# Patient Record
Sex: FEMALE | Race: WHITE | NOT HISPANIC OR LATINO | Employment: OTHER | URBAN - METROPOLITAN AREA
[De-identification: names, ages, dates, MRNs, and addresses within clinical notes are randomized per-mention and may not be internally consistent; named-entity substitution may affect disease eponyms.]

---

## 2017-04-28 ENCOUNTER — TRANSCRIBE ORDERS (OUTPATIENT)
Dept: ADMINISTRATIVE | Facility: HOSPITAL | Age: 82
End: 2017-04-28

## 2017-04-28 DIAGNOSIS — Z78.0 POSTMENOPAUSAL: ICD-10-CM

## 2017-04-28 DIAGNOSIS — Z12.31 VISIT FOR SCREENING MAMMOGRAM: Primary | ICD-10-CM

## 2017-05-05 ENCOUNTER — APPOINTMENT (OUTPATIENT)
Dept: BONE DENSITY | Facility: HOSPITAL | Age: 82
End: 2017-05-05
Attending: INTERNAL MEDICINE

## 2017-05-05 ENCOUNTER — APPOINTMENT (OUTPATIENT)
Dept: MAMMOGRAPHY | Facility: HOSPITAL | Age: 82
End: 2017-05-05
Attending: INTERNAL MEDICINE

## 2017-05-19 ENCOUNTER — APPOINTMENT (OUTPATIENT)
Dept: BONE DENSITY | Facility: HOSPITAL | Age: 82
End: 2017-05-19
Attending: INTERNAL MEDICINE

## 2017-05-19 ENCOUNTER — HOSPITAL ENCOUNTER (OUTPATIENT)
Dept: MAMMOGRAPHY | Facility: HOSPITAL | Age: 82
Discharge: HOME | End: 2017-05-19
Attending: INTERNAL MEDICINE | Admitting: INTERNAL MEDICINE

## 2017-05-19 DIAGNOSIS — Z78.0 POSTMENOPAUSAL: ICD-10-CM

## 2017-05-19 DIAGNOSIS — Z12.31 VISIT FOR SCREENING MAMMOGRAM: ICD-10-CM

## 2017-05-19 PROCEDURE — G0202 SCR MAMMO BI INCL CAD: HCPCS

## 2017-05-19 PROCEDURE — 77063 BREAST TOMOSYNTHESIS BI: CPT

## 2017-05-19 PROCEDURE — 77080 DXA BONE DENSITY AXIAL: CPT

## 2017-09-15 ENCOUNTER — HOSPITAL ENCOUNTER (OUTPATIENT)
Dept: INFUSION THERAPY | Facility: HOSPITAL | Age: 82
Discharge: HOME | End: 2017-09-15

## 2017-09-15 DIAGNOSIS — M81.0 SENILE OSTEOPOROSIS: Primary | ICD-10-CM

## 2017-10-26 ENCOUNTER — HOSPITAL ENCOUNTER (OUTPATIENT)
Dept: INFUSION THERAPY | Facility: HOSPITAL | Age: 82
Setting detail: INFUSION SERIES
Discharge: HOME | End: 2017-10-26
Admitting: INTERNAL MEDICINE

## 2017-10-26 VITALS
WEIGHT: 123 LBS | BODY MASS INDEX: 21.79 KG/M2 | RESPIRATION RATE: 16 BRPM | SYSTOLIC BLOOD PRESSURE: 162 MMHG | HEART RATE: 85 BPM | HEIGHT: 63 IN | DIASTOLIC BLOOD PRESSURE: 81 MMHG | OXYGEN SATURATION: 99 % | TEMPERATURE: 97.6 F

## 2017-10-26 DIAGNOSIS — M81.0 SENILE OSTEOPOROSIS: Primary | ICD-10-CM

## 2017-10-26 LAB
CALCIUM SPEC-SCNC: 8.9 MG/DL (ref 8.8–10.5)
CREAT BLD-MCNC: 1.63 MG/DL (ref 0.57–1)
GFR SERPL CREATININE-BSD FRML MDRD: 30 ML/MIN/1.73

## 2017-10-26 RX ORDER — LEVOTHYROXINE SODIUM 88 UG/1
88 TABLET ORAL DAILY
COMMUNITY

## 2017-10-26 RX ORDER — MIRTAZAPINE 15 MG/1
15 TABLET, FILM COATED ORAL NIGHTLY
COMMUNITY
End: 2018-04-25

## 2017-10-26 RX ORDER — LISINOPRIL AND HYDROCHLOROTHIAZIDE 12.5; 1 MG/1; MG/1
1 TABLET ORAL DAILY
COMMUNITY
End: 2018-04-20 | Stop reason: HOSPADM

## 2017-10-26 RX ORDER — CLONAZEPAM 0.5 MG/1
0.5 TABLET ORAL NIGHTLY
Status: ON HOLD | COMMUNITY
End: 2018-04-26

## 2017-10-26 RX ORDER — ZOLEDRONIC ACID 5 MG/100ML
5 INJECTION, SOLUTION INTRAVENOUS ONCE
Status: DISCONTINUED | OUTPATIENT
Start: 2017-10-26 | End: 2017-10-28 | Stop reason: HOSPADM

## 2017-10-26 NOTE — PROGRESS NOTES
1100 PT. TO [facility] FOR SCHEDULED RECLAST INFUSION, LABS DRAWN PER LAB. CREATININE LEVEL ELEVATED, DR. ELDRIDGE NOTIFIED, WILL HOLD INJECTION FOR TODAY, PT. DISCHARGED WITH DAUGHTER. FILOMENA BARRETO RN

## 2017-10-26 NOTE — PATIENT INSTRUCTIONS
Call Dr. Dion [facility] at [phone] Zoledronic Acid injection (Paget's Disease, Osteoporosis)  What is this medicine?  ZOLEDRONIC ACID (NICHOLAS wendi preston ik AS id) lowers the amount of calcium loss from bone. It is used to treat Paget's disease and osteoporosis in women.  This medicine may be used for other purposes; ask your health care provider or pharmacist if you have questions.  COMMON BRAND NAME(S): Reclast, Zometa  What should I tell my health care provider before I take this medicine?  They need to know if you have any of these conditions:  -aspirin-sensitive asthma  -cancer, especially if you are receiving medicines used to treat cancer  -dental disease or wear dentures  -infection  -kidney disease  -low levels of calcium in the blood  -past surgery on the parathyroid gland or intestines  -receiving corticosteroids like dexamethasone or prednisone  -an unusual or allergic reaction to zoledronic acid, other medicines, foods, dyes, or preservatives  -pregnant or trying to get pregnant  -breast-feeding  How should I use this medicine?  This medicine is for infusion into a vein. It is given by a health care professional in a hospital or clinic setting.  Talk to your pediatrician regarding the use of this medicine in children. This medicine is not approved for use in children.  Overdosage: If you think you have taken too much of this medicine contact a poison control center or emergency room at once.  NOTE: This medicine is only for you. Do not share this medicine with others.  What if I miss a dose?  It is important not to miss your dose. Call your doctor or health care professional if you are unable to keep an appointment.  What may interact with this medicine?  -certain antibiotics given by injection  -NSAIDs, medicines for pain and inflammation, like ibuprofen or naproxen  -some diuretics like bumetanide, furosemide  -teriparatide  This list may not describe all possible interactions. Give your health  care provider a list of all the medicines, herbs, non-prescription drugs, or dietary supplements you use. Also tell them if you smoke, drink alcohol, or use illegal drugs. Some items may interact with your medicine.  What should I watch for while using this medicine?  Visit your doctor or health care professional for regular checkups. It may be some time before you see the benefit from this medicine. Do not stop taking your medicine unless your doctor tells you to. Your doctor may order blood tests or other tests to see how you are doing.  Women should inform their doctor if they wish to become pregnant or think they might be pregnant. There is a potential for serious side effects to an unborn child. Talk to your health care professional or pharmacist for more information.  You should make sure that you get enough calcium and vitamin D while you are taking this medicine. Discuss the foods you eat and the vitamins you take with your health care professional.  Some people who take this medicine have severe bone, joint, and/or muscle pain. This medicine may also increase your risk for jaw problems or a broken thigh bone. Tell your doctor right away if you have severe pain in your jaw, bones, joints, or muscles. Tell your doctor if you have any pain that does not go away or that gets worse.  Tell your dentist and dental surgeon that you are taking this medicine. You should not have major dental surgery while on this medicine. See your dentist to have a dental exam and fix any dental problems before starting this medicine. Take good care of your teeth while on this medicine. Make sure you see your dentist for regular follow-up appointments.  What side effects may I notice from receiving this medicine?  Side effects that you should report to your doctor or health care professional as soon as possible:  -allergic reactions like skin rash, itching or hives, swelling of the face, lips, or tongue  -anxiety, confusion, or  "depression  -breathing problems  -changes in vision  -eye pain  -feeling faint or lightheaded, falls  -jaw pain, especially after dental work  -mouth sores  -muscle cramps, stiffness, or weakness  -redness, blistering, peeling or loosening of the skin, including inside the mouth  -trouble passing urine or change in the amount of urine  Side effects that usually do not require medical attention (report to your doctor or health care professional if they continue or are bothersome):  -bone, joint, or muscle pain  -constipation  -diarrhea  -fever  -hair loss  -irritation at site where injected  -loss of appetite  -nausea, vomiting  -stomach upset  -trouble sleeping  -trouble swallowing  -weak or tired  This list may not describe all possible side effects. Call your doctor for medical advice about side effects. You may report side effects to FDA at 5-438-FDA-8782.  Where should I keep my medicine?  This drug is given in a hospital or clinic and will not be stored at home.  NOTE: This sheet is a summary. It may not cover all possible information. If you have questions about this medicine, talk to your doctor, pharmacist, or health care provider.     © 2017, [employer]/Gold [employer]. (2015-05-16 14:19:57)   if you have any problems or concerns.    We know you have a Choice in healthcare and appreciate you using [facility].  Our purpose is to provide you \"Excellent Care\".  We hope that you will always choose us in the future and continue to recommend us to your family and friends.                "

## 2018-04-18 ENCOUNTER — APPOINTMENT (OUTPATIENT)
Dept: CT IMAGING | Facility: HOSPITAL | Age: 83
End: 2018-04-18
Attending: INTERNAL MEDICINE

## 2018-04-18 ENCOUNTER — HOSPITAL ENCOUNTER (EMERGENCY)
Facility: HOSPITAL | Age: 83
Discharge: ANOTHER HOSPITAL | End: 2018-04-18
Attending: EMERGENCY MEDICINE | Admitting: EMERGENCY MEDICINE

## 2018-04-18 ENCOUNTER — APPOINTMENT (OUTPATIENT)
Dept: CT IMAGING | Facility: HOSPITAL | Age: 83
End: 2018-04-18

## 2018-04-18 ENCOUNTER — APPOINTMENT (OUTPATIENT)
Dept: CARDIOLOGY | Facility: HOSPITAL | Age: 83
End: 2018-04-18
Attending: INTERNAL MEDICINE

## 2018-04-18 ENCOUNTER — APPOINTMENT (OUTPATIENT)
Dept: GENERAL RADIOLOGY | Facility: HOSPITAL | Age: 83
End: 2018-04-18

## 2018-04-18 ENCOUNTER — HOSPITAL ENCOUNTER (INPATIENT)
Facility: HOSPITAL | Age: 83
LOS: 2 days | Discharge: HOME HEALTH | End: 2018-04-20
Attending: INTERNAL MEDICINE | Admitting: INTERNAL MEDICINE

## 2018-04-18 VITALS
OXYGEN SATURATION: 98 % | SYSTOLIC BLOOD PRESSURE: 115 MMHG | RESPIRATION RATE: 14 BRPM | HEIGHT: 62 IN | BODY MASS INDEX: 20.89 KG/M2 | TEMPERATURE: 97.9 F | HEART RATE: 91 BPM | WEIGHT: 113.5 LBS | DIASTOLIC BLOOD PRESSURE: 69 MMHG

## 2018-04-18 DIAGNOSIS — R55 SYNCOPE AND COLLAPSE: Primary | ICD-10-CM

## 2018-04-18 DIAGNOSIS — R77.8 TROPONIN I ABOVE REFERENCE RANGE: ICD-10-CM

## 2018-04-18 DIAGNOSIS — S09.90XA INJURY OF HEAD, INITIAL ENCOUNTER: ICD-10-CM

## 2018-04-18 DIAGNOSIS — M25.551 BILATERAL HIP PAIN: ICD-10-CM

## 2018-04-18 DIAGNOSIS — E87.5 HYPERKALEMIA: ICD-10-CM

## 2018-04-18 DIAGNOSIS — S00.03XA HEMATOMA OF SCALP, INITIAL ENCOUNTER: ICD-10-CM

## 2018-04-18 DIAGNOSIS — E87.1 HYPONATREMIA: ICD-10-CM

## 2018-04-18 DIAGNOSIS — N18.9 CHRONIC RENAL IMPAIRMENT, UNSPECIFIED CKD STAGE: ICD-10-CM
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
## 2018-04-18 DIAGNOSIS — I61.5 INTRAVENTRICULAR HEMORRHAGE (HCC): ICD-10-CM

## 2018-04-18 DIAGNOSIS — M25.552 BILATERAL HIP PAIN: ICD-10-CM

## 2018-04-18 DIAGNOSIS — R53.81 DEBILITY: Primary | ICD-10-CM

## 2018-04-18 DIAGNOSIS — I49.1 PAC (PREMATURE ATRIAL CONTRACTION): ICD-10-CM

## 2018-04-18 PROBLEM — T79.6XXA TRAUMATIC RHABDOMYOLYSIS (HCC): Status: ACTIVE | Noted: 2018-04-18

## 2018-04-18 PROBLEM — I10 ESSENTIAL HYPERTENSION: Status: ACTIVE | Noted: 2018-04-18

## 2018-04-18 PROBLEM — N18.30 CHRONIC KIDNEY DISEASE, STAGE 3 (HCC): Status: ACTIVE | Noted: 2018-04-18

## 2018-04-18 PROBLEM — I62.9 INTRACRANIAL HEMORRHAGE (HCC): Status: ACTIVE | Noted: 2018-04-18

## 2018-04-18 PROBLEM — S06.30AA TRAUMATIC INTRAVENTRICULAR HEMORRHAGE: Status: ACTIVE | Noted: 2018-04-18

## 2018-04-18 PROBLEM — R82.90 ABNORMAL URINALYSIS: Status: ACTIVE | Noted: 2018-04-18

## 2018-04-18 LAB
ALBUMIN SERPL-MCNC: 4 G/DL (ref 3.5–5.2)
ALBUMIN/GLOB SERPL: 1.3 G/DL
ALP SERPL-CCNC: 74 U/L (ref 40–129)
ALT SERPL W P-5'-P-CCNC: 13 U/L (ref 5–33)
ANION GAP SERPL CALCULATED.3IONS-SCNC: 11.2 MMOL/L
AORTIC DIMENSIONLESS INDEX: 1.1 (DI)
APTT PPP: 26.9 SECONDS (ref 24.3–38.1)
AST SERPL-CCNC: 23 U/L (ref 5–32)
BACTERIA UR QL AUTO: ABNORMAL /HPF
BASOPHILS # BLD AUTO: 0.04 10*3/MM3 (ref 0–0.2)
BASOPHILS NFR BLD AUTO: 0.3 % (ref 0–2)
BH CV ECHO MEAS - AO MAX PG (FULL): -3.8 MMHG
BH CV ECHO MEAS - AO MAX PG: 8.4 MMHG
BH CV ECHO MEAS - AO MEAN PG (FULL): -1 MMHG
BH CV ECHO MEAS - AO MEAN PG: 5 MMHG
BH CV ECHO MEAS - AO V2 MAX: 145 CM/SEC
BH CV ECHO MEAS - AO V2 MEAN: 106 CM/SEC
BH CV ECHO MEAS - AO V2 VTI: 32 CM
BH CV ECHO MEAS - AVA(I,A): 2.7 CM^2
BH CV ECHO MEAS - AVA(I,D): 2.7 CM^2
BH CV ECHO MEAS - AVA(V,A): 3.1 CM^2
BH CV ECHO MEAS - AVA(V,D): 3.1 CM^2
BH CV ECHO MEAS - BSA(HAYCOCK): 1.5 M^2
BH CV ECHO MEAS - BSA: 1.5 M^2
BH CV ECHO MEAS - BZI_BMI: 20.9 KILOGRAMS/M^2
BH CV ECHO MEAS - BZI_METRIC_HEIGHT: 157.5 CM
BH CV ECHO MEAS - BZI_METRIC_WEIGHT: 51.7 KG
BH CV ECHO MEAS - CONTRAST EF (2CH): 81 ML/M^2
BH CV ECHO MEAS - CONTRAST EF 4CH: 80.8 ML/M^2
BH CV ECHO MEAS - EDV(CUBED): 42.9 ML
BH CV ECHO MEAS - EDV(MOD-SP2): 42 ML
BH CV ECHO MEAS - EDV(MOD-SP4): 52 ML
BH CV ECHO MEAS - EDV(TEICH): 50.9 ML
BH CV ECHO MEAS - EF(CUBED): 86.4 %
BH CV ECHO MEAS - EF(MOD-BP): 80 %
BH CV ECHO MEAS - EF(MOD-SP2): 81 %
BH CV ECHO MEAS - EF(MOD-SP4): 80.8 %
BH CV ECHO MEAS - EF(TEICH): 80.9 %
BH CV ECHO MEAS - ESV(CUBED): 5.8 ML
BH CV ECHO MEAS - ESV(MOD-SP2): 8 ML
BH CV ECHO MEAS - ESV(MOD-SP4): 10 ML
BH CV ECHO MEAS - ESV(TEICH): 9.7 ML
BH CV ECHO MEAS - FS: 48.6 %
BH CV ECHO MEAS - IVS/LVPW: 1
BH CV ECHO MEAS - IVSD: 0.9 CM
BH CV ECHO MEAS - LAT PEAK E' VEL: 11 CM/SEC
BH CV ECHO MEAS - LV DIASTOLIC VOL/BSA (35-75): 34.5 ML/M^2
BH CV ECHO MEAS - LV MASS(C)D: 88.8 GRAMS
BH CV ECHO MEAS - LV MASS(C)DI: 59 GRAMS/M^2
BH CV ECHO MEAS - LV MAX PG: 12.3 MMHG
BH CV ECHO MEAS - LV MEAN PG: 6 MMHG
BH CV ECHO MEAS - LV SYSTOLIC VOL/BSA (12-30): 6.6 ML/M^2
BH CV ECHO MEAS - LV V1 MAX: 175 CM/SEC
BH CV ECHO MEAS - LV V1 MEAN: 114 CM/SEC
BH CV ECHO MEAS - LV V1 VTI: 34.5 CM
BH CV ECHO MEAS - LVIDD: 3.5 CM
BH CV ECHO MEAS - LVIDS: 1.8 CM
BH CV ECHO MEAS - LVLD AP2: 7.3 CM
BH CV ECHO MEAS - LVLD AP4: 7.3 CM
BH CV ECHO MEAS - LVLS AP2: 6.1 CM
BH CV ECHO MEAS - LVLS AP4: 6.1 CM
BH CV ECHO MEAS - LVOT AREA (M): 2.5 CM^2
BH CV ECHO MEAS - LVOT AREA: 2.5 CM^2
BH CV ECHO MEAS - LVOT DIAM: 1.8 CM
BH CV ECHO MEAS - LVPWD: 0.9 CM
BH CV ECHO MEAS - MED PEAK E' VEL: 14 CM/SEC
BH CV ECHO MEAS - MV DEC SLOPE: 626 CM/SEC^2
BH CV ECHO MEAS - MV DEC TIME: 0.21 SEC
BH CV ECHO MEAS - MV E MAX VEL: 119 CM/SEC
BH CV ECHO MEAS - MV MAX PG: 7 MMHG
BH CV ECHO MEAS - MV MEAN PG: 3 MMHG
BH CV ECHO MEAS - MV P1/2T MAX VEL: 133 CM/SEC
BH CV ECHO MEAS - MV P1/2T: 62.2 MSEC
BH CV ECHO MEAS - MV V2 MAX: 132 CM/SEC
BH CV ECHO MEAS - MV V2 MEAN: 79.6 CM/SEC
BH CV ECHO MEAS - MV V2 VTI: 31.6 CM
BH CV ECHO MEAS - MVA P1/2T LCG: 1.7 CM^2
BH CV ECHO MEAS - MVA(P1/2T): 3.5 CM^2
BH CV ECHO MEAS - MVA(VTI): 2.8 CM^2
BH CV ECHO MEAS - PA ACC TIME: 0.1 SEC
BH CV ECHO MEAS - PA MAX PG (FULL): 2 MMHG
BH CV ECHO MEAS - PA MAX PG: 3.2 MMHG
BH CV ECHO MEAS - PA PR(ACCEL): 34.5 MMHG
BH CV ECHO MEAS - PA V2 MAX: 88.9 CM/SEC
BH CV ECHO MEAS - PVA(V,A): 1.9 CM^2
BH CV ECHO MEAS - PVA(V,D): 1.9 CM^2
BH CV ECHO MEAS - QP/QS: 0.24
BH CV ECHO MEAS - RAP SYSTOLE: 3 MMHG
BH CV ECHO MEAS - RV MAX PG: 1.2 MMHG
BH CV ECHO MEAS - RV MEAN PG: 1 MMHG
BH CV ECHO MEAS - RV V1 MAX: 53.9 CM/SEC
BH CV ECHO MEAS - RV V1 MEAN: 32.1 CM/SEC
BH CV ECHO MEAS - RV V1 VTI: 6.8 CM
BH CV ECHO MEAS - RVOT AREA: 3.1 CM^2
BH CV ECHO MEAS - RVOT DIAM: 2 CM
BH CV ECHO MEAS - RVSP: 34.4 MMHG
BH CV ECHO MEAS - SI(CUBED): 24.6 ML/M^2
BH CV ECHO MEAS - SI(LVOT): 58.3 ML/M^2
BH CV ECHO MEAS - SI(MOD-SP2): 22.6 ML/M^2
BH CV ECHO MEAS - SI(MOD-SP4): 27.9 ML/M^2
BH CV ECHO MEAS - SI(TEICH): 27.3 ML/M^2
BH CV ECHO MEAS - SV(CUBED): 37 ML
BH CV ECHO MEAS - SV(LVOT): 87.8 ML
BH CV ECHO MEAS - SV(MOD-SP2): 34 ML
BH CV ECHO MEAS - SV(MOD-SP4): 42 ML
BH CV ECHO MEAS - SV(RVOT): 21.5 ML
BH CV ECHO MEAS - SV(TEICH): 41.1 ML
BH CV ECHO MEAS - TAPSE (>1.6): 1.8 CM2
BH CV ECHO MEAS - TR MAX VEL: 280 CM/SEC
BH CV ECHO MEASUREMENTS AVERAGE E/E' RATIO: 10
BH CV VAS BP RIGHT ARM: NORMAL MMHG
BH CV XLRA - RV BASE: 3 CM
BH CV XLRA - TDI S': 16 CM/SEC
BILIRUB SERPL-MCNC: 0.4 MG/DL (ref 0.2–1.2)
BILIRUB UR QL STRIP: NEGATIVE
BUN BLD-MCNC: 25 MG/DL (ref 8–23)
BUN/CREAT SERPL: 16.3 (ref 7–25)
CALCIUM SPEC-SCNC: 9.4 MG/DL (ref 8.8–10.5)
CHLORIDE SERPL-SCNC: 88 MMOL/L (ref 98–107)
CK SERPL-CCNC: 200 U/L (ref 26–142)
CLARITY UR: CLEAR
CO2 SERPL-SCNC: 29.8 MMOL/L (ref 22–29)
COLOR UR: YELLOW
CREAT BLD-MCNC: 1.53 MG/DL (ref 0.57–1)
DEPRECATED RDW RBC AUTO: 43.6 FL (ref 37–54)
EOSINOPHIL # BLD AUTO: 0.31 10*3/MM3 (ref 0.1–0.3)
EOSINOPHIL NFR BLD AUTO: 2.5 % (ref 0–4)
ERYTHROCYTE [DISTWIDTH] IN BLOOD BY AUTOMATED COUNT: 13.2 % (ref 11.5–14.5)
GFR SERPL CREATININE-BSD FRML MDRD: 32 ML/MIN/1.73
GLOBULIN UR ELPH-MCNC: 3.1 GM/DL
GLUCOSE BLD-MCNC: 105 MG/DL (ref 65–99)
GLUCOSE UR STRIP-MCNC: NEGATIVE MG/DL
HCT VFR BLD AUTO: 34.9 % (ref 37–47)
HGB BLD-MCNC: 12.1 G/DL (ref 12–16)
HGB UR QL STRIP.AUTO: ABNORMAL
HYALINE CASTS UR QL AUTO: ABNORMAL /LPF
IMM GRANULOCYTES # BLD: 0.05 10*3/MM3 (ref 0–0.03)
IMM GRANULOCYTES NFR BLD: 0.4 % (ref 0–0.5)
INR PPP: 1.02 (ref 0.9–1.1)
INR PPP: 1.09 (ref 0.9–1.1)
KETONES UR QL STRIP: NEGATIVE
LEFT ATRIUM VOLUME INDEX: 22 ML/M2
LEFT ATRIUM VOLUME: 31 CM3
LEUKOCYTE ESTERASE UR QL STRIP.AUTO: NEGATIVE
LV EF 2D ECHO EST: 80 %
LYMPHOCYTES # BLD AUTO: 1.22 10*3/MM3 (ref 0.6–4.8)
LYMPHOCYTES NFR BLD AUTO: 9.9 % (ref 20–45)
MAGNESIUM SERPL-MCNC: 1.9 MG/DL (ref 1.7–2.5)
MAXIMAL PREDICTED HEART RATE: 133 BPM
MCH RBC QN AUTO: 31.2 PG (ref 27–31)
MCHC RBC AUTO-ENTMCNC: 34.7 G/DL (ref 31–37)
MCV RBC AUTO: 89.9 FL (ref 81–99)
MONOCYTES # BLD AUTO: 0.75 10*3/MM3 (ref 0–1)
MONOCYTES NFR BLD AUTO: 6.1 % (ref 3–8)
MUCOUS THREADS URNS QL MICRO: ABNORMAL /HPF
NEUTROPHILS # BLD AUTO: 10 10*3/MM3 (ref 1.5–8.3)
NEUTROPHILS NFR BLD AUTO: 80.8 % (ref 45–70)
NITRITE UR QL STRIP: NEGATIVE
NRBC BLD MANUAL-RTO: 0 /100 WBC (ref 0–0)
PH UR STRIP.AUTO: 7 [PH] (ref 4.5–8)
PLATELET # BLD AUTO: 292 10*3/MM3 (ref 140–500)
PMV BLD AUTO: 8.8 FL (ref 7.4–10.4)
POTASSIUM BLD-SCNC: 4.5 MMOL/L (ref 3.5–5.2)
POTASSIUM BLD-SCNC: 5.4 MMOL/L (ref 3.5–5.2)
PROT SERPL-MCNC: 7.1 G/DL (ref 6–8.5)
PROT UR QL STRIP: NEGATIVE
PROTHROMBIN TIME: 13.4 SECONDS (ref 12.1–15)
PROTHROMBIN TIME: 13.9 SECONDS (ref 11.7–14.2)
RBC # BLD AUTO: 3.88 10*6/MM3 (ref 4.2–5.4)
RBC # UR: ABNORMAL /HPF
REF LAB TEST METHOD: ABNORMAL
SODIUM BLD-SCNC: 129 MMOL/L (ref 136–145)
SP GR UR STRIP: 1.01 (ref 1–1.03)
SQUAMOUS #/AREA URNS HPF: ABNORMAL /HPF
STRESS TARGET HR: 113 BPM
TROPONIN T SERPL-MCNC: 0.03 NG/ML (ref 0–0.03)
TROPONIN T SERPL-MCNC: 0.03 NG/ML (ref 0–0.03)
TROPONIN T SERPL-MCNC: 0.05 NG/ML (ref 0–0.03)
UROBILINOGEN UR QL STRIP: ABNORMAL
WBC NRBC COR # BLD: 12.37 10*3/MM3 (ref 4.8–10.8)
WBC UR QL AUTO: ABNORMAL /HPF

## 2018-04-18 PROCEDURE — 93005 ELECTROCARDIOGRAM TRACING: CPT | Performed by: INTERNAL MEDICINE

## 2018-04-18 PROCEDURE — 93306 TTE W/DOPPLER COMPLETE: CPT

## 2018-04-18 PROCEDURE — P9612 CATHETERIZE FOR URINE SPEC: HCPCS

## 2018-04-18 PROCEDURE — 82550 ASSAY OF CK (CPK): CPT | Performed by: EMERGENCY MEDICINE

## 2018-04-18 PROCEDURE — 93005 ELECTROCARDIOGRAM TRACING: CPT | Performed by: EMERGENCY MEDICINE

## 2018-04-18 PROCEDURE — 70450 CT HEAD/BRAIN W/O DYE: CPT

## 2018-04-18 PROCEDURE — 83735 ASSAY OF MAGNESIUM: CPT | Performed by: EMERGENCY MEDICINE

## 2018-04-18 PROCEDURE — 85730 THROMBOPLASTIN TIME PARTIAL: CPT | Performed by: EMERGENCY MEDICINE

## 2018-04-18 PROCEDURE — 81001 URINALYSIS AUTO W/SCOPE: CPT | Performed by: EMERGENCY MEDICINE

## 2018-04-18 PROCEDURE — 99221 1ST HOSP IP/OBS SF/LOW 40: CPT | Performed by: NEUROLOGICAL SURGERY

## 2018-04-18 PROCEDURE — 84484 ASSAY OF TROPONIN QUANT: CPT | Performed by: EMERGENCY MEDICINE

## 2018-04-18 PROCEDURE — 99285 EMERGENCY DEPT VISIT HI MDM: CPT

## 2018-04-18 PROCEDURE — 85025 COMPLETE CBC W/AUTO DIFF WBC: CPT | Performed by: EMERGENCY MEDICINE

## 2018-04-18 PROCEDURE — 93010 ELECTROCARDIOGRAM REPORT: CPT | Performed by: INTERNAL MEDICINE

## 2018-04-18 PROCEDURE — 84484 ASSAY OF TROPONIN QUANT: CPT | Performed by: INTERNAL MEDICINE

## 2018-04-18 PROCEDURE — 99285 EMERGENCY DEPT VISIT HI MDM: CPT | Performed by: EMERGENCY MEDICINE

## 2018-04-18 PROCEDURE — 85610 PROTHROMBIN TIME: CPT | Performed by: INTERNAL MEDICINE

## 2018-04-18 PROCEDURE — 80053 COMPREHEN METABOLIC PANEL: CPT | Performed by: EMERGENCY MEDICINE

## 2018-04-18 PROCEDURE — 72125 CT NECK SPINE W/O DYE: CPT

## 2018-04-18 PROCEDURE — 73523 X-RAY EXAM HIPS BI 5/> VIEWS: CPT

## 2018-04-18 PROCEDURE — 85610 PROTHROMBIN TIME: CPT | Performed by: EMERGENCY MEDICINE

## 2018-04-18 PROCEDURE — 84132 ASSAY OF SERUM POTASSIUM: CPT | Performed by: INTERNAL MEDICINE

## 2018-04-18 PROCEDURE — 87086 URINE CULTURE/COLONY COUNT: CPT | Performed by: EMERGENCY MEDICINE

## 2018-04-18 PROCEDURE — 93306 TTE W/DOPPLER COMPLETE: CPT | Performed by: INTERNAL MEDICINE

## 2018-04-18 RX ORDER — DULOXETIN HYDROCHLORIDE 30 MG/1
30 CAPSULE, DELAYED RELEASE ORAL DAILY
COMMUNITY
End: 2018-04-20 | Stop reason: HOSPADM

## 2018-04-18 RX ORDER — ONDANSETRON 4 MG/1
4 TABLET, ORALLY DISINTEGRATING ORAL EVERY 6 HOURS PRN
Status: DISCONTINUED | OUTPATIENT
Start: 2018-04-18 | End: 2018-04-20 | Stop reason: HOSPADM

## 2018-04-18 RX ORDER — SODIUM CHLORIDE 0.9 % (FLUSH) 0.9 %
10 SYRINGE (ML) INJECTION AS NEEDED
Status: DISCONTINUED | OUTPATIENT
Start: 2018-04-18 | End: 2018-04-18 | Stop reason: HOSPADM

## 2018-04-18 RX ORDER — SODIUM CHLORIDE 9 MG/ML
125 INJECTION, SOLUTION INTRAVENOUS CONTINUOUS
Status: DISCONTINUED | OUTPATIENT
Start: 2018-04-18 | End: 2018-04-18 | Stop reason: HOSPADM

## 2018-04-18 RX ORDER — CALCIUM CARBONATE 200(500)MG
2 TABLET,CHEWABLE ORAL 2 TIMES DAILY PRN
Status: DISCONTINUED | OUTPATIENT
Start: 2018-04-18 | End: 2018-04-20 | Stop reason: HOSPADM

## 2018-04-18 RX ORDER — CLONAZEPAM 0.5 MG/1
0.5 TABLET ORAL NIGHTLY
Status: DISCONTINUED | OUTPATIENT
Start: 2018-04-18 | End: 2018-04-20 | Stop reason: HOSPADM

## 2018-04-18 RX ORDER — ONDANSETRON 4 MG/1
4 TABLET, FILM COATED ORAL EVERY 6 HOURS PRN
Status: DISCONTINUED | OUTPATIENT
Start: 2018-04-18 | End: 2018-04-20 | Stop reason: HOSPADM

## 2018-04-18 RX ORDER — NITROGLYCERIN 0.4 MG/1
0.4 TABLET SUBLINGUAL
Status: DISCONTINUED | OUTPATIENT
Start: 2018-04-18 | End: 2018-04-20 | Stop reason: HOSPADM

## 2018-04-18 RX ORDER — BISACODYL 5 MG/1
5 TABLET, DELAYED RELEASE ORAL DAILY PRN
Status: DISCONTINUED | OUTPATIENT
Start: 2018-04-18 | End: 2018-04-20 | Stop reason: HOSPADM

## 2018-04-18 RX ORDER — ACETAMINOPHEN 325 MG/1
650 TABLET ORAL EVERY 4 HOURS PRN
Status: DISCONTINUED | OUTPATIENT
Start: 2018-04-18 | End: 2018-04-20 | Stop reason: HOSPADM

## 2018-04-18 RX ORDER — HYDROCODONE BITARTRATE AND ACETAMINOPHEN 5; 325 MG/1; MG/1
1 TABLET ORAL EVERY 4 HOURS PRN
Status: DISCONTINUED | OUTPATIENT
Start: 2018-04-18 | End: 2018-04-20 | Stop reason: HOSPADM

## 2018-04-18 RX ORDER — SODIUM CHLORIDE 0.9 % (FLUSH) 0.9 %
1-10 SYRINGE (ML) INJECTION AS NEEDED
Status: DISCONTINUED | OUTPATIENT
Start: 2018-04-18 | End: 2018-04-20 | Stop reason: HOSPADM

## 2018-04-18 RX ORDER — SODIUM CHLORIDE 9 MG/ML
75 INJECTION, SOLUTION INTRAVENOUS CONTINUOUS
Status: DISCONTINUED | OUTPATIENT
Start: 2018-04-18 | End: 2018-04-18

## 2018-04-18 RX ORDER — LEVOTHYROXINE SODIUM 88 UG/1
88 TABLET ORAL
Status: DISCONTINUED | OUTPATIENT
Start: 2018-04-18 | End: 2018-04-20 | Stop reason: HOSPADM

## 2018-04-18 RX ORDER — MIRTAZAPINE 15 MG/1
15 TABLET, FILM COATED ORAL NIGHTLY
Status: DISCONTINUED | OUTPATIENT
Start: 2018-04-18 | End: 2018-04-20 | Stop reason: HOSPADM

## 2018-04-18 RX ORDER — ONDANSETRON 2 MG/ML
4 INJECTION INTRAMUSCULAR; INTRAVENOUS EVERY 6 HOURS PRN
Status: DISCONTINUED | OUTPATIENT
Start: 2018-04-18 | End: 2018-04-20 | Stop reason: HOSPADM

## 2018-04-18 RX ADMIN — SODIUM CHLORIDE 500 ML: 9 INJECTION, SOLUTION INTRAVENOUS at 05:42

## 2018-04-18 RX ADMIN — CLONAZEPAM 0.5 MG: 0.5 TABLET ORAL at 21:36

## 2018-04-18 RX ADMIN — LEVOTHYROXINE SODIUM 88 MCG: 88 TABLET ORAL at 17:32

## 2018-04-18 RX ADMIN — SODIUM CHLORIDE 75 ML/HR: 9 INJECTION, SOLUTION INTRAVENOUS at 12:30

## 2018-04-18 RX ADMIN — MIRTAZAPINE 15 MG: 15 TABLET, FILM COATED ORAL at 21:36

## 2018-04-18 NOTE — ED NOTES
Pt has a red spot about the size of a quarter on the left posterior side of her head.  No bleeding noted.  Ice bag replenished.     Ning Villafana RN  04/18/18 2249

## 2018-04-18 NOTE — ED NOTES
[facility], message left for Dr. Rider.      Sadie Maddox  04/18/18 0656

## 2018-04-18 NOTE — ED NOTES
[facility] EMS crew here.  Report given to crew.  Nursing unit notified that patient was on the way.     Ning Villafana RN  04/18/18 0904

## 2018-04-18 NOTE — ED PROVIDER NOTES
"Subjective   History of Present Illness   History obtained from patient, and family at bedside.    History of Present Illness    Chief complaint: Head injury and bilateral hip pain status post fall    Location: As above    Quality/Severity:  Mild discomfort without movement    Timing/Duration: Abrupt onset this evening, estimated around 20/3/30    Modifying Factors: Hip pain worse with range of motion    Associated Symptoms: Amnestic to event, otherwise appropriate according to family.  Patient has recently been experiencing episodic \"vertigo\" ×2 weeks.  No report of focal weakness.  No other recent illness    Narrative: 87-year-old female who lives alone and called her daughter-in-law this morning after falling in her home and being unable to get up.  Patient is amnestic to event.  Patient has been taking some Antivert as of late for some dizzy spells that she terms as vertigo.    PCP: Dion Ann                                                                                                                                                                      Review of Systems  All other systems reviewed and are otherwise negative as related chief complaint.    Past Medical History:   Diagnosis Date   • Depression    • Disease of thyroid gland    • Hypertension    • Pulmonary embolism     SPONTANEOUS HEMMORHAGE       No Known Allergies    Past Surgical History:   Procedure Laterality Date   • BREAST BIOPSY      BENIGN   • HYSTERECTOMY     • THYROID SURGERY      PARATHYROID REMOVAL   • VENA CAVA FILTER INSERTION         Family History   Problem Relation Age of Onset   • Breast cancer Daughter        Social History     Social History   • Marital status:      Social History Main Topics   • Smoking status: Former Smoker     Packs/day: 0.50     Years: 25.00     Quit date: 1974   • Smokeless tobacco: Never Used   • Alcohol use 0.6 oz/week     1 Glasses of wine per week      Comment: RARELY   • Drug use: No   • " Sexual activity: Defer     Other Topics Concern   • Not on file     ED Triage Vitals [04/18/18 0506]   Temp Heart Rate Resp BP SpO2   97.7 °F (36.5 °C) 89 18 155/83 95 %      Temp src Heart Rate Source Patient Position BP Location FiO2 (%)   Oral -- -- -- --       Objective   Physical Exam   Constitutional: She is oriented to person, place, and time. She appears well-developed. No distress.   HENT:   Head: Normocephalic. Head is without raccoon's eyes and without Rao's sign.       Mouth/Throat: Oropharynx is clear and moist.   Eyes: Conjunctivae and EOM are normal. Pupils are equal, round, and reactive to light. No scleral icterus.   Neck: Neck supple.   C-collar in place.  No focal bony tenderness.  No step-off.  C5-8 motor and sensory grossly intact   Cardiovascular: Normal rate, regular rhythm and intact distal pulses.    Pulmonary/Chest: Effort normal and breath sounds normal. No respiratory distress.   Abdominal: Soft. There is no tenderness.   Musculoskeletal:   Bilateral hip discomfort with extension however there is no discomfort with passive internal and external rotation.  No shortening or deformity appreciated.   Neurological: She is alert and oriented to person, place, and time.   Skin: Skin is warm and dry.   Psychiatric: She has a normal mood and affect. Thought content normal.   Nursing note and vitals reviewed.      Procedures         ED Course  ED Course   Value Comment By Time   Sodium: (!) 129 Chronic    Results for WILMER DA SILVA (MRN [id number]) as of 4/18/2018 06:12    12/2/2014 06:51  Sodium: 131 (L)    12/3/2014 05:55  Sodium: 130 (L)    12/4/2014 05:50  Sodium: 129 (L)    10/26/2017 11:28    4/18/2018 05:32  Sodium: 129 (L)   Andrea Bradshaw MD 04/18 0613   Creatinine: (!) 1.53 Stable  Results for WILMER DA SILVA (MRN [id number]) as of 4/18/2018 06:12    10/26/2017 11:28  Creatinine: 1.63 (H)   Andrea Bradshaw MD 04/18 0613    CONSULT        Provider: Dr. Hurt -  Neurosurgery    Discussion: Reviewed patient history, ED presentation and evaluation.  He is reassured that the patient presented 4-6 hours after her fall but is agreeable to consult.  He does recommend that the patient remain nothing by mouth until repeat imaging 4-6 hours after the initial head CT.  Every hour neuro checks are recommended until that point.    Agreeable c treatment and planned disposition.   Andrea Bradshaw MD 04/18 0658    Patient agreeable with plan for transfer.  We will contact the hospitalist at [facility] for transfer. Andrea Bradshaw MD 04/18 0659    Case discussed with Dr. Smith who is agreeable to consult with the hospitalist at [facility] from the whom we are awaiting a return call. Andrea Bradshaw MD 04/18 0733    CONSULT        Provider: Dr. Adry GASPAR [facility]    Discussion: accepts pt in transfer    Agreeable c treatment and planned disposition.   Andrea Bradshaw MD 04/18 0800      EKG           EKG time: 0 533  Rhythm/Rate: Sinus, 75-90; frequent PACs  P waves and SD: ALONDRA within normal limits  QRS, axis: Narrow QRS complex   ST and T waves: No acute appearing ischemic changes interpretation is slightly affected by underlying artifact; QTC within normal limits     Interpreted contemporaneously with treatment by me, independently viewed      RADIOLOGY        Study: XR bilateral hips with pelvis    Findings: No acute finding the right hip or pelvis; chronic-appearing change to left hip with no acute fracture or sublux identified.    Interpreted contemporaneously with treatment by me, independently viewed by me    Results for orders placed or performed during the hospital encounter of 04/18/18   Comprehensive Metabolic Panel   Result Value Ref Range    Glucose 105 (H) 65 - 99 mg/dL    BUN 25 (H) 8 - 23 mg/dL    Creatinine 1.53 (H) 0.57 - 1.00 mg/dL    Sodium 129 (L) 136 - 145 mmol/L    Potassium 5.4 (H) 3.5 - 5.2 mmol/L    Chloride 88 (L) 98 - 107 mmol/L    CO2 29.8  (H) 22.0 - 29.0 mmol/L    Calcium 9.4 8.8 - 10.5 mg/dL    Total Protein 7.1 6.0 - 8.5 g/dL    Albumin 4.00 3.50 - 5.20 g/dL    ALT (SGPT) 13 5 - 33 U/L    AST (SGOT) 23 5 - 32 U/L    Alkaline Phosphatase 74 40 - 129 U/L    Total Bilirubin 0.4 0.2 - 1.2 mg/dL    eGFR Non African Amer 32 (L) >60 mL/min/1.73    Globulin 3.1 gm/dL    A/G Ratio 1.3 g/dL    BUN/Creatinine Ratio 16.3 7.0 - 25.0    Anion Gap 11.2 mmol/L   aPTT   Result Value Ref Range    PTT 26.9 24.3 - 38.1 seconds   Protime-INR   Result Value Ref Range    Protime 13.4 12.1 - 15.0 Seconds    INR 1.02 0.90 - 1.10   Urinalysis With / Culture If Indicated - Urine, Catheter   Result Value Ref Range    Color, UA Yellow Yellow, Straw    Appearance, UA Clear Clear    pH, UA 7.0 4.5 - 8.0    Specific Gravity, UA 1.010 1.003 - 1.030    Glucose, UA Negative Negative    Ketones, UA Negative Negative, 80 mg/dL (3+), >=160 mg/dL (4+)    Bilirubin, UA Negative Negative    Blood, UA Trace (A) Negative    Protein, UA Negative Negative    Leuk Esterase, UA Negative Negative    Nitrite, UA Negative Negative    Urobilinogen, UA 0.2 E.U./dL 0.2 - 1.0 E.U./dL   Troponin   Result Value Ref Range    Troponin T 0.048 (H) 0.000 - 0.030 ng/mL   CK   Result Value Ref Range    Creatine Kinase 200 (H) 26 - 142 U/L   Magnesium   Result Value Ref Range    Magnesium 1.9 1.7 - 2.5 mg/dL   CBC Auto Differential   Result Value Ref Range    WBC 12.37 (H) 4.80 - 10.80 10*3/mm3    RBC 3.88 (L) 4.20 - 5.40 10*6/mm3    Hemoglobin 12.1 12.0 - 16.0 g/dL    Hematocrit 34.9 (L) 37.0 - 47.0 %    MCV 89.9 81.0 - 99.0 fL    MCH 31.2 (H) 27.0 - 31.0 pg    MCHC 34.7 31.0 - 37.0 g/dL    RDW 13.2 11.5 - 14.5 %    RDW-SD 43.6 37.0 - 54.0 fl    MPV 8.8 7.4 - 10.4 fL    Platelets 292 140 - 500 10*3/mm3    Neutrophil % 80.8 (H) 45.0 - 70.0 %    Lymphocyte % 9.9 (L) 20.0 - 45.0 %    Monocyte % 6.1 3.0 - 8.0 %    Eosinophil % 2.5 0.0 - 4.0 %    Basophil % 0.3 0.0 - 2.0 %    Immature Grans % 0.4 0.0 - 0.5 %     Neutrophils, Absolute 10.00 (H) 1.50 - 8.30 10*3/mm3    Lymphocytes, Absolute 1.22 0.60 - 4.80 10*3/mm3    Monocytes, Absolute 0.75 0.00 - 1.00 10*3/mm3    Eosinophils, Absolute 0.31 (H) 0.10 - 0.30 10*3/mm3    Basophils, Absolute 0.04 0.00 - 0.20 10*3/mm3    Immature Grans, Absolute 0.05 (H) 0.00 - 0.03 10*3/mm3    nRBC 0.0 0.0 - 0.0 /100 WBC   Urinalysis, Microscopic Only - Urine, Clean Catch   Result Value Ref Range    RBC, UA None Seen None Seen /HPF    WBC, UA 0-2 (A) None Seen /HPF    Bacteria, UA 1+ (A) None Seen /HPF    Squamous Epithelial Cells, UA 0-2 None Seen, 0-2 /HPF    Hyaline Casts, UA 0-2 None Seen /LPF    Mucus, UA Small/1+ (A) None Seen, Trace /HPF    Methodology Manual Light Microscopy        Ct Head Without Contrast    Result Date: 4/18/2018  Narrative: CT HEAD, NONCONTRAST, 4/18/2018:  HISTORY: 87-year-old female in the ED after head injury. She was found down in her home by a family mineral this morning after an apparent fall but does not recall event. Left posterior scalp hematoma.  TECHNIQUE: CT examination of the head without IV contrast. Radiation dose reduction techniques included automated exposure control or exposure modulation based on body size. Radiation audit for CT and nuclear cardiology exams in the last 12 months: 0.  FINDINGS: A left posterolateral parietal scalp hematoma is noted, but there is no visible skull fracture.  There is a tiny amount of isolated, acute intraventricular hemorrhage layering within the posterior horn left lateral ventricle. No parenchymal hematoma or subarachnoid hemorrhage is identified.  Mild generalized cerebral atrophy. Mild diffuse low-attenuation white matter changes are nonspecific but likely related to chronic small vessel disease.  No evidence of mass, mass effect, cerebral edema or hydrocephalus at this time.      Impression: 1. Isolated small volume acute intraventricular hemorrhage layering within the posterior horn left lateral ventricle. 2.  No other acute intracranial abnormality is identified. 3. Left parietal scalp hematoma. No skull fracture. 4. Mild diffuse chronic changes as noted above.  This report was finalized on 4/18/2018 6:38 AM by Dr. Malcom Machuca MD.      Ct Cervical Spine Without Contrast    Result Date: 4/18/2018  Narrative: CT CERVICAL SPINE, 4/18/2018:  HISTORY: 87-year-old female in the ED after head injury. She was found down in her home by a family member this morning after an apparent fall but does not recall event. Left posterior scalp hematoma  TECHNIQUE: Axial CT images of the cervical spine from the skull base to the upper margin of T5 with multiplanar image reconstruction. Radiation dose reduction techniques included automated exposure control or exposure modulation based on body size. Radiation audit for CT and nuclear cardiology exams in the last 12 months: 0.  FINDINGS: No fracture or other acute osseous abnormality is demonstrated. Mild to moderate degenerative disc space changes at C5-6 and C6-7. Mild to moderate degenerative facet arthropathy throughout the cervical spine bilaterally. Cervical vertebral alignment is normal. Demineralization suggests osteoporosis.      Impression: 1. No acute osseous abnormality. 2. Multilevel degenerative changes as noted above.  This report was finalized on 4/18/2018 6:42 AM by Dr. Malcom Machuca MD.      Xr Hips Bilateral With Or Without Pelvis 5 View    Result Date: 4/18/2018  Narrative: PELVIS AND HIPS, 4/18/2018:  HISTORY: 87-year-old female in the ED after a fall in the bathroom this morning with headache injury. Bilateral hip pain.  TECHNIQUE: AP pelvis and hips. Lateral radiograph of each hip.  FINDINGS: No acute fracture or dislocation is demonstrated involving the pelvis or hips. Three cannulated fixation screws traverse an old left femoral neck fracture.  Mild to moderate degenerative arthropathy involving both hips, greater on the right. Mild demineralization.       Impression: 1. No acute osseous abnormality. 2. ORIF old left femoral neck fracture. 3. Degenerative arthropathy both hips, greater on the right.  This report was finalized on 4/18/2018 7:41 AM by Dr. Malcom Machuca MD.                MDM  Number of Diagnoses or Management Options     Amount and/or Complexity of Data Reviewed  Clinical lab tests: reviewed and ordered  Tests in the radiology section of CPT®: reviewed and ordered  Tests in the medicine section of CPT®: reviewed and ordered  Decide to obtain previous medical records or to obtain history from someone other than the patient: yes  Review and summarize past medical records: yes  Discuss the patient with other providers: yes  Independent visualization of images, tracings, or specimens: yes        Final diagnoses:   Syncope and collapse   Injury of head, initial encounter   Intraventricular hemorrhage   Hematoma of scalp, initial encounter   Hyperkalemia   Troponin I above reference range   Hyponatremia   Chronic renal impairment, unspecified CKD stage   Bilateral hip pain   Fall, initial encounter   PAC (premature atrial contraction)            Andrea Bradshaw MD  04/18/18 0748       Andrea Bradshaw MD  04/18/18 0843

## 2018-04-18 NOTE — ED NOTES
Dr Bradshaw speaking with Dr Hurt, neurosurgery at [facility], about patient transfer.     Inna Salas, RN  04/18/18 2140

## 2018-04-19 ENCOUNTER — APPOINTMENT (OUTPATIENT)
Dept: CARDIOLOGY | Facility: HOSPITAL | Age: 83
End: 2018-04-19
Attending: INTERNAL MEDICINE

## 2018-04-19 PROBLEM — N17.9 AKI (ACUTE KIDNEY INJURY) (HCC): Status: ACTIVE | Noted: 2018-04-19

## 2018-04-19 LAB
ANION GAP SERPL CALCULATED.3IONS-SCNC: 11.6 MMOL/L
BASOPHILS # BLD AUTO: 0.01 10*3/MM3 (ref 0–0.2)
BASOPHILS NFR BLD AUTO: 0.1 % (ref 0–1.5)
BH CV XLRA MEAS LEFT CCA RATIO VEL: 69.9 CM/SEC
BH CV XLRA MEAS LEFT DIST CCA EDV: 14.5 CM/SEC
BH CV XLRA MEAS LEFT DIST CCA PSV: 69.9 CM/SEC
BH CV XLRA MEAS LEFT DIST ICA EDV: -19 CM/SEC
BH CV XLRA MEAS LEFT DIST ICA PSV: -76.5 CM/SEC
BH CV XLRA MEAS LEFT ICA RATIO VEL: -76.5 CM/SEC
BH CV XLRA MEAS LEFT ICA/CCA RATIO: -1.1
BH CV XLRA MEAS LEFT MID ICA EDV: -13 CM/SEC
BH CV XLRA MEAS LEFT MID ICA PSV: -64.4 CM/SEC
BH CV XLRA MEAS LEFT PROX CCA EDV: 14.1 CM/SEC
BH CV XLRA MEAS LEFT PROX CCA PSV: 90.9 CM/SEC
BH CV XLRA MEAS LEFT PROX ECA EDV: -6.5 CM/SEC
BH CV XLRA MEAS LEFT PROX ECA PSV: -85.6 CM/SEC
BH CV XLRA MEAS LEFT PROX ICA EDV: -13.7 CM/SEC
BH CV XLRA MEAS LEFT PROX ICA PSV: -65.2 CM/SEC
BH CV XLRA MEAS LEFT PROX SCLA PSV: 65.7 CM/SEC
BH CV XLRA MEAS LEFT VERTEBRAL A EDV: 14.9 CM/SEC
BH CV XLRA MEAS LEFT VERTEBRAL A PSV: 58.1 CM/SEC
BH CV XLRA MEAS RIGHT CCA RATIO VEL: -68 CM/SEC
BH CV XLRA MEAS RIGHT DIST CCA EDV: -15.7 CM/SEC
BH CV XLRA MEAS RIGHT DIST CCA PSV: -68 CM/SEC
BH CV XLRA MEAS RIGHT DIST ICA EDV: -15.6 CM/SEC
BH CV XLRA MEAS RIGHT DIST ICA PSV: -67.9 CM/SEC
BH CV XLRA MEAS RIGHT ICA RATIO VEL: -67.9 CM/SEC
BH CV XLRA MEAS RIGHT ICA/CCA RATIO: 1
BH CV XLRA MEAS RIGHT MID ICA EDV: -16.9 CM/SEC
BH CV XLRA MEAS RIGHT MID ICA PSV: -59.7 CM/SEC
BH CV XLRA MEAS RIGHT PROX CCA EDV: 12.3 CM/SEC
BH CV XLRA MEAS RIGHT PROX CCA PSV: 75 CM/SEC
BH CV XLRA MEAS RIGHT PROX ECA EDV: -5.1 CM/SEC
BH CV XLRA MEAS RIGHT PROX ECA PSV: -51.9 CM/SEC
BH CV XLRA MEAS RIGHT PROX ICA EDV: 14.7 CM/SEC
BH CV XLRA MEAS RIGHT PROX ICA PSV: 65.7 CM/SEC
BH CV XLRA MEAS RIGHT PROX SCLA PSV: 92.7 CM/SEC
BH CV XLRA MEAS RIGHT VERTEBRAL A EDV: -6.3 CM/SEC
BH CV XLRA MEAS RIGHT VERTEBRAL A PSV: -41.6 CM/SEC
BUN BLD-MCNC: 17 MG/DL (ref 8–23)
BUN/CREAT SERPL: 15 (ref 7–25)
CALCIUM SPEC-SCNC: 8.5 MG/DL (ref 8.6–10.5)
CHLORIDE SERPL-SCNC: 94 MMOL/L (ref 98–107)
CK SERPL-CCNC: 148 U/L (ref 20–180)
CO2 SERPL-SCNC: 25.4 MMOL/L (ref 22–29)
CREAT BLD-MCNC: 1.13 MG/DL (ref 0.57–1)
DEPRECATED RDW RBC AUTO: 44.9 FL (ref 37–54)
EOSINOPHIL # BLD AUTO: 0.35 10*3/MM3 (ref 0–0.7)
EOSINOPHIL NFR BLD AUTO: 5 % (ref 0.3–6.2)
ERYTHROCYTE [DISTWIDTH] IN BLOOD BY AUTOMATED COUNT: 13.6 % (ref 11.7–13)
GFR SERPL CREATININE-BSD FRML MDRD: 46 ML/MIN/1.73
GLUCOSE BLD-MCNC: 91 MG/DL (ref 65–99)
HCT VFR BLD AUTO: 30.4 % (ref 35.6–45.5)
HGB BLD-MCNC: 10.2 G/DL (ref 11.9–15.5)
IMM GRANULOCYTES # BLD: 0.02 10*3/MM3 (ref 0–0.03)
IMM GRANULOCYTES NFR BLD: 0.3 % (ref 0–0.5)
LEFT ARM BP: NORMAL MMHG
LYMPHOCYTES # BLD AUTO: 1.57 10*3/MM3 (ref 0.9–4.8)
LYMPHOCYTES NFR BLD AUTO: 22.6 % (ref 19.6–45.3)
MCH RBC QN AUTO: 30.4 PG (ref 26.9–32)
MCHC RBC AUTO-ENTMCNC: 33.6 G/DL (ref 32.4–36.3)
MCV RBC AUTO: 90.7 FL (ref 80.5–98.2)
MONOCYTES # BLD AUTO: 0.81 10*3/MM3 (ref 0.2–1.2)
MONOCYTES NFR BLD AUTO: 11.7 % (ref 5–12)
NEUTROPHILS # BLD AUTO: 4.19 10*3/MM3 (ref 1.9–8.1)
NEUTROPHILS NFR BLD AUTO: 60.3 % (ref 42.7–76)
PLATELET # BLD AUTO: 259 10*3/MM3 (ref 140–500)
PMV BLD AUTO: 9.1 FL (ref 6–12)
POTASSIUM BLD-SCNC: 4.2 MMOL/L (ref 3.5–5.2)
RBC # BLD AUTO: 3.35 10*6/MM3 (ref 3.9–5.2)
RIGHT ARM BP: NORMAL MMHG
SODIUM BLD-SCNC: 131 MMOL/L (ref 136–145)
TROPONIN T SERPL-MCNC: 0.05 NG/ML (ref 0–0.03)
WBC NRBC COR # BLD: 6.95 10*3/MM3 (ref 4.5–10.7)

## 2018-04-19 PROCEDURE — 85025 COMPLETE CBC W/AUTO DIFF WBC: CPT | Performed by: INTERNAL MEDICINE

## 2018-04-19 PROCEDURE — 93880 EXTRACRANIAL BILAT STUDY: CPT

## 2018-04-19 PROCEDURE — 80048 BASIC METABOLIC PNL TOTAL CA: CPT | Performed by: INTERNAL MEDICINE

## 2018-04-19 PROCEDURE — 82550 ASSAY OF CK (CPK): CPT | Performed by: INTERNAL MEDICINE

## 2018-04-19 PROCEDURE — 99222 1ST HOSP IP/OBS MODERATE 55: CPT | Performed by: INTERNAL MEDICINE

## 2018-04-19 PROCEDURE — 84484 ASSAY OF TROPONIN QUANT: CPT | Performed by: HOSPITALIST

## 2018-04-19 RX ORDER — SODIUM CHLORIDE 9 MG/ML
100 INJECTION, SOLUTION INTRAVENOUS CONTINUOUS
Status: ACTIVE | OUTPATIENT
Start: 2018-04-19 | End: 2018-04-20

## 2018-04-19 RX ADMIN — LEVOTHYROXINE SODIUM 88 MCG: 88 TABLET ORAL at 08:05

## 2018-04-19 RX ADMIN — MIRTAZAPINE 15 MG: 15 TABLET, FILM COATED ORAL at 20:53

## 2018-04-19 RX ADMIN — CLONAZEPAM 0.5 MG: 0.5 TABLET ORAL at 20:53

## 2018-04-19 RX ADMIN — SODIUM CHLORIDE 100 ML/HR: 9 INJECTION, SOLUTION INTRAVENOUS at 14:26

## 2018-04-20 ENCOUNTER — APPOINTMENT (OUTPATIENT)
Dept: CARDIOLOGY | Facility: HOSPITAL | Age: 83
End: 2018-04-20
Attending: INTERNAL MEDICINE

## 2018-04-20 VITALS
WEIGHT: 121.7 LBS | HEIGHT: 62 IN | OXYGEN SATURATION: 97 % | HEART RATE: 79 BPM | DIASTOLIC BLOOD PRESSURE: 74 MMHG | BODY MASS INDEX: 22.39 KG/M2 | RESPIRATION RATE: 18 BRPM | TEMPERATURE: 98 F | SYSTOLIC BLOOD PRESSURE: 148 MMHG

## 2018-04-20 LAB
ALBUMIN SERPL-MCNC: 3.4 G/DL (ref 3.5–5.2)
ANION GAP SERPL CALCULATED.3IONS-SCNC: 13.2 MMOL/L
BACTERIA SPEC AEROBE CULT: NO GROWTH
BUN BLD-MCNC: 15 MG/DL (ref 8–23)
BUN/CREAT SERPL: 12.6 (ref 7–25)
CALCIUM SPEC-SCNC: 7.9 MG/DL (ref 8.6–10.5)
CHLORIDE SERPL-SCNC: 92 MMOL/L (ref 98–107)
CO2 SERPL-SCNC: 24.8 MMOL/L (ref 22–29)
CREAT BLD-MCNC: 1.19 MG/DL (ref 0.57–1)
GFR SERPL CREATININE-BSD FRML MDRD: 43 ML/MIN/1.73
GLUCOSE BLD-MCNC: 88 MG/DL (ref 65–99)
PHOSPHATE SERPL-MCNC: 2.7 MG/DL (ref 2.5–4.5)
POTASSIUM BLD-SCNC: 4.1 MMOL/L (ref 3.5–5.2)
SODIUM BLD-SCNC: 130 MMOL/L (ref 136–145)

## 2018-04-20 PROCEDURE — 99231 SBSQ HOSP IP/OBS SF/LOW 25: CPT | Performed by: INTERNAL MEDICINE

## 2018-04-20 PROCEDURE — 0296T HC EXT ECG > 48HR TO 21 DAY RCRD W/CONECT INTL RCRD: CPT

## 2018-04-20 PROCEDURE — 80069 RENAL FUNCTION PANEL: CPT | Performed by: HOSPITALIST

## 2018-04-20 RX ADMIN — LEVOTHYROXINE SODIUM 88 MCG: 88 TABLET ORAL at 06:53

## 2018-04-25 ENCOUNTER — HOSPITAL ENCOUNTER (OUTPATIENT)
Facility: HOSPITAL | Age: 83
Setting detail: OBSERVATION
Discharge: HOME | End: 2018-04-26
Attending: EMERGENCY MEDICINE | Admitting: INTERNAL MEDICINE

## 2018-04-25 DIAGNOSIS — N17.9 AKI (ACUTE KIDNEY INJURY) (HCC): ICD-10-CM

## 2018-04-25 DIAGNOSIS — E86.0 DEHYDRATION: Primary | ICD-10-CM

## 2018-04-25 PROBLEM — M48.50XA: Status: ACTIVE | Noted: 2018-04-25

## 2018-04-25 LAB
ALBUMIN SERPL-MCNC: 4 G/DL (ref 3.5–5.2)
ALBUMIN/GLOB SERPL: 1.4 G/DL
ALP SERPL-CCNC: 75 U/L (ref 40–129)
ALT SERPL W P-5'-P-CCNC: 12 U/L (ref 5–33)
ANION GAP SERPL CALCULATED.3IONS-SCNC: 13.2 MMOL/L
AST SERPL-CCNC: 19 U/L (ref 5–32)
BASOPHILS # BLD AUTO: 0.04 10*3/MM3 (ref 0–0.2)
BASOPHILS NFR BLD AUTO: 0.6 % (ref 0–2)
BILIRUB SERPL-MCNC: 0.4 MG/DL (ref 0.2–1.2)
BUN BLD-MCNC: 18 MG/DL (ref 8–23)
BUN/CREAT SERPL: 12.9 (ref 7–25)
CALCIUM SPEC-SCNC: 9.3 MG/DL (ref 8.8–10.5)
CHLORIDE SERPL-SCNC: 86 MMOL/L (ref 98–107)
CO2 SERPL-SCNC: 26.8 MMOL/L (ref 22–29)
CREAT BLD-MCNC: 1.39 MG/DL (ref 0.57–1)
DEPRECATED RDW RBC AUTO: 45.6 FL (ref 37–54)
EOSINOPHIL # BLD AUTO: 0.15 10*3/MM3 (ref 0.1–0.3)
EOSINOPHIL NFR BLD AUTO: 2.1 % (ref 0–4)
ERYTHROCYTE [DISTWIDTH] IN BLOOD BY AUTOMATED COUNT: 13.7 % (ref 11.5–14.5)
GFR SERPL CREATININE-BSD FRML MDRD: 36 ML/MIN/1.73
GLOBULIN UR ELPH-MCNC: 2.9 GM/DL
GLUCOSE BLD-MCNC: 208 MG/DL (ref 65–99)
HCT VFR BLD AUTO: 33.4 % (ref 37–47)
HGB BLD-MCNC: 11.6 G/DL (ref 12–16)
IMM GRANULOCYTES # BLD: 0.02 10*3/MM3 (ref 0–0.03)
IMM GRANULOCYTES NFR BLD: 0.3 % (ref 0–0.5)
LYMPHOCYTES # BLD AUTO: 1.42 10*3/MM3 (ref 0.6–4.8)
LYMPHOCYTES NFR BLD AUTO: 20 % (ref 20–45)
MAGNESIUM SERPL-MCNC: 1.8 MG/DL (ref 1.7–2.5)
MCH RBC QN AUTO: 31.5 PG (ref 27–31)
MCHC RBC AUTO-ENTMCNC: 34.7 G/DL (ref 31–37)
MCV RBC AUTO: 90.8 FL (ref 81–99)
MONOCYTES # BLD AUTO: 0.73 10*3/MM3 (ref 0–1)
MONOCYTES NFR BLD AUTO: 10.3 % (ref 3–8)
NEUTROPHILS # BLD AUTO: 4.74 10*3/MM3 (ref 1.5–8.3)
NEUTROPHILS NFR BLD AUTO: 66.7 % (ref 45–70)
NRBC BLD MANUAL-RTO: 0 /100 WBC (ref 0–0)
PLATELET # BLD AUTO: 344 10*3/MM3 (ref 140–500)
PMV BLD AUTO: 9.5 FL (ref 7.4–10.4)
POTASSIUM BLD-SCNC: 4.4 MMOL/L (ref 3.5–5.2)
PROT SERPL-MCNC: 6.9 G/DL (ref 6–8.5)
RBC # BLD AUTO: 3.68 10*6/MM3 (ref 4.2–5.4)
SODIUM BLD-SCNC: 126 MMOL/L (ref 136–145)
TROPONIN T SERPL-MCNC: 0.04 NG/ML (ref 0–0.03)
WBC NRBC COR # BLD: 7.1 10*3/MM3 (ref 4.8–10.8)

## 2018-04-25 PROCEDURE — 99284 EMERGENCY DEPT VISIT MOD MDM: CPT

## 2018-04-25 PROCEDURE — 99219 PR INITIAL OBSERVATION CARE/DAY 50 MINUTES: CPT | Performed by: FAMILY MEDICINE

## 2018-04-25 PROCEDURE — 99285 EMERGENCY DEPT VISIT HI MDM: CPT | Performed by: PHYSICIAN ASSISTANT

## 2018-04-25 PROCEDURE — 85025 COMPLETE CBC W/AUTO DIFF WBC: CPT | Performed by: PHYSICIAN ASSISTANT

## 2018-04-25 PROCEDURE — G0378 HOSPITAL OBSERVATION PER HR: HCPCS

## 2018-04-25 PROCEDURE — 83735 ASSAY OF MAGNESIUM: CPT | Performed by: PHYSICIAN ASSISTANT

## 2018-04-25 PROCEDURE — 80053 COMPREHEN METABOLIC PANEL: CPT | Performed by: PHYSICIAN ASSISTANT

## 2018-04-25 PROCEDURE — 96361 HYDRATE IV INFUSION ADD-ON: CPT

## 2018-04-25 PROCEDURE — 96360 HYDRATION IV INFUSION INIT: CPT

## 2018-04-25 PROCEDURE — 84484 ASSAY OF TROPONIN QUANT: CPT | Performed by: PHYSICIAN ASSISTANT

## 2018-04-25 RX ORDER — SODIUM CHLORIDE 9 MG/ML
40 INJECTION, SOLUTION INTRAVENOUS AS NEEDED
Status: DISCONTINUED | OUTPATIENT
Start: 2018-04-25 | End: 2018-04-26 | Stop reason: HOSPADM

## 2018-04-25 RX ORDER — SODIUM CHLORIDE 0.9 % (FLUSH) 0.9 %
10 SYRINGE (ML) INJECTION AS NEEDED
Status: DISCONTINUED | OUTPATIENT
Start: 2018-04-25 | End: 2018-04-26 | Stop reason: HOSPADM

## 2018-04-25 RX ORDER — LEVOTHYROXINE SODIUM 88 UG/1
88 TABLET ORAL
Status: DISCONTINUED | OUTPATIENT
Start: 2018-04-26 | End: 2018-04-26 | Stop reason: HOSPADM

## 2018-04-25 RX ORDER — SODIUM CHLORIDE 9 MG/ML
125 INJECTION, SOLUTION INTRAVENOUS CONTINUOUS
Status: DISCONTINUED | OUTPATIENT
Start: 2018-04-25 | End: 2018-04-26 | Stop reason: HOSPADM

## 2018-04-25 RX ORDER — SODIUM CHLORIDE 0.9 % (FLUSH) 0.9 %
1-10 SYRINGE (ML) INJECTION AS NEEDED
Status: DISCONTINUED | OUTPATIENT
Start: 2018-04-25 | End: 2018-04-26 | Stop reason: HOSPADM

## 2018-04-25 RX ORDER — CLONAZEPAM 0.5 MG/1
0.5 TABLET ORAL NIGHTLY
Status: DISCONTINUED | OUTPATIENT
Start: 2018-04-25 | End: 2018-04-26 | Stop reason: HOSPADM

## 2018-04-25 RX ADMIN — SODIUM CHLORIDE 125 ML/HR: 9 INJECTION, SOLUTION INTRAVENOUS at 16:45

## 2018-04-25 RX ADMIN — CLONAZEPAM 0.5 MG: 0.5 TABLET ORAL at 20:15

## 2018-04-26 VITALS
SYSTOLIC BLOOD PRESSURE: 138 MMHG | WEIGHT: 110 LBS | OXYGEN SATURATION: 97 % | HEIGHT: 62 IN | DIASTOLIC BLOOD PRESSURE: 68 MMHG | HEART RATE: 84 BPM | BODY MASS INDEX: 20.24 KG/M2 | RESPIRATION RATE: 16 BRPM | TEMPERATURE: 97.8 F

## 2018-04-26 PROBLEM — I49.1 PAC (PREMATURE ATRIAL CONTRACTION): Status: ACTIVE | Noted: 2018-04-26

## 2018-04-26 LAB
ANION GAP SERPL CALCULATED.3IONS-SCNC: 8.7 MMOL/L
BUN BLD-MCNC: 15 MG/DL (ref 8–23)
BUN/CREAT SERPL: 13.3 (ref 7–25)
CALCIUM SPEC-SCNC: 7.9 MG/DL (ref 8.8–10.5)
CHLORIDE SERPL-SCNC: 96 MMOL/L (ref 98–107)
CO2 SERPL-SCNC: 26.3 MMOL/L (ref 22–29)
CREAT BLD-MCNC: 1.13 MG/DL (ref 0.57–1)
GFR SERPL CREATININE-BSD FRML MDRD: 46 ML/MIN/1.73
GLUCOSE BLD-MCNC: 101 MG/DL (ref 65–99)
POTASSIUM BLD-SCNC: 4.2 MMOL/L (ref 3.5–5.2)
SODIUM BLD-SCNC: 131 MMOL/L (ref 136–145)
TSH SERPL DL<=0.05 MIU/L-ACNC: 0.42 MIU/ML (ref 0.27–4.2)

## 2018-04-26 PROCEDURE — 96361 HYDRATE IV INFUSION ADD-ON: CPT

## 2018-04-26 PROCEDURE — 99217 PR OBSERVATION CARE DISCHARGE MANAGEMENT: CPT | Performed by: NURSE PRACTITIONER

## 2018-04-26 PROCEDURE — 99213 OFFICE O/P EST LOW 20 MIN: CPT | Performed by: INTERNAL MEDICINE

## 2018-04-26 PROCEDURE — 93005 ELECTROCARDIOGRAM TRACING: CPT | Performed by: FAMILY MEDICINE

## 2018-04-26 PROCEDURE — 80048 BASIC METABOLIC PNL TOTAL CA: CPT | Performed by: FAMILY MEDICINE

## 2018-04-26 PROCEDURE — 93010 ELECTROCARDIOGRAM REPORT: CPT | Performed by: INTERNAL MEDICINE

## 2018-04-26 PROCEDURE — G0378 HOSPITAL OBSERVATION PER HR: HCPCS

## 2018-04-26 PROCEDURE — 84443 ASSAY THYROID STIM HORMONE: CPT | Performed by: NURSE PRACTITIONER

## 2018-04-26 RX ORDER — CLONAZEPAM 0.5 MG/1
TABLET ORAL
Status: ON HOLD
Start: 2018-04-26 | End: 2018-05-29

## 2018-04-26 RX ADMIN — SODIUM CHLORIDE 125 ML/HR: 9 INJECTION, SOLUTION INTRAVENOUS at 02:05

## 2018-04-26 RX ADMIN — LEVOTHYROXINE SODIUM 88 MCG: 0.09 TABLET ORAL at 10:08

## 2018-04-26 RX ADMIN — SODIUM CHLORIDE 125 ML/HR: 9 INJECTION, SOLUTION INTRAVENOUS at 12:15

## 2018-05-11 PROCEDURE — 0298T HOLTER MONITOR - 72 HOUR UP TO 21 DAY: CPT | Performed by: INTERNAL MEDICINE

## 2018-05-29 ENCOUNTER — APPOINTMENT (OUTPATIENT)
Dept: CT IMAGING | Facility: HOSPITAL | Age: 83
End: 2018-05-29
Attending: EMERGENCY MEDICINE

## 2018-05-29 ENCOUNTER — HOSPITAL ENCOUNTER (INPATIENT)
Facility: HOSPITAL | Age: 83
LOS: 6 days | Discharge: NURSING, REHAB OR LONG-TERM CARE | End: 2018-06-05
Attending: EMERGENCY MEDICINE | Admitting: INTERNAL MEDICINE

## 2018-05-29 DIAGNOSIS — E87.1 HYPONATREMIA: Primary | ICD-10-CM

## 2018-05-29 DIAGNOSIS — R41.0 CONFUSION: ICD-10-CM

## 2018-05-29 DIAGNOSIS — R13.11 ORAL PHASE DYSPHAGIA: ICD-10-CM

## 2018-05-29 LAB
ALBUMIN SERPL-MCNC: 3.9 G/DL (ref 3.5–5.2)
ALBUMIN/GLOB SERPL: 1.5 G/DL
ALP SERPL-CCNC: 73 U/L (ref 40–129)
ALT SERPL W P-5'-P-CCNC: 12 U/L (ref 5–33)
ANION GAP SERPL CALCULATED.3IONS-SCNC: 12.9 MMOL/L
ANION GAP SERPL CALCULATED.3IONS-SCNC: 8.7 MMOL/L
APTT PPP: 28.1 SECONDS (ref 24.3–38.1)
AST SERPL-CCNC: 20 U/L (ref 5–32)
BACTERIA UR QL AUTO: ABNORMAL /HPF
BASOPHILS # BLD AUTO: 0.02 10*3/MM3 (ref 0–0.2)
BASOPHILS NFR BLD AUTO: 0.3 % (ref 0–2)
BILIRUB SERPL-MCNC: 0.6 MG/DL (ref 0.2–1.2)
BILIRUB UR QL STRIP: NEGATIVE
BUN BLD-MCNC: 10 MG/DL (ref 8–23)
BUN BLD-MCNC: 12 MG/DL (ref 8–23)
BUN/CREAT SERPL: 10.2 (ref 7–25)
BUN/CREAT SERPL: 11.3 (ref 7–25)
CALCIUM SPEC-SCNC: 8.3 MG/DL (ref 8.8–10.5)
CALCIUM SPEC-SCNC: 9.3 MG/DL (ref 8.8–10.5)
CHLORIDE SERPL-SCNC: 86 MMOL/L (ref 98–107)
CHLORIDE SERPL-SCNC: 91 MMOL/L (ref 98–107)
CLARITY UR: CLEAR
CO2 SERPL-SCNC: 25.1 MMOL/L (ref 22–29)
CO2 SERPL-SCNC: 27.3 MMOL/L (ref 22–29)
COLOR UR: YELLOW
CREAT BLD-MCNC: 0.98 MG/DL (ref 0.57–1)
CREAT BLD-MCNC: 1.06 MG/DL (ref 0.57–1)
DEPRECATED RDW RBC AUTO: 44.4 FL (ref 37–54)
EOSINOPHIL # BLD AUTO: 0.17 10*3/MM3 (ref 0.1–0.3)
EOSINOPHIL NFR BLD AUTO: 2.4 % (ref 0–4)
ERYTHROCYTE [DISTWIDTH] IN BLOOD BY AUTOMATED COUNT: 13.5 % (ref 11.5–14.5)
GFR SERPL CREATININE-BSD FRML MDRD: 49 ML/MIN/1.73
GFR SERPL CREATININE-BSD FRML MDRD: 54 ML/MIN/1.73
GLOBULIN UR ELPH-MCNC: 2.6 GM/DL
GLUCOSE BLD-MCNC: 101 MG/DL (ref 65–99)
GLUCOSE BLD-MCNC: 117 MG/DL (ref 65–99)
GLUCOSE UR STRIP-MCNC: NEGATIVE MG/DL
HCT VFR BLD AUTO: 35.7 % (ref 37–47)
HGB BLD-MCNC: 12.4 G/DL (ref 12–16)
HGB UR QL STRIP.AUTO: ABNORMAL
HYALINE CASTS UR QL AUTO: ABNORMAL /LPF
IMM GRANULOCYTES # BLD: 0.03 10*3/MM3 (ref 0–0.03)
IMM GRANULOCYTES NFR BLD: 0.4 % (ref 0–0.5)
INR PPP: 1.06 (ref 0.9–1.1)
KETONES UR QL STRIP: NEGATIVE
LEUKOCYTE ESTERASE UR QL STRIP.AUTO: ABNORMAL
LYMPHOCYTES # BLD AUTO: 1.38 10*3/MM3 (ref 0.6–4.8)
LYMPHOCYTES NFR BLD AUTO: 19.2 % (ref 20–45)
MCH RBC QN AUTO: 31.1 PG (ref 27–31)
MCHC RBC AUTO-ENTMCNC: 34.7 G/DL (ref 31–37)
MCV RBC AUTO: 89.5 FL (ref 81–99)
MONOCYTES # BLD AUTO: 0.77 10*3/MM3 (ref 0–1)
MONOCYTES NFR BLD AUTO: 10.7 % (ref 3–8)
NEUTROPHILS # BLD AUTO: 4.82 10*3/MM3 (ref 1.5–8.3)
NEUTROPHILS NFR BLD AUTO: 67 % (ref 45–70)
NITRITE UR QL STRIP: NEGATIVE
NRBC BLD MANUAL-RTO: 0 /100 WBC (ref 0–0)
PH UR STRIP.AUTO: 8.5 [PH] (ref 4.5–8)
PLATELET # BLD AUTO: 312 10*3/MM3 (ref 140–500)
PMV BLD AUTO: 9.1 FL (ref 7.4–10.4)
POTASSIUM BLD-SCNC: 4.3 MMOL/L (ref 3.5–5.2)
POTASSIUM BLD-SCNC: 4.4 MMOL/L (ref 3.5–5.2)
PROT SERPL-MCNC: 6.5 G/DL (ref 6–8.5)
PROT UR QL STRIP: ABNORMAL
PROTHROMBIN TIME: 13.8 SECONDS (ref 12.1–15)
RBC # BLD AUTO: 3.99 10*6/MM3 (ref 4.2–5.4)
RBC # UR: ABNORMAL /HPF
REF LAB TEST METHOD: ABNORMAL
SODIUM BLD-SCNC: 124 MMOL/L (ref 136–145)
SODIUM BLD-SCNC: 127 MMOL/L (ref 136–145)
SP GR UR STRIP: 1.01 (ref 1–1.03)
SQUAMOUS #/AREA URNS HPF: ABNORMAL /HPF
TSH SERPL DL<=0.05 MIU/L-ACNC: 0.76 MIU/ML (ref 0.27–4.2)
UROBILINOGEN UR QL STRIP: ABNORMAL
WBC NRBC COR # BLD: 7.19 10*3/MM3 (ref 4.8–10.8)
WBC UR QL AUTO: ABNORMAL /HPF

## 2018-05-29 PROCEDURE — G0378 HOSPITAL OBSERVATION PER HR: HCPCS

## 2018-05-29 PROCEDURE — 99291 CRITICAL CARE FIRST HOUR: CPT | Performed by: EMERGENCY MEDICINE

## 2018-05-29 PROCEDURE — 84443 ASSAY THYROID STIM HORMONE: CPT | Performed by: EMERGENCY MEDICINE

## 2018-05-29 PROCEDURE — 99220 PR INITIAL OBSERVATION CARE/DAY 70 MINUTES: CPT | Performed by: INTERNAL MEDICINE

## 2018-05-29 PROCEDURE — 85025 COMPLETE CBC W/AUTO DIFF WBC: CPT | Performed by: EMERGENCY MEDICINE

## 2018-05-29 PROCEDURE — 85610 PROTHROMBIN TIME: CPT | Performed by: EMERGENCY MEDICINE

## 2018-05-29 PROCEDURE — 85730 THROMBOPLASTIN TIME PARTIAL: CPT | Performed by: EMERGENCY MEDICINE

## 2018-05-29 PROCEDURE — 99284 EMERGENCY DEPT VISIT MOD MDM: CPT

## 2018-05-29 PROCEDURE — 81001 URINALYSIS AUTO W/SCOPE: CPT | Performed by: EMERGENCY MEDICINE

## 2018-05-29 PROCEDURE — 70450 CT HEAD/BRAIN W/O DYE: CPT

## 2018-05-29 PROCEDURE — 25010000002 ENOXAPARIN PER 10 MG: Performed by: INTERNAL MEDICINE

## 2018-05-29 PROCEDURE — 80053 COMPREHEN METABOLIC PANEL: CPT | Performed by: EMERGENCY MEDICINE

## 2018-05-29 RX ORDER — LEVOTHYROXINE SODIUM 88 UG/1
88 TABLET ORAL DAILY
Status: DISCONTINUED | OUTPATIENT
Start: 2018-05-29 | End: 2018-06-05 | Stop reason: HOSPADM

## 2018-05-29 RX ORDER — BUSPIRONE HYDROCHLORIDE 15 MG/1
7.5 TABLET ORAL 2 TIMES DAILY
Status: DISCONTINUED | OUTPATIENT
Start: 2018-05-29 | End: 2018-05-31

## 2018-05-29 RX ORDER — SODIUM CHLORIDE 0.9 % (FLUSH) 0.9 %
1-10 SYRINGE (ML) INJECTION AS NEEDED
Status: DISCONTINUED | OUTPATIENT
Start: 2018-05-29 | End: 2018-06-05 | Stop reason: HOSPADM

## 2018-05-29 RX ORDER — SODIUM CHLORIDE 9 MG/ML
50 INJECTION, SOLUTION INTRAVENOUS CONTINUOUS
Status: DISCONTINUED | OUTPATIENT
Start: 2018-05-29 | End: 2018-05-31

## 2018-05-29 RX ORDER — BISACODYL 5 MG/1
5 TABLET, DELAYED RELEASE ORAL DAILY PRN
Status: DISCONTINUED | OUTPATIENT
Start: 2018-05-29 | End: 2018-06-05 | Stop reason: HOSPADM

## 2018-05-29 RX ORDER — SODIUM CHLORIDE 9 MG/ML
40 INJECTION, SOLUTION INTRAVENOUS AS NEEDED
Status: DISCONTINUED | OUTPATIENT
Start: 2018-05-29 | End: 2018-06-05 | Stop reason: HOSPADM

## 2018-05-29 RX ORDER — ONDANSETRON 4 MG/1
4 TABLET, FILM COATED ORAL EVERY 6 HOURS PRN
Status: DISCONTINUED | OUTPATIENT
Start: 2018-05-29 | End: 2018-06-05 | Stop reason: HOSPADM

## 2018-05-29 RX ORDER — SODIUM CHLORIDE 0.9 % (FLUSH) 0.9 %
10 SYRINGE (ML) INJECTION AS NEEDED
Status: DISCONTINUED | OUTPATIENT
Start: 2018-05-29 | End: 2018-06-05 | Stop reason: HOSPADM

## 2018-05-29 RX ORDER — BUSPIRONE HYDROCHLORIDE 10 MG/1
7.5 TABLET ORAL 2 TIMES DAILY
COMMUNITY
End: 2019-11-27 | Stop reason: HOSPADM

## 2018-05-29 RX ADMIN — BUSPIRONE HYDROCHLORIDE 7.5 MG: 15 TABLET ORAL at 13:57

## 2018-05-29 RX ADMIN — BUSPIRONE HYDROCHLORIDE 7.5 MG: 15 TABLET ORAL at 20:26

## 2018-05-29 RX ADMIN — SODIUM CHLORIDE 100 ML/HR: 9 INJECTION, SOLUTION INTRAVENOUS at 08:46

## 2018-05-29 RX ADMIN — ENOXAPARIN SODIUM 40 MG: 40 INJECTION SUBCUTANEOUS at 13:57

## 2018-05-29 RX ADMIN — SODIUM CHLORIDE 100 ML/HR: 9 INJECTION, SOLUTION INTRAVENOUS at 19:26

## 2018-05-30 LAB
ANION GAP SERPL CALCULATED.3IONS-SCNC: 11.5 MMOL/L
ANION GAP SERPL CALCULATED.3IONS-SCNC: 9.6 MMOL/L
BASOPHILS # BLD AUTO: 0.03 10*3/MM3 (ref 0–0.2)
BASOPHILS NFR BLD AUTO: 0.4 % (ref 0–2)
BILIRUB UR QL STRIP: NEGATIVE
BUN BLD-MCNC: 10 MG/DL (ref 8–23)
BUN BLD-MCNC: 9 MG/DL (ref 8–23)
BUN/CREAT SERPL: 9.3 (ref 7–25)
BUN/CREAT SERPL: 9.7 (ref 7–25)
CALCIUM SPEC-SCNC: 8 MG/DL (ref 8.8–10.5)
CALCIUM SPEC-SCNC: 8 MG/DL (ref 8.8–10.5)
CHLORIDE SERPL-SCNC: 95 MMOL/L (ref 98–107)
CHLORIDE SERPL-SCNC: 96 MMOL/L (ref 98–107)
CLARITY UR: CLEAR
CO2 SERPL-SCNC: 22.5 MMOL/L (ref 22–29)
CO2 SERPL-SCNC: 23.4 MMOL/L (ref 22–29)
COLOR UR: YELLOW
CREAT BLD-MCNC: 0.93 MG/DL (ref 0.57–1)
CREAT BLD-MCNC: 1.07 MG/DL (ref 0.57–1)
DEPRECATED RDW RBC AUTO: 47 FL (ref 37–54)
EOSINOPHIL # BLD AUTO: 0.25 10*3/MM3 (ref 0.1–0.3)
EOSINOPHIL NFR BLD AUTO: 3.7 % (ref 0–4)
ERYTHROCYTE [DISTWIDTH] IN BLOOD BY AUTOMATED COUNT: 13.8 % (ref 11.5–14.5)
GFR SERPL CREATININE-BSD FRML MDRD: 49 ML/MIN/1.73
GFR SERPL CREATININE-BSD FRML MDRD: 57 ML/MIN/1.73
GLUCOSE BLD-MCNC: 153 MG/DL (ref 65–99)
GLUCOSE BLD-MCNC: 89 MG/DL (ref 65–99)
GLUCOSE UR STRIP-MCNC: NEGATIVE MG/DL
HCT VFR BLD AUTO: 31.8 % (ref 37–47)
HGB BLD-MCNC: 10.9 G/DL (ref 12–16)
HGB UR QL STRIP.AUTO: NEGATIVE
IMM GRANULOCYTES # BLD: 0.02 10*3/MM3 (ref 0–0.03)
IMM GRANULOCYTES NFR BLD: 0.3 % (ref 0–0.5)
KETONES UR QL STRIP: NEGATIVE
LEUKOCYTE ESTERASE UR QL STRIP.AUTO: NEGATIVE
LYMPHOCYTES # BLD AUTO: 1.72 10*3/MM3 (ref 0.6–4.8)
LYMPHOCYTES NFR BLD AUTO: 25.5 % (ref 20–45)
MCH RBC QN AUTO: 31.5 PG (ref 27–31)
MCHC RBC AUTO-ENTMCNC: 34.3 G/DL (ref 31–37)
MCV RBC AUTO: 91.9 FL (ref 81–99)
MONOCYTES # BLD AUTO: 0.91 10*3/MM3 (ref 0–1)
MONOCYTES NFR BLD AUTO: 13.5 % (ref 3–8)
NEUTROPHILS # BLD AUTO: 3.81 10*3/MM3 (ref 1.5–8.3)
NEUTROPHILS NFR BLD AUTO: 56.6 % (ref 45–70)
NITRITE UR QL STRIP: NEGATIVE
NRBC BLD MANUAL-RTO: 0 /100 WBC (ref 0–0)
OSMOLALITY SERPL: 275 MOSM/KG (ref 280–301)
PH UR STRIP.AUTO: 7 [PH] (ref 4.5–8)
PLATELET # BLD AUTO: 249 10*3/MM3 (ref 140–500)
PMV BLD AUTO: 9.2 FL (ref 7.4–10.4)
POTASSIUM BLD-SCNC: 4 MMOL/L (ref 3.5–5.2)
POTASSIUM BLD-SCNC: 4.6 MMOL/L (ref 3.5–5.2)
PROT UR QL STRIP: NEGATIVE
RBC # BLD AUTO: 3.46 10*6/MM3 (ref 4.2–5.4)
SODIUM BLD-SCNC: 129 MMOL/L (ref 136–145)
SODIUM BLD-SCNC: 129 MMOL/L (ref 136–145)
SP GR UR STRIP: 1.01 (ref 1–1.03)
UROBILINOGEN UR QL STRIP: NORMAL
WBC NRBC COR # BLD: 6.74 10*3/MM3 (ref 4.8–10.8)

## 2018-05-30 PROCEDURE — 97161 PT EVAL LOW COMPLEX 20 MIN: CPT

## 2018-05-30 PROCEDURE — 85025 COMPLETE CBC W/AUTO DIFF WBC: CPT | Performed by: INTERNAL MEDICINE

## 2018-05-30 PROCEDURE — G8980 MOBILITY D/C STATUS: HCPCS

## 2018-05-30 PROCEDURE — G8989 SELF CARE D/C STATUS: HCPCS

## 2018-05-30 PROCEDURE — G8978 MOBILITY CURRENT STATUS: HCPCS

## 2018-05-30 PROCEDURE — 92610 EVALUATE SWALLOWING FUNCTION: CPT

## 2018-05-30 PROCEDURE — 83930 ASSAY OF BLOOD OSMOLALITY: CPT | Performed by: NURSE PRACTITIONER

## 2018-05-30 PROCEDURE — G8987 SELF CARE CURRENT STATUS: HCPCS

## 2018-05-30 PROCEDURE — G8996 SWALLOW CURRENT STATUS: HCPCS

## 2018-05-30 PROCEDURE — 99232 SBSQ HOSP IP/OBS MODERATE 35: CPT | Performed by: NURSE PRACTITIONER

## 2018-05-30 PROCEDURE — 80048 BASIC METABOLIC PNL TOTAL CA: CPT | Performed by: NURSE PRACTITIONER

## 2018-05-30 PROCEDURE — 81003 URINALYSIS AUTO W/O SCOPE: CPT | Performed by: NURSE PRACTITIONER

## 2018-05-30 PROCEDURE — 97165 OT EVAL LOW COMPLEX 30 MIN: CPT

## 2018-05-30 PROCEDURE — G8988 SELF CARE GOAL STATUS: HCPCS

## 2018-05-30 PROCEDURE — 83935 ASSAY OF URINE OSMOLALITY: CPT | Performed by: NURSE PRACTITIONER

## 2018-05-30 PROCEDURE — G8997 SWALLOW GOAL STATUS: HCPCS

## 2018-05-30 PROCEDURE — G8998 SWALLOW D/C STATUS: HCPCS

## 2018-05-30 PROCEDURE — 84300 ASSAY OF URINE SODIUM: CPT | Performed by: NURSE PRACTITIONER

## 2018-05-30 PROCEDURE — G8979 MOBILITY GOAL STATUS: HCPCS

## 2018-05-30 PROCEDURE — 25010000002 ENOXAPARIN PER 10 MG: Performed by: INTERNAL MEDICINE

## 2018-05-30 PROCEDURE — 80048 BASIC METABOLIC PNL TOTAL CA: CPT | Performed by: INTERNAL MEDICINE

## 2018-05-30 RX ADMIN — LEVOTHYROXINE SODIUM 88 MCG: 0.09 TABLET ORAL at 20:47

## 2018-05-30 RX ADMIN — BUSPIRONE HYDROCHLORIDE 7.5 MG: 15 TABLET ORAL at 20:47

## 2018-05-30 RX ADMIN — ENOXAPARIN SODIUM 40 MG: 40 INJECTION SUBCUTANEOUS at 15:42

## 2018-05-30 RX ADMIN — SODIUM CHLORIDE 100 ML/HR: 9 INJECTION, SOLUTION INTRAVENOUS at 07:29

## 2018-05-30 RX ADMIN — BUSPIRONE HYDROCHLORIDE 7.5 MG: 15 TABLET ORAL at 08:32

## 2018-05-31 LAB
ANION GAP SERPL CALCULATED.3IONS-SCNC: 10.7 MMOL/L
BUN BLD-MCNC: 11 MG/DL (ref 8–23)
BUN/CREAT SERPL: 12.5 (ref 7–25)
CALCIUM SPEC-SCNC: 8.1 MG/DL (ref 8.8–10.5)
CHLORIDE SERPL-SCNC: 96 MMOL/L (ref 98–107)
CO2 SERPL-SCNC: 22.3 MMOL/L (ref 22–29)
CORTIS SERPL-MCNC: 15.66 MCG/DL
CREAT BLD-MCNC: 0.88 MG/DL (ref 0.57–1)
CREAT UR-MCNC: 51.6 MG/DL
GFR SERPL CREATININE-BSD FRML MDRD: 61 ML/MIN/1.73
GLUCOSE BLD-MCNC: 94 MG/DL (ref 65–99)
OSMOLALITY UR: 361 MOSM/KG
POTASSIUM BLD-SCNC: 4.2 MMOL/L (ref 3.5–5.2)
SODIUM BLD-SCNC: 129 MMOL/L (ref 136–145)
SODIUM UR-SCNC: 124 MMOL/L

## 2018-05-31 PROCEDURE — 80048 BASIC METABOLIC PNL TOTAL CA: CPT | Performed by: NURSE PRACTITIONER

## 2018-05-31 PROCEDURE — 99232 SBSQ HOSP IP/OBS MODERATE 35: CPT | Performed by: NURSE PRACTITIONER

## 2018-05-31 PROCEDURE — 25010000002 ENOXAPARIN PER 10 MG: Performed by: INTERNAL MEDICINE

## 2018-05-31 PROCEDURE — 82533 TOTAL CORTISOL: CPT | Performed by: NURSE PRACTITIONER

## 2018-05-31 PROCEDURE — 82570 ASSAY OF URINE CREATININE: CPT | Performed by: NURSE PRACTITIONER

## 2018-05-31 RX ORDER — BUSPIRONE HYDROCHLORIDE 15 MG/1
7.5 TABLET ORAL EVERY 8 HOURS SCHEDULED
Status: DISCONTINUED | OUTPATIENT
Start: 2018-05-31 | End: 2018-06-05 | Stop reason: HOSPADM

## 2018-05-31 RX ORDER — AMLODIPINE BESYLATE 5 MG/1
5 TABLET ORAL
Status: DISCONTINUED | OUTPATIENT
Start: 2018-05-31 | End: 2018-06-05 | Stop reason: HOSPADM

## 2018-05-31 RX ADMIN — ENOXAPARIN SODIUM 40 MG: 40 INJECTION SUBCUTANEOUS at 12:15

## 2018-05-31 RX ADMIN — LEVOTHYROXINE SODIUM 88 MCG: 0.09 TABLET ORAL at 20:14

## 2018-05-31 RX ADMIN — BUSPIRONE HYDROCHLORIDE 7.5 MG: 15 TABLET ORAL at 21:09

## 2018-05-31 RX ADMIN — AMLODIPINE BESYLATE 5 MG: 5 TABLET ORAL at 13:50

## 2018-05-31 RX ADMIN — BUSPIRONE HYDROCHLORIDE 7.5 MG: 15 TABLET ORAL at 08:39

## 2018-05-31 RX ADMIN — BUSPIRONE HYDROCHLORIDE 7.5 MG: 15 TABLET ORAL at 13:51

## 2018-05-31 RX ADMIN — SODIUM CHLORIDE 50 ML/HR: 9 INJECTION, SOLUTION INTRAVENOUS at 01:05

## 2018-06-01 ENCOUNTER — APPOINTMENT (OUTPATIENT)
Dept: ULTRASOUND IMAGING | Facility: HOSPITAL | Age: 83
End: 2018-06-01

## 2018-06-01 ENCOUNTER — APPOINTMENT (OUTPATIENT)
Dept: MRI IMAGING | Facility: HOSPITAL | Age: 83
End: 2018-06-01

## 2018-06-01 LAB
ANION GAP SERPL CALCULATED.3IONS-SCNC: 9.4 MMOL/L
BASOPHILS # BLD AUTO: 0.03 10*3/MM3 (ref 0–0.2)
BASOPHILS NFR BLD AUTO: 0.3 % (ref 0–2)
BUN BLD-MCNC: 13 MG/DL (ref 8–23)
BUN/CREAT SERPL: 14.1 (ref 7–25)
CALCIUM SPEC-SCNC: 8.8 MG/DL (ref 8.8–10.5)
CHLORIDE SERPL-SCNC: 91 MMOL/L (ref 98–107)
CO2 SERPL-SCNC: 25.6 MMOL/L (ref 22–29)
CREAT BLD-MCNC: 0.92 MG/DL (ref 0.57–1)
DEPRECATED RDW RBC AUTO: 46.6 FL (ref 37–54)
EOSINOPHIL # BLD AUTO: 0.37 10*3/MM3 (ref 0.1–0.3)
EOSINOPHIL NFR BLD AUTO: 4.2 % (ref 0–4)
ERYTHROCYTE [DISTWIDTH] IN BLOOD BY AUTOMATED COUNT: 13.9 % (ref 11.5–14.5)
GFR SERPL CREATININE-BSD FRML MDRD: 58 ML/MIN/1.73
GLUCOSE BLD-MCNC: 99 MG/DL (ref 65–99)
HCT VFR BLD AUTO: 33.3 % (ref 37–47)
HGB BLD-MCNC: 11.6 G/DL (ref 12–16)
IMM GRANULOCYTES # BLD: 0.03 10*3/MM3 (ref 0–0.03)
IMM GRANULOCYTES NFR BLD: 0.3 % (ref 0–0.5)
LYMPHOCYTES # BLD AUTO: 1.36 10*3/MM3 (ref 0.6–4.8)
LYMPHOCYTES NFR BLD AUTO: 15.5 % (ref 20–45)
MCH RBC QN AUTO: 31.9 PG (ref 27–31)
MCHC RBC AUTO-ENTMCNC: 34.8 G/DL (ref 31–37)
MCV RBC AUTO: 91.5 FL (ref 81–99)
MONOCYTES # BLD AUTO: 0.96 10*3/MM3 (ref 0–1)
MONOCYTES NFR BLD AUTO: 11 % (ref 3–8)
NEUTROPHILS # BLD AUTO: 6 10*3/MM3 (ref 1.5–8.3)
NEUTROPHILS NFR BLD AUTO: 68.7 % (ref 45–70)
NRBC BLD MANUAL-RTO: 0 /100 WBC (ref 0–0)
NT-PROBNP SERPL-MCNC: 1030 PG/ML (ref 5–450)
PLATELET # BLD AUTO: 285 10*3/MM3 (ref 140–500)
PMV BLD AUTO: 9.6 FL (ref 7.4–10.4)
POTASSIUM BLD-SCNC: 4.6 MMOL/L (ref 3.5–5.2)
RBC # BLD AUTO: 3.64 10*6/MM3 (ref 4.2–5.4)
SODIUM BLD-SCNC: 126 MMOL/L (ref 136–145)
URATE SERPL-MCNC: 2.8 MG/DL (ref 3.4–7)
WBC NRBC COR # BLD: 8.75 10*3/MM3 (ref 4.8–10.8)

## 2018-06-01 PROCEDURE — 25010000002 ENOXAPARIN PER 10 MG: Performed by: INTERNAL MEDICINE

## 2018-06-01 PROCEDURE — 0 GADOBENATE DIMEGLUMINE 529 MG/ML SOLUTION: Performed by: INTERNAL MEDICINE

## 2018-06-01 PROCEDURE — 99232 SBSQ HOSP IP/OBS MODERATE 35: CPT | Performed by: NURSE PRACTITIONER

## 2018-06-01 PROCEDURE — 94799 UNLISTED PULMONARY SVC/PX: CPT

## 2018-06-01 PROCEDURE — 70553 MRI BRAIN STEM W/O & W/DYE: CPT

## 2018-06-01 PROCEDURE — 99222 1ST HOSP IP/OBS MODERATE 55: CPT | Performed by: INTERNAL MEDICINE

## 2018-06-01 PROCEDURE — 84550 ASSAY OF BLOOD/URIC ACID: CPT | Performed by: NURSE PRACTITIONER

## 2018-06-01 PROCEDURE — 85025 COMPLETE CBC W/AUTO DIFF WBC: CPT | Performed by: NURSE PRACTITIONER

## 2018-06-01 PROCEDURE — 76775 US EXAM ABDO BACK WALL LIM: CPT

## 2018-06-01 PROCEDURE — 80048 BASIC METABOLIC PNL TOTAL CA: CPT | Performed by: NURSE PRACTITIONER

## 2018-06-01 PROCEDURE — A9577 INJ MULTIHANCE: HCPCS | Performed by: INTERNAL MEDICINE

## 2018-06-01 PROCEDURE — 83880 ASSAY OF NATRIURETIC PEPTIDE: CPT | Performed by: NURSE PRACTITIONER

## 2018-06-01 RX ORDER — SODIUM CHLORIDE 9 MG/ML
100 INJECTION, SOLUTION INTRAVENOUS CONTINUOUS
Status: DISCONTINUED | OUTPATIENT
Start: 2018-06-01 | End: 2018-06-02

## 2018-06-01 RX ADMIN — BUSPIRONE HYDROCHLORIDE 7.5 MG: 15 TABLET ORAL at 06:19

## 2018-06-01 RX ADMIN — SODIUM CHLORIDE 100 ML/HR: 9 INJECTION, SOLUTION INTRAVENOUS at 19:59

## 2018-06-01 RX ADMIN — AMLODIPINE BESYLATE 5 MG: 5 TABLET ORAL at 08:13

## 2018-06-01 RX ADMIN — LEVOTHYROXINE SODIUM 88 MCG: 0.09 TABLET ORAL at 22:11

## 2018-06-01 RX ADMIN — ENOXAPARIN SODIUM 40 MG: 40 INJECTION SUBCUTANEOUS at 11:46

## 2018-06-01 RX ADMIN — BUSPIRONE HYDROCHLORIDE 7.5 MG: 15 TABLET ORAL at 13:27

## 2018-06-01 RX ADMIN — SODIUM CHLORIDE 100 ML/HR: 9 INJECTION, SOLUTION INTRAVENOUS at 08:07

## 2018-06-01 RX ADMIN — GADOBENATE DIMEGLUMINE 10 ML: 529 INJECTION, SOLUTION INTRAVENOUS at 17:35

## 2018-06-01 RX ADMIN — BUSPIRONE HYDROCHLORIDE 7.5 MG: 15 TABLET ORAL at 22:11

## 2018-06-01 NOTE — CONSULTS
Date of Hospital Visit: 18  Encounter Provider: Sydney Sierra MD  Place of Service: [facility] CARDIOLOGY  Patient Name: Nicolasa Woodall  :1930  Referral Provider: dom new    Chief complaint        History of Present Illness    This is an 87-year-old female with a history of hypertension, pulmonary embolism, subdural hematoma who is brought in by her caregiver for increasing confusion.  On arrival, she was hyponatremic with sodium 124.  Chest CT of the head which showed no acute intracranial findings.        At home she has poor po intake and has lost weight.  Her TSH is stable.      She had an echocardiogram done 18 showing ejection fraction greater than 70% with mild aortic valve calcification.  She had a holter 2018 short runs of PAT but no atrial fibrillation.      She has no chest pain or dyspnea.  Her energy level is low.  She has no palpitations or tachycardia. She is having difficulty eating due to poor fitting dentures and so she has lost weight.  She lives along, nonsmoker and no alcohol.  Her daughter takes care of her.      At this time, she has no neuro deficits.  She would really like to go home.    her sodium dropped when IV fluids stopped her mental status did not change and so there is a current concern that maybe she is having TIAs.  Family really wants an aggressive workup.      Past Medical History:   Diagnosis Date   • Depression    • Disease of thyroid gland    • Hypertension    • Pulmonary embolism     SPONTANEOUS HEMMORHAGE   • Subdural hematoma        Past Surgical History:   Procedure Laterality Date   • BREAST BIOPSY      BENIGN   • HYSTERECTOMY     • JOINT REPLACEMENT     • THYROID SURGERY      PARATHYROID REMOVAL   • VENA CAVA FILTER INSERTION         Prescriptions Prior to Admission   Medication Sig Dispense Refill Last Dose   • busPIRone (BUSPAR) 10 MG tablet Take 7.5 mg by mouth 2 (Two) Times a Day.   2018 at  "2000   • levothyroxine (SYNTHROID, LEVOTHROID) 88 MCG tablet Take 88 mcg by mouth Daily.   5/28/2018 at 2000       Current Meds  Scheduled Meds:  amLODIPine 5 mg Oral Q24H   busPIRone 7.5 mg Oral Q8H   enoxaparin 40 mg Subcutaneous Q24H   levothyroxine 88 mcg Oral Daily     Continuous Infusions:  sodium chloride 100 mL/hr Last Rate: 100 mL/hr (06/01/18 0807)     PRN Meds:.bisacodyl  •  ondansetron  •  sodium chloride  •  Insert peripheral IV **AND** sodium chloride  •  sodium chloride    Allergies as of 05/29/2018   • (No Known Allergies)       Social History     Social History   • Marital status:      Spouse name: N/A   • Number of children: N/A   • Years of education: N/A     Occupational History   • Not on file.     Social History Main Topics   • Smoking status: Former Smoker     Packs/day: 0.50     Years: 25.00     Quit date: 1974   • Smokeless tobacco: Never Used   • Alcohol use 0.6 oz/week     1 Glasses of wine per week      Comment: RARELY   • Drug use: No   • Sexual activity: Defer     Other Topics Concern   • Not on file     Social History Narrative   • No narrative on file       Family History   Problem Relation Age of Onset   • Breast cancer Daughter        REVIEW OF SYSTEMS:   12 point ROS was performed and is negative except as outlined in HPI            Objective:   Temp:  [97.7 °F (36.5 °C)-98.5 °F (36.9 °C)] 97.7 °F (36.5 °C)  Heart Rate:  [68-88] 75  Resp:  [16-20] 18  BP: (132-183)/(74-78) 132/76  Body mass index is 22.12 kg/m².  Flowsheet Rows      First Filed Value   Admission Height  152 cm (59.84\") Documented at 05/29/2018 0738   Admission Weight  50.8 kg (112 lb) Documented at 05/29/2018 0738        Vitals:    06/01/18 1235   BP:    Pulse:    Resp:    Temp:    SpO2: 100%       General Appearance:    Alert, cooperative, in no acute distress   Head:    Normocephalic, without obvious abnormality, atraumatic   Eyes:            Lids and lashes normal, conjunctivae and sclerae normal, no   " icterus, no pallor, corneas clear, PERRLA   Ears:    Ears appear intact with no abnormalities noted   Throat:   No oral lesions, no thrush, oral mucosa moist   Neck:   No adenopathy, supple, trachea midline, no thyromegaly, no   carotid bruit, no JVD   Back:     No kyphosis present, no scoliosis present, no skin lesions, erythema or scars, no tenderness to percussion or palpation, range of motion normal   Lungs:     Clear to auscultation,respirations regular, even and unlabored    Heart:    Regular rhythm and normal rate, normal S1 and S2, no murmur, no gallop, no rub, no click   Chest Wall:    No abnormalities observed   Abdomen:     Normal bowel sounds, no masses, no organomegaly, soft        non-tender, non-distended, no guarding, no rebound  tenderness   Extremities:   Moves all extremities well, no edema, no cyanosis, no redness   Pulses:   Pulses palpable and equal bilaterally. Normal radial, carotid, femoral, dorsalis pedis and posterior tibial pulses bilaterally. Normal abdominal aorta   Skin:  Psychiatric:   No bleeding, bruising or rash    Alert and oriented x 3, normal mood and affect                 Lab Review:      Results from last 7 days  Lab Units 06/01/18  0453  05/29/18  0805   SODIUM mmol/L 126*  < > 124*   POTASSIUM mmol/L 4.6  < > 4.3   CHLORIDE mmol/L 91*  < > 86*   CO2 mmol/L 25.6  < > 25.1   BUN mg/dL 13  < > 12   CREATININE mg/dL 0.92  < > 1.06*   CALCIUM mg/dL 8.8  < > 9.3   BILIRUBIN mg/dL  --   --  0.6   ALK PHOS U/L  --   --  73   ALT (SGPT) U/L  --   --  12   AST (SGOT) U/L  --   --  20   GLUCOSE mg/dL 99  < > 101*   < > = values in this interval not displayed.          Results from last 7 days  Lab Units 06/01/18  0453 05/30/18  0331 05/29/18  0805   WBC 10*3/mm3 8.75 6.74 7.19   HEMOGLOBIN g/dL 11.6* 10.9* 12.4   HEMATOCRIT % 33.3* 31.8* 35.7*   PLATELETS 10*3/mm3 285 249 312       Results from last 7 days  Lab Units 05/29/18  0805   INR  1.06   APTT seconds 28.1             I  personally viewed and interpreted the patient's EKG/Telemetry data - SR with PACs  )  Patient Active Problem List   Diagnosis   • Traumatic intraventricular hemorrhage   • Hyponatremia   • Traumatic rhabdomyolysis   • Syncope   • Abnormal urinalysis   • Essential hypertension   • Chronic kidney disease, stage 3   • AMI (acute kidney injury)   • Dehydration   • Collapse of vertebra   • PAC (premature atrial contraction)     Assessment and Plan:    1. Hyponatremia - resolving, likely SIADH  2. Metabolic encephalopathy.   3. H/o subdural hematoma after a fall.   4. pSVT on holter.  There is no clear atrial fibrillation.  We do not know whether not she's had TIAs.  Given all this, I would not anticoagulate her until there is some better evidence that she is having ischemic neurologic events.  Her MRI of the head is pending      Will see as needed.  Call back if the MRI shows ischemic embolic events.     Sydney Sierra MD  06/01/18  3:04 PM.  Time spent in reviewing chart, discussion and examination:

## 2018-06-01 NOTE — PLAN OF CARE
Problem: Patient Care Overview  Goal: Plan of Care Review  Outcome: Ongoing (interventions implemented as appropriate)   06/01/18 0600   Coping/Psychosocial   Plan of Care Reviewed With patient   Plan of Care Review   Progress improving     Goal: Individualization and Mutuality  Outcome: Ongoing (interventions implemented as appropriate)    Goal: Discharge Needs Assessment  Outcome: Ongoing (interventions implemented as appropriate)      Problem: Fall Risk (Adult)  Goal: Identify Related Risk Factors and Signs and Symptoms  Outcome: Ongoing (interventions implemented as appropriate)    Goal: Absence of Fall  Outcome: Ongoing (interventions implemented as appropriate)      Problem: Fluid Volume Excess (Adult)  Goal: Identify Related Risk Factors and Signs and Symptoms  Outcome: Ongoing (interventions implemented as appropriate)    Goal: Optimal Fluid Balance  Outcome: Ongoing (interventions implemented as appropriate)      Problem: Confusion, Acute (Adult)  Goal: Identify Related Risk Factors and Signs and Symptoms  Outcome: Ongoing (interventions implemented as appropriate)    Goal: Cognitive/Functional Impairments Minimized  Outcome: Ongoing (interventions implemented as appropriate)    Goal: Safety  Outcome: Ongoing (interventions implemented as appropriate)      Problem: Skin Injury Risk (Adult)  Goal: Skin Health and Integrity  Outcome: Ongoing (interventions implemented as appropriate)

## 2018-06-01 NOTE — CONSULTS
Consults    Patient Care Team:  Dion Ann MD as PCP - General (Family Medicine)    Chief complaint:hyponatremia    Subjective     History of Present Illness    Patient is a 87 year old female with history of ckd III and chronic hyponatremia followed by Dr. Gaspar in the past. S/p subdural 2 months ago from fall. With na up to 129 yesterday then ivf stopped and now 126.  Has had poor po intake recently    Review of Systems     Past Medical History:   Diagnosis Date   • Depression    • Disease of thyroid gland    • Hypertension    • Pulmonary embolism     SPONTANEOUS HEMMORHAGE   • Subdural hematoma        Objective      Vital Signs  Temp:  [97.7 °F (36.5 °C)-98.5 °F (36.9 °C)] 97.7 °F (36.5 °C)  Heart Rate:  [68-88] 75  Resp:  [16-20] 18  BP: (132-183)/(74-78) 132/76    Physical Exam  General Appearance:  In no acute distress.    HEENT: Normal HEENT exam.    Lungs:  Normal respiratory rate and normal effort.  He is not in respiratory distress.  Breath sounds clear to auscultation.  No wheezes, rales or rhonchi.    Heart: Normal rate.  Regular rhythm.  S1 normal and S2 normal.  No murmur or gallop.   Chest: Asymmetric chest wall expansion.   Extremities: Normal range of motion.  There is no deformity, effusion or dependent edema.    Neurological: Patient is alert   Skin:  Warm and dry.  No rash.   Abdomen: Abdomen is soft and non-distended.  There are no signs of ascites.  There is no abdominal tenderness.    Results Review:    I reviewed the patient's new clinical results.    Scheduled Meds:  amLODIPine 5 mg Oral Q24H   busPIRone 7.5 mg Oral Q8H   enoxaparin 40 mg Subcutaneous Q24H   levothyroxine 88 mcg Oral Daily     Continuous Infusions:  sodium chloride 100 mL/hr Last Rate: 100 mL/hr (06/01/18 0807)     PRN Meds:.bisacodyl  •  ondansetron  •  sodium chloride  •  Insert peripheral IV **AND** sodium chloride  •  sodium chloride    Assessment/Plan     Active Problems:    Hyponatremia      Assessment & Plan  1.  Hyponatremia - chronic  2. TME  3. htn  4. calvin - resolved    Patient seen and examined. With chronic hyponatremia. Admitted with symptomatic sodium of 124.  Increased to 129 with ivf then dropped to 126 when fluids stopped. With h/o siadh in the past and recent intracranial bleed.  Labs reviewed. Picture is likely mixed.  Chronic siadh with poor po intake and hypovolemic hyponatremia.  paitent with difficulty eating due to ill fitting dentures.  Agree with ivf for now. Encourage po intake. Discussed with daughter increasing solute intake yet limiting free water. Will follow labs.  May need to try nacl tablets 1-2 grams per day if not improvement soon. Not candidate for tolvaptan      Indu Lemon MD  06/01/18  2:30 PM

## 2018-06-01 NOTE — PROGRESS NOTES
"SERVICE: [facility] HOSPITALIST    CONSULTANTS: Nephrology    CHIEF COMPLAINT: Follow-up hyponatremia, metabolic encephalopathy    SUBJECTIVE: The patient notes she is feeling well today and is unaware that her sodium dropped back down again.  She reports she is tolerating her diet and ambulating without issues.  She otherwise denies f/c/cough/soa/chest pain/n/v/d/abdominal pain or other new concerns.    OBJECTIVE:    /76 (BP Location: Left arm, Patient Position: Sitting)   Pulse 75   Temp 97.7 °F (36.5 °C) (Oral)   Resp 18   Ht 152 cm (59.84\")   Wt 51.1 kg (112 lb 10.5 oz)   SpO2 100%   BMI 22.12 kg/m²     MEDS/LABS REVIEWED AND ORDERED    amLODIPine 5 mg Oral Q24H   busPIRone 7.5 mg Oral Q8H   enoxaparin 40 mg Subcutaneous Q24H   levothyroxine 88 mcg Oral Daily     Physical Exam   Constitutional: She is oriented to person, place, and time. She appears well-developed and well-nourished.   HENT:   Head: Normocephalic and atraumatic.   Eyes: Pupils are equal, round, and reactive to light.   Cardiovascular: Normal rate.    Irregular rhythm   Pulmonary/Chest: Effort normal and breath sounds normal. No respiratory distress. She has no wheezes. She has no rales.   Abdominal: Soft. Bowel sounds are normal. She exhibits no distension. There is no tenderness. There is no guarding.   Musculoskeletal: She exhibits no edema.   Neurological: She is alert and oriented to person, place, and time.   Skin: Skin is warm and dry. No erythema.   Psychiatric: She has a normal mood and affect. Her behavior is normal.   Vitals reviewed.    LAB/DIAGNOSTICS:    Lab Results (last 24 hours)     Procedure Component Value Units Date/Time    BNP [[id number]]  (Abnormal) Collected:  06/01/18 0453    Specimen:  Blood Updated:  06/01/18 1219     proBNP 1,030.0 (H) pg/mL     Narrative:       Among patients with dyspnea, NT-proBNP is highly sensitive for the detection of acute congestive heart failure. In addition " NT-proBNP of <300 pg/ml effectively rules out acute congestive heart failure with 99% negative predictive value.    Uric Acid [[id number]]  (Abnormal) Collected:  06/01/18 0453    Specimen:  Blood Updated:  06/01/18 1209     Uric Acid 2.8 (L) mg/dL     CBC & Differential [[id number]] Collected:  06/01/18 0453    Specimen:  Blood Updated:  06/01/18 0933    Narrative:       The following orders were created for panel order CBC & Differential.  Procedure                               Abnormality         Status                     ---------                               -----------         ------                     CBC Auto Differential[[id number]]        Abnormal            Final result                 Please view results for these tests on the individual orders.    CBC Auto Differential [[id number]]  (Abnormal) Collected:  06/01/18 0453    Specimen:  Blood Updated:  06/01/18 0933     WBC 8.75 10*3/mm3      RBC 3.64 (L) 10*6/mm3      Hemoglobin 11.6 (L) g/dL      Hematocrit 33.3 (L) %      MCV 91.5 fL      MCH 31.9 (H) pg      MCHC 34.8 g/dL      RDW 13.9 %      RDW-SD 46.6 fl      MPV 9.6 fL      Platelets 285 10*3/mm3      Neutrophil % 68.7 %      Lymphocyte % 15.5 (L) %      Monocyte % 11.0 (H) %      Eosinophil % 4.2 (H) %      Basophil % 0.3 %      Immature Grans % 0.3 %      Neutrophils, Absolute 6.00 10*3/mm3      Lymphocytes, Absolute 1.36 10*3/mm3      Monocytes, Absolute 0.96 10*3/mm3      Eosinophils, Absolute 0.37 (H) 10*3/mm3      Basophils, Absolute 0.03 10*3/mm3      Immature Grans, Absolute 0.03 10*3/mm3      nRBC 0.0 /100 WBC     Basic Metabolic Panel [[id number]]  (Abnormal) Collected:  06/01/18 0453    Specimen:  Blood Updated:  06/01/18 0522     Glucose 99 mg/dL      BUN 13 mg/dL      Creatinine 0.92 mg/dL      Sodium 126 (L) mmol/L      Potassium 4.6 mmol/L      Chloride 91 (L) mmol/L      CO2 25.6 mmol/L      Calcium 8.8 mg/dL      eGFR Non African Amer 58 (L) mL/min/1.73      BUN/Creatinine Ratio  14.1     Anion Gap 9.4 mmol/L     Narrative:       The MDRD GFR formula is only valid for adults with stable renal function between ages 18 and 70.    Osmolality, Urine - Urine, Clean Catch [[id number]] Collected:  05/30/18 1601    Specimen:  Urine from Urine, Catheter Updated:  05/31/18 1654     Osmolality, Urine 361 mOsm/kg     Narrative:       Osmo Normal Reference Ranges:    Random:  mOsm/kg H2O, depending on fluid intake.  Random: >850 mOsm/kg H20, after 12 hour fluid restriction.    24 Hour: 300-900 mOsm/kg H2O.        No orders to display     Results for orders placed during the hospital encounter of 04/18/18   Adult Transthoracic Echo Complete W/ Cont if Necessary Per Protocol    Narrative · Left ventricular systolic function is hyperdynamic (EF > 70).  · There is mild calcification of the aortic valve.        Us Renal Bilateral    Result Date: 6/1/2018  Mild bilateral renal atrophy.  This report was finalized on 6/1/2018 9:40 AM by Dr. Andrea Paul MD.      ASSESSMENT/PLAN:  1, Acute on chronic hypovolemic hyponatremia, r/o SIADH:  Improvement on IV fluids and decreased sodium when IV fluids stopped  Sodium currently 126, patient asymptomatic (dropped overnight from 120 to 126 off IVFs)  AM cortisol normal  Serum osmolality low/urine creatinine 51.6  Urine osmolality 361, urine sodium 124  No sign of CHF, last echo 4/2018 overall normal  Suspect this is combination of multifactorial with nutritional and too much water intake  FENA 1.8%, Intrinsic, check renal ultrasound, consult nephrology  (NOTE: sodium 124 on screening labs on 5/11/18 per Dr. Ann)  Await further nephrology input     2. Metabolic encephalopathy, possible TIA (a-fib) vs secondary to acute on chronic hyponatremia: consult neurology  Check MRI brain  Resolved, mentation back to baseline despite today's drop back to 126  Orthostatics and UA negative  PT/OT evaluation recommended rolling walker, and  for PT/OT, ordered   Initially  "suspected secondary to dehydration with low dietary intake/high free water  Continue regular diet  Monitor     3. Hypertension: Improved today with start of amlodipine, consider BB  Previously on lisinopril/HCTZ in past with multiple electrolyte abnormalities  Anxiety contributory  Continue amlodipine 5 mg daily  Monitor      4. Dysphagia: SLP evaluated  Failed initial screen, witnessed coughing while eating, edentulous  SLP evaluation demonstrated no trouble swallowing  Recommends continued mechanical soft/ground/no raw vegetables or hard raw fruits, diced fruit canned ok     5. Chronic cardiac arrhythmia, A-fib; frequent PACs/PVCs: consult cardiology  ?Possible TIA? as above number 2  Zio patch done in 4/2018 notes \"rare isolated supraventricular and ventricular ectopic beats there are several short nonsustained runs of paroxysmal atrial tachycardia I do not see any clear A. fib and I don't see any sustained bradycardia or tachyarrhythmias\" per reading Dr. Orta  F/U Cardiology appointment 6/8/18 with Dr. Daley      6. Moderate chronic illness malnutrition: SLP and dietician following  Added ensure plus bid this admit  Monitor, Body mass index is 22.12 kg/m².  Per daughter, Dion Ann MD is monitoring as well     7. Depression/anxiety with h/o benzodiazepine dependence:   Anxiety improved today, continue buspar 7.5 mg three times daily  (changed to buspar during last admit secondary to hyponatremia with SSRI)  Last benzo rx noted 3/2018     8. Hypothyroidism: TSH normal on home levothyroxine 88 mcg daily     9. H/O subdural hematoma secondary to fall: CT head without acute findings     10. Chronic Anemia: hemoglobin stable at 11.6, suspect nutrition contributory  No active blood loss noted, f/u Dion Ann MD     "

## 2018-06-01 NOTE — PLAN OF CARE
Problem: Fall Risk (Adult)  Goal: Absence of Fall  Outcome: Ongoing (interventions implemented as appropriate)   06/01/18 5283   Fall Risk (Adult)   Absence of Fall making progress toward outcome  (Encourage to request assistance when transferring.)   Patient understands how to use the call bell when in need of assistance.

## 2018-06-02 LAB
ANION GAP SERPL CALCULATED.3IONS-SCNC: 10.9 MMOL/L
BUN BLD-MCNC: 13 MG/DL (ref 8–23)
BUN/CREAT SERPL: 15.1 (ref 7–25)
CALCIUM SPEC-SCNC: 8.6 MG/DL (ref 8.8–10.5)
CHLORIDE SERPL-SCNC: 89 MMOL/L (ref 98–107)
CO2 SERPL-SCNC: 26.1 MMOL/L (ref 22–29)
CREAT BLD-MCNC: 0.86 MG/DL (ref 0.57–1)
DEPRECATED RDW RBC AUTO: 46.6 FL (ref 37–54)
ERYTHROCYTE [DISTWIDTH] IN BLOOD BY AUTOMATED COUNT: 13.7 % (ref 11.5–14.5)
GFR SERPL CREATININE-BSD FRML MDRD: 62 ML/MIN/1.73
GLUCOSE BLD-MCNC: 99 MG/DL (ref 65–99)
HCT VFR BLD AUTO: 32.9 % (ref 37–47)
HGB BLD-MCNC: 11.4 G/DL (ref 12–16)
MCH RBC QN AUTO: 31.8 PG (ref 27–31)
MCHC RBC AUTO-ENTMCNC: 34.7 G/DL (ref 31–37)
MCV RBC AUTO: 91.9 FL (ref 81–99)
PLATELET # BLD AUTO: 282 10*3/MM3 (ref 140–500)
PMV BLD AUTO: 9.6 FL (ref 7.4–10.4)
POTASSIUM BLD-SCNC: 4.5 MMOL/L (ref 3.5–5.2)
RBC # BLD AUTO: 3.58 10*6/MM3 (ref 4.2–5.4)
SODIUM BLD-SCNC: 126 MMOL/L (ref 136–145)
WBC NRBC COR # BLD: 10.89 10*3/MM3 (ref 4.8–10.8)

## 2018-06-02 PROCEDURE — 85027 COMPLETE CBC AUTOMATED: CPT | Performed by: INTERNAL MEDICINE

## 2018-06-02 PROCEDURE — 25010000002 ENOXAPARIN PER 10 MG: Performed by: INTERNAL MEDICINE

## 2018-06-02 PROCEDURE — 94799 UNLISTED PULMONARY SVC/PX: CPT

## 2018-06-02 PROCEDURE — 99356 PR PROLONGED SVC I/P OR OBS SETTING 1ST HOUR: CPT | Performed by: INTERNAL MEDICINE

## 2018-06-02 PROCEDURE — 99233 SBSQ HOSP IP/OBS HIGH 50: CPT | Performed by: INTERNAL MEDICINE

## 2018-06-02 PROCEDURE — 80048 BASIC METABOLIC PNL TOTAL CA: CPT | Performed by: NURSE PRACTITIONER

## 2018-06-02 RX ORDER — SODIUM CHLORIDE 1000 MG
1 TABLET, SOLUBLE MISCELLANEOUS 2 TIMES DAILY WITH MEALS
Status: DISCONTINUED | OUTPATIENT
Start: 2018-06-02 | End: 2018-06-05 | Stop reason: HOSPADM

## 2018-06-02 RX ADMIN — BUSPIRONE HYDROCHLORIDE 7.5 MG: 15 TABLET ORAL at 13:05

## 2018-06-02 RX ADMIN — SODIUM CHLORIDE TAB 1 GM 1 G: 1 TAB at 12:07

## 2018-06-02 RX ADMIN — LEVOTHYROXINE SODIUM 88 MCG: 0.09 TABLET ORAL at 20:59

## 2018-06-02 RX ADMIN — SODIUM CHLORIDE 100 ML/HR: 9 INJECTION, SOLUTION INTRAVENOUS at 05:48

## 2018-06-02 RX ADMIN — BUSPIRONE HYDROCHLORIDE 7.5 MG: 15 TABLET ORAL at 05:50

## 2018-06-02 RX ADMIN — SODIUM CHLORIDE 100 ML/HR: 9 INJECTION, SOLUTION INTRAVENOUS at 16:36

## 2018-06-02 RX ADMIN — BUSPIRONE HYDROCHLORIDE 7.5 MG: 15 TABLET ORAL at 21:00

## 2018-06-02 RX ADMIN — AMLODIPINE BESYLATE 5 MG: 5 TABLET ORAL at 07:49

## 2018-06-02 RX ADMIN — ENOXAPARIN SODIUM 40 MG: 40 INJECTION SUBCUTANEOUS at 12:08

## 2018-06-02 RX ADMIN — SODIUM CHLORIDE TAB 1 GM 1 G: 1 TAB at 17:24

## 2018-06-02 NOTE — PROGRESS NOTES
"SERVICE: [facility] HOSPITALIST    CONSULTANTS:  Debbie Smith    CHIEF COMPLAINT: all over weakness    SUBJECTIVE:  Severe non-specific Generalized Weakness that has been worsening over the past 6 months to a year, that have rapidly worsened since admission for SDH in April. Associated with loss of desire to go to [employer], routine hair appointments, attend her grandkids graduation, and go shopping, all activities she previously enjoyed. With additional symptoms of increased worry over small details, going out into public spaces, and anorexia.     Med history and [facility] sig for anxiety and depression, with recent changes in her anxiety and depression medication    OBJECTIVE:    /70 (BP Location: Right arm, Patient Position: Lying)   Pulse 80   Temp 95 °F (35 °C) (Oral)   Resp 16   Ht 152 cm (59.84\")   Wt 51.1 kg (112 lb 10.5 oz)   SpO2 97%   BMI 22.12 kg/m²     MEDS/LABS REVIEWED AND ORDERED      amLODIPine 5 mg Oral Q24H   busPIRone 7.5 mg Oral Q8H   enoxaparin 40 mg Subcutaneous Q24H   levothyroxine 88 mcg Oral Daily   sodium chloride 1 g Oral BID With Meals       Physical Exam   Constitutional: She appears well-developed and well-nourished. No distress.   Neurological: She is alert.   Skin: Skin is warm and dry. She is not diaphoretic.   Psychiatric: Her behavior is normal. Her affect is blunt.   Deferred mood, very flat affect   Nursing note and vitals reviewed.      LAB/DIAGNOSTICS:    Lab Results (last 24 hours)     Procedure Component Value Units Date/Time    Basic Metabolic Panel [[id number]]  (Abnormal) Collected:  06/02/18 0452    Specimen:  Blood Updated:  06/02/18 0539     Glucose 99 mg/dL      BUN 13 mg/dL      Creatinine 0.86 mg/dL      Sodium 126 (L) mmol/L      Potassium 4.5 mmol/L      Chloride 89 (L) mmol/L      CO2 26.1 mmol/L      Calcium 8.6 (L) mg/dL      eGFR Non African Amer 62 mL/min/1.73      BUN/Creatinine Ratio 15.1     Anion Gap 10.9 mmol/L     Narrative:       " The MDRD GFR formula is only valid for adults with stable renal function between ages 18 and 70.        No orders to display     Results for orders placed during the hospital encounter of 04/18/18   Adult Transthoracic Echo Complete W/ Cont if Necessary Per Protocol    Narrative · Left ventricular systolic function is hyperdynamic (EF > 70).  · There is mild calcification of the aortic valve.        Mri Brain With & Without Contrast    Result Date: 6/2/2018  There is a thin right-sided chronic subdural hematoma with negligible mass effect, only 3 to 4 mm in maximal thickness. This is undoubtedly a sequela of the April 18, 2018 fall and intracranial hemorrhage.  No parenchymal hemorrhage, no acute ischemia, mild to moderate senescent change without acute abnormality otherwise.  This report was finalized on 6/2/2018 8:02 AM by Dr. Vinicius Lim MD.      Us Renal Bilateral    Result Date: 6/1/2018  Mild bilateral renal atrophy.  This report was finalized on 6/1/2018 9:40 AM by Dr. Andrea Paul MD.          ASSESSMENT/PLAN:    General Malaise with non-specific all over weakness  - with reduced interest in food, activities she previously enjoyed, avoidance of social situations  - Worsening over the course of months, worse since fall  - Consult PT/OT for eval, for potentional referral to SNF, pt had declined recommended SNF admission after initial admission for fall.    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 Chronic Intermittent delirium  - Per Dr. Norwood, more consistent with hyponatermia w/ Admission sodium of 124 than TIA, do appreciate his assistance in this manner  - Sodium during admission from 4/18 to 4/26 ranged between 126 and 131, with it being 131 on discharge  - Per family pt's sodium has been regularly in  the 120's for the last several years  - Personally doubt that chronic hyponatremia main contributor to AMS, feel depression, SSRI/Benzo medication changes, and potentinal post concussive syndrome on top of age/dementia related memory changes to be more significant contributors    Resolving Subdural Hematoma on top of chronic small vessel changes from recent fall  - MRI showed Chronic small vessel ischemic changes no acute stroke  - Stable 4mm subdural hematoma with minimal mass effect    Significant history of Anxiety and Depression  - Per discharge sum by Dr. Kamara at [facility] from 4/22 pt was sent home on Mirtazapine 15 mg QHS. And Klonopin 0.5mg daily  - Agree with family's report of previous physician's opinion of benzo's in 86 yo ladies, that there is an increase of falls, and this needs to be taken in context of a recent subdural hematoma  - Significant clinical symptoms of depression with withdrawing from activities she used to enjoy, which started prior to hospitalization for fall, but has been worsening since fall  - Family is to discuss risk vs benefits of SSRI's and Benzos, and participate in shared discission making over the use of these medications    Intermittent Confusion and Memory loss  - Had been happening prior to fall, however worse since fall  - Needs through otpt neuropsychiatric cognitive evaluation    TIME Spent from 16:57 to 18:12 in floor and face to face time with the pt and pt's family counseling on depression and anxiety treatment, risk vs benefits for benzos in setting of falls and participating in shared decision making

## 2018-06-02 NOTE — NURSING NOTE
Continued Stay Note  HENNY Nguyen     Patient Name: Nicolasa Woodall  MRN: [id number]  Today's Date: 6/2/2018    Admit Date: 5/29/2018          Discharge Plan     Row Name 06/02/18 0915       Plan    Plan plan home    Patient/Family in Agreement with Plan yes    Plan Comments Spoke with patient at bedside, IMM updated. She is sitting up in bed, eating breakfast - she states she is having trouble using her left hand to eat & is expecting daughter to arrive to assist. Declines help from CM. RN notified. Will continue to follow.              Discharge Codes    No documentation.           Con Godinez RN

## 2018-06-02 NOTE — PROGRESS NOTES
Renal/[facility]:    Chart reviewed.  Na stable at 126.  Patient with SIADH and also low solute intake.  Continue free water restriction.  Add oral NaCl.  Can D/C IVF in AM.  Will follow peripherally over the weekend.  Call with questions.    Rigoberto Moyer M.D.  [facility]  [phone]

## 2018-06-02 NOTE — CONSULTS
Patient Identification:  NAME:  Nicolasa Woodall  Age:  87 y.o.   Sex:  female   :  1930   MRN:  [id number]       Chief complaint: She does not have one reason for consult confusion change in mental status    History of present illness:  This patient is an 87-year-old right-handed white female to history of hypertension pulmonary embolism subdural hematoma on the right side from fall back in April who comes to the hospital with significant confusion duration at least several days context the patient with hyponatremia and sodium of 129 on 531 and 120 6161.  Presumably this has been because of her excess fluid intakes that she readily admits but also some degree of SIADH as well.  Duration not really known beyond this location she has a subdural hematoma on the right side of her brain context patient who otherwise denies any paresthesias paralysis or pain or headache or change in vision she denies hallucinations or confusion modifying factors fluid restriction I did perform an independent eyeball review of the neuro imaging MRI and CTA and they show continued evolution of the right hemisphere subdural hematoma in an appropriate way..  No witnessed seizure activity or report of seizure activity  It is noted that the patient has gone to a stepdown process over about 3 weeks to discontinue benzodiazepines and at first it was questionable whether it could be related to that according to reports of the daughter.    Past medical history:  Past Medical History:   Diagnosis Date   • Depression    • Disease of thyroid gland    • Hypertension    • Pulmonary embolism     SPONTANEOUS HEMMORHAGE   • Subdural hematoma        Past surgical history:  Past Surgical History:   Procedure Laterality Date   • BREAST BIOPSY      BENIGN   • HYSTERECTOMY     • JOINT REPLACEMENT     • THYROID SURGERY      PARATHYROID REMOVAL   • VENA CAVA FILTER INSERTION         Allergies:  Patient has no known allergies.    Home  medications:  Prescriptions Prior to Admission   Medication Sig Dispense Refill Last Dose   • busPIRone (BUSPAR) 10 MG tablet Take 7.5 mg by mouth 2 (Two) Times a Day.   5/28/2018 at 2000   • levothyroxine (SYNTHROID, LEVOTHROID) 88 MCG tablet Take 88 mcg by mouth Daily.   5/28/2018 at 2000        Hospital medications:    amLODIPine 5 mg Oral Q24H   busPIRone 7.5 mg Oral Q8H   enoxaparin 40 mg Subcutaneous Q24H   levothyroxine 88 mcg Oral Daily   sodium chloride 1 g Oral BID With Meals       sodium chloride 100 mL/hr Last Rate: 100 mL/hr (06/02/18 0548)     bisacodyl  •  ondansetron  •  sodium chloride  •  Insert peripheral IV **AND** sodium chloride  •  sodium chloride    Family history:  Family History   Problem Relation Age of Onset   • Breast cancer Daughter        Social history:  Social History   Substance Use Topics   • Smoking status: Former Smoker     Packs/day: 0.50     Years: 25.00     Quit date: 1974   • Smokeless tobacco: Never Used   • Alcohol use 0.6 oz/week     1 Glasses of wine per week      Comment: RARELY       Review of systems:    She states that she drinks too much water.  She does not have headache hair eyes ears nose throat skin bone joint  lymphatic hematologic or oncologic complaints no neck pain chest pain abdominal pain bowel bladder incontinence fever chills or rash she did phone her head a month or so ago and knows about some blood on the brain but denies any headache    Objective:  Vitals Ranges:   Temp:  [97.2 °F (36.2 °C)-98.2 °F (36.8 °C)] 97.3 °F (36.3 °C)  Heart Rate:  [71-87] 75  Resp:  [18] 18  BP: (140-165)/(75-88) 141/83      Physical Exam:  Awake alert oriented ×3 in no distress fund of knowledge attention span and concentration recent remote memory and language is normal well-developed well-nourished in no distress pupils 1-1/2 constricting to 1 normal disc retinas visual fields extraocular movements are full without nystagmus nasolabial folds palate tongue symmetrical  normal hearing facial sensation head turning shoulder shrug motor 5 out of 5 times for extremities rate or drift reflexes trace throughout symmetrical toes downgoing bilaterally sensation normal light touch face both arms and both legs coordination normal in the upper extremities station and gait was not tested at this time for fear of would let her pass out or fall heart regular without murmur neck supple without bruits extremities no clubbing cyanosis or edema visual acuity normal at 3 feet of.  Minimal rigidity no bradykinesia but no resting tremor    Results review:   I reviewed the patient's new clinical results.    Data review:  Lab Results (last 24 hours)     Procedure Component Value Units Date/Time    Basic Metabolic Panel [[id number]]  (Abnormal) Collected:  06/02/18 0452    Specimen:  Blood Updated:  06/02/18 0539     Glucose 99 mg/dL      BUN 13 mg/dL      Creatinine 0.86 mg/dL      Sodium 126 (L) mmol/L      Potassium 4.5 mmol/L      Chloride 89 (L) mmol/L      CO2 26.1 mmol/L      Calcium 8.6 (L) mg/dL      eGFR Non African Amer 62 mL/min/1.73      BUN/Creatinine Ratio 15.1     Anion Gap 10.9 mmol/L     Narrative:       The MDRD GFR formula is only valid for adults with stable renal function between ages 18 and 70.    BNP [[id number]]  (Abnormal) Collected:  06/01/18 0453    Specimen:  Blood Updated:  06/01/18 1219     proBNP 1,030.0 (H) pg/mL     Narrative:       Among patients with dyspnea, NT-proBNP is highly sensitive for the detection of acute congestive heart failure. In addition NT-proBNP of <300 pg/ml effectively rules out acute congestive heart failure with 99% negative predictive value.    Uric Acid [[id number]]  (Abnormal) Collected:  06/01/18 0453    Specimen:  Blood Updated:  06/01/18 1209     Uric Acid 2.8 (L) mg/dL            Imaging:  Imaging Results (last 24 hours)     Procedure Component Value Units Date/Time    MRI Brain With & Without Contrast [[id number]] Collected:  06/02/18 0757      Updated:  06/02/18 0804    Narrative:       EXAM: BRAIN MRI WITH AND WITHOUT CONTRAST JUNE 1, 2018     HISTORY: Confusion began 3 days ago, recent hyponatremia     TECHNIQUE: Routine brain MRI with and without contrast      COMPARISON: Head CT dated 5/29/2018     FINDINGS: There is no MR evidence of acute ischemia or other acute  restricted diffusion. There is no hydrocephalus but there is a right  temporo-occipital been subdural hematoma, but only 3 to 4 mm in maximal  thickness and extends into the right parietal region but mass effect is  negligible.     There is volume loss and moderate nonspecific white matter change but  again no evidence of acute ischemia is seen is no evidence of acute  parenchymal hemorrhage. Normal flow-voids are seen in the cerebral  vessels. Bone marrow signal is normal. Postcontrast images show no mass  or abnormal enhancement.       Impression:       There is a thin right-sided chronic subdural hematoma with  negligible mass effect, only 3 to 4 mm in maximal thickness. This is  undoubtedly a sequela of the April 18, 2018 fall and intracranial  hemorrhage.     No parenchymal hemorrhage, no acute ischemia, mild to moderate senescent  change without acute abnormality otherwise.     This report was finalized on 6/2/2018 8:02 AM by Dr. Vinicius Lim MD.                Assessment and Plan:     Active Problems:    Hyponatremia    With resultant metabolic encephalopathy now much improved.  She readily admits that she was drinking too much water and she appears to have SIADH , leading to the hyponatremia.  I have reviewed the MRI brain there is no acute stroke she is not suffered a TIA and I do not see evidence of primary seizure disorder her right sided chronic subdural hematoma is changing as I would expect it to be and I would not recommend further neurologic workup at this time.      Nahum Norwood MD  06/02/18  11:13 AM

## 2018-06-02 NOTE — PLAN OF CARE
Problem: Fall Risk (Adult)  Goal: Absence of Fall  Outcome: Ongoing (interventions implemented as appropriate)   06/02/18 2642   Fall Risk (Adult)   Absence of Fall making progress toward outcome  (Patient encouraged to ask for assistance during transfer.)   Patient uses call bell to ask for assistance, x1 assist with walker.  Patient does well.

## 2018-06-02 NOTE — PLAN OF CARE
Problem: Patient Care Overview  Goal: Plan of Care Review  Outcome: Ongoing (interventions implemented as appropriate)   06/02/18 0618   Coping/Psychosocial   Plan of Care Reviewed With patient   Plan of Care Review   Progress no change       Problem: Fall Risk (Adult)  Goal: Absence of Fall  Outcome: Ongoing (interventions implemented as appropriate)   06/02/18 0618   Fall Risk (Adult)   Absence of Fall making progress toward outcome       Problem: Confusion, Acute (Adult)  Goal: Safety  Outcome: Ongoing (interventions implemented as appropriate)   06/02/18 0618   Confusion, Acute (Adult)   Safety making progress toward outcome       Problem: Skin Injury Risk (Adult)  Goal: Skin Health and Integrity  Outcome: Ongoing (interventions implemented as appropriate)   06/02/18 0618   Skin Injury Risk (Adult)   Skin Health and Integrity making progress toward outcome

## 2018-06-03 LAB
ANION GAP SERPL CALCULATED.3IONS-SCNC: 11.1 MMOL/L
BUN BLD-MCNC: 15 MG/DL (ref 8–23)
BUN/CREAT SERPL: 18.1 (ref 7–25)
CALCIUM SPEC-SCNC: 8.6 MG/DL (ref 8.8–10.5)
CHLORIDE SERPL-SCNC: 93 MMOL/L (ref 98–107)
CO2 SERPL-SCNC: 24.9 MMOL/L (ref 22–29)
CREAT BLD-MCNC: 0.83 MG/DL (ref 0.57–1)
DEPRECATED RDW RBC AUTO: 47.2 FL (ref 37–54)
ERYTHROCYTE [DISTWIDTH] IN BLOOD BY AUTOMATED COUNT: 13.9 % (ref 11.5–14.5)
GFR SERPL CREATININE-BSD FRML MDRD: 65 ML/MIN/1.73
GLUCOSE BLD-MCNC: 97 MG/DL (ref 65–99)
HCT VFR BLD AUTO: 32.3 % (ref 37–47)
HGB BLD-MCNC: 11 G/DL (ref 12–16)
MCH RBC QN AUTO: 31.2 PG (ref 27–31)
MCHC RBC AUTO-ENTMCNC: 34.1 G/DL (ref 31–37)
MCV RBC AUTO: 91.5 FL (ref 81–99)
PLATELET # BLD AUTO: 265 10*3/MM3 (ref 140–500)
PMV BLD AUTO: 9.3 FL (ref 7.4–10.4)
POTASSIUM BLD-SCNC: 4.3 MMOL/L (ref 3.5–5.2)
RBC # BLD AUTO: 3.53 10*6/MM3 (ref 4.2–5.4)
SODIUM BLD-SCNC: 129 MMOL/L (ref 136–145)
WBC NRBC COR # BLD: 10.13 10*3/MM3 (ref 4.8–10.8)

## 2018-06-03 PROCEDURE — 85027 COMPLETE CBC AUTOMATED: CPT | Performed by: INTERNAL MEDICINE

## 2018-06-03 PROCEDURE — 25010000002 ENOXAPARIN PER 10 MG: Performed by: INTERNAL MEDICINE

## 2018-06-03 PROCEDURE — 97165 OT EVAL LOW COMPLEX 30 MIN: CPT

## 2018-06-03 PROCEDURE — 94799 UNLISTED PULMONARY SVC/PX: CPT

## 2018-06-03 PROCEDURE — 97116 GAIT TRAINING THERAPY: CPT

## 2018-06-03 PROCEDURE — 97161 PT EVAL LOW COMPLEX 20 MIN: CPT

## 2018-06-03 PROCEDURE — 99231 SBSQ HOSP IP/OBS SF/LOW 25: CPT | Performed by: HOSPITALIST

## 2018-06-03 PROCEDURE — 80048 BASIC METABOLIC PNL TOTAL CA: CPT | Performed by: NURSE PRACTITIONER

## 2018-06-03 RX ADMIN — SODIUM CHLORIDE TAB 1 GM 1 G: 1 TAB at 08:46

## 2018-06-03 RX ADMIN — BUSPIRONE HYDROCHLORIDE 7.5 MG: 15 TABLET ORAL at 21:46

## 2018-06-03 RX ADMIN — AMLODIPINE BESYLATE 5 MG: 5 TABLET ORAL at 08:37

## 2018-06-03 RX ADMIN — BUSPIRONE HYDROCHLORIDE 7.5 MG: 15 TABLET ORAL at 05:50

## 2018-06-03 RX ADMIN — LEVOTHYROXINE SODIUM 88 MCG: 0.09 TABLET ORAL at 21:46

## 2018-06-03 RX ADMIN — ENOXAPARIN SODIUM 40 MG: 40 INJECTION SUBCUTANEOUS at 11:48

## 2018-06-03 RX ADMIN — SODIUM CHLORIDE TAB 1 GM 1 G: 1 TAB at 17:08

## 2018-06-03 RX ADMIN — BUSPIRONE HYDROCHLORIDE 7.5 MG: 15 TABLET ORAL at 13:45

## 2018-06-03 NOTE — THERAPY EVALUATION
Acute Care - Physical Therapy  Evaluation   Stormy Rivera     Patient Name: Nicolasa Woodall  : 1930  MRN: [id number]  Today's Date: 6/3/2018   Onset of Illness/Injury or Date of Surgery: 18  Date of Referral to PT: 18 (for re-evaluation)  Referring Physician: Dr Byers      Admit Date: 2018    Visit Dx:     ICD-10-CM ICD-9-CM   1. Hyponatremia E87.1 276.1   2. Confusion R41.0 298.9   3. Oral phase dysphagia R13.11 787.21     Patient Active Problem List   Diagnosis   • Traumatic intraventricular hemorrhage   • Hyponatremia   • Traumatic rhabdomyolysis   • Syncope   • Abnormal urinalysis   • Essential hypertension   • Chronic kidney disease, stage 3   • AMI (acute kidney injury)   • Dehydration   • Collapse of vertebra   • PAC (premature atrial contraction)     Past Medical History:   Diagnosis Date   • Depression    • Disease of thyroid gland    • Hypertension    • Pulmonary embolism     SPONTANEOUS HEMMORHAGE   • Subdural hematoma      Past Surgical History:   Procedure Laterality Date   • BREAST BIOPSY      BENIGN   • HYSTERECTOMY     • JOINT REPLACEMENT     • THYROID SURGERY      PARATHYROID REMOVAL   • VENA CAVA FILTER INSERTION          PT ASSESSMENT (last 12 hours)      Physical Therapy Evaluation     Row Name 18 0915          PT Evaluation Time/Intention    Subjective Information complains of;pain   right arm from IV  -LN     Document Type re-evaluation  -LN     Mode of Treatment physical therapy  -LN     Patient Effort adequate  -LN     Symptoms Noted During/After Treatment fatigue;shortness of breath  -LN     Comment Patient was very fatigued after ambulation and SOA noted; checked O2 sats and they were at 100%.  She also moaned a lot during mobility and ambulation. -LN     Row Name 18 0915          General Information    Patient Profile Reviewed? yes  -LN     Onset of Illness/Injury or Date of Surgery 18  -LN     Referring Physician Dr. Byers  -LN     Patient  Observations alert;agree to therapy  -LN     General Observations of Patient Patient resting in bed; agreed to work with therapy.   -LN     Prior Level of Function independent:;all household mobility;gait;transfer;bed mobility  -LN     Equipment Currently Used at Home cane, straight;walker, rolling  -LN     Pertinent History of Current Functional Problem Re-evaluation ordered per family request.  Family and MD report patient having increased weakness overall.   -LN     Existing Precautions/Restrictions fall  -LN     Risks Reviewed patient:;LOB;dizziness;increased discomfort  -LN     Benefits Reviewed patient:;improve function;increase independence;increase strength;increase knowledge  -LN     Barriers to Rehab --   appears depressed/anxious- flat affect noted  -LN     Row Name 06/03/18 0915          Relationship/Environment    Primary Source of Support/Comfort child(hebert)  -LN     Lives With alone  -LN     Family Caregiver if Needed child(hebert), adult   patient reports that dtr can help as needed  -LN     Row Name 06/03/18 0915          Resource/Environmental Concerns    Current Living Arrangements home/apartment/condo  -LN     Resource/Environmental Concerns none  -LN     Row Name 06/03/18 0915          Home Main Entrance    Number of Stairs, Main Entrance two  -LN     Stair Railings, Main Entrance none  -LN     Stairs Comment, Main Entrance Patient purchased a handrail and son is to install it soon.  -LN     Row Name 06/03/18 0915          Cognitive Assessment/Intervention- PT/OT    Affect/Mental Status (Cognitive) sad/depressed affect  -LN     Orientation Status (Cognition) oriented x 4  -LN     Follows Commands (Cognition) WFL  -LN     Personal Safety Interventions fall prevention program maintained;gait belt;nonskid shoes/slippers when out of bed;supervised activity  -LN     Row Name 06/03/18 0915          Safety Issues, Functional Mobility    Safety Issues Affecting Function (Mobility) positioning of assistive  device  -LN     Comment, Safety Issues/Impairments (Mobility) Patient needed some assist with steering of walker as she fatigued- she tended to veer to right.   -     Row Name 06/03/18 0915          Bed Mobility Assessment/Treatment    Bed Mobility Assessment/Treatment supine-sit  -LN     Supine-Sit [place] (Bed Mobility) supervision  -LN     Assistive Device (Bed Mobility) bed rails;head of bed elevated  -     Row Name 06/03/18 0915          Transfer Assessment/Treatment    Transfer Assessment/Treatment sit-stand transfer;stand-sit transfer  -LN     Sit-Stand [place] (Transfers) stand by assist  -LN     Stand-Sit [place] (Transfers) stand by assist;verbal cues  -     Row Name 06/03/18 0915          Sit-Stand Transfer    Assistive Device (Sit-Stand Transfers) walker, front-wheeled  -     Row Name 06/03/18 0915          Stand-Sit Transfer    Assistive Device (Stand-Sit Transfers) walker, front-wheeled  -     Row Name 06/03/18 0915          Gait/Stairs Assessment/Training    [place] Level (Gait) contact guard;minimum assist (75% patient effort)  -LN     Assistive Device (Gait) walker, front-wheeled  -     Distance in Feet (Gait) 250  -LN     Bilateral Gait Deviations forward flexed posture;leans right  -LN     Comment (Gait/Stairs) Patient needed increased assist as she fatigued; tended to veer to right as she fatigued.   -     Row Name 06/03/18 0915          General ROM    GENERAL ROM COMMENTS B LE WFL.  -[facility] Name 06/03/18 0915          General Assessment (Manual Muscle Testing)    Comment, General Manual Muscle Testing (MMT) Assessment B LE 4/5.   -     Row Name 06/03/18 0915          Sensory Assessment/Intervention    Sensory General Assessment no sensation deficits identified  -[facility] Name 06/03/18 0915          Pain Scale: Numbers Pre/Post-Treatment    Pain Scale: Numbers, Pretreatment --   did not rate- c/o pain right arm secondary to IV issues  -[facility] Name  06/03/18 0915          Edema Assessment & Management    Additional Documentation --   edema noted right arm with bruising noted  -LN     Row Name 06/03/18 0915          Physical Therapy Clinical Impression    Date of Referral to PT 06/02/18   for re-evaluation  -LN     PT Diagnosis (PT Clinical Impression) Physical deconditioning secondary to being sedentary in hospital.   -LN     Criteria for Skilled Interventions Met (PT Clinical Impression) yes;treatment indicated  -LN     Pathology/Pathophysiology Noted (Describe Specifically for Each System) musculoskeletal  -LN     Impairments Found (describe specific impairments) aerobic capacity/endurance;gait, locomotion, and balance  -LN     Rehab Potential (PT Clinical Summary) good, to achieve stated therapy goals  -LN     Predicted Duration of Therapy (PT) 3- 5 days  -LN     Care Plan Review (PT) evaluation/treatment results reviewed;care plan/treatment goals reviewed;risks/benefits reviewed;patient/other agree to care plan  -LN     Patient/Family Concerns, Anticipated Discharge Disposition (PT) MD reports that family has reservations about her going home at this time and worried that she will go home and just stay in bed.   -LN     Row Name 06/03/18 0915          Vital Signs    Post SpO2 (%) 100  -LN     O2 Delivery Post Treatment room air  -LN     Post Patient Position Sitting  -LN     Row Name 06/03/18 0915          Bed Mobility Goal 1 (PT)    Activity/Assistive Device (Bed Mobility Goal 1, PT) sit to supine;supine to sit  -LN     [place] Level/Cues Needed (Bed Mobility Goal 1, PT) independent   without use of bedrail  -LN     Time Frame (Bed Mobility Goal 1, PT) 3 days  -LN     Row Name 06/03/18 0915          Transfer Goal 1 (PT)    Activity/Assistive Device (Transfer Goal 1, PT) sit-to-stand/stand-to-sit  -LN     [place] Level/Cues Needed (Transfer Goal 1, PT) Supervision/independent  -LN     Time Frame (Transfer Goal 1, PT) 3 days  -LN     Row Name  06/03/18 0915          Gait Training Goal 1 (PT)    Activity/Assistive Device (Gait Training Goal 1, PT) gait (walking locomotion);assistive device use;walker, rolling  -LN     [place] Level (Gait Training Goal 1, PT) standby assist;contact guard assist  -LN     Distance (Gait Goal 1, PT) 300 feet with no LOB and good control of walker. -LN     Time Frame (Gait Training Goal 1, PT) 3 days  -LN     Row Name 06/03/18 0915          Stairs Goal 1 (PT)    Activity/Assistive Device (Stairs Goal 1, PT) ascending stairs;descending stairs  -LN     [place] Level/Cues Needed (Stairs Goal 1, PT) minimum assist (75% or more patient effort)  -LN     Number of Stairs (Stairs Goal 1, PT) 2   with 1 handrail  -LN     Time Frame (Stairs Goal 1, PT) 5 days  -LN     Row Name 06/03/18 0915          Patient Education Goal (PT)    Activity (Patient Education Goal, PT) Functional mobility and gait with RWX; therapeutic exercise with HEP  -LN     [place]/Cues/Accuracy (Memory Goal 2, PT) demonstrates adequately;verbalizes understanding  -LN     Time Frame (Patient Education Goal, PT) 5 days  -LN     Row Name 06/03/18 0915          Positioning and Restraints    Pre-Treatment Position in bed  -LN     Post Treatment Position chair  -LN     In Chair notified nsg;reclined;call light within reach;encouraged to call for assist   had to encourage patient to stay sitting in chair  -LN     Row Name 06/03/18 0915          Living Environment    Home Accessibility stairs to enter home  -LN       User Key  (r) = Recorded By, (t) = Taken By, (c) = Cosigned By    Initials Name Provider Type    LN Kat Sandhu, PT Physical Therapist          Physical Therapy Education     Title: PT OT SLP Therapies (Active)     Topic: Physical Therapy (Active)     Point: Mobility training (Done)    Learning Progress Summary     Learner Status Readiness Method Response Comment Documented by    Patient Done Acceptance E VU,NR Education provided on  functional mobility and gait with rolling walker and importance of activity and sitting up in chair. LN 06/03/18 1417     Done Acceptance E,TB VU Educated patient on benefit of use of RW with all mobility. Patient agreeable and receptive to recommendations. BP 05/30/18 1357    Family Done Acceptance E,TB VU Educated patient on benefit of use of RW with all mobility. Patient agreeable and receptive to recommendations. BP 05/30/18 1357                      User Key     Initials Effective Dates Name Provider Type Discipline    LN 06/22/16 -  Kat Sandhu, PT Physical Therapist PT     04/03/18 -  Isac Garcia, PT Physical Therapist PT                PT Recommendation and Plan  Anticipated Discharge Disposition (PT): skilled nursing facility, transitional care  Planned Therapy Interventions (PT Eval): balance training, gait training, home exercise program, patient/family education, strengthening, transfer training  Therapy Frequency (PT Clinical Impression): daily  Outcome Summary/Treatment Plan (PT)  Anticipated Discharge Disposition (PT): skilled nursing facility, transitional care  Patient/Family Concerns, Anticipated Discharge Disposition (PT): MD reports that family has reservations about her going home at this time and worried that she will go home and just stay in bed.   Plan of Care Reviewed With: patient  Outcome Summary: PT note: Re-evaluation completed today. Patient able to transfer supine to sit with supervision but did use bedrail and have HOB elevated and needed extended time to complete. She performed sit to stand transfer with RWX and SBA for safety. She ambulated 250 feet with RWX with CGA- min A of 1 with assist for steering of walker when she became fatigued secondary to veering to right side).  She became very fatigued after ambulation and SOA noted (but O2 sats were 100%). Patient with flat affect and was anxious with mobility and ambulation today. Patient with generalized weakness.  Patient with noticeable change in affect/mood and functional staus since evaluation done on 5/30/2018. Do feel patient would benefit from a STR stay prior to discharge home secondary to patient lives alone. Do not feel she is safe to go home at this time and be alone. PT to follow patient 1 x day while she is in hospital for functional mobility and gait training with RWX,  therapeutic exercise with HEP and patient education.           Outcome Measures     Row Name 06/03/18 1200 06/03/18 0915          How much help from another person do you currently need...    Turning from your back to your side while in flat bed without using bedrails?  -- 4  -LN     Moving from lying on back to sitting on the side of a flat bed without bedrails?  -- 3  -LN     Moving to and from a bed to a chair (including a wheelchair)?  -- 3  -LN     Standing up from a chair using your arms (e.g., wheelchair, bedside chair)?  -- 3  -LN     Climbing 3-5 steps with a railing?  -- 3  -LN     To walk in hospital room?  -- 3  -LN     AM-PAC 6 Clicks Score  -- 19  -LN        How much help from another is currently needed...    Putting on and taking off regular lower body clothing? 3  -EN  --     Bathing (including washing, rinsing, and drying) 3  -EN  --     Toileting (which includes using toilet bed pan or urinal) 3  -EN  --     Putting on and taking off regular upper body clothing 4  -EN  --     Taking care of personal grooming (such as brushing teeth) 4  -EN  --     Eating meals 4  -EN  --     Score 21  -EN  --        Functional Assessment    Outcome Measure Options  -- AM-PAC 6 Clicks Basic Mobility (PT)  -LN       User Key  (r) = Recorded By, (t) = Taken By, (c) = Cosigned By    Initials Name Provider Type    EN Bree Chaudhary, OTR Occupational Therapist    LN Kat Sandhu, PT Physical Therapist           Time Calculation:         PT Charges     Row Name 06/03/18 1482             Time Calculation    Start Time 0943  -LN      Stop  Time 1002  -LN      Time Calculation (min) 19 min  -LN        User Key  (r) = Recorded By, (t) = Taken By, (c) = Cosigned By    Initials Name Provider Type    LN Kat Sandhu, PT Physical Therapist          Therapy Charges for Today     Code Description Service Date Service Provider Modifiers Qty    59397580447 HC PT EVAL LOW COMPLEXITY 1 6/3/2018 Kat Sandhu, PT GP 1    90655574938 HC GAIT TRAINING EA 15 MIN 6/3/2018 Kat Sandhu, PT GP 1          PT G-Codes  PT Professional Judgement Used?: Yes  Outcome Measure Options: AM-PAC 6 Clicks Basic Mobility (PT)        Kat Sandhu, PT  6/3/2018

## 2018-06-03 NOTE — PLAN OF CARE
Problem: Fall Risk (Adult)  Intervention: Monitor/Assist with Self Care   06/03/18 0512   Activity   Activity Assistance Provided assistance, 1 person     Intervention: Reduce Risk/Promote Restraint Free Environment   06/03/18 0512   Safety Management   Environmental Safety Modification assistive device/personal items within reach       Goal: Absence of Fall  Outcome: Ongoing (interventions implemented as appropriate)   06/03/18 0512   Fall Risk (Adult)   Absence of Fall making progress toward outcome

## 2018-06-03 NOTE — PLAN OF CARE
Problem: Skin Injury Risk (Adult)  Intervention: Prevent/Manage Excess Moisture   06/03/18 0512   Hygiene Care   Perineal Care perineum cleansed       Goal: Skin Health and Integrity  Outcome: Ongoing (interventions implemented as appropriate)   06/03/18 0512   Skin Injury Risk (Adult)   Skin Health and Integrity making progress toward outcome

## 2018-06-03 NOTE — PLAN OF CARE
Problem: Patient Care Overview  Goal: Plan of Care Review   06/03/18 5724   Coping/Psychosocial   Plan of Care Reviewed With patient   OTHER   Outcome Summary PT note: Re-evaluation completed today. Patient able to transfer supine to sit with supervision but did use bedrail and have HOB elevated and needed extended time to complete. She performed sit to stand transfer with RWX and SBA for safety. She ambulated 250 feet with RWX with CGA- min A of 1 with assist for steering of walker when she became fatigued secondary to veering to right side. She became very fatigued after ambulation and SOA noted (but O2 sats were 100%). Patient with flat affect and was anxious with mobility and ambulation today. Patient with generalized weakness. Patient with noticeable change in affect/mood and functional status since evaluation done on 5/30/2018. Do feel patient would benefit from a STR stay prior to discharge home secondary to patient lives alone. Do not feel she is safe to go home at this time and be alone. PT to follow patient 1 x day while she is in hospital for functional mobility and gait training with RWX, therapeutic exercise with HEP and patient education.

## 2018-06-03 NOTE — PROGRESS NOTES
Renal/[facility]:    Chart check.  Na improved to 129.  Off IVF.  Continue oral NaCl and minimize free water.      Rigoberto Moyer M.D.  [facility]

## 2018-06-04 LAB
ANION GAP SERPL CALCULATED.3IONS-SCNC: 10.8 MMOL/L
BUN BLD-MCNC: 18 MG/DL (ref 8–23)
BUN/CREAT SERPL: 22.2 (ref 7–25)
CALCIUM SPEC-SCNC: 8.8 MG/DL (ref 8.8–10.5)
CHLORIDE SERPL-SCNC: 90 MMOL/L (ref 98–107)
CO2 SERPL-SCNC: 25.2 MMOL/L (ref 22–29)
CREAT BLD-MCNC: 0.81 MG/DL (ref 0.57–1)
DEPRECATED RDW RBC AUTO: 47 FL (ref 37–54)
ERYTHROCYTE [DISTWIDTH] IN BLOOD BY AUTOMATED COUNT: 13.8 % (ref 11.5–14.5)
GFR SERPL CREATININE-BSD FRML MDRD: 67 ML/MIN/1.73
GLUCOSE BLD-MCNC: 96 MG/DL (ref 65–99)
HCT VFR BLD AUTO: 32.6 % (ref 37–47)
HGB BLD-MCNC: 11.2 G/DL (ref 12–16)
MCH RBC QN AUTO: 31.5 PG (ref 27–31)
MCHC RBC AUTO-ENTMCNC: 34.4 G/DL (ref 31–37)
MCV RBC AUTO: 91.6 FL (ref 81–99)
PLATELET # BLD AUTO: 268 10*3/MM3 (ref 140–500)
PMV BLD AUTO: 9.6 FL (ref 7.4–10.4)
POTASSIUM BLD-SCNC: 4.6 MMOL/L (ref 3.5–5.2)
RBC # BLD AUTO: 3.56 10*6/MM3 (ref 4.2–5.4)
SODIUM BLD-SCNC: 126 MMOL/L (ref 136–145)
WBC NRBC COR # BLD: 8.28 10*3/MM3 (ref 4.8–10.8)

## 2018-06-04 PROCEDURE — 80048 BASIC METABOLIC PNL TOTAL CA: CPT | Performed by: NURSE PRACTITIONER

## 2018-06-04 PROCEDURE — 99231 SBSQ HOSP IP/OBS SF/LOW 25: CPT | Performed by: INTERNAL MEDICINE

## 2018-06-04 PROCEDURE — 97116 GAIT TRAINING THERAPY: CPT

## 2018-06-04 PROCEDURE — 99356 PR PROLONGED SVC I/P OR OBS SETTING 1ST HOUR: CPT | Performed by: INTERNAL MEDICINE

## 2018-06-04 PROCEDURE — 25010000002 ENOXAPARIN PER 10 MG: Performed by: INTERNAL MEDICINE

## 2018-06-04 PROCEDURE — 85027 COMPLETE CBC AUTOMATED: CPT | Performed by: INTERNAL MEDICINE

## 2018-06-04 PROCEDURE — 97530 THERAPEUTIC ACTIVITIES: CPT

## 2018-06-04 RX ADMIN — BUSPIRONE HYDROCHLORIDE 7.5 MG: 15 TABLET ORAL at 13:09

## 2018-06-04 RX ADMIN — LEVOTHYROXINE SODIUM 88 MCG: 0.09 TABLET ORAL at 20:22

## 2018-06-04 RX ADMIN — BUSPIRONE HYDROCHLORIDE 7.5 MG: 15 TABLET ORAL at 21:52

## 2018-06-04 RX ADMIN — AMLODIPINE BESYLATE 5 MG: 5 TABLET ORAL at 08:26

## 2018-06-04 RX ADMIN — ENOXAPARIN SODIUM 40 MG: 40 INJECTION SUBCUTANEOUS at 13:08

## 2018-06-04 RX ADMIN — SODIUM CHLORIDE TAB 1 GM 1 G: 1 TAB at 08:26

## 2018-06-04 RX ADMIN — BUSPIRONE HYDROCHLORIDE 7.5 MG: 15 TABLET ORAL at 06:50

## 2018-06-04 RX ADMIN — SODIUM CHLORIDE TAB 1 GM 1 G: 1 TAB at 18:38

## 2018-06-04 NOTE — PLAN OF CARE
Problem: Patient Care Overview  Goal: Plan of Care Review   06/04/18 1125   OTHER   Outcome Summary OT: Education regarding benefits of activity and BUE HEP. Pt states she wants to return home, but she is concerned about weakness/activity tolerance.  She states family is encouraging her to go to short term rehab prior to returning home.

## 2018-06-04 NOTE — THERAPY TREATMENT NOTE
Acute Care - Physical Therapy Treatment Note   Stormy Rivera     Patient Name: Nicolasa Woodall  : 1930  MRN: [id number]  Today's Date: 2018  Onset of Illness/Injury or Date of Surgery: 18  Date of Referral to PT: 18 (for re-evaluation)  Referring Physician: Dr Byers    Admit Date: 2018    Visit Dx:    ICD-10-CM ICD-9-CM   1. Hyponatremia E87.1 276.1   2. Confusion R41.0 298.9   3. Oral phase dysphagia R13.11 787.21     Patient Active Problem List   Diagnosis   • Traumatic intraventricular hemorrhage   • Hyponatremia   • Traumatic rhabdomyolysis   • Syncope   • Abnormal urinalysis   • Essential hypertension   • Chronic kidney disease, stage 3   • AMI (acute kidney injury)   • Dehydration   • Collapse of vertebra   • PAC (premature atrial contraction)       Therapy Treatment          Rehabilitation Treatment Summary     Row Name 18 1033             Treatment Time/Intention    Discipline physical therapy assistant  -KM      Document Type therapy note (daily note)  -KM      Subjective Information --   pt states she wants to go home; agreed to PT  -KM      Mode of Treatment physical therapy  -KM      Patient/Family Observations pt in bed.  RUE on pillow  -KM      Patient Effort adequate  -KM      Comment at end of session, pt requested bedside table be turned so she could reach the buttons.  Turned table several times and pt could not find the buttons she was looking for.  Pt states buttons to make it go up and down.  Figured out that pt was talking about buttons on bed to elevate bed.  Educated pt that recliner does not have this.  Pt voiced understanding  -KM      Existing Precautions/Restrictions fall   soreness in RUE  -KM      Recorded by [KM] Bere Rogers PTA 18 1212      Row Name 18 1033             Cognitive Assessment/Intervention- PT/OT    Personal Safety Interventions gait belt;nonskid shoes/slippers when out of bed  -KM      Recorded by [KM] Bere Rogers PTA  06/04/18 1212      Row Name 06/04/18 1033             Bed Mobility Assessment/Treatment    Supine-Sit Maitland (Bed Mobility) supervision;conditional independence   with bedrail and head of bed elevated  -KM      Recorded by [KM] Bere Rogers PTA 06/04/18 1212      Row Name 06/04/18 1033             Transfer Assessment/Treatment    Sit-Stand Maitland (Transfers) stand by assist;verbal cues  -KM      Stand-Sit Maitland (Transfers) stand by assist;verbal cues  -KM      Recorded by [KM] Bere Rogers PTA 06/04/18 1212      Row Name 06/04/18 1033             Sit-Stand Transfer    Assistive Device (Sit-Stand Transfers) walker, front-wheeled  -KM      Recorded by [KM] Bere Rogers PTA 06/04/18 1212      Row Name 06/04/18 1033             Stand-Sit Transfer    Assistive Device (Stand-Sit Transfers) walker, front-wheeled  -KM      Recorded by [KM] Bere Rogers PTA 06/04/18 1212      Row Name 06/04/18 1033             Gait/Stairs Assessment/Training    Maitland Level (Gait) contact guard;stand by assist;verbal cues  -KM      Assistive Device (Gait) walker, front-wheeled  -KM      Distance in Feet (Gait) 327  -KM      Comment (Gait/Stairs) pt required cues to stay up with walker and not ambulate so fast.  She required two standing rest breaks due to fatigue but again, pt was trying to rush with ambulation.  Increased cues with backing to chair.    -KM      Recorded by [KM] Bere Rogers PTA 06/04/18 1212      Row Name 06/04/18 1033             Therapeutic Exercise    Lower Extremity (Therapeutic Exercise) quad sets, bilateral;SAQ (short arc quad), bilateral;LAQ (long arc quad), bilateral  -KM      Lower Extremity Range of Motion (Therapeutic Exercise) hip abduction/adduction, bilateral;ankle dorsiflexion/plantar flexion, bilateral   10 reps of each; encouraged pt to perform again later  -KM      Recorded by [KM] Bere Rogers PTA 06/04/18 1212      Row Name 06/04/18 1033             Positioning and  Restraints    Pre-Treatment Position in bed  -KM      Post Treatment Position chair  -KM      In Chair notified nsg;reclined;call light within reach;encouraged to call for assist;legs elevated;RUE elevated  -KM      Recorded by [KM] Bere Rogers, CHRISTIE 06/04/18 1212      Row Name 06/04/18 1033             Pain Scale: Numbers Pre/Post-Treatment    Pain Scale: Numbers, Pretreatment --   no rating- just soreness in RUE from old IV site  -KM      Recorded by [KM] Beer MJ Ken, CHRISTIE 06/04/18 1212      Row Name 06/04/18 1033             Outcome Summary/Treatment Plan (PT)    Daily Summary of Progress (PT) progress towards functional goals is fair  -KM      Plan for Continued Treatment (PT) cont per plan  -KM      Anticipated Equipment Needs at Discharge (PT) --   pt states she wants to go home/ family wants rehab  -KM      Anticipated Discharge Disposition (PT) --   pt lives alone- unsure at this time  -KM      Recorded by [KM] Bere Rogers, CHRISTIE 06/04/18 1212        User Key  (r) = Recorded By, (t) = Taken By, (c) = Cosigned By    Initials Name Effective Dates Discipline    KM Bere Rogers, PTA 08/11/15 -  PT                     Physical Therapy Education     Title: PT OT SLP Therapies (Active)     Topic: Physical Therapy (Active)     Point: Mobility training (Done)    Learning Progress Summary     Learner Status Readiness Method Response Comment Documented by    Patient Done Acceptance E,D VU encouraged pt to ambulate with staff- also spoke to pt's PCA about this.  Encouraged pt to perform HEP again this pm KM 06/04/18 1212     Done Acceptance E VU,NR Education provided on functional mobility and gait with rolling walker and importance of activity and sitting up in chair. LN 06/03/18 1417     Done Acceptance E,TB VU Educated patient on benefit of use of RW with all mobility. Patient agreeable and receptive to recommendations. BP 05/30/18 1357    Family Done Acceptance E,TB VU Educated patient on benefit of use of RW  with all mobility. Patient agreeable and receptive to recommendations. BP 05/30/18 8527          Point: Home exercise program (Done)    Learning Progress Summary     Learner Status Readiness Method Response Comment Documented by    Patient Done Acceptance JOB TITUS encouraged pt to ambulate with staff- also spoke to pt's PCA about this.  Encouraged pt to perform HEP again this pm  06/04/18 1212                      User Key     Initials Effective Dates Name Provider Type Discipline     08/11/15 -  Bere Rogers, PTA Physical Therapy Assistant PT    LN 06/22/16 -  Kat Sandhu, PT Physical Therapist PT    BP 04/03/18 -  Isac Garcia, PT Physical Therapist PT              Pt stated several times during session that she wants to go home.  Education to pt about benefits of moving/ambulation, etc and that family is concerned about her being home alone and not getting out of bed.  Asked pt if she would get up and move around if at home and she did not give me an answer.  Pt states she feels like she is worse since she has been here.  Again, explained benefits of getting up and moving with staff assistance.  Discussed with pt's PCA about ambulating with pt (with rolling walker) again later today.      PT Recommendation and Plan  Anticipated Discharge Disposition (PT):  (pt lives alone- unsure at this time)  Outcome Summary/Treatment Plan (PT)  Daily Summary of Progress (PT): progress towards functional goals is fair  Plan for Continued Treatment (PT): cont per plan  Anticipated Equipment Needs at Discharge (PT):  (pt states she wants to go home/ family wants rehab)  Anticipated Discharge Disposition (PT):  (pt lives alone- unsure at this time)  Plan of Care Reviewed With: patient  Progress: improving  Outcome Summary: PT:  Pt ambulated with rolling walker x 327 feet.  Cues to not ambulate too fast and to stay up within walker.  Instructed in LE HEP and encouraged pt to continue to ambulate with staff.  Pt  stated several times during session that she wants to go home.          Outcome Measures     Row Name 06/04/18 1033 06/03/18 1200 06/03/18 0915       How much help from another person do you currently need...    Turning from your back to your side while in flat bed without using bedrails? 3  -KM  -- 4  -LN    Moving from lying on back to sitting on the side of a flat bed without bedrails? 3  -KM  -- 3  -LN    Moving to and from a bed to a chair (including a wheelchair)? 3  -KM  -- 3  -LN    Standing up from a chair using your arms (e.g., wheelchair, bedside chair)? 3  -KM  -- 3  -LN    Climbing 3-5 steps with a railing? 3 -KM  -- 3  -LN    To walk in hospital room? 3  -KM  -- 3  -LN    AM-PAC 6 Clicks Score 18  -KM  -- 19  -LN       How much help from another is currently needed...    Putting on and taking off regular lower body clothing?  -- 3  -EN  --    Bathing (including washing, rinsing, and drying)  -- 3  -EN  --    Toileting (which includes using toilet bed pan or urinal)  -- 3  -EN  --    Putting on and taking off regular upper body clothing  -- 4  -EN  --    Taking care of personal grooming (such as brushing teeth)  -- 4  -EN  --    Eating meals  -- 4  -EN  --    Score  -- 21  -EN  --       Functional Assessment    Outcome Measure Options AM-PAC 6 Clicks Basic Mobility (PT)  -KM  -- AM-PAC 6 Clicks Basic Mobility (PT)  -LN      User Key  (r) = Recorded By, (t) = Taken By, (c) = Cosigned By    Initials Name Provider Type    ALTA Rogers PTA Physical Therapy Assistant    EN Bree Chaudhary, OTR Occupational Therapist    LN Kat Sandhu, PT Physical Therapist           Time Calculation:         PT Charges     Row Name 06/04/18 1215             Time Calculation    Start Time 1033  -KM      Stop Time 1054  -KM      Time Calculation (min) 21 min  -KM        User Key  (r) = Recorded By, (t) = Taken By, (c) = Cosigned By    Initials Name Provider Type    ALTA Rogers PTA Physical Therapy  Assistant          Therapy Charges for Today     Code Description Service Date Service Provider Modifiers Qty    02789541508 HC GAIT TRAINING EA 15 MIN 6/4/2018 Bere Rogers, PTA GP 1          PT G-Codes  PT Professional Judgement Used?: Yes  Outcome Measure Options: AM-PAC 6 Clicks Basic Mobility (PT)  Functional Limitation: Mobility: Walking and moving around  Mobility: Walking and Moving Around Current Status (): At least 1 percent but less than 20 percent impaired, limited or restricted  Mobility: Walking and Moving Around Goal Status (): At least 1 percent but less than 20 percent impaired, limited or restricted  Mobility: Walking and Moving Around Discharge Status (): At least 1 percent but less than 20 percent impaired, limited or restricted    Bere Rogers PTA  6/4/2018

## 2018-06-04 NOTE — PROGRESS NOTES
"Hospitalist Team      Patient Care Team:  Dion Ann MD as PCP - General (Family Medicine)        Chief Complaint:  Weakness    Subjective    Interval History and ROS:     Patient is asleep when I enter room.  She is not talkative tonight and really just wants to go back to bed.  No new complaints    Objective    Vital Signs  Temp:  [96.5 °F (35.8 °C)-98.2 °F (36.8 °C)] 97.3 °F (36.3 °C)  Heart Rate:  [73-82] 82  Resp:  [16-20] 16  BP: (131-143)/(63-72) 136/72  Oxygen Therapy  SpO2: 97 %  Pulse Oximetry Type: Intermittent  Device (Oxygen Therapy): room air}  Flowsheet Rows      First Filed Value   Admission Height  152 cm (59.84\") Documented at 05/29/2018 0738   Admission Weight  50.8 kg (112 lb) Documented at 05/29/2018 0738        Body mass index is 22.12 kg/m².      Physical Exam:    Physical Exam   Constitutional: Patient appears elderly and weak and non communicative tonight  HEENT:   Head: Normocephalic and atraumatic.   Eyes:  Pupils are equal, round, and reactive to light. EOM are intact. Sclera are anicteric and non-injected.  Mouth and Throat: Patient has moist mucous membranes. Oropharynx is clear of any erythema or exudate.     Neck: Neck supple. No JVD present. No thyromegaly present. No lymphadenopathy present.  Cardiovascular: Regular rate, regular rhythm, S1 normal and S2 normal.  Exam reveals no gallop and no friction rub.  No murmur heard.  Pulmonary/Chest: Lungs are clear to auscultation bilaterally. No respiratory distress. No wheezes. No rhonchi. No rales.   Abdominal: Soft. Bowel sounds are normal. No distension and no mass. There is no hepatosplenomegaly. There is no tenderness.   Musculoskeletal: weak  Muscle tone  Extremities: No edema. Pulses are palpable in all 4 extremities.  Neurological: Patient is alert and oriented to person, place, and time. Cranial nerves II-XII are grossly intact with no focal deficits.  Skin: Skin is warm. No rash noted. Nails show no clubbing.  No cyanosis " or erythema.         Results Review:     I reviewed the patient's new clinical results.    Lab Results (last 24 hours)     Procedure Component Value Units Date/Time    Basic Metabolic Panel [[id number]]  (Abnormal) Collected:  06/03/18 0421    Specimen:  Blood Updated:  06/03/18 0524     Glucose 97 mg/dL      BUN 15 mg/dL      Creatinine 0.83 mg/dL      Sodium 129 (L) mmol/L      Potassium 4.3 mmol/L      Chloride 93 (L) mmol/L      CO2 24.9 mmol/L      Calcium 8.6 (L) mg/dL      eGFR Non African Amer 65 mL/min/1.73      BUN/Creatinine Ratio 18.1     Anion Gap 11.1 mmol/L     Narrative:       The MDRD GFR formula is only valid for adults with stable renal function between ages 18 and 70.    CBC (No Diff) [[id number]]  (Abnormal) Collected:  06/03/18 0421    Specimen:  Blood Updated:  06/03/18 0455     WBC 10.13 10*3/mm3      RBC 3.53 (L) 10*6/mm3      Hemoglobin 11.0 (L) g/dL      Hematocrit 32.3 (L) %      MCV 91.5 fL      MCH 31.2 (H) pg      MCHC 34.1 g/dL      RDW 13.9 %      RDW-SD 47.2 fl      MPV 9.3 fL      Platelets 265 10*3/mm3           Imaging Results (last 24 hours)     ** No results found for the last 24 hours. **          Xray not reviewed personally by physician.      ECG not reviewed personally by physician  ECG/EMG Results (most recent)     None          Medication Review:   I have reviewed the patient's current medication list    Current Facility-Administered Medications:   •  amLODIPine (NORVASC) tablet 5 mg, 5 mg, Oral, Q24H, Irasema Mcmillan, APRN, 5 mg at 06/03/18 0837  •  bisacodyl (DULCOLAX) EC tablet 5 mg, 5 mg, Oral, Daily PRN, Hubert Wong DO  •  busPIRone (BUSPAR) tablet 7.5 mg, 7.5 mg, Oral, Q8H, Irasema Mcmillan, APRN, 7.5 mg at 06/03/18 2146  •  enoxaparin (LOVENOX) syringe 40 mg, 40 mg, Subcutaneous, Q24H, Hubert oWng DO, 40 mg at 06/03/18 1148  •  levothyroxine (SYNTHROID, LEVOTHROID) tablet 88 mcg, 88 mcg, Oral, Daily, Hubert Wong DO, 88 mcg at 06/03/18 6276  •  ondansetron  (ZOFRAN) tablet 4 mg, 4 mg, Oral, Q6H PRN, Hubert Wong, DO  •  sodium chloride 0.9 % flush 1-10 mL, 1-10 mL, Intravenous, PRN, Hubert Wong DO  •  Insert peripheral IV, , , Once **AND** sodium chloride 0.9 % flush 10 mL, 10 mL, Intravenous, PRN, Chapincito Faria MD  •  sodium chloride 0.9 % infusion 40 mL, 40 mL, Intravenous, PRN, Hubert Wong, DO  •  sodium chloride tablet 1 g, 1 g, Oral, BID With Meals, Rigoberto Moyer MD, 1 g at 06/03/18 1704      Assessment/Plan     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      Malaise and weakness with chronic intermittent confusion/ delerium   Sounds like Failure to thrive/ Body mass index is 22.12 kg/m².   What was weight 1 and 2 and 3 years ago.    Anxiety and Depression   I would not start SSRI's back personally with the low sodium and I would not start patient on sedatives with the weakness and depression.  Both could make things worse.  Instead I would recommend outpatient geriatric psychiatric evaluation and care.    Resolving Subdural hematoma on top of chronic small vessel changes from recent fall/  This could be causing many of the above changes and symptoms and with time may see some improvement but may not see everything improve.    [facility] in [place] would be a good place for a thorough neurocognitive evaluation as an outpatient for this patient.  They provide the PCP a complete workup and recommendation.        Plan for disposition:  See my recommendations above    Nicky Robles DO  06/03/18  9:55 PM      Time: 25 min

## 2018-06-04 NOTE — PROGRESS NOTES
Adult Nutrition  Assessment/PES    Patient Name:  Nicolasa Woodall  YOB: 1930  MRN: [id number]  Admit Date:  5/29/2018    Assessment Date:  6/4/2018    Comments:  Recommend increase Ensure Plus to 3 per day (currently only 2 times).   Pt can meet most of needs (85%) with 3 Ensure Plus and 3 yogurts per day.   Will cont to follow.           Reason for Assessment     Row Name 06/04/18 1611          Reason for Assessment    Reason For Assessment follow-up protocol     Diagnosis other (see comments)   malaise, weakness, SIADH, met encph vs delierum, resolving SDH, anxiety/depression             Nutrition/Diet History     Row Name 06/04/18 1612          Nutrition/Diet History    Typical Food/Fluid Intake Pt up in chair, talking with female visitors. Reports eating is hard due to desire & ill fitting dentures. Reports drinking Ensure well and yogurt as well.                Labs/Tests/Procedures/Meds     Row Name 06/04/18 1613          Labs/Procedures/Meds    Lab Results Reviewed reviewed     Lab Results Comments Na 126 (NaCl tabs noted)        Diagnostic Tests/Procedures    Diagnostic Test/Procedure Reviewed reviewed        Medications    Pertinent Medications Reviewed reviewed     Pertinent Medications Comments Na Cl                 Nutrition Prescription Ordered     Row Name 06/04/18 1613          Nutrition Prescription PO    Current PO Diet Soft Texture     Texture Ground   no raw fruits/veggies     Supplement Ensure Plus   yogurt all meals     Supplement Frequency 2 times a day             Evaluation of Received Nutrient/Fluid Intake     Row Name 06/04/18 1614          Fluid Intake Evaluation    Oral Fluid (mL) 1316   88% ave x 3 days        PO Evaluation    Number of Meals 4     % PO Intake 63             Problem/Interventions:        Problem 1     Row Name 06/04/18 1615          Nutrition Diagnoses Problem 1    Problem 1 Malnutrition     Etiology (related to) Factors Affecting Nutrition     Appetite  Poor     Oral Chewing Difficulty     Signs/Symptoms (evidenced by) Unintended Weight Change     Unintended Weight Change Loss     Number of Pounds Lost 10.4#     Weight loss time period 6-7 months                     Intervention Goal     Row Name 06/04/18 1615          Intervention Goal    PO Increase intake;PO intake (%)     PO Intake % 75 %     Weight No significant weight loss             Nutrition Intervention     Row Name 06/04/18 1615          Nutrition Intervention    RD/Tech Action Interview for preference;Encourage intake;Follow Tx progress       [unfilled]        Education/Evaluation     Row Name 06/04/18 1616          Education    Education Other (comment)   Encouraged po and supplement.         Monitor/Evaluation    Monitor Per protocol;I&O;PO intake;Supplement intake;Pertinent labs;Weight;Symptoms     Education Follow-up Other (comment)   Pt verbalized understanding.          Electronically signed by:  Bev Lagunas RD  06/04/18 4:16 PM

## 2018-06-04 NOTE — THERAPY TREATMENT NOTE
Acute Care - Occupational Therapy Treatment Note  HENNY Nguyen     Patient Name: Nicolasa Woodall  : 1930  MRN: [id number]  Today's Date: 2018  Onset of Illness/Injury or Date of Surgery: 18  Date of Referral to OT: 18  Referring Physician: Dr Byers    Admit Date: 2018       ICD-10-CM ICD-9-CM   1. Hyponatremia E87.1 276.1   2. Confusion R41.0 298.9   3. Oral phase dysphagia R13.11 787.21     Patient Active Problem List   Diagnosis   • Traumatic intraventricular hemorrhage   • Hyponatremia   • Traumatic rhabdomyolysis   • Syncope   • Abnormal urinalysis   • Essential hypertension   • Chronic kidney disease, stage 3   • AMI (acute kidney injury)   • Dehydration   • Collapse of vertebra   • PAC (premature atrial contraction)     Past Medical History:   Diagnosis Date   • Depression    • Disease of thyroid gland    • Hypertension    • Pulmonary embolism     SPONTANEOUS HEMMORHAGE   • Subdural hematoma      Past Surgical History:   Procedure Laterality Date   • BREAST BIOPSY      BENIGN   • HYSTERECTOMY     • JOINT REPLACEMENT     • THYROID SURGERY      PARATHYROID REMOVAL   • VENA CAVA FILTER INSERTION         Therapy Treatment          Rehabilitation Treatment Summary     Row Name 18 1100 18 1033          Treatment Time/Intention    Discipline occupational therapist  -SD physical therapy assistant  -KM     Document Type therapy note (daily note)  -SD therapy note (daily note)  -KM     Subjective Information complains of;weakness  -SD --   pt states she wants to go home; agreed to PT  -KM     Mode of Treatment occupational therapy  -SD physical therapy  -KM     Patient/Family Observations Pt reclined in a chair.  -SD pt in bed.  RUE on pillow  -KM     Patient Effort adequate  -SD adequate  -KM     Comment  -- at end of session, pt requested bedside table be turned so she could reach the buttons.  Turned table several times and pt could not find the buttons she was looking for.   Pt states buttons to make it go up and down.  Figured out that pt was talking about buttons on bed to elevate bed.  Educated pt that recliner does not have this.  Pt voiced understanding  -KM     Existing Precautions/Restrictions fall  -SD fall   soreness in RUE  -KM     Recorded by [SD] Jovani Cantu, OTR 06/04/18 1513 [KM] Bere Rogers, PTA 06/04/18 1212     Row Name 06/04/18 1100 06/04/18 1033          Cognitive Assessment/Intervention- PT/OT    Personal Safety Interventions gait belt;fall prevention program maintained;nonskid shoes/slippers when out of bed;supervised activity  -SD gait belt;nonskid shoes/slippers when out of bed  -KM     Recorded by [SD] Jovani Cantu, OTR 06/04/18 1513 [KM] Bere Rogers, PTA 06/04/18 1212     Row Name 06/04/18 1033             Bed Mobility Assessment/Treatment    Supine-Sit [place] (Bed Mobility) supervision;conditional independence   with bedrail and head of bed elevated  -KM      Recorded by [KM] Bere Rogers, PTA 06/04/18 1212      Row Name 06/04/18 1100 06/04/18 1033          Transfer Assessment/Treatment    Sit-Stand [place] (Transfers) supervision  -SD stand by assist;verbal cues  -KM     Stand-Sit [place] (Transfers) supervision  -SD stand by assist;verbal cues  -KM     Recorded by [SD] Jovani Cantu, OTR 06/04/18 1513 [KM] Bere Rogers, PTA 06/04/18 1212     Row Name 06/04/18 1100 06/04/18 1033          Sit-Stand Transfer    Assistive Device (Sit-Stand Transfers) walker, front-wheeled  -SD walker, front-wheeled  -KM     Recorded by [SD] Jovani Cantu, OTR 06/04/18 1513 [KM] Bere Rogers, PTA 06/04/18 1212     Row Name 06/04/18 1100 06/04/18 1033          Stand-Sit Transfer    Assistive Device (Stand-Sit Transfers) walker, front-wheeled  -SD walker, front-wheeled  -KM     Recorded by [SD] Joavni Cantu, OTR 06/04/18 1513 [KM] Bere Rogers, PTA 06/04/18 1212     Row Name 06/04/18 1033             Gait/Stairs Assessment/Training     [place] Level (Gait) contact guard;stand by assist;verbal cues  -KM      Assistive Device (Gait) walker, front-wheeled  -KM      Distance in Feet (Gait) 327  -KM      Comment (Gait/Stairs) pt required cues to stay up with walker and not ambulate so fast.  She required two standing rest breaks due to fatigue but again, pt was trying to rush with ambulation.  Increased cues with backing to chair.    -KM      Recorded by [KM] Bere Rogers, PTA 06/04/18 1212      Row Name 06/04/18 1100             BADL Safety/Performance    Skilled BADL Treatment/Intervention BADL process/adaptation training;other (see comments)   education with [place] HEP  -SD      Recorded by [SD] Jovani Cantu, OTR 06/04/18 1513      Row Name 06/04/18 1100 06/04/18 1033          Therapeutic Exercise    Upper Extremity Range of Motion (Therapeutic Exercise) shoulder flexion/extension, bilateral;shoulder abduction/adduction, bilateral;elbow flexion/extension, bilateral;forearm supination/pronation, bilateral  -SD  --     Lower Extremity (Therapeutic Exercise)  -- quad sets, bilateral;SAQ (short arc quad), bilateral;LAQ (long arc quad), bilateral  -KM     Lower Extremity Range of Motion (Therapeutic Exercise)  -- hip abduction/adduction, bilateral;ankle dorsiflexion/plantar flexion, bilateral   10 reps of each; encouraged pt to perform again later  -KM     Exercise Type (Therapeutic Exercise) AROM (active range of motion)  -SD  --     Position (Therapeutic Exercise) seated  -SD  --     Sets/Reps (Therapeutic Exercise) 1/10  -SD  --     Recorded by [SD] Jovani Cantu, OTR 06/04/18 1513 [KM] Bere Rogers, PTA 06/04/18 1212     Row Name 06/04/18 1100 06/04/18 1033          Positioning and Restraints    Pre-Treatment Position sitting in chair/recliner  -SD in bed  -KM     Post Treatment Position chair  -SD chair  -KM     In Chair reclined;call light within reach;encouraged to call for assist;RUE elevated;legs elevated  -SD notified  nsg;reclined;call light within reach;encouraged to call for assist;legs elevated;RUE elevated  -KM     Recorded by [SD] Jovani Cantu, OTR 06/04/18 1513 [KM] Bere Rogers, PTA 06/04/18 1212     Row Name 06/04/18 1033             Pain Scale: Numbers Pre/Post-Treatment    Pain Scale: Numbers, Pretreatment --   no rating- just soreness in RUE from old IV site  -KM      Recorded by [KM] Bere Rogers, PTA 06/04/18 1212      Row Name 06/04/18 1100             Edema Assessment & Management    Location (Edema) upper extremity, right   elbow  -SD      Recorded by [SD] Jovani Cantu, OTR 06/04/18 1513      Row Name 06/04/18 1100             Upper Extremity Edema Measures, Right    Upper Extremity, Right (Edema) elbow   education with elevation/arom/hand pumping   -SD      Recorded by [SD] Jovani Cantu, OTR 06/04/18 1513      Row Name 06/04/18 1100             Outcome Summary/Treatment Plan (OT)    Daily Summary of Progress (OT) progress toward functional goals as expected  -SD      Recorded by [SD] Jovani Cantu, OTR 06/04/18 1513      Row Name 06/04/18 1033             Outcome Summary/Treatment Plan (PT)    Daily Summary of Progress (PT) progress towards functional goals is fair  -KM      Plan for Continued Treatment (PT) cont per plan  -KM      Anticipated Equipment Needs at Discharge (PT) --   pt states she wants to go home/ family wants rehab  -KM      Anticipated Discharge Disposition (PT) --   pt lives alone- unsure at this time  -KM      Recorded by [KM] Bere Rogers, PTA 06/04/18 1212        User Key  (r) = Recorded By, (t) = Taken By, (c) = Cosigned By    Initials Name Effective Dates Discipline    KM Bere Rogers, PTA 08/11/15 -  PT    SD Jovani Cantu, OTR 06/22/16 -  OT                   OT Rehab Goals     Row Name 06/04/18 1100             Transfer Goal 1 (OT)    [place] Level/Cues Needed (Transfer Goal 1, OT) supervision required  -SD      Progress/Outcome (Transfer Goal 1, OT) goal met   -SD         Patient Education Goal (OT)    Activity (Patient Education Goal, OT) Patient to demo ind with UE HEP  -SD      Progress/Outcome (Patient Education Goal, OT) goal ongoing  -SD        User Key  (r) = Recorded By, (t) = Taken By, (c) = Cosigned By    Initials Name Provider Type Discipline    EDILSON Cantu, OTR Occupational Therapist OT        Occupational Therapy Education     Title: PT OT SLP Therapies (Active)     Topic: Occupational Therapy (Done)     Point: ADL training (Done)     Description: Instruct learner(s) on proper safety adaptation and remediation techniques during self care or transfers.   Instruct in proper use of assistive devices.   Learning Progress Summary     Learner Status Readiness Method Response Comment Documented by    Patient Done Acceptance E VU Patient educated on benefits of activity safety during functional transfers and mobility. EN 06/03/18 1153     Done Acceptance E VU Patient educated on benefits of rolling walker for safe mobility. EN 05/30/18 1404    Family Done Acceptance E VU Patient educated on benefits of rolling walker for safe mobility. EN 05/30/18 1404          Point: Home exercise program (Done)     Description: Instruct learner(s) on appropriate technique for monitoring, assisting and/or progressing therapeutic exercises/activities.   Learning Progress Summary     Learner Status Readiness Method Response Comment Documented by    Patient Done Acceptance E,TB,D VU,DU Education with benefits of activity, BUE arom program and RUE edema management (elevation/positioning, arom, hand pumping). SD 06/04/18 1513                      User Key     Initials Effective Dates Name Provider Type Discipline    EN 06/22/16 -  Bree Chaudhary, OTR Occupational Therapist OT    SD 06/22/16 -  Jovani Cantu OTR Occupational Therapist OT                OT Recommendation and Plan  Outcome Summary/Treatment Plan (OT)  Daily Summary of Progress (OT): progress toward  functional goals as expected  Daily Summary of Progress (OT): progress toward functional goals as expected  Outcome Summary: OT:  Education regarding benefits of activity and BUE HEP.  Pt states she wants to return home, but she is concerned about weakness/activity tolerance.          Outcome Measures     Row Name 06/04/18 1100 06/04/18 1033 06/03/18 1200       How much help from another person do you currently need...    Turning from your back to your side while in flat bed without using bedrails?  -- 3  -KM  --    Moving from lying on back to sitting on the side of a flat bed without bedrails?  -- 3  -KM  --    Moving to and from a bed to a chair (including a wheelchair)?  -- 3  -KM  --    Standing up from a chair using your arms (e.g., wheelchair, bedside chair)?  -- 3  -KM  --    Climbing 3-5 steps with a railing?  -- 2  -KM  --    To walk in hospital room?  -- 3  -KM  --    AM-[facility] 6 Clicks Score  -- 17  -KM  --       How much help from another is currently needed...    Putting on and taking off regular lower body clothing? 3  -SD  -- 3  -EN    Bathing (including washing, rinsing, and drying) 3  -SD  -- 3  -EN    Toileting (which includes using toilet bed pan or urinal) 3  -SD  -- 3  -EN    Putting on and taking off regular upper body clothing 4  -SD  -- 4  -EN    Taking care of personal grooming (such as brushing teeth) 4  -SD  -- 4  -EN    Eating meals 4  -SD  -- 4  -EN    Score 21  -SD  -- 21  -EN       Functional Assessment    Outcome Measure Options AM-[facility] 6 Clicks Daily Activity (OT)  -SD AM-[facility] 6 Clicks Basic Mobility (PT)  -KM  --    Row Name 06/03/18 0915             How much help from another person do you currently need...    Turning from your back to your side while in flat bed without using bedrails? 4  -LN      Moving from lying on back to sitting on the side of a flat bed without bedrails? 3  -LN      Moving to and from a bed to a chair (including a wheelchair)? 3  -LN      Standing up from a  chair using your arms (e.g., wheelchair, bedside chair)? 3  -LN      Climbing 3-5 steps with a railing? 3  -LN      To walk in hospital room? 3  -LN      AM-PAC 6 Clicks Score 19  -LN         Functional Assessment    Outcome Measure Options AM-[facility]AC 6 Clicks Basic Mobility (PT)  -LN        User Key  (r) = Recorded By, (t) = Taken By, (c) = Cosigned By    Initials Name Provider Type    KM Bere Rogers, PTA Physical Therapy Assistant    ELISEO Chaudhary, OTR Occupational Therapist    DEILSON Cantu, OTR Occupational Therapist    LN Kat Sandhu, PT Physical Therapist           Time Calculation:         Time Calculation- OT     Row Name 06/04/18 1517             Time Calculation- OT    OT Start Time 1100  -SD        User Key  (r) = Recorded By, (t) = Taken By, (c) = Cosigned By    Initials Name Provider Type    EDILSON Cantu, OTR Occupational Therapist           Therapy Charges for Today     Code Description Service Date Service Provider Modifiers Qty    [id number]  OT THERAPEUTIC ACT EA 15 MIN 6/4/2018 ARNAV Kumar GO 2          OT G-codes  OT Professional Judgement Used?: Yes  Functional Limitation: Self care  Self Care Current Status (): At least 20 percent but less than 40 percent impaired, limited or restricted  Self Care Goal Status (): At least 20 percent but less than 40 percent impaired, limited or restricted  Self Care Discharge Status (): At least 20 percent but less than 40 percent impaired, limited or restricted    ARNAV Gonzales  6/4/2018

## 2018-06-04 NOTE — PROGRESS NOTES
Renal function remains stable  Sodium a bit lower today but has been 126-129 for the last 7 days: near baseline  Continue salt tabs  Agree with avoiding any medications such as SSRIs or HCTZ that could worsen hyponatremia  Avoid excessive fluid intake  D/C Planning underway      Herbert Padgett M.D.  [facility]

## 2018-06-04 NOTE — PROGRESS NOTES
"SERVICE: [facility] HOSPITALIST    CONSULTANTS:  Nephro    CHIEF COMPLAINT: Confusion    SUBJECTIVE:    Pt is awake and alert, with no signs of confusion. Exam at the bedside with daughter. Daughter agrees that pt is less confused today, despite sodium being lower.     Shared decision making with Pt and Daughter, Daughter offered pt a choice between [facility] and going home with 24 care provided by a friend of the family. Pt chose [facility], with SNF here if [facility] was not available. If she needed a different SNF, she would have to re-evaluate if she would rather have 24/7 care at home with the family friend.    OBJECTIVE:    /63 (BP Location: Left arm, Patient Position: Lying)   Pulse 80   Temp 98 °F (36.7 °C) (Oral)   Resp 16   Ht 152 cm (59.84\")   Wt 51.1 kg (112 lb 10.5 oz)   SpO2 95%   BMI 22.12 kg/m²     MEDS/LABS REVIEWED AND ORDERED      amLODIPine 5 mg Oral Q24H   busPIRone 7.5 mg Oral Q8H   enoxaparin 40 mg Subcutaneous Q24H   levothyroxine 88 mcg Oral Daily   sodium chloride 1 g Oral BID With Meals       Physical Exam   Eyes: Pupils are equal, round, and reactive to light.   Cardiovascular: Normal rate and regular rhythm.    Pulmonary/Chest: Effort normal and breath sounds normal. No respiratory distress.   Abdominal: Soft. Bowel sounds are normal. She exhibits no distension.   Musculoskeletal: She exhibits no edema.   Neurological: She is alert.   Skin: Skin is warm and dry. No erythema.   Psychiatric: She has a normal mood and affect. Her behavior is normal.   Nursing note and vitals reviewed.      LAB/DIAGNOSTICS:    Lab Results (last 24 hours)     Procedure Component Value Units Date/Time    Basic Metabolic Panel [[id number]]  (Abnormal) Collected:  06/04/18 0409    Specimen:  Blood Updated:  06/04/18 0550     Glucose 96 mg/dL      BUN 18 mg/dL      Creatinine 0.81 mg/dL      Sodium 126 (L) mmol/L      Potassium 4.6 mmol/L      Chloride 90 (L) mmol/L      CO2 25.2 " mmol/L      Calcium 8.8 mg/dL      eGFR Non African Amer 67 mL/min/1.73      BUN/Creatinine Ratio 22.2     Anion Gap 10.8 mmol/L     Narrative:       The MDRD GFR formula is only valid for adults with stable renal function between ages 18 and 70.    CBC (No Diff) [[id number]]  (Abnormal) Collected:  06/04/18 0409    Specimen:  Blood Updated:  06/04/18 0459     WBC 8.28 10*3/mm3      RBC 3.56 (L) 10*6/mm3      Hemoglobin 11.2 (L) g/dL      Hematocrit 32.6 (L) %      MCV 91.6 fL      MCH 31.5 (H) pg      MCHC 34.4 g/dL      RDW 13.8 %      RDW-SD 47.0 fl      MPV 9.6 fL      Platelets 268 10*3/mm3         Results for orders placed during the hospital encounter of 04/18/18   Adult Transthoracic Echo Complete W/ Cont if Necessary Per Protocol    Narrative · Left ventricular systolic function is hyperdynamic (EF > 70).  · There is mild calcification of the aortic valve.        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 Chronic Intermittent delirium  - Per Dr. Norwood, more consistent with hyponatermia w/ Admission sodium of 124 than TIA, do appreciate his assistance in this manner  - Sodium during admission from 4/18 to 4/26 ranged between 126 and 131, with it being 131 on discharge  - Per family pt's sodium has been regularly in the 120's for the last several years  - Personally doubt that chronic  hyponatremia main contributor to AMS, feel depression, SSRI/Benzo medication changes, and potentinal post concussive syndrome on top of age/dementia related memory changes to be more significant contributors     Resolving Subdural Hematoma on top of chronic small vessel changes from recent fall  - MRI showed Chronic small vessel ischemic changes no acute stroke  - Stable 4mm subdural hematoma with minimal mass effect     Significant history of Anxiety and Depression  - [facility] Kam[facility] pt was sent home on Mirtazapine 15 mg QHS. And Klonopin 0.5mg daily  - Agree with family's report of previous physician's opinion of benzo's in 88 yo ladies, that there is an increase of falls, and this needs to be taken in context of a recent subdural hematoma  - Significant clinical symptoms of depression with withdrawing from activities she used to enjoy, which started prior to hospitalization for fall, but has been worsening since fall  - DC on 4/25 pt was sent to PCP to titrate off Benzos and start Buspar / Wellbutrin  - On Admission, it appears PCP had started Buspar and titrated off benzos    Intermittent Confusion and Memory loss  -[facility] in [place] would be a good place for a thorough neurocognitive evaluation as an outpatient for this patient.  They provide the PCP a complete workup and recommendation.     CKD Stage III    Dispo  - Plan is [place] for SNF, pt is in agreement      TIME: Spent from 17:35 to 17:47 and then from 18:05 to 18:40 in face to face discussions with the pt

## 2018-06-04 NOTE — PLAN OF CARE
Problem: Patient Care Overview  Goal: Plan of Care Review  Outcome: Ongoing (interventions implemented as appropriate)   06/04/18 1213   Coping/Psychosocial   Plan of Care Reviewed With patient   Plan of Care Review   Progress improving   OTHER   Outcome Summary PT: Pt ambulated with rolling walker x 327 feet. Cues to not ambulate too fast and to stay up within walker. Instructed in [place] HEP and encouraged pt to continue to ambulate with staff. Pt stated several times during session that she wants to go home.

## 2018-06-04 NOTE — NURSING NOTE
Continued Stay Note  HENNY Nguyen     Patient Name: Nicolasa Woodall  MRN: [id number]  Today's Date: 6/4/2018    Admit Date: 5/29/2018          Discharge Plan    No documentation.             Discharge Codes    No documentation.           Christina Rojas RN

## 2018-06-05 VITALS
HEIGHT: 60 IN | OXYGEN SATURATION: 97 % | RESPIRATION RATE: 16 BRPM | WEIGHT: 112.66 LBS | BODY MASS INDEX: 22.12 KG/M2 | HEART RATE: 72 BPM | SYSTOLIC BLOOD PRESSURE: 130 MMHG | TEMPERATURE: 97.5 F | DIASTOLIC BLOOD PRESSURE: 70 MMHG

## 2018-06-05 LAB
ANION GAP SERPL CALCULATED.3IONS-SCNC: 9.7 MMOL/L
BUN BLD-MCNC: 30 MG/DL (ref 8–23)
BUN/CREAT SERPL: 30.3 (ref 7–25)
CALCIUM SPEC-SCNC: 8.5 MG/DL (ref 8.8–10.5)
CHLORIDE SERPL-SCNC: 88 MMOL/L (ref 98–107)
CO2 SERPL-SCNC: 26.3 MMOL/L (ref 22–29)
CREAT BLD-MCNC: 0.99 MG/DL (ref 0.57–1)
DEPRECATED RDW RBC AUTO: 46.5 FL (ref 37–54)
ERYTHROCYTE [DISTWIDTH] IN BLOOD BY AUTOMATED COUNT: 13.7 % (ref 11.5–14.5)
GFR SERPL CREATININE-BSD FRML MDRD: 53 ML/MIN/1.73
GLUCOSE BLD-MCNC: 97 MG/DL (ref 65–99)
HCT VFR BLD AUTO: 31.3 % (ref 37–47)
HGB BLD-MCNC: 10.7 G/DL (ref 12–16)
MCH RBC QN AUTO: 31.5 PG (ref 27–31)
MCHC RBC AUTO-ENTMCNC: 34.2 G/DL (ref 31–37)
MCV RBC AUTO: 92.1 FL (ref 81–99)
PLATELET # BLD AUTO: 295 10*3/MM3 (ref 140–500)
PMV BLD AUTO: 9.5 FL (ref 7.4–10.4)
POTASSIUM BLD-SCNC: 4.5 MMOL/L (ref 3.5–5.2)
RBC # BLD AUTO: 3.4 10*6/MM3 (ref 4.2–5.4)
SODIUM BLD-SCNC: 124 MMOL/L (ref 136–145)
WBC NRBC COR # BLD: 7.08 10*3/MM3 (ref 4.8–10.8)

## 2018-06-05 PROCEDURE — 99238 HOSP IP/OBS DSCHRG MGMT 30/<: CPT | Performed by: INTERNAL MEDICINE

## 2018-06-05 PROCEDURE — 80048 BASIC METABOLIC PNL TOTAL CA: CPT | Performed by: NURSE PRACTITIONER

## 2018-06-05 PROCEDURE — 85027 COMPLETE CBC AUTOMATED: CPT | Performed by: INTERNAL MEDICINE

## 2018-06-05 PROCEDURE — 97116 GAIT TRAINING THERAPY: CPT

## 2018-06-05 RX ORDER — SODIUM CHLORIDE 1000 MG
1 TABLET, SOLUBLE MISCELLANEOUS 2 TIMES DAILY WITH MEALS
Start: 2018-06-05 | End: 2019-11-27 | Stop reason: HOSPADM

## 2018-06-05 RX ORDER — AMLODIPINE BESYLATE 5 MG/1
5 TABLET ORAL
Status: ON HOLD
Start: 2018-06-05 | End: 2019-11-08

## 2018-06-05 RX ORDER — ONDANSETRON 4 MG/1
4 TABLET, FILM COATED ORAL EVERY 6 HOURS PRN
Start: 2018-06-05 | End: 2018-06-08 | Stop reason: ALTCHOICE

## 2018-06-05 RX ADMIN — SODIUM CHLORIDE TAB 1 GM 1 G: 1 TAB at 10:48

## 2018-06-05 RX ADMIN — BUSPIRONE HYDROCHLORIDE 7.5 MG: 15 TABLET ORAL at 05:58

## 2018-06-05 RX ADMIN — AMLODIPINE BESYLATE 5 MG: 5 TABLET ORAL at 10:51

## 2018-06-05 NOTE — NURSING NOTE
Continued Stay Note  HENNY Nguyen     Patient Name: Nicolasa Woodall  MRN: [id number]  Today's Date: 6/5/2018    Admit Date: 5/29/2018          Discharge Plan     Row Name 06/05/18 0912       Plan    Plan Comments Spoke with Sebastián at [facility] - they can take the patient today.  Will follow.  Family will need to transport              Discharge Codes    No documentation.           Christina Rojas RN

## 2018-06-05 NOTE — PLAN OF CARE
Problem: Patient Care Overview  Goal: Plan of Care Review  Outcome: Outcome(s) achieved Date Met: 06/05/18 06/05/18 7977   Coping/Psychosocial   Plan of Care Reviewed With patient   Plan of Care Review   Progress improving   OTHER   Outcome Summary PT: Pt ambulates with rolling walker and assist of one. Encouraged pt to continue HEP. Plan is for [facility] to go to [facility].

## 2018-06-05 NOTE — PLAN OF CARE
Problem: Patient Care Overview  Goal: Plan of Care Review  Outcome: Ongoing (interventions implemented as appropriate)   06/05/18 0447   Coping/Psychosocial   Plan of Care Reviewed With patient   Plan of Care Review   Progress improving   OTHER   Outcome Summary Pt VSS. Pt denies pain throughout night. Plan to attend rehab at [place] when discharged. Will continue to monitor patient.     Goal: Individualization and Mutuality  Outcome: Ongoing (interventions implemented as appropriate)    Goal: Discharge Needs Assessment  Outcome: Ongoing (interventions implemented as appropriate)    Goal: Interprofessional Rounds/Family Conf  Outcome: Ongoing (interventions implemented as appropriate)      Problem: Fall Risk (Adult)  Goal: Identify Related Risk Factors and Signs and Symptoms  Outcome: Ongoing (interventions implemented as appropriate)    Goal: Absence of Fall  Outcome: Ongoing (interventions implemented as appropriate)      Problem: Fluid Volume Excess (Adult)  Goal: Identify Related Risk Factors and Signs and Symptoms  Outcome: Ongoing (interventions implemented as appropriate)    Goal: Optimal Fluid Balance  Outcome: Ongoing (interventions implemented as appropriate)      Problem: Confusion, Acute (Adult)  Goal: Identify Related Risk Factors and Signs and Symptoms  Outcome: Ongoing (interventions implemented as appropriate)    Goal: Cognitive/Functional Impairments Minimized  Outcome: Ongoing (interventions implemented as appropriate)    Goal: Safety  Outcome: Ongoing (interventions implemented as appropriate)      Problem: Skin Injury Risk (Adult)  Goal: Skin Health and Integrity  Outcome: Ongoing (interventions implemented as appropriate)

## 2018-06-05 NOTE — THERAPY TREATMENT NOTE
Acute Care - Physical Therapy Treatment Note   [place]     Patient Name: Nicolasa Woodall  : 1930  MRN: [id number]  Today's Date: 2018  Onset of Illness/Injury or Date of Surgery: 18  Date of Referral to PT: 18 (for re-evaluation)  Referring Physician: Dr Byers    Admit Date: 2018    Visit Dx:    ICD-10-CM ICD-9-CM   1. Hyponatremia E87.1 276.1   2. Confusion R41.0 298.9   3. Oral phase dysphagia R13.11 787.21     Patient Active Problem List   Diagnosis   • Traumatic intraventricular hemorrhage   • Hyponatremia   • Traumatic rhabdomyolysis   • Syncope   • Abnormal urinalysis   • Essential hypertension   • Chronic kidney disease, stage 3   • AMI (acute kidney injury)   • Dehydration   • Collapse of vertebra   • PAC (premature atrial contraction)       Therapy Treatment          Rehabilitation Treatment Summary     Row Name 18             Treatment Time/Intention    Discipline physical therapy assistant  -KM      Document Type therapy note (daily note)  -KM      Subjective Information --   reports no pain- just can't eat all that food she says  -KM      Mode of Treatment physical therapy  -KM      Patient/Family Observations pt in chair.  states she is finished with breakfast  -KM      Patient Effort adequate  -KM      Comment pt saw a visitor in [place] during ambulation that she knew- pt more talkative and better with ambulation.  Then discharge planner told pt going to [place] and pt concerned her family did not have time to come get her and transport her over there- required increased cues to stay within walker and cues for directions .  -KM      Recorded by [KM] Bere Rogers PTA 18 1138      Row Name 18             Cognitive Assessment/Intervention- PT/OT    Personal Safety Interventions gait belt;nonskid shoes/slippers when out of bed  -KM      Recorded by [KM] Bere Rogers PTA 18 1138      Row Name 18             Bed Mobility  Assessment/Treatment    Comment (Bed Mobility) pt up in chair  -KM      Recorded by [KM] Bere Rogers, PTA 06/05/18 1138      Row Name 06/05/18 0901             Transfer Assessment/Treatment    Comment (Transfers) while on toilet, pt unsure as to what to do.  Cues to wipe herself and in the end, did assist her with this to get herself clean.  Pt reports she has not had a bath since she got here- but nursing reports she has.  PCA coming to assist pt with bath after session.  While standing at sink- cues to find paper towels and to throw into trash can when finished  -KM      Sit-Stand [place] (Transfers) supervision;verbal cues  -KM      Stand-Sit [place] (Transfers) verbal cues;supervision  -KM      [place] Level (Toilet Transfer) supervision;verbal cues  -KM      Assistive Device (Toilet Transfer) commode, 3-in-1;walker, front-wheeled   cues as to what to do at times-  -KM      Recorded by [KM] Bere Rogers, CHRISTIE 06/05/18 1138      Row Name 06/05/18 0901             Sit-Stand Transfer    Assistive Device (Sit-Stand Transfers) walker, front-wheeled  -KM      Recorded by [KM] Bere Rogers, PTA 06/05/18 1138      Row Name 06/05/18 0901             Stand-Sit Transfer    Assistive Device (Stand-Sit Transfers) walker, front-wheeled  -KM      Recorded by [KM] Bere Rogers PTA 06/05/18 1138      Row Name 06/05/18 0901             Toilet Transfer    Type (Toilet Transfer) sit-stand;stand-sit  -KM      Recorded by [KM] Bere Rogers PTA 06/05/18 1138      Row Name 06/05/18 0901             Gait/Stairs Assessment/Training    [place] Level (Gait) contact guard;stand by assist;verbal cues  -KM      Assistive Device (Gait) walker, front-wheeled  -KM      Distance in Feet (Gait) 380  -KM      Comment (Gait/Stairs) while talking to her friend, pt ambulated at faster pace and did not require cues.  After speaking with discharge planner about transfer to NH, pt required increased cues and directions with  ambulation.  Pt reports her daughter is the  and son is also employed and does not think they will have time to take her there.  I spoke to discharge planner and told her what pt had said.  Dc planner going to call family.  -KM      Recorded by [KM] Bere Rogers PTA 06/05/18 1138      Row Name 06/05/18 0901             Therapeutic Exercise    Comment (Therapeutic Exercise) pt fatigued at end of session(states maybe she should lie down and rest).  Encouraged her to perform HEP later.  PCA coming to assist with bath.   -KM      Recorded by [KM] Bere Rogers PTA 06/05/18 1138      Row Name 06/05/18 0901             Positioning and Restraints    Pre-Treatment Position sitting in chair/recliner  -KM      Post Treatment Position chair  -KM      In Chair notified nsg;reclined;call light within reach;encouraged to call for assist;legs elevated  -KM      Recorded by [KM] Bere Rogers PTA 06/05/18 1138      Row Name 06/05/18 0901             Pain Scale: Numbers Pre/Post-Treatment    Pain Scale: Numbers, Pretreatment --   pt states she never has pain.  -KM      Recorded by [KM] Bere Rogers PTA 06/05/18 1138      Row Name 06/05/18 0901             Outcome Summary/Treatment Plan (PT)    Daily Summary of Progress (PT) progress towards functional goals is fair  -KM      Anticipated Discharge Disposition (PT) skilled nursing facility  -KM      Patient/Family Concerns, Anticipated Discharge Disposition (PT) STR per family  -KM      Reason for Discharge (PT Discharge Summary) patient discharged from this facility   going to [place]  -KM      Recorded by [KM] Bere Rogers PTA 06/05/18 1138        User Key  (r) = Recorded By, (t) = Taken By, (c) = Cosigned By    Initials Name Effective Dates Discipline    KM Bere Rogers PTA 08/11/15 -  PT                       PT Rehab Goals     Row Name 06/05/18 0901             Bed Mobility Goal 1 (PT)    Activity/Assistive Device (Bed Mobility Goal 1, PT) sit to  supine;supine to sit  -KM      [place] Level/Cues Needed (Bed Mobility Goal 1, PT) independent   without use of bedrail  -KM      Time Frame (Bed Mobility Goal 1, PT) 3 days   goal not met  -KM         Transfer Goal 1 (PT)    Activity/Assistive Device (Transfer Goal 1, PT) sit-to-stand/stand-to-sit  -KM      [place] Level/Cues Needed (Transfer Goal 1, PT) standby assist  -KM      Time Frame (Transfer Goal 1, PT) 3 days   partially met  -KM         Gait Training Goal 1 (PT)    Activity/Assistive Device (Gait Training Goal 1, PT) gait (walking locomotion);assistive device use;walker, rolling  -KM      [place] Level (Gait Training Goal 1, PT) standby assist;contact guard assist  -KM      Distance (Gait Goal 1, PT) 300 feet with no LOB and good control of walker noted.   -KM      Time Frame (Gait Training Goal 1, PT) 3 days   not met  -KM         Stairs Goal 1 (PT)    Activity/Assistive Device (Stairs Goal 1, PT) ascending stairs;descending stairs  -KM      [place] Level/Cues Needed (Stairs Goal 1, PT) minimum assist (75% or more patient effort)  -KM      Number of Stairs (Stairs Goal 1, PT) 2   with 1 handrail  -KM      Time Frame (Stairs Goal 1, PT) 5 days   not attempted/not met  -KM         Patient Education Goal (PT)    Activity (Patient Education Goal, PT) Functional mobility and gait with RWX; therapeutic exercise with HEP  -KM      [place]/Cues/Accuracy (Memory Goal 2, PT) demonstrates adequately;verbalizes understanding  -KM      Time Frame (Patient Education Goal, PT) 5 days  -KM      Progress/Outcome (Patient Education Goal, PT) goal partially met  -KM        User Key  (r) = Recorded By, (t) = Taken By, (c) = Cosigned By    Initials Name Provider Type Discipline    ALTA Rogers PTA Physical Therapy Assistant PT          Physical Therapy Education     Title: PT OT SLP Therapies (Done)     Topic: Physical Therapy (Resolved)     Point: Mobility training (Resolved)    Learning  Progress Summary     Learner Status Readiness Method Response Comment Documented by    Patient Done Acceptance E,D VU Encouraged pt to continue to ambulate with staff and perform HEP KM 06/05/18 1144     Done Acceptance E,D VU encouraged pt to ambulate with staff- also spoke to pt's PCA about this.  Encouraged pt to perform HEP again this pm KM 06/04/18 1212     Done Acceptance E VU,NR Education provided on functional mobility and gait with rolling walker and importance of activity and sitting up in chair. LN 06/03/18 1417     Done Acceptance E,TB VU Educated patient on benefit of use of RW with all mobility. Patient agreeable and receptive to recommendations. BP 05/30/18 1357    Family Done Acceptance E,TB VU Educated patient on benefit of use of RW with all mobility. Patient agreeable and receptive to recommendations. BP 05/30/18 1357          Point: Home exercise program (Resolved)    Learning Progress Summary     Learner Status Readiness Method Response Comment Documented by    Patient Done Acceptance E,D VU Encouraged pt to continue to ambulate with staff and perform HEP KM 06/05/18 1144     Done Acceptance E,D VU encouraged pt to ambulate with staff- also spoke to pt's PCA about this.  Encouraged pt to perform HEP again this pm KM 06/04/18 1212          Point: Precautions (Resolved)    Learning Progress Summary     Learner Status Readiness Method Response Comment Documented by    Patient Done Acceptance E,D VU Encouraged pt to continue to ambulate with staff and perform HEP KM 06/05/18 1144                      User Key     Initials Effective Dates Name Provider Type Discipline    KM 08/11/15 -  Bere Rogers, PTA Physical Therapy Assistant PT    LN 06/22/16 -  Kat Sandhu, PT Physical Therapist PT    BP 04/03/18 -  Isac Garcia, PT Physical Therapist PT                    PT Recommendation and Plan  Anticipated Discharge Disposition (PT): skilled nursing facility  Outcome Summary/Treatment Plan  (PT)  Daily Summary of Progress (PT): progress towards functional goals is fair  Plan for Continued Treatment (PT): cont per plan  Anticipated Equipment Needs at Discharge (PT):  (pt states she wants to go home/ family wants rehab)  Anticipated Discharge Disposition (PT): skilled nursing facility  Patient/Family Concerns, Anticipated Discharge Disposition (PT): STR per family  Reason for Discharge (PT Discharge Summary): patient discharged from this facility (going to [place])  Plan of Care Reviewed With: patient  Progress: improving  Outcome Summary: PT:  Pt ambulates with rolling walker and assist of one.  Encouraged pt to continue HEP.  Plan is for pt to go to STR.          Outcome Measures     Row Name 06/05/18 0901 06/04/18 1100 06/04/18 1033       How much help from another person do you currently need...    Turning from your back to your side while in flat bed without using bedrails? 3  -KM  -- 3  -KM    Moving from lying on back to sitting on the side of a flat bed without bedrails? 3  -KM  -- 3  -KM    Moving to and from a bed to a chair (including a wheelchair)? 3  -KM  -- 3  -KM    Standing up from a chair using your arms (e.g., wheelchair, bedside chair)? 3  -KM  -- 3  -KM    Climbing 3-5 steps with a railing? 3  -KM  -- 2  -KM    To walk in hospital room? 3  -KM  -- 3  -KM    AM-PAC 6 Clicks Score 18  -KM  -- 17  -KM       How much help from another is currently needed...    Putting on and taking off regular lower body clothing?  -- 3  -SD  --    Bathing (including washing, rinsing, and drying)  -- 3  -SD  --    Toileting (which includes using toilet bed pan or urinal)  -- 3  -SD  --    Putting on and taking off regular upper body clothing  -- 4  -SD  --    Taking care of personal grooming (such as brushing teeth)  -- 4  -SD  --    Eating meals  -- 4  -SD  --    Score  -- 21  -SD  --       Functional Assessment    Outcome Measure Options AM-PAC 6 Clicks Basic Mobility (PT)  -KM AM-PAC 6 Clicks Daily  Activity (OT)  -SD AM-[facility] 6 Clicks Basic Mobility (PT)  -KM    Row Name 06/03/18 1200 06/03/18 0915          How much help from another person do you currently need...    Turning from your back to your side while in flat bed without using bedrails?  -- 4  -LN     Moving from lying on back to sitting on the side of a flat bed without bedrails?  -- 3  -LN     Moving to and from a bed to a chair (including a wheelchair)?  -- 3  -LN     Standing up from a chair using your arms (e.g., wheelchair, bedside chair)?  -- 3  -LN     Climbing 3-5 steps with a railing?  -- 3  -LN     To walk in hospital room?  -- 3  -LN     AM-PAC 6 Clicks Score  -- 19  -LN        How much help from another is currently needed...    Putting on and taking off regular lower body clothing? 3  -EN  --     Bathing (including washing, rinsing, and drying) 3  -EN  --     Toileting (which includes using toilet bed pan or urinal) 3  -EN  --     Putting on and taking off regular upper body clothing 4  -EN  --     Taking care of personal grooming (such as brushing teeth) 4  -EN  --     Eating meals 4  -EN  --     Score 21  -EN  --        Functional Assessment    Outcome Measure Options  -- AM-PAC 6 Clicks Basic Mobility (PT)  -LN       User Key  (r) = Recorded By, (t) = Taken By, (c) = Cosigned By    Initials Name Provider Type    ALTA Rogers PTA Physical Therapy Assistant    ELISEO Chaudhary, OTR Occupational Therapist    EDILSON Cantu, OTR Occupational Therapist    LN Kat Sandhu, PT Physical Therapist           Time Calculation:         PT Charges     Row Name 06/05/18 1147             Time Calculation    Start Time 0902  -KM      Stop Time 0924  -KM      Time Calculation (min) 22 min  -KM        User Key  (r) = Recorded By, (t) = Taken By, (c) = Cosigned By    Initials Name Provider Type    ALTA Rogers PTA Physical Therapy Assistant          Therapy Charges for Today     Code Description Service Date Service Provider  Modifiers Qty    30551237657 HC GAIT TRAINING EA 15 MIN 6/4/2018 Bere Rogers, PTA GP 1    55980895563 HC GAIT TRAINING EA 15 MIN 6/5/2018 Bere Rogers PTA GP 1          PT G-Codes  PT Professional Judgement Used?: Yes  Outcome Measure Options: AM-PAC 6 Clicks Basic Mobility (PT)  Functional Limitation: Mobility: Walking and moving around  Mobility: Walking and Moving Around Current Status (): At least 1 percent but less than 20 percent impaired, limited or restricted  Mobility: Walking and Moving Around Goal Status (): At least 1 percent but less than 20 percent impaired, limited or restricted  Mobility: Walking and Moving Around Discharge Status (): At least 1 percent but less than 20 percent impaired, limited or restricted    eBre Rogers PTA  6/5/2018

## 2018-06-05 NOTE — DISCHARGE SUMMARY
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  DATE: 5/29/2018.  HISTORY: CONFUSION AND DIZZINESS SINCE LAST NIGHT, GENERALIZED WEAKNESS.  COMPARISON: NONCONTRAST CT HEAD 4/18/2018.  2 prior CT scans and no prior nuclear medicine myocardial perfusion scan in the past 12 months.   TECHNIQUE:     Radiation dose reduction techniques were utilized, including automated exposure control and exposure modulation based on body size.  FINDINGS: Mild hypodensities in the deep white matter of the brain are nonspecific but favored to reflect changes of chronic microvascular disease, and appear similar to the prior exam. No acute intracranial hemorrhage is identified.  The blood products seen within the left lateral ventricle on the 4/18/2018 examination have resolved.  Ventricular configuration is stable. Mild generalized atrophy.  Paranasal sinuses and mastoid air cells are clear. No acute calvarial abnormality.        Impression: 1. No acute intracranial findings 2. Mild chronic microvascular disease changes and mild parenchymal atrophy.  This  report was finalized on 5/29/2018 9:24 AM by Dr. Radha Hays MD.      Mri Brain With & Without Contrast    Result Date: 6/2/2018  Narrative: EXAM: BRAIN MRI WITH AND WITHOUT CONTRAST JUNE 1, 2018  HISTORY: Confusion began 3 days ago, recent hyponatremia  TECHNIQUE: Routine brain MRI with and without contrast  COMPARISON: Head CT dated 5/29/2018  FINDINGS: There is no MR evidence of acute ischemia or other acute restricted diffusion. There is no hydrocephalus but there is a right temporo-occipital been subdural hematoma, but only 3 to 4 mm in maximal thickness and extends into the right parietal region but mass effect is negligible.  There is volume loss and moderate nonspecific white matter change but again no evidence of acute ischemia is seen is no evidence of acute parenchymal hemorrhage. Normal flow-voids are seen in the cerebral vessels. Bone marrow signal is normal. Postcontrast images show no mass or abnormal enhancement.      Impression: There is a thin right-sided chronic subdural hematoma with negligible mass effect, only 3 to 4 mm in maximal thickness. This is undoubtedly a sequela of the April 18, 2018 fall and intracranial hemorrhage.  No parenchymal hemorrhage, no acute ischemia, mild to moderate senescent change without acute abnormality otherwise.  This report was finalized on 6/2/2018 8:02 AM by Dr. Vinicius Lim MD.      Us Renal Bilateral    Result Date: 6/1/2018  Narrative: EXAM: Renal ultrasound, 6/1/2018 9:38 AM.  INDICATION: Acute on chronic hyponatremia..  COMPARISON: CT abdomen pelvis 11/23/2014.  FINDINGS: The right kidney measures 8.2 cm. The left kidney measures 9.8 cm. Renal cortical thickness and echogenicity is normal.  No hydronephrosis.   The bladder is unremarkable.      Impression: Mild bilateral renal atrophy.  This report was finalized on 6/1/2018 9:40 AM by Dr. Andrea Paul MD.        Allergies:  has No Known Allergies.    [facility]  reviewed    Discharge Medications:    Nicolasa Woodall   Home Medication Instructions [place]:[id number]    Printed on:06/05/18 5030   Medication Information                      amLODIPine (NORVASC) 5 MG tablet  Take 1 tablet by mouth Daily.             busPIRone (BUSPAR) 10 MG tablet  Take 7.5 mg by mouth 2 (Two) Times a Day.             levothyroxine (SYNTHROID, LEVOTHROID) 88 MCG tablet  Take 88 mcg by mouth Daily.             ondansetron (ZOFRAN) 4 MG tablet  Take 1 tablet by mouth Every 6 (Six) Hours As Needed for Nausea or Vomiting.             sodium chloride 1 g tablet  Take 1 tablet by mouth 2 (Two) Times a Day With Meals.                 History of Present Illness:  Patient is a very pleasant 87-year-old female past medical history as outlined below who came to the ER with her primary caregiver and POA daughter who reports that she had some intermittent confusion last night and this morning.  Last night patient reports that she was trying to change the channel on the TV but couldn't remember how to do so.  Last for several minutes.  This morning she also reported that she couldn't remember how to dial the phone today call her daughter so when the daughter came home she brought her to the emergency room for further treatment.  They both report that she has generally not been eating well over the past several weeks.  She has been generally more weak.  She does not have any nausea vomiting diarrhea no fevers or chills does not have any cough or sputum production does not have any dysuria.    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 Chronic Intermittent delirium  - Sodium during admission from 4/18 to 4/26 ranged between 126 and 131, with it being 131 on discharge  - Per family pt's sodium has been regularly in the 120's for the last several years  - Personally doubt that chronic hyponatremia main contributor to AMS, feel depression, SSRI/Benzo medication changes, and potentinal post concussive syndrome on top of age/dementia related memory changes to be more significant contributors     Resolving Subdural Hematoma on top of chronic small vessel changes from recent fall  - MRI showed Chronic small vessel ischemic changes no acute stroke  - Stable 4mm subdural hematoma with minimal mass effect     Significant history of Anxiety and Depression  - Per discharge sum by Dr. Kamara at [facility] from 4/22 pt was sent home on Mirtazapine 15 mg QHS. And Klonopin 0.5mg daily  - Agree with family's report of previous physician's opinion of benzo's in 86 yo ladies, that there is an increase of falls, and this needs to be taken in context of a recent subdural hematoma  - Significant clinical symptoms of depression with withdrawing from activities she used to enjoy, which started prior to hospitalization for fall, but has been worsening since fall  - DC on 4/25 pt was sent to PCP to titrate off Benzos and start Buspar / Wellbutrin  - On Admission, it appears PCP had started Buspar and titrated off benzos, continue Buspar     Intermittent Confusion and Memory loss  -[facility] in [place] would be a good place for a thorough neurocognitive evaluation as an outpatient for this patient.  They provide the PCP a complete workup and recommendation.     CKD Stage III     Dispo  - Plan is [place] for SNF, pt is in agreement    Last Lab Results:    Lab Results (most recent)     Procedure Component Value Units Date/Time    Basic Metabolic Panel [[id number]]  (Abnormal) Collected:  06/05/18 0409    Specimen:  Blood Updated:  06/05/18 0543     Glucose 97 mg/dL      BUN 30 (H) mg/dL      Creatinine 0.99 mg/dL      Sodium 124 (L) mmol/L      Potassium 4.5 mmol/L      Chloride 88 (L) mmol/L      CO2 26.3 mmol/L      Calcium 8.5 (L) mg/dL      eGFR Non African Amer 53 (L) mL/min/1.73      BUN/Creatinine Ratio 30.3 (H)     Anion Gap 9.7 mmol/L     Narrative:       The MDRD GFR formula is only valid for adults with stable renal function between ages 18 and 70.    CBC (No Diff) [[id number]]  (Abnormal) Collected:  06/05/18 0409    Specimen:  Blood Updated:  06/05/18 0449     WBC 7.08 10*3/mm3      RBC 3.40 (L) 10*6/mm3      Hemoglobin 10.7 (L) g/dL      Hematocrit 31.3 (L) %      MCV 92.1 fL      MCH 31.5 (H) pg      MCHC 34.2 g/dL      RDW 13.7 %      RDW-SD 46.5 fl      MPV 9.5 fL      Platelets 295 10*3/mm3     Basic Metabolic Panel [[id number]]  (Abnormal) Collected:  06/04/18 0409    Specimen:  Blood Updated:  06/04/18 0550     Glucose 96 mg/dL      BUN 18 mg/dL      Creatinine 0.81 mg/dL      Sodium 126 (L) mmol/L      Potassium 4.6 mmol/L      Chloride 90 (L) mmol/L      CO2 25.2 mmol/L      Calcium 8.8 mg/dL      eGFR Non African Amer 67 mL/min/1.73      BUN/Creatinine Ratio 22.2     Anion Gap 10.8 mmol/L     Narrative:       The MDRD GFR formula is only valid for adults with stable renal function between ages 18 and 70.    CBC (No Diff) [[id number]]  (Abnormal) Collected:  06/04/18 0409    Specimen:  Blood Updated:  06/04/18 0459     WBC 8.28 10*3/mm3      RBC 3.56 (L) 10*6/mm3      Hemoglobin 11.2 (L) g/dL      Hematocrit 32.6 (L) %      MCV 91.6 fL      MCH 31.5 (H) pg      MCHC 34.4 g/dL      RDW 13.8 %      RDW-SD 47.0 fl      MPV 9.6 fL      Platelets 268 10*3/mm3     Basic Metabolic Panel [[id number]]  (Abnormal) Collected:  06/03/18 0421    Specimen:  Blood  Updated:  06/03/18 0524     Glucose 97 mg/dL      BUN 15 mg/dL      Creatinine 0.83 mg/dL      Sodium 129 (L) mmol/L      Potassium 4.3 mmol/L      Chloride 93 (L) mmol/L      CO2 24.9 mmol/L      Calcium 8.6 (L) mg/dL      eGFR Non African Amer 65 mL/min/1.73      BUN/Creatinine Ratio 18.1     Anion Gap 11.1 mmol/L     Narrative:       The MDRD GFR formula is only valid for adults with stable renal function between ages 18 and 70.    CBC (No Diff) [[id number]]  (Abnormal) Collected:  06/03/18 0421    Specimen:  Blood Updated:  06/03/18 0455     WBC 10.13 10*3/mm3      RBC 3.53 (L) 10*6/mm3      Hemoglobin 11.0 (L) g/dL      Hematocrit 32.3 (L) %      MCV 91.5 fL      MCH 31.2 (H) pg      MCHC 34.1 g/dL      RDW 13.9 %      RDW-SD 47.2 fl      MPV 9.3 fL      Platelets 265 10*3/mm3     CBC (No Diff) [[id number]]  (Abnormal) Collected:  06/02/18 1739    Specimen:  Blood Updated:  06/02/18 1831     WBC 10.89 (H) 10*3/mm3      RBC 3.58 (L) 10*6/mm3      Hemoglobin 11.4 (L) g/dL      Hematocrit 32.9 (L) %      MCV 91.9 fL      MCH 31.8 (H) pg      MCHC 34.7 g/dL      RDW 13.7 %      RDW-SD 46.6 fl      MPV 9.6 fL      Platelets 282 10*3/mm3     Basic Metabolic Panel [[id number]]  (Abnormal) Collected:  06/02/18 0452    Specimen:  Blood Updated:  06/02/18 0539     Glucose 99 mg/dL      BUN 13 mg/dL      Creatinine 0.86 mg/dL      Sodium 126 (L) mmol/L      Potassium 4.5 mmol/L      Chloride 89 (L) mmol/L      CO2 26.1 mmol/L      Calcium 8.6 (L) mg/dL      eGFR Non African Amer 62 mL/min/1.73      BUN/Creatinine Ratio 15.1     Anion Gap 10.9 mmol/L     Narrative:       The MDRD GFR formula is only valid for adults with stable renal function between ages 18 and 70.    BNP [[id number]]  (Abnormal) Collected:  06/01/18 0453    Specimen:  Blood Updated:  06/01/18 1219     proBNP 1,030.0 (H) pg/mL     Narrative:       Among patients with dyspnea, NT-proBNP is highly sensitive for the detection of acute congestive heart failure.  In addition NT-proBNP of <300 pg/ml effectively rules out acute congestive heart failure with 99% negative predictive value.    Uric Acid [[id number]]  (Abnormal) Collected:  06/01/18 0453    Specimen:  Blood Updated:  06/01/18 1209     Uric Acid 2.8 (L) mg/dL     CBC & Differential [[id number]] Collected:  06/01/18 0453    Specimen:  Blood Updated:  06/01/18 0933    Narrative:       The following orders were created for panel order CBC & Differential.  Procedure                               Abnormality         Status                     ---------                               -----------         ------                     CBC Auto Differential[[id number]]        Abnormal            Final result                 Please view results for these tests on the individual orders.    CBC Auto Differential [[id number]]  (Abnormal) Collected:  06/01/18 0453    Specimen:  Blood Updated:  06/01/18 0933     WBC 8.75 10*3/mm3      RBC 3.64 (L) 10*6/mm3      Hemoglobin 11.6 (L) g/dL      Hematocrit 33.3 (L) %      MCV 91.5 fL      MCH 31.9 (H) pg      MCHC 34.8 g/dL      RDW 13.9 %      RDW-SD 46.6 fl      MPV 9.6 fL      Platelets 285 10*3/mm3      Neutrophil % 68.7 %      Lymphocyte % 15.5 (L) %      Monocyte % 11.0 (H) %      Eosinophil % 4.2 (H) %      Basophil % 0.3 %      Immature Grans % 0.3 %      Neutrophils, Absolute 6.00 10*3/mm3      Lymphocytes, Absolute 1.36 10*3/mm3      Monocytes, Absolute 0.96 10*3/mm3      Eosinophils, Absolute 0.37 (H) 10*3/mm3      Basophils, Absolute 0.03 10*3/mm3      Immature Grans, Absolute 0.03 10*3/mm3      nRBC 0.0 /100 WBC     Basic Metabolic Panel [[id number]]  (Abnormal) Collected:  06/01/18 0453    Specimen:  Blood Updated:  06/01/18 0522     Glucose 99 mg/dL      BUN 13 mg/dL      Creatinine 0.92 mg/dL      Sodium 126 (L) mmol/L      Potassium 4.6 mmol/L      Chloride 91 (L) mmol/L      CO2 25.6 mmol/L      Calcium 8.8 mg/dL      eGFR Non African Amer 58 (L) mL/min/1.73       BUN/Creatinine Ratio 14.1     Anion Gap 9.4 mmol/L     Narrative:       The MDRD GFR formula is only valid for adults with stable renal function between ages 18 and 70.    Osmolality, Urine - Urine, Clean Catch [[id number]] Collected:  05/30/18 1601    Specimen:  Urine from Urine, Catheter Updated:  05/31/18 1654     Osmolality, Urine 361 mOsm/kg     Narrative:       Osmo Normal Reference Ranges:    Random:  mOsm/kg H2O, depending on fluid intake.  Random: >850 mOsm/kg H20, after 12 hour fluid restriction.    24 Hour: 300-900 mOsm/kg H2O.    Sodium, Urine, Random - Urine, Clean Catch [[id number]] Collected:  05/30/18 1601    Specimen:  Urine from Urine, Catheter Updated:  05/31/18 1329     Sodium, Urine 124 mmol/L     Creatinine, Urine, Random - Urine, Clean Catch [[id number]] Collected:  05/31/18 1032    Specimen:  Urine from Urine, Clean Catch Updated:  05/31/18 1051     Creatinine, Urine 51.6 mg/dL     Cortisol [[id number]]  (Normal) Collected:  05/31/18 0404    Specimen:  Blood Updated:  05/31/18 0808     Cortisol 15.66 mcg/dL     Narrative:       Cortisol Reference Ranges:    Cortisol 6AM - 10AM Range: 6.02-18.40 mcg/dl  Cortisol 4PM - 8PM Range: 2.68-10.50 mcg/dl  Critical AM/PM:    Less than 2 mcg/dl    Basic Metabolic Panel [[id number]]  (Abnormal) Collected:  05/31/18 0404    Specimen:  Blood Updated:  05/31/18 0448     Glucose 94 mg/dL      BUN 11 mg/dL      Creatinine 0.88 mg/dL      Sodium 129 (L) mmol/L      Potassium 4.2 mmol/L      Chloride 96 (L) mmol/L      CO2 22.3 mmol/L      Calcium 8.1 (L) mg/dL      eGFR Non African Amer 61 mL/min/1.73      BUN/Creatinine Ratio 12.5     Anion Gap 10.7 mmol/L     Narrative:       The MDRD GFR formula is only valid for adults with stable renal function between ages 18 and 70.    Osmolality, Serum [[id number]]  (Abnormal) Collected:  05/30/18 1509    Specimen:  Blood Updated:  05/30/18 2139     Osmolality 275 (L) mOsm/kg     Urinalysis With / Culture If Indicated  - Urine, Catheter [[id number]]  (Normal) Collected:  05/30/18 1553    Specimen:  Urine from Urine, Catheter Updated:  05/30/18 1600     Color, UA Yellow     Appearance, UA Clear     pH, UA 7.0     Specific Gravity, UA 1.015     Glucose, UA Negative     Ketones, UA Negative     Bilirubin, UA Negative     Blood, UA Negative     Protein, UA Negative     Leuk Esterase, UA Negative     Nitrite, UA Negative     Urobilinogen, UA 0.2 E.U./dL    Narrative:       Urine microscopic not indicated.    Basic Metabolic Panel [[id number]]  (Abnormal) Collected:  05/30/18 1340    Specimen:  Blood Updated:  05/30/18 1434     Glucose 153 (H) mg/dL      BUN 10 mg/dL      Creatinine 1.07 (H) mg/dL      Sodium 129 (L) mmol/L      Potassium 4.6 mmol/L      Chloride 95 (L) mmol/L      CO2 22.5 mmol/L      Calcium 8.0 (L) mg/dL      eGFR Non African Amer 49 (L) mL/min/1.73      BUN/Creatinine Ratio 9.3     Anion Gap 11.5 mmol/L     Narrative:       The MDRD GFR formula is only valid for adults with stable renal function between ages 18 and 70.    Basic Metabolic Panel [[id number]]  (Abnormal) Collected:  05/30/18 0331    Specimen:  Blood Updated:  05/30/18 0438     Glucose 89 mg/dL      BUN 9 mg/dL      Creatinine 0.93 mg/dL      Sodium 129 (L) mmol/L      Potassium 4.0 mmol/L      Chloride 96 (L) mmol/L      CO2 23.4 mmol/L      Calcium 8.0 (L) mg/dL      eGFR Non African Amer 57 (L) mL/min/1.73      BUN/Creatinine Ratio 9.7     Anion Gap 9.6 mmol/L     Narrative:       The MDRD GFR formula is only valid for adults with stable renal function between ages 18 and 70.    CBC Auto Differential [[id number]]  (Abnormal) Collected:  05/30/18 0331    Specimen:  Blood Updated:  05/30/18 0413     WBC 6.74 10*3/mm3      RBC 3.46 (L) 10*6/mm3      Hemoglobin 10.9 (L) g/dL      Hematocrit 31.8 (L) %      MCV 91.9 fL      MCH 31.5 (H) pg      MCHC 34.3 g/dL      RDW 13.8 %      RDW-SD 47.0 fl      MPV 9.2 fL      Platelets 249 10*3/mm3      Neutrophil %  56.6 %      Lymphocyte % 25.5 %      Monocyte % 13.5 (H) %      Eosinophil % 3.7 %      Basophil % 0.4 %      Immature Grans % 0.3 %      Neutrophils, Absolute 3.81 10*3/mm3      Lymphocytes, Absolute 1.72 10*3/mm3      Monocytes, Absolute 0.91 10*3/mm3      Eosinophils, Absolute 0.25 10*3/mm3      Basophils, Absolute 0.03 10*3/mm3      Immature Grans, Absolute 0.02 10*3/mm3      nRBC 0.0 /100 WBC     Basic Metabolic Panel [[id number]]  (Abnormal) Collected:  05/29/18 1622    Specimen:  Blood Updated:  05/29/18 1656     Glucose 117 (H) mg/dL      BUN 10 mg/dL      Creatinine 0.98 mg/dL      Sodium 127 (L) mmol/L      Potassium 4.4 mmol/L      Chloride 91 (L) mmol/L      CO2 27.3 mmol/L      Calcium 8.3 (L) mg/dL      eGFR Non African Amer 54 (L) mL/min/1.73      BUN/Creatinine Ratio 10.2     Anion Gap 8.7 mmol/L     Narrative:       The MDRD GFR formula is only valid for adults with stable renal function between ages 18 and 70.    Urinalysis, Microscopic Only - Urine, Clean Catch [[id number]]  (Abnormal) Collected:  05/29/18 0848    Specimen:  Urine from Urine, Clean Catch Updated:  05/29/18 0916     RBC, UA 0-2 (A) /HPF      WBC, UA 6-12 (A) /HPF      Bacteria, UA Trace (A) /HPF      Squamous Epithelial Cells, UA 0-2 /HPF      Hyaline Casts, UA None Seen /LPF      Methodology Manual Light Microscopy    Urinalysis With / Culture If Indicated - Urine, Clean Catch [[id number]]  (Abnormal) Collected:  05/29/18 0848    Specimen:  Urine from Urine, Clean Catch Updated:  05/29/18 0859     Color, UA Yellow     Appearance, UA Clear     pH, UA 8.5 (H)     Specific Gravity, UA 1.015     Glucose, UA Negative     Ketones, UA Negative     Bilirubin, UA Negative     Blood, UA Trace (A)     Protein, UA 30 mg/dL (1+) (A)     Leuk Esterase, UA Small (1+) (A)     Nitrite, UA Negative     Urobilinogen, UA 0.2 E.U./dL    TSH [[id number]]  (Normal) Collected:  05/29/18 0805    Specimen:  Blood Updated:  05/29/18 0843     TSH 0.762 mIU/mL      Comprehensive Metabolic Panel [[id number]]  (Abnormal) Collected:  05/29/18 0805    Specimen:  Blood Updated:  05/29/18 0832     Glucose 101 (H) mg/dL      BUN 12 mg/dL      Creatinine 1.06 (H) mg/dL      Sodium 124 (L) mmol/L      Potassium 4.3 mmol/L      Chloride 86 (L) mmol/L      CO2 25.1 mmol/L      Calcium 9.3 mg/dL      Total Protein 6.5 g/dL      Albumin 3.90 g/dL      ALT (SGPT) 12 U/L      AST (SGOT) 20 U/L      Alkaline Phosphatase 73 U/L      Total Bilirubin 0.6 mg/dL      eGFR Non African Amer 49 (L) mL/min/1.73      Globulin 2.6 gm/dL      A/G Ratio 1.5 g/dL      BUN/Creatinine Ratio 11.3     Anion Gap 12.9 mmol/L     Narrative:       The MDRD GFR formula is only valid for adults with stable renal function between ages 18 and 70.    Protime-INR [[id number]]  (Normal) Collected:  05/29/18 0805    Specimen:  Blood Updated:  05/29/18 0825     Protime 13.8 Seconds      INR 1.06    Narrative:       Therapeutic Ranges for INR: 2.0-3.0 (PT 20-30)                              2.5-3.5 (PT 25-34)    aPTT [[id number]]  (Normal) Collected:  05/29/18 0805    Specimen:  Blood Updated:  05/29/18 0825     PTT 28.1 seconds     Narrative:       PTT = The equivalent PTT values for the therapeutic range of heparin levels at 0.1 to 0.7 U/ml are 53 to 110 seconds.    CBC & Differential [[id number]] Collected:  05/29/18 0805    Specimen:  Blood Updated:  05/29/18 0815    Narrative:       The following orders were created for panel order CBC & Differential.  Procedure                               Abnormality         Status                     ---------                               -----------         ------                     CBC Auto Differential[[id number]]        Abnormal            Final result                 Please view results for these tests on the individual orders.    CBC Auto Differential [[id number]]  (Abnormal) Collected:  05/29/18 0805    Specimen:  Blood Updated:  05/29/18 0815     WBC 7.19 10*3/mm3      RBC  3.99 (L) 10*6/mm3      Hemoglobin 12.4 g/dL      Hematocrit 35.7 (L) %      MCV 89.5 fL      MCH 31.1 (H) pg      MCHC 34.7 g/dL      RDW 13.5 %      RDW-SD 44.4 fl      MPV 9.1 fL      Platelets 312 10*3/mm3      Neutrophil % 67.0 %      Lymphocyte % 19.2 (L) %      Monocyte % 10.7 (H) %      Eosinophil % 2.4 %      Basophil % 0.3 %      Immature Grans % 0.4 %      Neutrophils, Absolute 4.82 10*3/mm3      Lymphocytes, Absolute 1.38 10*3/mm3      Monocytes, Absolute 0.77 10*3/mm3      Eosinophils, Absolute 0.17 10*3/mm3      Basophils, Absolute 0.02 10*3/mm3      Immature Grans, Absolute 0.03 10*3/mm3      nRBC 0.0 /100 WBC         Imaging Results (most recent)     Procedure Component Value Units Date/Time    SCANNED - IMAGING [[id number]] Resulted:  05/29/18      Updated:  06/04/18 1459    MRI Brain With & Without Contrast [[id number]] Collected:  06/02/18 0756     Updated:  06/02/18 0804    Narrative:       EXAM: BRAIN MRI WITH AND WITHOUT CONTRAST JUNE 1, 2018     HISTORY: Confusion began 3 days ago, recent hyponatremia     TECHNIQUE: Routine brain MRI with and without contrast      COMPARISON: Head CT dated 5/29/2018     FINDINGS: There is no MR evidence of acute ischemia or other acute  restricted diffusion. There is no hydrocephalus but there is a right  temporo-occipital been subdural hematoma, but only 3 to 4 mm in maximal  thickness and extends into the right parietal region but mass effect is  negligible.     There is volume loss and moderate nonspecific white matter change but  again no evidence of acute ischemia is seen is no evidence of acute  parenchymal hemorrhage. Normal flow-voids are seen in the cerebral  vessels. Bone marrow signal is normal. Postcontrast images show no mass  or abnormal enhancement.       Impression:       There is a thin right-sided chronic subdural hematoma with  negligible mass effect, only 3 to 4 mm in maximal thickness. This is  undoubtedly a sequela of the April 18, 2018 fall  and intracranial  hemorrhage.     No parenchymal hemorrhage, no acute ischemia, mild to moderate senescent  change without acute abnormality otherwise.     This report was finalized on 6/2/2018 8:02 AM by Dr. Vinicius Lim MD.       US Renal Bilateral [[id number]] Collected:  06/01/18 0938     Updated:  06/01/18 0942    Narrative:       EXAM: Renal ultrasound, 6/1/2018 9:38 AM.     INDICATION:   Acute on chronic hyponatremia..     COMPARISON:   CT abdomen pelvis 11/23/2014.     FINDINGS:   The right kidney measures 8.2 cm. The left kidney measures 9.8 cm. Renal  cortical thickness and echogenicity is normal.  No hydronephrosis.       The bladder is unremarkable.       Impression:       Mild bilateral renal atrophy.     This report was finalized on 6/1/2018 9:40 AM by Dr. Andrea Paul MD.       CT Head Without Contrast [[id number]] Collected:  05/29/18 0921     Updated:  05/29/18 0926    Narrative:       EXAM: NONCONTRAST CT HEAD.     DATE: 5/29/2018.     HISTORY: CONFUSION AND DIZZINESS SINCE LAST NIGHT, GENERALIZED WEAKNESS.     COMPARISON: NONCONTRAST CT HEAD 4/18/2018.     2 prior CT scans and no prior nuclear medicine myocardial perfusion scan  in the past 12 months.        TECHNIQUE:      Radiation dose reduction techniques were utilized, including  automated exposure control and exposure modulation based on body size.     FINDINGS:  Mild hypodensities in the deep white matter of the brain are nonspecific  but favored to reflect changes of chronic microvascular disease, and  appear similar to the prior exam. No acute intracranial hemorrhage is  identified.      The blood products seen within the left lateral ventricle on the  4/18/2018 examination have resolved.     Ventricular configuration is stable. Mild generalized atrophy.     Paranasal sinuses and mastoid air cells are clear. No acute calvarial  abnormality.             Impression:       1. No acute intracranial findings  2. Mild chronic microvascular  disease changes and mild parenchymal  atrophy.     This report was finalized on 5/29/2018 9:24 AM by Dr. Radha Hays MD.             PROCEDURES      Condition on Discharge:  Asympomatic and at baseline    Physical Exam at Discharge  Vital Signs  Temp:  [97.7 °F (36.5 °C)-98.8 °F (37.1 °C)] 97.7 °F (36.5 °C)  Heart Rate:  [70-86] 70  Resp:  [16] 16  BP: (102-134)/(59-76) 102/59    Physical Exam:  Physical Exam   Constitutional: She is oriented to person, place, and time. She appears well-developed and well-nourished. No distress.   HENT:   Right Ear: External ear normal.   Left Ear: External ear normal.   Nose: Nose normal.   Eyes: Conjunctivae and EOM are normal. Pupils are equal, round, and reactive to light.   Neck: Normal range of motion. Neck supple. No JVD present.   Cardiovascular: Normal rate, regular rhythm and normal heart sounds.  Exam reveals no friction rub.    No murmur heard.  Pulmonary/Chest: Effort normal and breath sounds normal. No stridor. No respiratory distress. She has no wheezes.   Abdominal: Soft. Bowel sounds are normal. She exhibits no distension. There is no tenderness.   Musculoskeletal: Normal range of motion. She exhibits no edema.   Neurological: She is alert and oriented to person, place, and time. No cranial nerve deficit.   Skin: Skin is warm and dry. She is not diaphoretic.   Psychiatric: She has a normal mood and affect. Her behavior is normal.   Nursing note and vitals reviewed.      Discharge Disposition  To [facility]    Visiting Nurse:    No     Home PT/OT:  No     Home Safety Evaluation:  No     DME  None at this time    Discharge Diet:      Dietary Orders     Start     Ordered    05/31/18 0842  DIET MESSAGE Please send strawberry yogurt now and with pts meals per pt's request thanks  Daily With Breakfast, Lunch & Dinner     Comments:  Please send strawberry yogurt now and with pts meals per pt's request thanks    05/31/18 0802    05/30/18 5214  Diet Soft  Texture; Ground  Diet Effective Now     Comments:  no raw vegetables or hard raw fruits, diced fruit canned ok   Question Answer Comment   Diet Texture / Consistency Soft Texture    Select Texture: Ground        05/30/18 1457    05/30/18 1200  Dietary Nutrition Supplement: Ensure Plus  Daily With Lunch & Dinner     Question:  Select Supplement:  Answer:  Ensure Plus    05/30/18 1963          Activity at Discharge:  As tolerated, no restrictions    Pre-discharge education        Follow-up Appointments  Future Appointments  Date Time Provider Department Center   6/8/2018 1:00 PM Maldonado Daley MD [facility] None     Additional Instructions for the Follow-ups that You Need to Schedule     Discharge Follow-up with PCP    As directed      Follow Up Details:  2 weeks         Discharge Follow-up with Specified Provider: [facility] [place]; 1 Month    As directed      To:  Fatimah reilly [place]    Follow Up:  1 Month    Follow Up Details:  Neuropyschiatric Evaluation               Test Results Pending at Discharge       Claudia Byers MD  06/05/18  10:32 AM    Time: < 30 minutes

## 2018-06-05 NOTE — NURSING NOTE
Continued Stay Note  EHNNY Nguyen     Patient Name: Nicolasa Woodall  MRN: [id number]  Today's Date: 6/5/2018    Admit Date: 5/29/2018          Discharge Plan     Row Name 06/05/18 1023       Plan    Plan Comments Spoke with Mrs Woodall's son Roger, and he states he will be here at 1230 to take her to [place].  Will follow    Row Name 06/05/18 0912       Plan    Plan Comments Spoke with Sebastián at [place] - they can take the patient today.  Will follow.  Family will need to transport              Discharge Codes    No documentation.           Christina Rojas RN

## 2018-06-06 NOTE — NURSING NOTE
Case Management Discharge Note    Final Note: Discharged to [facility]    Destination - Selection Complete     Service Request Status Selected Specialties Address Phone Number Fax Number    [facility] Selected Skilled Nursing Facility [address] [place] [id number] [phone]      Durable Medical Equipment     No service coordination in this encounter.      Dialysis/Infusion     No service coordination in this encounter.      Home Medical Care     No service coordination in this encounter.      Social Care     No service coordination in this encounter.             Final Discharge Disposition Code: 03 - skilled nursing facility (SNF)

## 2018-06-08 ENCOUNTER — OFFICE VISIT (OUTPATIENT)
Dept: CARDIOLOGY | Facility: CLINIC | Age: 83
End: 2018-06-08

## 2018-06-08 VITALS
SYSTOLIC BLOOD PRESSURE: 112 MMHG | HEIGHT: 60 IN | HEART RATE: 90 BPM | BODY MASS INDEX: 22.54 KG/M2 | WEIGHT: 114.8 LBS | DIASTOLIC BLOOD PRESSURE: 76 MMHG | OXYGEN SATURATION: 98 %

## 2018-06-08 DIAGNOSIS — I35.8 AORTIC VALVE SCLEROSIS: ICD-10-CM

## 2018-06-08 DIAGNOSIS — I47.1 PAT (PAROXYSMAL ATRIAL TACHYCARDIA) (HCC): ICD-10-CM

## 2018-06-08 DIAGNOSIS — I10 ESSENTIAL HYPERTENSION: ICD-10-CM

## 2018-06-08 DIAGNOSIS — R55 SYNCOPE, UNSPECIFIED SYNCOPE TYPE: Primary | ICD-10-CM

## 2018-06-08 PROCEDURE — 99214 OFFICE O/P EST MOD 30 MIN: CPT | Performed by: INTERNAL MEDICINE

## 2018-06-10 NOTE — PROGRESS NOTES
Date of Office Visit: 2018  Encounter Provider: Maldonado Daley MD  Place of Service: [facility] CARDIOLOGY  Patient Name: Nicolasa Woodall  :1930    Chief complaint: Follow-up for possible syncope, aortic sclerosis, and   hypertension.    History of Present Illness:    Dear Dr. Ann:      I again had the pleasure of seeing your patient in cardiology office on 2018.  As you   well know, she is a very pleasant 87 year-old white female with a history of chronic   kidney disease and hypertension who presents for follow-up.  I initially saw the patient   in the hospital on 2018 for a possible episode of syncope.  The patient has no   recollection of the events, but evidently either suffered a mechanical fall or syncopal   episode and was found on the ground.  She did have a right subdural hematoma as   a complication from this.  Her troponin was equivocal, although she had not had any   chest discomfort.  She underwent an extensive work-up, including an echocardiogram   on 2018 which showed an ejection fraction of greater than 70%, aortic sclerosis,   and no other significant valvular disease.  There was no evidence of LVOT obstruction   on the echocardiogram.  She also had a carotid Doppler ultrasound performed on   2018 which showed only mild bilateral disease.  She subsequently wore a 14 day   Zio Patch at the time of discharge on 2018, and this did show several brief runs   of paroxysmal atrial tachycardia, although no atrial fibrillation and no pauses or   bradycardia.  Her average heart rate was 84 on the monitor.    The patient presents today for follow-up.  Unfortunately, she has been extremely   depressed recently.  She was admitted earlier this month to [facility] with   altered mental status, and was found to have multifactorial encephalopathy.  Her   sodium was 124 at the time, and her Remeron and Cymbalta were both  discontinued   as these may have been contributing to the hyponatremia.  Since these medications   were discontinued, she has felt poorly.  However, she has not had any further   syncope.  She states that her blood pressure has been running good at home.  She   really has had no other cardiac symptoms, including chest pain or shortness of   breath.    Past Medical History:   Diagnosis Date   • Anxiety    • Chronic kidney disease    • Depression    • Disease of thyroid gland    • Hypertension    • Metabolic encephalopathy    • Pulmonary embolism     SPONTANEOUS HEMMORHAGE   • Subdural hematoma        Past Surgical History:   Procedure Laterality Date   • BREAST BIOPSY      BENIGN   • HYSTERECTOMY     • JOINT REPLACEMENT     • THYROID SURGERY      PARATHYROID REMOVAL   • VENA CAVA FILTER INSERTION         Current Outpatient Prescriptions on File Prior to Visit   Medication Sig Dispense Refill   • amLODIPine (NORVASC) 5 MG tablet Take 1 tablet by mouth Daily.     • busPIRone (BUSPAR) 10 MG tablet Take 7.5 mg by mouth 2 (Two) Times a Day.     • levothyroxine (SYNTHROID, LEVOTHROID) 88 MCG tablet Take 88 mcg by mouth Daily.     • sodium chloride 1 g tablet Take 1 tablet by mouth 2 (Two) Times a Day With Meals.       No current facility-administered medications on file prior to visit.      Allergies as of 06/08/2018   • (No Known Allergies)     Social History     Social History   • Marital status:      Spouse name: N/A   • Number of children: N/A   • Years of education: N/A     Occupational History   • Not on file.     Social History Main Topics   • Smoking status: Former Smoker     Packs/day: 0.50     Years: 25.00     Quit date: 1974   • Smokeless tobacco: Never Used   • Alcohol use 0.6 oz/week     1 Glasses of wine per week      Comment: RARELY/Caffeine use   • Drug use: No   • Sexual activity: Defer     Other Topics Concern   • Not on file     Social History Narrative   • No narrative on file     Family History  "  Problem Relation Age of Onset   • Breast cancer Daughter        Review of Systems   Constitution: Positive for malaise/fatigue and weight loss.   Psychiatric/Behavioral: Positive for depression. The patient is nervous/anxious.    All other systems reviewed and are negative.     Objective:     Vitals:    06/08/18 1310   BP: 112/76   BP Location: Left arm   Pulse: 90   SpO2: 98%   Weight: 52.1 kg (114 lb 12.8 oz)   Height: 152.4 cm (60\")     Body mass index is 22.42 kg/m².    Physical Exam   Constitutional: She is oriented to person, place, and time. She appears well-developed and well-nourished.   HENT:   Head: Normocephalic and atraumatic.   Eyes: Conjunctivae are normal.   Neck: Neck supple.   Cardiovascular: Normal rate and regular rhythm.  Exam reveals no gallop and no friction rub.    Murmur heard.   Systolic murmur is present with a grade of 2/6  at the upper right sternal border, upper left sternal border  Pulmonary/Chest: Effort normal and breath sounds normal.   Abdominal: Soft. There is no tenderness.   Musculoskeletal: She exhibits no edema.   Neurological: She is alert and oriented to person, place, and time.   Skin: Skin is warm.   Psychiatric: She has a normal mood and affect. Her behavior is normal.     Lab Review:   Procedures    Cardiac Procedures:  1.  Echocardiogram on 4/18/2018: The ejection fraction was greater than 70%.  There   was aortic sclerosis without stenosis.  The RVSP was 34 mmHg. There was no other   significant valvular disease.  2.  14 day Zio Patch starting on 4/20/2018: There were several runs of PAT, but no   atrial fibrillation.  The average heart rate was 84 bpm.    Assessment:       Diagnosis Plan   1. Syncope, unspecified syncope type     2. Essential hypertension     3. Aortic valve sclerosis     4. PAT (paroxysmal atrial tachycardia)       Plan:       Again, it is unclear as to whether the patient had a mechanical fall or syncopal episode in   the hospital.  However, she " has not had further syncope or falls since this time.  She has   recovered from the subdural hematoma.  Her echocardiogram showed aortic sclerosis,   but no stenosis.  Although her ejection fraction was hyperdynamic, there was no   evidence of LVOT obstruction.  She did not have any issues on her extended monitor   other than clinically insignificant runs of paroxysmal atrial tachycardia.  I do not feel that   she requires further cardiac work-up at this time.  Her blood pressure is 112/76, and she   is going to continue on the amlodipine.  Unfortunately, she has had severe depression   since coming off of her Remeron and Cymbalta for possibly contributing to recent   hyponatremia.  She is going to talk with Dr. Ann about potentially starting these   back.  She will follow-up with me on an as-needed basis in the future.    I spent greater than 30 minutes in the care of the patient, including reviewing records and  in direct patient care.  Greater than half this time was spent in face-to-face counseling   regarding her aortic sclerosis, potential syncopal episode, and test results.

## 2018-08-14 ENCOUNTER — TRANSCRIBE ORDERS (OUTPATIENT)
Dept: ADMINISTRATIVE | Facility: HOSPITAL | Age: 83
End: 2018-08-14

## 2018-08-14 DIAGNOSIS — R63.4 LOSS OF WEIGHT: Primary | ICD-10-CM

## 2018-08-14 DIAGNOSIS — Z86.79 PERSONAL HISTORY OF SUBDURAL HEMATOMA: Primary | ICD-10-CM

## 2018-08-23 ENCOUNTER — HOSPITAL ENCOUNTER (OUTPATIENT)
Dept: CT IMAGING | Facility: HOSPITAL | Age: 83
Discharge: HOME | End: 2018-08-23
Attending: INTERNAL MEDICINE | Admitting: INTERNAL MEDICINE

## 2018-08-23 DIAGNOSIS — Z86.79 PERSONAL HISTORY OF SUBDURAL HEMATOMA: ICD-10-CM

## 2018-08-23 PROCEDURE — 70450 CT HEAD/BRAIN W/O DYE: CPT

## 2018-09-04 ENCOUNTER — APPOINTMENT (OUTPATIENT)
Dept: GENERAL RADIOLOGY | Facility: HOSPITAL | Age: 83
End: 2018-09-04

## 2018-09-04 ENCOUNTER — APPOINTMENT (OUTPATIENT)
Dept: CT IMAGING | Facility: HOSPITAL | Age: 83
End: 2018-09-04

## 2018-09-04 ENCOUNTER — HOSPITAL ENCOUNTER (EMERGENCY)
Facility: HOSPITAL | Age: 83
Discharge: HOME | End: 2018-09-04
Attending: EMERGENCY MEDICINE | Admitting: EMERGENCY MEDICINE

## 2018-09-04 VITALS
TEMPERATURE: 97.7 F | BODY MASS INDEX: 19.37 KG/M2 | WEIGHT: 109.31 LBS | RESPIRATION RATE: 16 BRPM | DIASTOLIC BLOOD PRESSURE: 92 MMHG | OXYGEN SATURATION: 97 % | HEART RATE: 86 BPM | HEIGHT: 63 IN | SYSTOLIC BLOOD PRESSURE: 164 MMHG

## 2018-09-04 DIAGNOSIS — T07.XXXA MULTIPLE CONTUSIONS: ICD-10-CM
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
## 2018-09-04 DIAGNOSIS — S51.812A SKIN TEAR OF LEFT FOREARM WITHOUT COMPLICATION, INITIAL ENCOUNTER: ICD-10-CM

## 2018-09-04 DIAGNOSIS — S22.42XA CLOSED FRACTURE OF MULTIPLE RIBS OF LEFT SIDE, INITIAL ENCOUNTER: Primary | ICD-10-CM

## 2018-09-04 LAB
ALBUMIN SERPL-MCNC: 3.6 G/DL (ref 3.5–5.2)
ALBUMIN/GLOB SERPL: 1.2 G/DL
ALP SERPL-CCNC: 73 U/L (ref 40–129)
ALT SERPL W P-5'-P-CCNC: 15 U/L (ref 5–33)
ANION GAP SERPL CALCULATED.3IONS-SCNC: 12.1 MMOL/L
AST SERPL-CCNC: 18 U/L (ref 5–32)
BASOPHILS # BLD AUTO: 0.03 10*3/MM3 (ref 0–0.2)
BASOPHILS NFR BLD AUTO: 0.3 % (ref 0–2)
BILIRUB SERPL-MCNC: 0.5 MG/DL (ref 0.2–1.2)
BUN BLD-MCNC: 32 MG/DL (ref 8–23)
BUN/CREAT SERPL: 27.4 (ref 7–25)
CALCIUM SPEC-SCNC: 8.7 MG/DL (ref 8.8–10.5)
CHLORIDE SERPL-SCNC: 98 MMOL/L (ref 98–107)
CO2 SERPL-SCNC: 27.9 MMOL/L (ref 22–29)
CREAT BLD-MCNC: 1.17 MG/DL (ref 0.57–1)
DEPRECATED RDW RBC AUTO: 46.5 FL (ref 37–54)
EOSINOPHIL # BLD AUTO: 0.34 10*3/MM3 (ref 0.1–0.3)
EOSINOPHIL NFR BLD AUTO: 3.5 % (ref 0–4)
ERYTHROCYTE [DISTWIDTH] IN BLOOD BY AUTOMATED COUNT: 13.7 % (ref 11.5–14.5)
GFR SERPL CREATININE-BSD FRML MDRD: 44 ML/MIN/1.73
GLOBULIN UR ELPH-MCNC: 3.1 GM/DL
GLUCOSE BLD-MCNC: 95 MG/DL (ref 65–99)
HCT VFR BLD AUTO: 36.6 % (ref 37–47)
HGB BLD-MCNC: 11.9 G/DL (ref 12–16)
IMM GRANULOCYTES # BLD: 0.04 10*3/MM3 (ref 0–0.03)
IMM GRANULOCYTES NFR BLD: 0.4 % (ref 0–0.5)
LYMPHOCYTES # BLD AUTO: 1.25 10*3/MM3 (ref 0.6–4.8)
LYMPHOCYTES NFR BLD AUTO: 12.7 % (ref 20–45)
MCH RBC QN AUTO: 30.4 PG (ref 27–31)
MCHC RBC AUTO-ENTMCNC: 32.5 G/DL (ref 31–37)
MCV RBC AUTO: 93.4 FL (ref 81–99)
MONOCYTES # BLD AUTO: 0.85 10*3/MM3 (ref 0–1)
MONOCYTES NFR BLD AUTO: 8.6 % (ref 3–8)
NEUTROPHILS # BLD AUTO: 7.34 10*3/MM3 (ref 1.5–8.3)
NEUTROPHILS NFR BLD AUTO: 74.5 % (ref 45–70)
NRBC BLD MANUAL-RTO: 0 /100 WBC (ref 0–0)
PLATELET # BLD AUTO: 284 10*3/MM3 (ref 140–500)
PMV BLD AUTO: 9 FL (ref 7.4–10.4)
POTASSIUM BLD-SCNC: 4.3 MMOL/L (ref 3.5–5.2)
PROT SERPL-MCNC: 6.7 G/DL (ref 6–8.5)
RBC # BLD AUTO: 3.92 10*6/MM3 (ref 4.2–5.4)
SODIUM BLD-SCNC: 138 MMOL/L (ref 136–145)
WBC NRBC COR # BLD: 9.85 10*3/MM3 (ref 4.8–10.8)

## 2018-09-04 PROCEDURE — 73590 X-RAY EXAM OF LOWER LEG: CPT

## 2018-09-04 PROCEDURE — 73130 X-RAY EXAM OF HAND: CPT

## 2018-09-04 PROCEDURE — 70450 CT HEAD/BRAIN W/O DYE: CPT

## 2018-09-04 PROCEDURE — 94799 UNLISTED PULMONARY SVC/PX: CPT

## 2018-09-04 PROCEDURE — 12001 RPR S/N/AX/GEN/TRNK 2.5CM/<: CPT | Performed by: EMERGENCY MEDICINE

## 2018-09-04 PROCEDURE — 99284 EMERGENCY DEPT VISIT MOD MDM: CPT | Performed by: EMERGENCY MEDICINE

## 2018-09-04 PROCEDURE — 80053 COMPREHEN METABOLIC PANEL: CPT | Performed by: EMERGENCY MEDICINE

## 2018-09-04 PROCEDURE — 85025 COMPLETE CBC W/AUTO DIFF WBC: CPT | Performed by: EMERGENCY MEDICINE

## 2018-09-04 PROCEDURE — 99284 EMERGENCY DEPT VISIT MOD MDM: CPT

## 2018-09-04 PROCEDURE — 73110 X-RAY EXAM OF WRIST: CPT

## 2018-09-04 PROCEDURE — 71101 X-RAY EXAM UNILAT RIBS/CHEST: CPT

## 2018-09-04 RX ORDER — MIRTAZAPINE 15 MG/1
15 TABLET, FILM COATED ORAL NIGHTLY
COMMUNITY

## 2018-09-04 RX ORDER — DRONABINOL 2.5 MG/1
2.5 CAPSULE ORAL
Status: ON HOLD | COMMUNITY
End: 2019-11-08

## 2018-09-04 RX ORDER — DULOXETIN HYDROCHLORIDE 60 MG/1
60 CAPSULE, DELAYED RELEASE ORAL DAILY
Status: ON HOLD | COMMUNITY
End: 2019-11-08

## 2018-09-04 NOTE — ED PROVIDER NOTES
"Subjective   Ms. Nicolasa Woodall is an 86 yo WF who presents secondary to injuries sustained in a fall. Patient got up to use the bathroom.  She was attempting to sit on the commode when she lost her balance and fell forward \"face first\" into the bathtub.  She does not complain of headache at this time.  No loss of consciousness.  Patient has pain in left posterior mid ribs, right wrist, left finger and thumb, bilateral lower legs.  Patient has a skin tear on her left forearm.  No other injuries reported.  Patient was stable in transport.  Patient presents for evaluation.    Patient's daughter reports is here and had a fall with a intercranial hemorrhage earlier this year.        History provided by:  Patient (daughter)  Fall   Mechanism of injury: fall    Injury location:  Torso, leg, finger and shoulder/arm  Shoulder/arm injury location:  R wrist, L fingers and L forearm  Finger injury location:  L thumb and L index finger  Torso injury location:  Back  Leg injury location:  L lower leg and R lower leg  Incident location:  Home  Time since incident:  1 hour  Arrived directly from scene: yes    Fall:     Fall occurred: as patient was attempting to sit down on the toilet.    Impact surface: bathtub.    Point of impact:  Head    Entrapped after fall: no    Suspicion of alcohol use: no    Suspicion of drug use: no    Tetanus status:  Unknown  Prior to arrival data:     Bystander interventions:  None    Patient ambulatory at scene: yes      Blood loss:  Minimal    Responsiveness at scene:  Alert    Orientation at scene:  Person, place and situation    Loss of consciousness: no      Amnesic to event: no    Associated symptoms: back pain (left mid posterior ribs)    Associated symptoms: no abdominal pain, no blindness, no chest pain, no difficulty breathing, no headaches, no hearing loss, no loss of consciousness, no nausea, no neck pain, no seizures and no vomiting    Risk factors: kidney disease    Risk factors: no " AICD, no anticoagulation therapy, no asthma, no beta blocker therapy, no CABG, no CAD, no CHF, no COPD, no diabetes, no dialysis, no hemophilia, no pacemaker, no past MI and no steroid use        Review of Systems   Constitutional: Negative.  Negative for appetite change, diaphoresis and fever.   HENT: Negative.  Negative for hearing loss.    Eyes: Negative.  Negative for blindness.   Respiratory: Negative for cough, chest tightness, shortness of breath and wheezing.    Cardiovascular: Negative for chest pain, palpitations and leg swelling.   Gastrointestinal: Negative for abdominal pain, nausea and vomiting.   Genitourinary: Negative.  Negative for difficulty urinating, flank pain, frequency and hematuria.   Musculoskeletal: Positive for back pain (left mid posterior ribs). Negative for neck pain.   Skin: Negative.  Negative for color change, pallor and rash.   Neurological: Negative.  Negative for dizziness, seizures, loss of consciousness, syncope and headaches.   Psychiatric/Behavioral: Negative.  Negative for agitation, behavioral problems and hallucinations.   All other systems reviewed and are negative.      Past Medical History:   Diagnosis Date   • Anxiety    • Chronic kidney disease    • Depression    • Disease of thyroid gland    • Hypertension    • Metabolic encephalopathy    • Pulmonary embolism (CMS/HCC)     SPONTANEOUS HEMMORHAGE   • Subdural hematoma (CMS/HCC)        No Known Allergies    Past Surgical History:   Procedure Laterality Date   • BREAST BIOPSY      BENIGN   • HYSTERECTOMY     • JOINT REPLACEMENT     • THYROID SURGERY      PARATHYROID REMOVAL   • VENA CAVA FILTER INSERTION         Family History   Problem Relation Age of Onset   • Breast cancer Daughter        Social History     Social History   • Marital status:      Social History Main Topics   • Smoking status: Former Smoker     Packs/day: 0.50     Years: 25.00     Quit date: 1974   • Smokeless tobacco: Never Used   • Alcohol  use 0.6 oz/week     1 Glasses of wine per week      Comment: RARELY/Caffeine use   • Drug use: No   • Sexual activity: Defer     Other Topics Concern   • Not on file           Objective   Physical Exam   Constitutional: She is oriented to person, place, and time. She appears well-developed and well-nourished. No distress.   78-year-old white female laying in bed.  Patient appears in good health for age.  She is friendly and cooperative.  Accompanied by her daughter and daughter's spouse.   HENT:   Head: Normocephalic and atraumatic.   Right Ear: External ear normal.   Left Ear: External ear normal.   Nose: Nose normal.   Mouth/Throat: Oropharynx is clear and moist.   Eyes: Pupils are equal, round, and reactive to light. Conjunctivae and EOM are normal.   Neck: Normal range of motion. Neck supple.   Cardiovascular: Normal rate, regular rhythm, normal heart sounds and intact distal pulses.  Exam reveals no gallop and no friction rub.    No murmur heard.  Pulmonary/Chest: Effort normal and breath sounds normal.   Abdominal: Soft. Bowel sounds are normal. She exhibits no distension. There is no tenderness.   Musculoskeletal: Normal range of motion.        Right wrist: She exhibits tenderness. She exhibits normal range of motion, no swelling, no effusion, no crepitus and no deformity.        Thoracic back: She exhibits pain. She exhibits normal range of motion, no tenderness, no bony tenderness, no swelling, no edema and no deformity.        Back:         Arms:       Left hand: She exhibits tenderness and swelling. She exhibits normal range of motion, normal two-point discrimination, normal capillary refill, no deformity and no laceration. Normal sensation noted. Normal strength noted.        Hands:       Right lower leg: She exhibits tenderness. She exhibits no bony tenderness, no swelling, no edema, no deformity and no laceration.        Left lower leg: She exhibits tenderness and swelling. She exhibits no bony  tenderness, no edema and no deformity.        Legs:  Neurological: She is alert and oriented to person, place, and time. She has normal reflexes.   Skin: Skin is warm and dry. She is not diaphoretic.   Psychiatric: She has a normal mood and affect. Her behavior is normal.   Nursing note and vitals reviewed.      Laceration Repair  Date/Time: 9/4/2018 6:17 AM  Performed by: ADELA LAWRENCE  Authorized by: ADELA LAWRENCE     Consent:     Consent obtained:  Verbal    Consent given by:  Patient    Alternatives discussed:  No treatment  Anesthesia (see MAR for exact dosages):     Anesthesia method:  None  Laceration details:     Location:  Shoulder/arm    Shoulder/arm location:  L lower arm    Length (cm):  2  Repair type:     Repair type:  Simple  Exploration:     Wound extent: no areolar tissue violation noted, no fascia violation noted, no foreign bodies/material noted, no muscle damage noted, no nerve damage noted, no tendon damage noted and no vascular damage noted      Contaminated: no    Treatment:     Area cleansed with:  Renate-Mignon    Amount of cleaning:  Standard    Visualized foreign bodies/material removed: no    Skin repair:     Repair method:  Steri-Strips    Number of Steri-Strips:  3  Approximation:     Approximation:  Close    Vermilion border: well-aligned    Post-procedure details:     Dressing:  Non-adherent dressing    Patient tolerance of procedure:  Tolerated well, no immediate complications               ED Course  ED Course as of Sep 04 0819   Tue Sep 04, 2018   0543 We'll obtain CT head as well as several x-rays.  No loss of consciousness.  No headache.  No signs of concussion.  Patient has skin tear left forearm.  Tetanus status is unknown.  Daughter prefers to check with Dr. Ann later today concerning patient's tetanus status.  [SS]   0704 Patient's daughter now reports Ms Woodall has a history of hyponatremia.  She requests that we check baseline labs.  Will draw blood and check CBC  "and CMP.  [SS]   0750 CT head shows only age-appropriate changes.  X-rays on notable for left posterior fifth and sixth rib fractures.  All other x-rays are unremarkable.   Patient reports her rib pain is \"not even enough to take an aspirin\".  Patient had natural childbirth without any type of pain management including one breech birth.  Patient states she has a high tolerance for pain.  Daughter and son are in agreement.  Will obtain incentive spirometer.  CBC only notable for minimal anemia.  Awaiting CMP.  [SS]   0806 Patient's sodium is 138.  CMP only notable for renal dysfunction which is chronic.  RT is giving patient incentive spirometry education.  Will DC home.  [SS]      ED Course User Index  [SS] Adonis Curiel MD      Labs Reviewed   COMPREHENSIVE METABOLIC PANEL - Abnormal; Notable for the following:        Result Value    BUN 32 (*)     Creatinine 1.17 (*)     Calcium 8.7 (*)     eGFR Non African Amer 44 (*)     BUN/Creatinine Ratio 27.4 (*)     All other components within normal limits    Narrative:     The MDRD GFR formula is only valid for adults with stable renal function between ages 18 and 70.   CBC WITH AUTO DIFFERENTIAL - Abnormal; Notable for the following:     RBC 3.92 (*)     Hemoglobin 11.9 (*)     Hematocrit 36.6 (*)     Neutrophil % 74.5 (*)     Lymphocyte % 12.7 (*)     Monocyte % 8.6 (*)     Eosinophils, Absolute 0.34 (*)     Immature Grans, Absolute 0.04 (*)     All other components within normal limits   CBC AND DIFFERENTIAL    Narrative:     The following orders were created for panel order CBC & Differential.  Procedure                               Abnormality         Status                     ---------                               -----------         ------                     CBC Auto Differential[[id number]]        Abnormal            Final result                 Please view results for these tests on the individual orders.       Xr Ribs Left With Pa Chest    Result Date: " 9/4/2018  Narrative: PROCEDURE: XR RIBS LEFT W PA CHEST-  INDICATION: Left rib pain after fall today.  COMPARISON: Chest x-ray 11/23/2014  FINDINGS: PA view of the chest and oblique views of the left ribs. Cardiomegaly is stable. There is atherosclerotic disease in the aorta. The lungs are clear. There are chronic small bilateral pleural effusions versus pleural scarring. Postoperative changes are present at the left lung base. No pneumothorax. There are left posterior fifth and sixth rib fractures.      Impression: Left posterior fifth and sixth rib fractures. No pneumothorax.      This report was finalized on 9/4/2018 7:24 AM by Dr. Rip Kendrick MD.      Xr Wrist 3+ View Right    Result Date: 9/4/2018  Narrative: INDICATION: Wrist pain after fall today.  COMPARISON: None.  FINDINGS: 3 views of the right wrist.  No fracture or dislocation. No bone erosion or destruction.  No foreign body. 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      Impression: Degenerative disease. No acute findings.  This report was finalized on 9/4/2018 7:18 AM by Dr. Rip Kenrdick MD.      Xr Hand 3+ View Left    Result Date: 9/4/2018  Narrative: INDICATION: Hand pain after fall today.  COMPARISON: None.  FINDINGS: 3 views of the left hand.  No fracture or dislocation. No bone erosion or destruction.  No foreign body. 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      Impression: No acute findings.  This report was finalized on 9/4/2018 7:21 AM by Dr. Rip Kendrick MD.      Xr Tibia Fibula 2 View Left    Result Date: 9/4/2018  Narrative: INDICATION: Leg pain after fall today.  COMPARISON: None.  FINDINGS: 2 views of the left tibia/fibula.  No fracture or dislocation.  No bone erosion or destruction. Articulation at the knee and ankle is anatomic..  No foreign body.      Impression: Negative left tibia/fibula.  This report was finalized on 9/4/2018  7:16 AM by Dr. Rip Kendrick MD.      Xr Tibia Fibula 2 View Right    Result Date: 9/4/2018  Narrative: INDICATION: Leg pain after fall today.  COMPARISON: None.  FINDINGS: 2 views of the right tibia/fibula.  No fracture or dislocation.  No bone erosion or destruction. Articulation at the knee and ankle is anatomic..  No foreign body.      Impression: Negative right tibia/fibula.  This report was finalized on 9/4/2018 7:17 AM by Dr. Rip Kendrick MD.      Ct Head Without Contrast    Result Date: 9/4/2018  Narrative: CT HEAD WO CONTRAST-: 9/4/2018 6:52 AM  HISTORY: Headache after a fall this morning.  TECHNIQUE: Axial unenhanced head CT. Radiation dose reduction techniques included automated exposure control or exposure modulation based on body size. Radiation audit for number of CT and nuclear cardiology exams performed in the last year: 4.   COMPARISON: 8/23/2018  FINDINGS: No intracranial hemorrhage, mass, or infarct. No hydrocephalus or extra-axial fluid collection. There are senescent changes, including volume loss and nonspecific white matter change, but no acute abnormality is seen . The skull base, calvarium, and extracranial soft tissues are normal.      Impression: Senescent changes without acute finding.       This report was finalized on 9/4/2018 7:15 AM by Dr. Rip Kendrick MD.      Ct Head Without Contrast    Result Date: 8/23/2018  Narrative: CT HEAD WO CONTRAST-: 8/23/2018 1:10 PM  HISTORY: Subdural hematoma. Restaging.  TECHNIQUE: Axial unenhanced head CT. Radiation dose reduction techniques included automated exposure control or exposure modulation based on body size. Radiation audit for number of CT and nuclear cardiology exams performed in the last year: 4.   COMPARISON: MRI of the brain 6/1/2018 and CT head 4/18/2018  FINDINGS: No acute intracranial hemorrhage, mass lesion, or acute infarct. The small thin subdural hematoma adjacent to the right occipital lobe is not visualized on the CT  scan.  There is mild generalized atrophy and chronic small vessel changes. The ventricles and basilar cisterns are normal in size and configuration. No acute osseous abnormalities. The visualized paranasal sinuses and mastoid air cells are clear.      Impression: Resolution of the small subdural hematoma adjacent to the right occipital lobe.       This report was finalized on 8/23/2018 2:25 PM by Dr. Andrea Paul MD.                MDM  Number of Diagnoses or Management Options  Closed fracture of multiple ribs of left side, initial encounter: new and requires workup  Fall, initial encounter: new and requires workup  Multiple contusions: new and requires workup     Amount and/or Complexity of Data Reviewed  Clinical lab tests: reviewed and ordered  Tests in the radiology section of CPT®: reviewed and ordered    Risk of Complications, Morbidity, and/or Mortality  Presenting problems: moderate  Diagnostic procedures: high  Management options: moderate    Patient Progress  Patient progress: improved        Final diagnoses:   Closed fracture of multiple ribs of left side, initial encounter   Multiple contusions   Fall, initial encounter   Skin tear of left forearm without complication, initial encounter            Adonis Curiel MD  09/04/18 0838

## 2018-09-04 NOTE — DISCHARGE INSTRUCTIONS
Tylenol or ibuprofen as needed for pain.  Apply ice to areas of contusion for swelling and pain.  Leave uncovered ×24 hours.  Then you may remove dressing. After 24 hours he may wash the wound gently twice daily with antibacterial soap and padded dry.  Apply thin layer of Neosporin. Leave Steri-Strips in place until they crural and fall off. Use incentive spirometer 4-6 times daily for the next 2 weeks.  Follow up with Dr. Ann as above.  Return to ED for signs of closed head injury, signs of infection, medical emergencies.

## 2019-11-08 ENCOUNTER — HOSPITAL ENCOUNTER (INPATIENT)
Facility: HOSPITAL | Age: 84
LOS: 4 days | End: 2019-11-13
Attending: EMERGENCY MEDICINE | Admitting: ORTHOPAEDIC SURGERY

## 2019-11-08 ENCOUNTER — APPOINTMENT (OUTPATIENT)
Dept: GENERAL RADIOLOGY | Facility: HOSPITAL | Age: 84
End: 2019-11-08

## 2019-11-08 ENCOUNTER — APPOINTMENT (OUTPATIENT)
Dept: CT IMAGING | Facility: HOSPITAL | Age: 84
End: 2019-11-08

## 2019-11-08 DIAGNOSIS — S72.002A CLOSED LEFT HIP FRACTURE, INITIAL ENCOUNTER (HCC): Primary | ICD-10-CM

## 2019-11-08 LAB
ALBUMIN SERPL-MCNC: 3.7 G/DL (ref 3.5–5.2)
ALBUMIN/GLOB SERPL: 1.3 G/DL
ALP SERPL-CCNC: 115 U/L (ref 39–117)
ALT SERPL W P-5'-P-CCNC: 13 U/L (ref 1–33)
ANION GAP SERPL CALCULATED.3IONS-SCNC: 12.3 MMOL/L (ref 5–15)
AST SERPL-CCNC: 22 U/L (ref 1–32)
BASOPHILS # BLD AUTO: 0.02 10*3/MM3 (ref 0–0.2)
BASOPHILS NFR BLD AUTO: 0.1 % (ref 0–1.5)
BILIRUB SERPL-MCNC: 0.6 MG/DL (ref 0.2–1.2)
BUN BLD-MCNC: 30 MG/DL (ref 8–23)
BUN/CREAT SERPL: 26.5 (ref 7–25)
CALCIUM SPEC-SCNC: 9.1 MG/DL (ref 8.6–10.5)
CHLORIDE SERPL-SCNC: 90 MMOL/L (ref 98–107)
CO2 SERPL-SCNC: 25.7 MMOL/L (ref 22–29)
CREAT BLD-MCNC: 1.13 MG/DL (ref 0.57–1)
DEPRECATED RDW RBC AUTO: 42.7 FL (ref 37–54)
EOSINOPHIL # BLD AUTO: 0 10*3/MM3 (ref 0–0.4)
EOSINOPHIL NFR BLD AUTO: 0 % (ref 0.3–6.2)
ERYTHROCYTE [DISTWIDTH] IN BLOOD BY AUTOMATED COUNT: 12.9 % (ref 12.3–15.4)
GFR SERPL CREATININE-BSD FRML MDRD: 45 ML/MIN/1.73
GLOBULIN UR ELPH-MCNC: 2.9 GM/DL
GLUCOSE BLD-MCNC: 117 MG/DL (ref 65–99)
HCT VFR BLD AUTO: 32.7 % (ref 34–46.6)
HGB BLD-MCNC: 10.9 G/DL (ref 12–15.9)
IMM GRANULOCYTES # BLD AUTO: 0.18 10*3/MM3 (ref 0–0.05)
IMM GRANULOCYTES NFR BLD AUTO: 1.2 % (ref 0–0.5)
LYMPHOCYTES # BLD AUTO: 0.42 10*3/MM3 (ref 0.7–3.1)
LYMPHOCYTES NFR BLD AUTO: 2.9 % (ref 19.6–45.3)
MAGNESIUM SERPL-MCNC: 2 MG/DL (ref 1.6–2.4)
MCH RBC QN AUTO: 30.3 PG (ref 26.6–33)
MCHC RBC AUTO-ENTMCNC: 33.3 G/DL (ref 31.5–35.7)
MCV RBC AUTO: 90.8 FL (ref 79–97)
MONOCYTES # BLD AUTO: 0.91 10*3/MM3 (ref 0.1–0.9)
MONOCYTES NFR BLD AUTO: 6.3 % (ref 5–12)
NEUTROPHILS # BLD AUTO: 12.91 10*3/MM3 (ref 1.7–7)
NEUTROPHILS NFR BLD AUTO: 89.5 % (ref 42.7–76)
NRBC BLD AUTO-RTO: 0 /100 WBC (ref 0–0.2)
PLATELET # BLD AUTO: 370 10*3/MM3 (ref 140–450)
PMV BLD AUTO: 8.8 FL (ref 6–12)
POTASSIUM BLD-SCNC: 4.7 MMOL/L (ref 3.5–5.2)
PROT SERPL-MCNC: 6.6 G/DL (ref 6–8.5)
RBC # BLD AUTO: 3.6 10*6/MM3 (ref 3.77–5.28)
SODIUM BLD-SCNC: 128 MMOL/L (ref 136–145)
TSH SERPL DL<=0.05 MIU/L-ACNC: 1.87 UIU/ML (ref 0.27–4.2)
WBC NRBC COR # BLD: 14.44 10*3/MM3 (ref 3.4–10.8)

## 2019-11-08 PROCEDURE — 73502 X-RAY EXAM HIP UNI 2-3 VIEWS: CPT

## 2019-11-08 PROCEDURE — 99285 EMERGENCY DEPT VISIT HI MDM: CPT

## 2019-11-08 PROCEDURE — 80053 COMPREHEN METABOLIC PANEL: CPT | Performed by: EMERGENCY MEDICINE

## 2019-11-08 PROCEDURE — 99222 1ST HOSP IP/OBS MODERATE 55: CPT | Performed by: HOSPITALIST

## 2019-11-08 PROCEDURE — 25010000002 MORPHINE PER 10 MG: Performed by: EMERGENCY MEDICINE

## 2019-11-08 PROCEDURE — 83735 ASSAY OF MAGNESIUM: CPT | Performed by: EMERGENCY MEDICINE

## 2019-11-08 PROCEDURE — 25010000002 MORPHINE PER 10 MG

## 2019-11-08 PROCEDURE — G0378 HOSPITAL OBSERVATION PER HR: HCPCS

## 2019-11-08 PROCEDURE — 72192 CT PELVIS W/O DYE: CPT

## 2019-11-08 PROCEDURE — 93005 ELECTROCARDIOGRAM TRACING: CPT | Performed by: EMERGENCY MEDICINE

## 2019-11-08 PROCEDURE — 25010000002 ONDANSETRON PER 1 MG: Performed by: EMERGENCY MEDICINE

## 2019-11-08 PROCEDURE — 93010 ELECTROCARDIOGRAM REPORT: CPT | Performed by: INTERNAL MEDICINE

## 2019-11-08 PROCEDURE — 84443 ASSAY THYROID STIM HORMONE: CPT | Performed by: HOSPITALIST

## 2019-11-08 PROCEDURE — 85025 COMPLETE CBC W/AUTO DIFF WBC: CPT | Performed by: EMERGENCY MEDICINE

## 2019-11-08 PROCEDURE — 99284 EMERGENCY DEPT VISIT MOD MDM: CPT | Performed by: EMERGENCY MEDICINE

## 2019-11-08 RX ORDER — ACETAMINOPHEN 500 MG
1000 TABLET ORAL ONCE
Status: COMPLETED | OUTPATIENT
Start: 2019-11-08 | End: 2019-11-08

## 2019-11-08 RX ORDER — ACETAMINOPHEN 325 MG/1
650 TABLET ORAL EVERY 6 HOURS PRN
Status: DISCONTINUED | OUTPATIENT
Start: 2019-11-08 | End: 2019-11-13 | Stop reason: HOSPADM

## 2019-11-08 RX ORDER — MORPHINE SULFATE 2 MG/ML
INJECTION, SOLUTION INTRAMUSCULAR; INTRAVENOUS
Status: COMPLETED
Start: 2019-11-08 | End: 2019-11-08

## 2019-11-08 RX ORDER — MORPHINE SULFATE 2 MG/ML
2 INJECTION, SOLUTION INTRAMUSCULAR; INTRAVENOUS
Status: DISCONTINUED | OUTPATIENT
Start: 2019-11-08 | End: 2019-11-08

## 2019-11-08 RX ORDER — MIRTAZAPINE 15 MG/1
15 TABLET, FILM COATED ORAL NIGHTLY
Status: DISCONTINUED | OUTPATIENT
Start: 2019-11-08 | End: 2019-11-13 | Stop reason: HOSPADM

## 2019-11-08 RX ORDER — SODIUM CHLORIDE 9 MG/ML
75 INJECTION, SOLUTION INTRAVENOUS CONTINUOUS
Status: DISCONTINUED | OUTPATIENT
Start: 2019-11-08 | End: 2019-11-09

## 2019-11-08 RX ORDER — LEVOTHYROXINE SODIUM 88 UG/1
88 TABLET ORAL DAILY
Status: DISCONTINUED | OUTPATIENT
Start: 2019-11-09 | End: 2019-11-13 | Stop reason: HOSPADM

## 2019-11-08 RX ORDER — ACETAMINOPHEN 325 MG/1
650 TABLET ORAL ONCE
Status: DISCONTINUED | OUTPATIENT
Start: 2019-11-08 | End: 2019-11-08

## 2019-11-08 RX ORDER — ONDANSETRON 2 MG/ML
4 INJECTION INTRAMUSCULAR; INTRAVENOUS ONCE
Status: COMPLETED | OUTPATIENT
Start: 2019-11-08 | End: 2019-11-08

## 2019-11-08 RX ORDER — MORPHINE SULFATE 2 MG/ML
1 INJECTION, SOLUTION INTRAMUSCULAR; INTRAVENOUS
Status: DISCONTINUED | OUTPATIENT
Start: 2019-11-08 | End: 2019-11-13 | Stop reason: HOSPADM

## 2019-11-08 RX ORDER — ACETAMINOPHEN 500 MG
1000 TABLET ORAL EVERY 6 HOURS PRN
Status: DISCONTINUED | OUTPATIENT
Start: 2019-11-08 | End: 2019-11-08

## 2019-11-08 RX ADMIN — ACETAMINOPHEN 1000 MG: 500 TABLET, FILM COATED ORAL at 16:37

## 2019-11-08 RX ADMIN — SODIUM CHLORIDE 1000 ML: 9 INJECTION, SOLUTION INTRAVENOUS at 18:03

## 2019-11-08 RX ADMIN — MORPHINE SULFATE 1 MG: 2 INJECTION, SOLUTION INTRAMUSCULAR; INTRAVENOUS at 23:50

## 2019-11-08 RX ADMIN — MIRTAZAPINE 15 MG: 15 TABLET, FILM COATED ORAL at 23:15

## 2019-11-08 RX ADMIN — ONDANSETRON 4 MG: 2 INJECTION, SOLUTION INTRAMUSCULAR; INTRAVENOUS at 18:11

## 2019-11-08 RX ADMIN — MORPHINE SULFATE 2 MG: 4 INJECTION INTRAVENOUS at 18:11

## 2019-11-08 RX ADMIN — SODIUM CHLORIDE 75 ML/HR: 9 INJECTION, SOLUTION INTRAVENOUS at 23:15

## 2019-11-08 NOTE — ED NOTES
Pt asked for something to drink. I told her I would have to check with the physician first. Asked Dr. Leahy if this would be okay, he said it should be fine. Pt provided a cup of water.     Tonya Velasquez  11/08/19 1740

## 2019-11-08 NOTE — ED NOTES
Dr. Owens called per Dr. Leahy. He is currently in surgery and will call back when finished. Estimated time for call back 1 hour.     Tonya Velasquez  11/08/19 4763

## 2019-11-08 NOTE — ED PROVIDER NOTES
Subjective   Patient is a pleasant 89-year-old female with historical osteopenia who presents to the ED with increasing left hip pain for the last several weeks.  No definitive traumatic event.  Just gradually increasing pain.  Now having difficulty ambulating on her own secondary to pain.  Historically has had a left hip fracture many years ago that was repaired by pins.  No fever or chills.  No nausea or vomiting.  No urinary symptoms.            Review of Systems   Constitutional: Negative for chills and fever.   Respiratory: Negative for chest tightness and shortness of breath.    Cardiovascular: Negative for chest pain.   Gastrointestinal: Negative for abdominal pain.   Musculoskeletal: Positive for arthralgias. Negative for myalgias, neck pain and neck stiffness.   Skin: Negative for wound.   Neurological: Negative for headaches.   All other systems reviewed and are negative.      Past Medical History:   Diagnosis Date   • Anxiety    • Chronic kidney disease    • Depression     resolved after daughter completed chemotherapy    • Disease of thyroid gland    • Hypertension    • Metabolic encephalopathy    • Pulmonary embolism (CMS/HCC)     SPONTANEOUS HEMMORHAGE   • Subdural hematoma (CMS/HCC)        No Known Allergies    Past Surgical History:   Procedure Laterality Date   • BREAST BIOPSY      BENIGN   • HYSTERECTOMY     • JOINT REPLACEMENT     • THYROID SURGERY      PARATHYROID REMOVAL   • VENA CAVA FILTER INSERTION         Family History   Problem Relation Age of Onset   • Breast cancer Daughter        Social History     Socioeconomic History   • Marital status:      Spouse name: Not on file   • Number of children: Not on file   • Years of education: Not on file   • Highest education level: Not on file   Tobacco Use   • Smoking status: Former Smoker     Packs/day: 0.50     Years: 25.00     Pack years: 12.50     Last attempt to quit: 1974     Years since quittin.8   • Smokeless tobacco: Never Used    Substance and Sexual Activity   • Alcohol use: Yes     Alcohol/week: 0.6 oz     Types: 1 Glasses of wine per week     Comment: RARELY/Caffeine use   • Drug use: No   • Sexual activity: Defer           Objective   Physical Exam   Constitutional: She is oriented to person, place, and time. She appears well-developed and well-nourished. No distress.   Neck: Normal range of motion.   Patient displaying full range of motion without hesitation or apparent discomfort.   Pulmonary/Chest: Effort normal.   Musculoskeletal: Normal range of motion.   Passive and active range of motion of the left hip is very painful.  Tenderness elicited when pressing and laterally at the greater trochanter on the left.  Unable to elicit significant tenderness with pubic symphysis pressure.   Neurological: She is alert and oriented to person, place, and time.   CN 2-12 intact grossly without droop or slurred speech.   Skin: Skin is warm and dry. She is not diaphoretic.   Psychiatric: She has a normal mood and affect. Her behavior is normal.   Nursing note and vitals reviewed.      Procedures           ED Course  ED Course as of Nov 08 1910 Fri Nov 08, 2019   1657 Hip fracture by radiography - waiting on formal read. May be complicated given presence of old hardware. Appears as if prior fixation has failed perhaps? Discussing orthopedic options with family. Sending basic labs.  [JF]   1725 Family has requested Dr. Brown - call out, awaiting callback.  [JF]   1909 Discussed with Dr Brown who requests CT and admission to hospitalist. Discussed with Dr. Bower who accepts.  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 Patient's 3 cannulated screws now appear to engage the acetabulum and may represent a source  of irritation. These findings do not appear acute. Signer Name: DENNIS Bradshaw MD  Signed: 11/8/2019 5:02 PM  Workstation Name: [id number]  [facility]      Results for orders placed or performed during the hospital encounter of 11/08/19   CBC Auto Differential   Result Value Ref Range    WBC 14.44 (H) 3.40 - 10.80 10*3/mm3    RBC 3.60 (L) 3.77 - 5.28 10*6/mm3    Hemoglobin 10.9 (L) 12.0 - 15.9 g/dL    Hematocrit 32.7 (L) 34.0 - 46.6 %    MCV 90.8 79.0 - 97.0 fL    MCH 30.3 26.6 - 33.0 pg    MCHC 33.3 31.5 - 35.7 g/dL    RDW 12.9 12.3 - 15.4 %    RDW-SD 42.7 37.0 - 54.0 fl    MPV 8.8 6.0 - 12.0 fL    Platelets 370 140 - 450 10*3/mm3    Neutrophil % 89.5 (H) 42.7 - 76.0 %    Lymphocyte % 2.9 (L) 19.6 - 45.3 %    Monocyte % 6.3 5.0 - 12.0 %    Eosinophil % 0.0 (L) 0.3 - 6.2 %    Basophil % 0.1 0.0 - 1.5 %    Immature Grans % 1.2 (H) 0.0 - 0.5 %    Neutrophils, Absolute 12.91 (H) 1.70 - 7.00 10*3/mm3    Lymphocytes, Absolute 0.42 (L) 0.70 - 3.10 10*3/mm3    Monocytes, Absolute 0.91 (H) 0.10 - 0.90 10*3/mm3    Eosinophils, Absolute 0.00 0.00 - 0.40 10*3/mm3    Basophils, Absolute 0.02 0.00 - 0.20 10*3/mm3    Immature Grans, Absolute 0.18 (H) 0.00 - 0.05 10*3/mm3    nRBC 0.0 0.0 - 0.2 /100 WBC               MDM  Number of Diagnoses or Management Options  Closed left hip fracture, initial encounter (CMS/[facility]): new and requires workup  Diagnosis management comments: Differential diagnosis includes hip fracture, pelvic fracture, septic joint, nerve impingement, femoral hernia, muscle injury.       Amount and/or Complexity of Data Reviewed  Clinical lab tests: reviewed and ordered  Tests in the radiology section of CPT®: reviewed and ordered  Discuss the patient with other providers: yes    Risk of Complications, Morbidity, and/or Mortality  Presenting problems: moderate  Diagnostic procedures: moderate  Management options: moderate    Patient Progress  Patient progress: stable      Final diagnoses:    Closed left hip fracture, initial encounter (CMS/[facility])              Lauro Leahy MD  11/08/19 1910

## 2019-11-09 ENCOUNTER — APPOINTMENT (OUTPATIENT)
Dept: GENERAL RADIOLOGY | Facility: HOSPITAL | Age: 84
End: 2019-11-09

## 2019-11-09 LAB
25(OH)D3 SERPL-MCNC: 19.5 NG/ML (ref 30–100)
ALBUMIN SERPL-MCNC: 2.9 G/DL (ref 3.5–5.2)
ALBUMIN/GLOB SERPL: 1 G/DL
ALP SERPL-CCNC: 94 U/L (ref 39–117)
ALT SERPL W P-5'-P-CCNC: 10 U/L (ref 1–33)
ANION GAP SERPL CALCULATED.3IONS-SCNC: 10.9 MMOL/L (ref 5–15)
AST SERPL-CCNC: 20 U/L (ref 1–32)
BASOPHILS # BLD AUTO: 0.02 10*3/MM3 (ref 0–0.2)
BASOPHILS NFR BLD AUTO: 0.2 % (ref 0–1.5)
BILIRUB SERPL-MCNC: 0.5 MG/DL (ref 0.2–1.2)
BUN BLD-MCNC: 24 MG/DL (ref 8–23)
BUN/CREAT SERPL: 26.4 (ref 7–25)
CALCIUM SPEC-SCNC: 7.7 MG/DL (ref 8.6–10.5)
CHLORIDE SERPL-SCNC: 92 MMOL/L (ref 98–107)
CO2 SERPL-SCNC: 24.1 MMOL/L (ref 22–29)
CREAT BLD-MCNC: 0.91 MG/DL (ref 0.57–1)
DEPRECATED RDW RBC AUTO: 43 FL (ref 37–54)
EOSINOPHIL # BLD AUTO: 0.03 10*3/MM3 (ref 0–0.4)
EOSINOPHIL NFR BLD AUTO: 0.3 % (ref 0.3–6.2)
ERYTHROCYTE [DISTWIDTH] IN BLOOD BY AUTOMATED COUNT: 12.9 % (ref 12.3–15.4)
FOLATE SERPL-MCNC: 14.3 NG/ML (ref 4.78–24.2)
GFR SERPL CREATININE-BSD FRML MDRD: 58 ML/MIN/1.73
GLOBULIN UR ELPH-MCNC: 2.9 GM/DL
GLUCOSE BLD-MCNC: 96 MG/DL (ref 65–99)
HCT VFR BLD AUTO: 29.2 % (ref 34–46.6)
HGB BLD-MCNC: 9.7 G/DL (ref 12–15.9)
IMM GRANULOCYTES # BLD AUTO: 0.07 10*3/MM3 (ref 0–0.05)
IMM GRANULOCYTES NFR BLD AUTO: 0.6 % (ref 0–0.5)
INR PPP: 1.15 (ref 0.9–1.1)
LYMPHOCYTES # BLD AUTO: 1.45 10*3/MM3 (ref 0.7–3.1)
LYMPHOCYTES NFR BLD AUTO: 12.4 % (ref 19.6–45.3)
MCH RBC QN AUTO: 30.3 PG (ref 26.6–33)
MCHC RBC AUTO-ENTMCNC: 33.2 G/DL (ref 31.5–35.7)
MCV RBC AUTO: 91.3 FL (ref 79–97)
MONOCYTES # BLD AUTO: 1.11 10*3/MM3 (ref 0.1–0.9)
MONOCYTES NFR BLD AUTO: 9.5 % (ref 5–12)
NEUTROPHILS # BLD AUTO: 9.03 10*3/MM3 (ref 1.7–7)
NEUTROPHILS NFR BLD AUTO: 77 % (ref 42.7–76)
NRBC BLD AUTO-RTO: 0 /100 WBC (ref 0–0.2)
PLATELET # BLD AUTO: 365 10*3/MM3 (ref 140–450)
PMV BLD AUTO: 9.2 FL (ref 6–12)
POTASSIUM BLD-SCNC: 4 MMOL/L (ref 3.5–5.2)
PROT SERPL-MCNC: 5.8 G/DL (ref 6–8.5)
PROTHROMBIN TIME: 14.4 SECONDS (ref 12.1–15)
RBC # BLD AUTO: 3.2 10*6/MM3 (ref 3.77–5.28)
SODIUM BLD-SCNC: 127 MMOL/L (ref 136–145)
VIT B12 BLD-MCNC: 368 PG/ML (ref 211–946)
WBC NRBC COR # BLD: 11.71 10*3/MM3 (ref 3.4–10.8)

## 2019-11-09 PROCEDURE — 85610 PROTHROMBIN TIME: CPT | Performed by: ORTHOPAEDIC SURGERY

## 2019-11-09 PROCEDURE — 82746 ASSAY OF FOLIC ACID SERUM: CPT | Performed by: HOSPITALIST

## 2019-11-09 PROCEDURE — 82607 VITAMIN B-12: CPT | Performed by: HOSPITALIST

## 2019-11-09 PROCEDURE — 83540 ASSAY OF IRON: CPT | Performed by: HOSPITALIST

## 2019-11-09 PROCEDURE — 85025 COMPLETE CBC W/AUTO DIFF WBC: CPT | Performed by: HOSPITALIST

## 2019-11-09 PROCEDURE — 94799 UNLISTED PULMONARY SVC/PX: CPT

## 2019-11-09 PROCEDURE — 80053 COMPREHEN METABOLIC PANEL: CPT | Performed by: HOSPITALIST

## 2019-11-09 PROCEDURE — 83550 IRON BINDING TEST: CPT | Performed by: HOSPITALIST

## 2019-11-09 PROCEDURE — 99232 SBSQ HOSP IP/OBS MODERATE 35: CPT | Performed by: HOSPITALIST

## 2019-11-09 PROCEDURE — 71045 X-RAY EXAM CHEST 1 VIEW: CPT

## 2019-11-09 PROCEDURE — 99222 1ST HOSP IP/OBS MODERATE 55: CPT | Performed by: ORTHOPAEDIC SURGERY

## 2019-11-09 PROCEDURE — 82306 VITAMIN D 25 HYDROXY: CPT | Performed by: HOSPITALIST

## 2019-11-09 PROCEDURE — 82728 ASSAY OF FERRITIN: CPT | Performed by: HOSPITALIST

## 2019-11-09 RX ORDER — SODIUM CHLORIDE 1000 MG
1 TABLET, SOLUBLE MISCELLANEOUS DAILY
Status: DISCONTINUED | OUTPATIENT
Start: 2019-11-09 | End: 2019-11-09

## 2019-11-09 RX ORDER — CHOLECALCIFEROL (VITAMIN D3) 1250 MCG
50000 CAPSULE ORAL
Status: DISCONTINUED | OUTPATIENT
Start: 2019-11-09 | End: 2019-11-13 | Stop reason: HOSPADM

## 2019-11-09 RX ORDER — SODIUM CHLORIDE 1000 MG
1 TABLET, SOLUBLE MISCELLANEOUS 2 TIMES DAILY WITH MEALS
Status: DISCONTINUED | OUTPATIENT
Start: 2019-11-09 | End: 2019-11-13 | Stop reason: HOSPADM

## 2019-11-09 RX ORDER — SODIUM CHLORIDE 9 MG/ML
50 INJECTION, SOLUTION INTRAVENOUS CONTINUOUS
Status: DISCONTINUED | OUTPATIENT
Start: 2019-11-09 | End: 2019-11-09

## 2019-11-09 RX ORDER — BUSPIRONE HYDROCHLORIDE 15 MG/1
7.5 TABLET ORAL 2 TIMES DAILY
Status: DISCONTINUED | OUTPATIENT
Start: 2019-11-09 | End: 2019-11-13 | Stop reason: HOSPADM

## 2019-11-09 RX ADMIN — MIRTAZAPINE 15 MG: 15 TABLET, FILM COATED ORAL at 22:05

## 2019-11-09 RX ADMIN — LEVOTHYROXINE SODIUM 88 MCG: 88 TABLET ORAL at 09:57

## 2019-11-09 RX ADMIN — SODIUM CHLORIDE TAB 1 GM 1 G: 1 TAB at 22:05

## 2019-11-09 RX ADMIN — SODIUM CHLORIDE TAB 1 GM 1 G: 1 TAB at 11:45

## 2019-11-09 RX ADMIN — BUSPIRONE HYDROCHLORIDE 7.5 MG: 15 TABLET ORAL at 22:06

## 2019-11-09 NOTE — PLAN OF CARE
Problem: Patient Care Overview  Goal: Discharge Needs Assessment  Outcome: Ongoing (interventions implemented as appropriate)   11/09/19 0901   Discharge Needs Assessment   Readmission Within the Last 30 Days no previous admission in last 30 days   Concerns to be Addressed discharge planning   Patient/Family Anticipates Transition to inpatient rehabilitation facility   Equipment Needed After Discharge (Continue to assess)   Discharge Facility/Level of Care Needs rehabilitation facility;nursing facility, skilled   Disability   Equipment Currently Used at Home bath bench;commode;walker, rolling

## 2019-11-09 NOTE — H&P
[facility]OSPITAL[facility]     Dion Ann MD    CHIEF COMPLAINT: Left Hip Pain    HISTORY OF PRESENT ILLNESS:    Ms. Woodall is a pleasant, 88 y/o  female who presents to the ER this evening w/ progressively worsening left hip pain.  She underwent pinning of her left hip in 2012 after a fall.  Family report she has done well over the years until her birthday this past September.  She was starting to complain of hip pain w/ activity but none at rest.  She was compliant w/ PT up until they released her.  Family notes she was not entirely compliant w/ their instructions (ie excersises, crossing legs, etc).  She reports the pain was increasing w/ activity.  Family could not get her to even go for a walk.  She was able to get up to the bathroom OK, but hadn't done much else.  Last evening, they took her out to celebrate her daughter's birthday, and she required much more help than usual.  This morning, the picture was much of the same so she was brought to the ER.    Her activity has been limited by pain and relieved w/ rest.  She denies chest pain and dyspnea.  She doesn't each much even when her daughter cooks for her and brings it into the home.  She was on appetite stiumlants w/o much improvement.  She has maintained her baseline mental state.  She denies numbness in her legs, but does note some weakness given her limited activity.      Past Medical History:   Diagnosis Date   • Anxiety    • Chronic kidney disease    • Depression     resolved after daughter completed chemotherapy    • Disease of thyroid gland    • Hypertension    • Metabolic encephalopathy    • Pulmonary embolism (CMS/HCC)     SPONTANEOUS HEMMORHAGE   • Subdural hematoma (CMS/HCC)      Past Surgical History:   Procedure Laterality Date   • BREAST BIOPSY      BENIGN   • HYSTERECTOMY     • JOINT REPLACEMENT     • THYROID SURGERY      PARATHYROID REMOVAL   • VENA CAVA FILTER INSERTION       Family History   Problem Relation  "Age of Onset   • Breast cancer Daughter      Social History     Tobacco Use   • Smoking status: Former Smoker     Packs/day: 0.50     Years: 25.00     Pack years: 12.50     Last attempt to quit: 1974     Years since quittin.8   • Smokeless tobacco: Never Used   Substance Use Topics   • Alcohol use: Yes     Alcohol/week: 0.6 oz     Types: 1 Glasses of wine per week     Comment: RARELY/Caffeine use   • Drug use: No     Medications Prior to Admission   Medication Sig Dispense Refill Last Dose   • busPIRone (BUSPAR) 10 MG tablet Take 7.5 mg by mouth 2 (Two) Times a Day.   2019 at Unknown time   • levothyroxine (SYNTHROID, LEVOTHROID) 88 MCG tablet Take 88 mcg by mouth Daily.   2019 at Unknown time   • mirtazapine (REMERON) 15 MG tablet Take 15 mg by mouth Every Night.   2019 at Unknown time   • sodium chloride 1 g tablet Take 1 tablet by mouth 2 (Two) Times a Day With Meals. (Patient taking differently: Take 1 g by mouth Daily.)   2019 at Unknown time   • amLODIPine (NORVASC) 5 MG tablet Take 1 tablet by mouth Daily.   Taking   • dronabinol (MARINOL) 2.5 MG capsule Take 2.5 mg by mouth 2 (Two) Times a Day Before Meals.      • DULoxetine (CYMBALTA) 60 MG capsule Take 60 mg by mouth Daily.        Allergies:  Patient has no known allergies.    REVIEW OF SYSTEMS:  Please see the above history of present illness for pertinent positives and negatives.  The remainder of the patient's systems have been reviewed and are negative.    Vital Signs  Temp:  [99.6 °F (37.6 °C)] 99.6 °F (37.6 °C)  Heart Rate:  [] 112  Resp:  [16-20] 16  BP: (149-184)/(88-98) 154/92  Oxygen Therapy  SpO2: 93 %  Pulse Oximetry Type: Intermittent}  Body mass index is 19.84 kg/m².  Flowsheet Rows      First Filed Value   Admission Height  160 cm (63\") Documented at 2019 1539   Admission Weight  50.8 kg (112 lb) Documented at 2019 1539             Physical Exam:  Physical Exam   Constitutional: Patient appears " well-developed and well-nourished and in no acute distress .  Appears stated age.  HEENT:   Head: Normocephalic and atraumatic.   Eyes:  Pupils are equal, round, and reactive to light. EOM are intact. Sclerae are anicteric and noninjected.  Mild conjunctival pallor.  Mouth and Throat: Patient has moist mucous membranes. Oropharynx is clear of any erythema or exudate.     Neck: Neck supple. No JVD present.  Thyromegaly present. No lymphadenopathy present.  Cardiovascular: Regular rate, regular rhythm, S1 normal and S2 normal.  Exam reveals no gallop and no friction rub.  No murmur heard.  Pulmonary/Chest: Lungs are clear to auscultation bilaterally.  There is a squeek noted in the RUL.  Normal excursion.  No wheeze or rales.  Abdominal: Soft. Abdomen is distended, but non-tender.  Bowel sounds are diminished.  Musculoskeletal: Normal muscle tone  Extremities: No edema. Pulses are palpable in all 4 extremities.  Neurological: Cranial nerves II-XII are grossly intact with no focal deficits.  Skin: Skin is warm. No rash noted. Nails show no clubbing.  No cyanosis or erythema.    Emotional Behavior:    Judgement and Insight: Average (based on history from family)   Mental Status:  Alertness  Normal   Memory:  Normal   Mood and Affect:         Depression  Normal               Anxiety  Normal    Debilities:   Physical Weakness  None   Handicaps  None   Disabilities  None   Agitation  None     Results Review:    I reviewed the patient's new clinical results.  Lab Results (most recent)     Procedure Component Value Units Date/Time    Magnesium [[id number]]  (Normal) Collected:  11/08/19 1703    Specimen:  Blood Updated:  11/08/19 1735     Magnesium 2.0 mg/dL     Comprehensive Metabolic Panel [[id number]]  (Abnormal) Collected:  11/08/19 1703    Specimen:  Blood Updated:  11/08/19 1735     Glucose 117 mg/dL      BUN 30 mg/dL      Creatinine 1.13 mg/dL      Sodium 128 mmol/L      Potassium 4.7 mmol/L      Chloride 90 mmol/L       CO2 25.7 mmol/L      Calcium 9.1 mg/dL      Total Protein 6.6 g/dL      Albumin 3.70 g/dL      ALT (SGPT) 13 U/L      AST (SGOT) 22 U/L      Alkaline Phosphatase 115 U/L      Total Bilirubin 0.6 mg/dL      eGFR Non African Amer 45 mL/min/1.73      Globulin 2.9 gm/dL      A/G Ratio 1.3 g/dL      BUN/Creatinine Ratio 26.5     Anion Gap 12.3 mmol/L     Narrative:       GFR Normal >60  Chronic Kidney Disease <60  Kidney Failure <15    CBC & Differential [[id number]] Collected:  11/08/19 1703    Specimen:  Blood Updated:  11/08/19 1716    Narrative:       The following orders were created for panel order CBC & Differential.  Procedure                               Abnormality         Status                     ---------                               -----------         ------                     CBC Auto Differential[[id number]]        Abnormal            Final result                 Please view results for these tests on the individual orders.    CBC Auto Differential [[id number]]  (Abnormal) Collected:  11/08/19 1703    Specimen:  Blood Updated:  11/08/19 1716     WBC 14.44 10*3/mm3      RBC 3.60 10*6/mm3      Hemoglobin 10.9 g/dL      Hematocrit 32.7 %      MCV 90.8 fL      MCH 30.3 pg      MCHC 33.3 g/dL      RDW 12.9 %      RDW-SD 42.7 fl      MPV 8.8 fL      Platelets 370 10*3/mm3      Neutrophil % 89.5 %      Lymphocyte % 2.9 %      Monocyte % 6.3 %      Eosinophil % 0.0 %      Basophil % 0.1 %      Immature Grans % 1.2 %      Neutrophils, Absolute 12.91 10*3/mm3      Lymphocytes, Absolute 0.42 10*3/mm3      Monocytes, Absolute 0.91 10*3/mm3      Eosinophils, Absolute 0.00 10*3/mm3      Basophils, Absolute 0.02 10*3/mm3      Immature Grans, Absolute 0.18 10*3/mm3      nRBC 0.0 /100 WBC           Imaging Results (Most Recent)     Procedure Component Value Units Date/Time    CT Pelvis Without Contrast [[id number]] Updated:  11/08/19 2001    XR Hip With or Without Pelvis 2 - 3 View Left [[id number]] Collected:  11/08/19  1702     Updated:  11/08/19 1704    Narrative:       CR Hip Uni Comp Min 2 Vws LT    INDICATION:   Left anterior groin pain for several months. Pain getting worse. History of hip pinning in 2012.    COMPARISON:   4/18/2018    FINDINGS:  AP and frog-leg lateral view(s) of the left hip. Developing arthrosis of the left hip with near-complete collapse of the left femoral head with fragmentation. No significant change in positioning of the cannulated screws but due to femoral head collapse  these now engage the acetabulum. There is some destruction of the left acetabular joint. Right hip unremarkable. Bone mineralization appears normal. IVC filter noted.      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 Patient's 3 cannulated screws now appear to engage the acetabulum and may represent a source of irritation.  These findings do not appear acute.    Signer Name: DENNIS Bradshaw MD   Signed: 11/8/2019 5:02 PM   Workstation Name: [id number]    [facility]        reviewed    ECG/EMG Results (most recent)     Procedure Component Value Units Date/Time    ECG 12 Lead [[id number]] Collected:  11/08/19 1739     Updated:  11/08/19 1742    Narrative:       HEART RATE= 111  bpm  RR Interval= 580  ms  AR Interval= 178  ms  P Horizontal Axis= 47  deg  P Front Axis= 38  deg  QRSD Interval= 90  ms  QT Interval= 344  ms  QRS Axis= -18  deg  T Wave Axis= 24  deg  - ABNORMAL ECG -  Sinus tachycardia  Atrial premature complexes  Consider RVH or posterior infarct  Inferior infarct, old  Electronically Signed By:   Date and Time of Study: 2019-11-08 17:39:52        Reviewed and compared to 4/2018 w/o dynamic change.    Assessment/Plan     1.  Left Hip Fracture: Ortho was consulted from ER and will see tomorrow.  Pain control w/ Tylenol.    2.  Hyponatremia: This is chronic.  Trend over the last 2 years  does have some normal readings.  I'm gently hydrating her this evening as I suspect some of this may be do to low solute (which has been mentioned in the past).  Repeat in AM.  Holding salt tabs.    3.  Hypothyroidism: Check TSH.    4.  HTN: BP near goal.  Continue Norvasc and monitor.    I discussed the patients findings and my recommendations with patient and family.     Gino Bower MD  11/08/19  8:22 PM

## 2019-11-09 NOTE — PLAN OF CARE
Problem: Patient Care Overview  Goal: Plan of Care Review  Outcome: Ongoing (interventions implemented as appropriate)   11/09/19 9311   Coping/Psychosocial   Plan of Care Reviewed With patient;daughter   Plan of Care Review   Progress no change   OTHER   Outcome Summary VSS. Daughters at bedside most of shift. Alert and oriented repositioned every two hours pillows adjusted for comfort denies pain med       Problem: Fall Risk (Adult)  Goal: Identify Related Risk Factors and Signs and Symptoms  Outcome: Ongoing (interventions implemented as appropriate)

## 2019-11-09 NOTE — NURSING NOTE
Discharge Planning Assessment   Stormy Rivera     Patient Name: Nicolasa Woodall  MRN: [id number]  Today's Date: 11/9/2019    Admit Date: 11/8/2019    Discharge Needs Assessment     Row Name 11/09/19 0901       Living Environment    Lives With  alone    Current Living Arrangements  home/apartment/condo    Primary Care Provided by  self    Provides Primary Care For  no one, unable/limited ability to care for self    Family Caregiver if Needed  child(hebert), adult    Family Caregiver Names  Virginia    Quality of Family Relationships  helpful;involved;supportive    Able to Return to Prior Arrangements  -- Will need rehab post op       Resource/Environmental Concerns    Resource/Environmental Concerns  none       Transition Planning    Patient/Family Anticipates Transition to  inpatient rehabilitation facility       Discharge Needs Assessment    Readmission Within the Last 30 Days  no previous admission in last 30 days    Concerns to be Addressed  discharge planning    Equipment Currently Used at Home  bath bench;commode;walker, rolling    Equipment Needed After Discharge  -- Continue to assess    Discharge Facility/Level of Care Needs  rehabilitation facility;nursing facility, skilled        Discharge Plan     Row Name 11/09/19 0903       Plan    Plan  Rehab upon discharge    Patient/Family in Agreement with Plan  yes    Plan Comments  Spoke with Mrs Woodall and her daughter Virginia at bedside (with permission).  Mrs Woodall lives alone in a home in [place].  Her daughter is her POA.  Mrs Woodall has a rolling walker, bedside acommode and a shower chair.  She does not wear oxygen at em and does not have home health.  She is independent with her ADL's and her daughter drives her wherever she needs to go. Her pharmacy is [facility].  She has an advanced directive on file here.  The daughter and the patient have requested that she have rehab here at [facility] rehab.  Voice mail left with Cecilia Bañuelos re  referral.  Will continue to follow        Destination      No service coordination in this encounter.      Durable Medical Equipment      No service coordination in this encounter.      Dialysis/Infusion      No service coordination in this encounter.      Home Medical Care      No service coordination in this encounter.      Therapy      No service coordination in this encounter.      Community Resources      No service coordination in this encounter.          Demographic Summary     Row Name 11/09/19 0900       General Information    Arrived From  home    Required Notices Provided  Important Message from Medicare Signed by daughter Fadia BLANCHARD    Referral Source  admission list    Reason for Consult  discharge planning    Preferred Language  English     Used During This Interaction  no       Contact Information    Permission Granted to Share Info With          Functional Status    No documentation.       Psychosocial    No documentation.       Abuse/Neglect    No documentation.       Legal    No documentation.       Substance Abuse    No documentation.       Patient Forms    No documentation.           Christina Rojas RN

## 2019-11-09 NOTE — PLAN OF CARE
Problem: Patient Care Overview  Goal: Plan of Care Review  Outcome: Ongoing (interventions implemented as appropriate)   11/09/19 0541   Coping/Psychosocial   Plan of Care Reviewed With patient;daughter   Plan of Care Review   Progress no change   OTHER   Outcome Summary NP admitted with pain and inability to use lt leg. Previously repaired with pins; unknown reason but pins were coming out causing damage to hip joint and need for surgery. Pt utilized narcotics but would like to also have tylenol available to use as first option for pain medicine. VSS, IVF, awaiting surgery with Dr. Owens. will continue to monitor     Goal: Individualization and Mutuality  Outcome: Ongoing (interventions implemented as appropriate)    Goal: Discharge Needs Assessment  Outcome: Ongoing (interventions implemented as appropriate)      Problem: Fall Risk (Adult)  Goal: Identify Related Risk Factors and Signs and Symptoms  Outcome: Ongoing (interventions implemented as appropriate)    Goal: Absence of Fall  Outcome: Ongoing (interventions implemented as appropriate)      Problem: Skin Injury Risk (Adult)  Goal: Identify Related Risk Factors and Signs and Symptoms  Outcome: Ongoing (interventions implemented as appropriate)    Goal: Skin Health and Integrity  Outcome: Ongoing (interventions implemented as appropriate)      Problem: Pain, Acute (Adult)  Goal: Identify Related Risk Factors and Signs and Symptoms  Outcome: Ongoing (interventions implemented as appropriate)    Goal: Acceptable Pain Control/Comfort Level  Outcome: Ongoing (interventions implemented as appropriate)

## 2019-11-10 LAB
ABO GROUP BLD: NORMAL
ABO GROUP BLD: NORMAL
ANION GAP SERPL CALCULATED.3IONS-SCNC: 12.2 MMOL/L (ref 5–15)
BASOPHILS # BLD AUTO: 0.03 10*3/MM3 (ref 0–0.2)
BASOPHILS NFR BLD AUTO: 0.2 % (ref 0–1.5)
BLD GP AB SCN SERPL QL: NEGATIVE
BUN BLD-MCNC: 17 MG/DL (ref 8–23)
BUN/CREAT SERPL: 19.8 (ref 7–25)
CALCIUM SPEC-SCNC: 7.9 MG/DL (ref 8.6–10.5)
CHLORIDE SERPL-SCNC: 94 MMOL/L (ref 98–107)
CO2 SERPL-SCNC: 22.8 MMOL/L (ref 22–29)
CREAT BLD-MCNC: 0.86 MG/DL (ref 0.57–1)
DEPRECATED RDW RBC AUTO: 43 FL (ref 37–54)
EOSINOPHIL # BLD AUTO: 0.34 10*3/MM3 (ref 0–0.4)
EOSINOPHIL NFR BLD AUTO: 2.6 % (ref 0.3–6.2)
ERYTHROCYTE [DISTWIDTH] IN BLOOD BY AUTOMATED COUNT: 12.9 % (ref 12.3–15.4)
FERRITIN SERPL-MCNC: 377 NG/ML (ref 13–150)
GFR SERPL CREATININE-BSD FRML MDRD: 62 ML/MIN/1.73
GLUCOSE BLD-MCNC: 103 MG/DL (ref 65–99)
HCT VFR BLD AUTO: 33.5 % (ref 34–46.6)
HGB BLD-MCNC: 11.1 G/DL (ref 12–15.9)
IMM GRANULOCYTES # BLD AUTO: 0.07 10*3/MM3 (ref 0–0.05)
IMM GRANULOCYTES NFR BLD AUTO: 0.5 % (ref 0–0.5)
IRON 24H UR-MRATE: 22 MCG/DL (ref 37–145)
IRON SATN MFR SERPL: 11 % (ref 20–50)
LYMPHOCYTES # BLD AUTO: 1.24 10*3/MM3 (ref 0.7–3.1)
LYMPHOCYTES NFR BLD AUTO: 9.5 % (ref 19.6–45.3)
MCH RBC QN AUTO: 30.3 PG (ref 26.6–33)
MCHC RBC AUTO-ENTMCNC: 33.1 G/DL (ref 31.5–35.7)
MCV RBC AUTO: 91.5 FL (ref 79–97)
MONOCYTES # BLD AUTO: 1.07 10*3/MM3 (ref 0.1–0.9)
MONOCYTES NFR BLD AUTO: 8.2 % (ref 5–12)
NEUTROPHILS # BLD AUTO: 10.36 10*3/MM3 (ref 1.7–7)
NEUTROPHILS NFR BLD AUTO: 79 % (ref 42.7–76)
NRBC BLD AUTO-RTO: 0 /100 WBC (ref 0–0.2)
PLATELET # BLD AUTO: 401 10*3/MM3 (ref 140–450)
PMV BLD AUTO: 9.4 FL (ref 6–12)
POTASSIUM BLD-SCNC: 4.2 MMOL/L (ref 3.5–5.2)
RBC # BLD AUTO: 3.66 10*6/MM3 (ref 3.77–5.28)
RH BLD: POSITIVE
RH BLD: POSITIVE
SODIUM BLD-SCNC: 129 MMOL/L (ref 136–145)
T&S EXPIRATION DATE: NORMAL
TIBC SERPL-MCNC: 194 MCG/DL (ref 298–536)
UIBC SERPL-MCNC: 172 MCG/DL (ref 112–346)
WBC NRBC COR # BLD: 13.11 10*3/MM3 (ref 3.4–10.8)

## 2019-11-10 PROCEDURE — 86920 COMPATIBILITY TEST SPIN: CPT

## 2019-11-10 PROCEDURE — 86901 BLOOD TYPING SEROLOGIC RH(D): CPT | Performed by: ORTHOPAEDIC SURGERY

## 2019-11-10 PROCEDURE — 86900 BLOOD TYPING SEROLOGIC ABO: CPT | Performed by: ORTHOPAEDIC SURGERY

## 2019-11-10 PROCEDURE — 86922 COMPATIBILITY TEST ANTIGLOB: CPT

## 2019-11-10 PROCEDURE — 86901 BLOOD TYPING SEROLOGIC RH(D): CPT

## 2019-11-10 PROCEDURE — 80048 BASIC METABOLIC PNL TOTAL CA: CPT | Performed by: HOSPITALIST

## 2019-11-10 PROCEDURE — 86850 RBC ANTIBODY SCREEN: CPT | Performed by: ORTHOPAEDIC SURGERY

## 2019-11-10 PROCEDURE — 85025 COMPLETE CBC W/AUTO DIFF WBC: CPT | Performed by: HOSPITALIST

## 2019-11-10 PROCEDURE — 94799 UNLISTED PULMONARY SVC/PX: CPT

## 2019-11-10 PROCEDURE — 99232 SBSQ HOSP IP/OBS MODERATE 35: CPT | Performed by: HOSPITALIST

## 2019-11-10 PROCEDURE — 99223 1ST HOSP IP/OBS HIGH 75: CPT | Performed by: INTERNAL MEDICINE

## 2019-11-10 PROCEDURE — 86900 BLOOD TYPING SEROLOGIC ABO: CPT

## 2019-11-10 RX ORDER — QUETIAPINE FUMARATE 25 MG/1
12.5 TABLET, FILM COATED ORAL NIGHTLY
Status: DISCONTINUED | OUTPATIENT
Start: 2019-11-10 | End: 2019-11-13 | Stop reason: HOSPADM

## 2019-11-10 RX ORDER — DOCUSATE SODIUM 100 MG/1
100 CAPSULE, LIQUID FILLED ORAL 2 TIMES DAILY
Status: DISCONTINUED | OUTPATIENT
Start: 2019-11-10 | End: 2019-11-13 | Stop reason: HOSPADM

## 2019-11-10 RX ORDER — CHOLECALCIFEROL (VITAMIN D3) 125 MCG
5 CAPSULE ORAL NIGHTLY
Status: DISCONTINUED | OUTPATIENT
Start: 2019-11-10 | End: 2019-11-13 | Stop reason: HOSPADM

## 2019-11-10 RX ADMIN — MIRTAZAPINE 15 MG: 15 TABLET, FILM COATED ORAL at 22:04

## 2019-11-10 RX ADMIN — DOCUSATE SODIUM 100 MG: 100 CAPSULE, LIQUID FILLED ORAL at 22:01

## 2019-11-10 RX ADMIN — DOCUSATE SODIUM 100 MG: 100 CAPSULE, LIQUID FILLED ORAL at 15:06

## 2019-11-10 RX ADMIN — QUETIAPINE FUMARATE 12.5 MG: 25 TABLET ORAL at 22:03

## 2019-11-10 RX ADMIN — MELATONIN TAB 5 MG 5 MG: 5 TAB at 22:02

## 2019-11-10 RX ADMIN — BUSPIRONE HYDROCHLORIDE 7.5 MG: 15 TABLET ORAL at 09:50

## 2019-11-10 RX ADMIN — SODIUM CHLORIDE TAB 1 GM 1 G: 1 TAB at 09:49

## 2019-11-10 RX ADMIN — BUSPIRONE HYDROCHLORIDE 7.5 MG: 15 TABLET ORAL at 22:02

## 2019-11-10 RX ADMIN — SODIUM CHLORIDE TAB 1 GM 1 G: 1 TAB at 17:07

## 2019-11-10 RX ADMIN — LEVOTHYROXINE SODIUM 88 MCG: 88 TABLET ORAL at 09:50

## 2019-11-10 NOTE — PROGRESS NOTES
"Hospitalist Team      Patient Care Team:  Dion Ann MD as PCP - General (Family Medicine)  Dion Ann MD as PCP - Claims Attributed        Chief Complaint: Follow-up left hip pain secondary to Collapse of the left femoral head    Subjective    Interval History and ROS:     The patient did not sleep well overnight last night and declined to take any medication to help her sleep.  Because of this she is very sleepy this morning.  Is agreeable to take something for sleep tonight.  She is not having any pain at the time of my examination and only has pain with movement.  She denies any chest pain, shortness of breath, nausea, vomiting, or heart palpitations.  She remains afebrile.  Her blood pressure and heart rate are within normal limits.  She does note she has not had a bowel movement in a couple of days.  Her sodium level is improving.      Objective    Vital Signs  Temp:  [97.3 °F (36.3 °C)-98.7 °F (37.1 °C)] 98.7 °F (37.1 °C)  Heart Rate:  [] 82  Resp:  [16-18] 18  BP: (125-169)/(70-87) 125/70  Oxygen Therapy  SpO2: 96 %  Pulse Oximetry Type: Intermittent  Device (Oxygen Therapy): room air     Flowsheet Rows      First Filed Value   Admission Height  160 cm (63\") Documented at 11/08/2019 1539   Admission Weight  50.8 kg (112 lb) Documented at 11/08/2019 1539            Physical Exam:    Physical Exam   Constitutional: She appears well-developed and well-nourished. No distress.   HENT:   Head: Normocephalic and atraumatic.   Eyes: Conjunctivae and EOM are normal.   Cardiovascular: Normal rate, regular rhythm and normal heart sounds. Exam reveals no gallop and no friction rub.   No murmur heard.  Pulmonary/Chest: Effort normal and breath sounds normal. No respiratory distress. She has no wheezes. She has no rales.   Abdominal: Soft. Bowel sounds are normal. There is no tenderness.   Musculoskeletal: She exhibits no edema.   Neurological: She is alert and oriented to person, place, and time. "   Psychiatric: She has a normal mood and affect. Her behavior is normal.   Vitals reviewed.    Results Review:     I reviewed the patient's new clinical results.    Lab Results (last 24 hours)     Procedure Component Value Units Date/Time    Iron Profile [[id number]]  (Abnormal) Collected:  11/09/19 0357    Specimen:  Blood Updated:  11/10/19 0637     Iron 22 mcg/dL      Iron Saturation 11 %      UIBC 172 mcg/dL      TIBC 194 mcg/dL     Ferritin [[id number]]  (Abnormal) Collected:  11/09/19 0357    Specimen:  Blood Updated:  11/10/19 0637     Ferritin 377.00 ng/mL     Basic Metabolic Panel [[id number]]  (Abnormal) Collected:  11/10/19 0345    Specimen:  Blood Updated:  11/10/19 0513     Glucose 103 mg/dL      BUN 17 mg/dL      Creatinine 0.86 mg/dL      Sodium 129 mmol/L      Potassium 4.2 mmol/L      Chloride 94 mmol/L      CO2 22.8 mmol/L      Calcium 7.9 mg/dL      eGFR Non African Amer 62 mL/min/1.73      BUN/Creatinine Ratio 19.8     Anion Gap 12.2 mmol/L     Narrative:       GFR Normal >60  Chronic Kidney Disease <60  Kidney Failure <15    CBC & Differential [[id number]] Collected:  11/10/19 0345    Specimen:  Blood Updated:  11/10/19 0454    Narrative:       The following orders were created for panel order CBC & Differential.  Procedure                               Abnormality         Status                     ---------                               -----------         ------                     CBC Auto Differential[[id number]]        Abnormal            Final result                 Please view results for these tests on the individual orders.    CBC Auto Differential [[id number]]  (Abnormal) Collected:  11/10/19 0345    Specimen:  Blood Updated:  11/10/19 0454     WBC 13.11 10*3/mm3      RBC 3.66 10*6/mm3      Hemoglobin 11.1 g/dL      Hematocrit 33.5 %      MCV 91.5 fL      MCH 30.3 pg      MCHC 33.1 g/dL      RDW 12.9 %      RDW-SD 43.0 fl      MPV 9.4 fL      Platelets 401 10*3/mm3      Neutrophil % 79.0 %       Lymphocyte % 9.5 %      Monocyte % 8.2 %      Eosinophil % 2.6 %      Basophil % 0.2 %      Immature Grans % 0.5 %      Neutrophils, Absolute 10.36 10*3/mm3      Lymphocytes, Absolute 1.24 10*3/mm3      Monocytes, Absolute 1.07 10*3/mm3      Eosinophils, Absolute 0.34 10*3/mm3      Basophils, Absolute 0.03 10*3/mm3      Immature Grans, Absolute 0.07 10*3/mm3      nRBC 0.0 /100 WBC     Vitamin B12 [[id number]]  (Normal) Collected:  11/09/19 0357    Specimen:  Blood Updated:  11/09/19 2043     Vitamin B-12 368 pg/mL     Folate [[id number]]  (Normal) Collected:  11/09/19 0357    Specimen:  Blood Updated:  11/09/19 2043     Folate 14.30 ng/mL           Imaging Results (Last 24 Hours)     ** No results found for the last 24 hours. **          ECG/EMG Results (most recent)     Procedure Component Value Units Date/Time    ECG 12 Lead [[id number]] Collected:  11/08/19 1739     Updated:  11/09/19 1729    Narrative:       HEART RATE= 111  bpm  RR Interval= 580  ms  MT Interval= 178  ms  P Horizontal Axis= 47  deg  P Front Axis= 38  deg  QRSD Interval= 90  ms  QT Interval= 344  ms  QRS Axis= -18  deg  T Wave Axis= 24  deg  - ABNORMAL ECG -  Sinus tachycardia  Atrial premature complexes  Consider RVH or posterior infarct  Inferior infarct, old  No change from prior tracing  Electronically Signed By: [facility] 09-Nov-2019 17:29:22  Date and Time of Study: 2019-11-08 17:39:52          Medication Review:   I have reviewed the patient's current medication list    Current Facility-Administered Medications:   •  acetaminophen (TYLENOL) tablet 650 mg, 650 mg, Oral, Q6H PRN, Gino Bower MD  •  busPIRone (BUSPAR) tablet 7.5 mg, 7.5 mg, Oral, BID, Cecilia Briceño MD, 7.5 mg at 11/10/19 0950  •  docusate sodium (COLACE) capsule 100 mg, 100 mg, Oral, BID, Cecilia Briceño MD, 100 mg at 11/10/19 1506  •  Influenza Vac Subunit Quad (FLUCELVAX) injection 0.5 mL, 0.5 mL, Intramuscular, Once,  Gino Bower MD  •  levothyroxine (SYNTHROID, LEVOTHROID) tablet 88 mcg, 88 mcg, Oral, Daily, Gino Bower MD, 88 mcg at 11/10/19 0950  •  melatonin tablet 5 mg, 5 mg, Oral, Nightly, Cecilia Briceño MD  •  mirtazapine (REMERON) tablet 15 mg, 15 mg, Oral, Nightly, Gino Bower MD, 15 mg at 11/09/19 2205  •  morphine injection 1 mg, 1 mg, Intravenous, Q3H PRN, Gino Bower MD, 1 mg at 11/08/19 2350  •  polyethylene glycol 3350 powder (packet), 17 g, Oral, Daily PRN, Cecilia Briceño MD  •  QUEtiapine (SEROquel) tablet 12.5 mg, 12.5 mg, Oral, Nightly, Cecilia Briceño MD  •  sodium chloride tablet 1 g, 1 g, Oral, BID With Meals, Cecilia Briceño MD, 1 g at 11/10/19 1707  •  Vitamin D3 50,000 Units, 50,000 Units, Oral, Q7 Days, Cecilia Briceño MD      Assessment/Plan     -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 Owens plans to take the patient to the operating room tomorrow for a left total hip arthroplasty.  Hematology saw the patient today regarding her history of PEs and of bleeding while on anticoagulation.  They recommended Lovenox 40 mg subcu daily following surgery for DVT prophylaxis and daughter is agreeable.  Patient's pain is currently controlled.  She only has pain with movement at this time.       -Chronic hyponatremia:  The patient was only taking 1 tablet a day at home but has previously been on as many as 4 tablets a day.  The patient's sodium is rising with twice daily dosing here.    Will recheck a BMP in the a.m.  If her sodium level plateaus at less than normal levels I would recommend increasing her salt tablets to 3 times daily with meals.  Will monitor sodium level closely.     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  Daughter is agreeable.  See above.  No acute issues here currently.     -Leukocytosis:  Likely reactive.    UA and chest x-ray negative here and no other indication of infection.  Monitor.     -Insomnia:  Patient agreeable to try medication tonight to help her sleep.  Will order melatonin and a low-dose of Seroquel.  Family will try to keep the patient awake today so that we do not get her days and nights flipped.     -Hypothyroidism:  TSH is within normal limits.  Patient continued on home supplementation.     -H/O Hypertension:  Patient is no longer on antihypertensive medications as an outpatient.  Today patient's blood pressure has been mostly within normal limits.  Continue to monitor.     -Chronic kidney disease stage III:  Baseline creatinine has ranged from 0.8-1.1.  Here her renal function is at baseline.  No acute issues here.  Will monitor perioperatively.     -Acute on chronic normocytic anemia:  Hemoglobin today is at her baseline.  Continue to monitor perioperatively.  Patient with no overt signs or symptoms of bleeding.  Patient with slightly low iron and iron percent saturation but ferritin is elevated in addition to TIBC being low and RDW being normal appears more consistent with anemia of chronic disease.  B12 and folate are normal.  Hematology noted that we could try oral iron supplementation.  Will hold off for now and monitor.     -Vitamin D deficiency:  25 hydroxy vitamin D level was 19.5 here.  Supplementation started here.     -Anxiety and depression:  Patient continued on her home dose of Remeron and BuSpar.  No acute issues here.    -Constipation:  Will add Colace twice daily and MiraLAX as needed.    Plan for disposition: Uncertain at this time, patient will need assessment by physical and occupational therapy postoperatively.    Cecilia Briceño MD  11/10/19  6:49 PM

## 2019-11-10 NOTE — PLAN OF CARE
Problem: Patient Care Overview  Goal: Plan of Care Review  Outcome: Ongoing (interventions implemented as appropriate)   11/10/19 9029   Coping/Psychosocial   Plan of Care Reviewed With patient;family   Plan of Care Review   Progress no change   OTHER   Outcome Summary Pt denies pain this shift. Periwick to LWS, clear yellow urine noted in collection container. Tolerating regular diet w/o complaints. All care explained. All questions answered.

## 2019-11-10 NOTE — PLAN OF CARE
Problem: Patient Care Overview  Goal: Individualization and Mutuality  Outcome: Ongoing (interventions implemented as appropriate)   11/10/19 5857   Individualization   Patient Specific Preferences Goes by Nicolasa   Patient Specific Interventions Consent obtained for surg 11/11/19, NPO 11/11/19 @0001

## 2019-11-10 NOTE — PROGRESS NOTES
"Hospitalist Team      Patient Care Team:  Dion Ann MD as PCP - General (Family Medicine)  Dion Ann MD as PCP - Claims Attributed        Chief Complaint: Follow-up left hip pain    Subjective    Interval History and ROS:     The patient states she is feeling fairly well this morning.  She is only having pain if she moves.  Sitting in bed at the time of my examination the patient states she is pain-free.  The patient denies any chest pain, shortness of breath, nausea, vomiting, or heart palpitations.  She is afebrile.  Her blood pressure is mostly within normal limits today.      Objective    Vital Signs  Temp:  [97.3 °F (36.3 °C)-99.3 °F (37.4 °C)] 97.5 °F (36.4 °C)  Heart Rate:  [] 100  Resp:  [16] 16  BP: (134-149)/(66-76) 149/70  Oxygen Therapy  SpO2: 96 %  Pulse Oximetry Type: Continuous  Device (Oxygen Therapy): room air     Flowsheet Rows      First Filed Value   Admission Height  160 cm (63\") Documented at 11/08/2019 1539   Admission Weight  50.8 kg (112 lb) Documented at 11/08/2019 1539            Physical Exam:    Physical Exam   Constitutional: She is oriented to person, place, and time. She appears well-developed and well-nourished. No distress.   HENT:   Head: Normocephalic and atraumatic.   Eyes: Conjunctivae and EOM are normal.   Cardiovascular: Normal rate, regular rhythm and normal heart sounds. Exam reveals no gallop and no friction rub.   No murmur heard.  Pulmonary/Chest: Effort normal and breath sounds normal. No respiratory distress. She has no wheezes. She has no rales.   Abdominal: Soft. Bowel sounds are normal. There is no tenderness.   Musculoskeletal: She exhibits no edema.   Neurological: She is alert and oriented to person, place, and time.   Psychiatric: She has a normal mood and affect. Her behavior is normal.   Vitals reviewed.      Results Review:     I reviewed the patient's new clinical results.    Lab Results (last 24 hours)     Procedure Component Value " Units Date/Time    Vitamin D 25 Hydroxy [[id number]]  (Abnormal) Collected:  11/09/19 0357    Specimen:  Blood Updated:  11/09/19 1204     25 Hydroxy, Vitamin D 19.5 ng/ml     Narrative:       Reference Range for Total Vitamin D 25(OH)     Deficiency <20.0 ng/mL   Insufficiency 21-29 ng/mL   Sufficiency  ng/mL  Toxicity >100 ng/ml    Protime-INR [[id number]]  (Abnormal) Collected:  11/09/19 0357    Specimen:  Blood Updated:  11/09/19 1012     Protime 14.4 Seconds      INR 1.15    Narrative:       Therapeutic Ranges for INR: 2.0-3.0 (PT 20-30)                              2.5-3.5 (PT 25-34)    Comprehensive Metabolic Panel [[id number]]  (Abnormal) Collected:  11/09/19 0357    Specimen:  Blood Updated:  11/09/19 0603     Glucose 96 mg/dL      BUN 24 mg/dL      Creatinine 0.91 mg/dL      Sodium 127 mmol/L      Potassium 4.0 mmol/L      Chloride 92 mmol/L      CO2 24.1 mmol/L      Calcium 7.7 mg/dL      Total Protein 5.8 g/dL      Albumin 2.90 g/dL      ALT (SGPT) 10 U/L      AST (SGOT) 20 U/L      Alkaline Phosphatase 94 U/L      Total Bilirubin 0.5 mg/dL      eGFR Non African Amer 58 mL/min/1.73      Globulin 2.9 gm/dL      A/G Ratio 1.0 g/dL      BUN/Creatinine Ratio 26.4     Anion Gap 10.9 mmol/L     Narrative:       GFR Normal >60  Chronic Kidney Disease <60  Kidney Failure <15    CBC & Differential [[id number]] Collected:  11/09/19 0357    Specimen:  Blood Updated:  11/09/19 0543    Narrative:       The following orders were created for panel order CBC & Differential.  Procedure                               Abnormality         Status                     ---------                               -----------         ------                     CBC Auto Differential[[id number]]        Abnormal            Final result                 Please view results for these tests on the individual orders.    CBC Auto Differential [[id number]]  (Abnormal) Collected:  11/09/19 0357    Specimen:  Blood Updated:  11/09/19 0543     WBC  11.71 10*3/mm3      RBC 3.20 10*6/mm3      Hemoglobin 9.7 g/dL      Hematocrit 29.2 %      MCV 91.3 fL      MCH 30.3 pg      MCHC 33.2 g/dL      RDW 12.9 %      RDW-SD 43.0 fl      MPV 9.2 fL      Platelets 365 10*3/mm3      Neutrophil % 77.0 %      Lymphocyte % 12.4 %      Monocyte % 9.5 %      Eosinophil % 0.3 %      Basophil % 0.2 %      Immature Grans % 0.6 %      Neutrophils, Absolute 9.03 10*3/mm3      Lymphocytes, Absolute 1.45 10*3/mm3      Monocytes, Absolute 1.11 10*3/mm3      Eosinophils, Absolute 0.03 10*3/mm3      Basophils, Absolute 0.02 10*3/mm3      Immature Grans, Absolute 0.07 10*3/mm3      nRBC 0.0 /100 WBC     TSH [[id number]]  (Normal) Collected:  11/08/19 1703    Specimen:  Blood Updated:  11/08/19 2319     TSH 1.870 uIU/mL           Imaging Results (Last 24 Hours)     Procedure Component Value Units Date/Time    XR Chest 1 View [[id number]] Collected:  11/09/19 1243     Updated:  11/09/19 1245    Narrative:       Portable chest    Indication preop hip surgery    COMPARISON: 9/4/2018    FINDINGS: The heart size and vascular normal lungs are clear the bones are normal.      Impression:       No active disease    Signer Name: Brody Gaspar MD   Signed: 11/9/2019 12:43 PM   Workstation Name: [facility]    [facility]    CT Pelvis Without Contrast [[id number]] Collected:  11/08/19 2029     Updated:  11/08/19 2032    Narrative:       INDICATION:    Abnormal plain films. Left anterior groin pain for several months and pain is getting worse. History of hip pinning in 2012. Cannot bear weight on left leg.    TECHNIQUE:   CT of the pelvis without contrast. Coronal and sagittal reconstructions were obtained.  Radiation dose reduction techniques included automated exposure control or exposure modulation based on body size. Radiation audit for number of CT and nuclear  cardiology exams performed in the last year: 0.      COMPARISON:    Plain films from 11/8/2019.    FINDINGS:  There is  an abnormal appearance of the left hip. The patient has had previous left hip pinning with 3 screws. There is collapse of the left femoral head and at least 2 of the screws now pass into the left acetabulum. There is erosion of the acetabulum  around the superiormost screw with deformity/remodeling of the superolateral acetabulum. There are multiple bone fragments within the hip joint related to the femoral head collapse. The femoral head collapse is new from 4/18/2018.    There are degenerative changes in the spine. The bones are osteopenic. Vascular calcifications present and there is an IVC filter partly seen. The patient is apparently post hysterectomy. There is sigmoid diverticulosis. There is a moderate amount of  colonic gas and stool particularly in the cecum.      Impression:         1. Patient's had previous left hip pinning and there has been collapse of the left femoral head such that at least 2 of the 3 left femoral pins impinge upon the acetabulum with associated bony remodeling of the superior acetabulum. Additionally there are  multiple bone fragments within the left hip joint related to the femoral head collapse.  2. The bones appear osteopenic, particularly the sacrum.    Signer Name: Renee Soto MD   Signed: 11/8/2019 8:29 PM   Workstation Name: [email or web address]    [facility]        ECG/EMG Results (most recent)     Procedure Component Value Units Date/Time    ECG 12 Lead [[id number]] Collected:  11/08/19 1739     Updated:  11/09/19 1729    Narrative:       HEART RATE= 111  bpm  RR Interval= 580  ms  MS Interval= 178  ms  P Horizontal Axis= 47  deg  P Front Axis= 38  deg  QRSD Interval= 90  ms  QT Interval= 344  ms  QRS Axis= -18  deg  T Wave Axis= 24  deg  - ABNORMAL ECG -  Sinus tachycardia  Atrial premature complexes  Consider RVH or posterior infarct  Inferior infarct, old  No change from prior tracing  Electronically Signed By: Sydney Sierra ([facility]) 09-Nov-2019  17:29:22  Date and Time of Study: 2019-11-08 17:39:52          Medication Review:   I have reviewed the patient's current medication list    Current Facility-Administered Medications:   •  acetaminophen (TYLENOL) tablet 650 mg, 650 mg, Oral, Q6H PRN, Gino Bower MD  •  levothyroxine (SYNTHROID, LEVOTHROID) tablet 88 mcg, 88 mcg, Oral, Daily, Gino Bower MD, 88 mcg at 11/09/19 0957  •  mirtazapine (REMERON) tablet 15 mg, 15 mg, Oral, Nightly, Gino Bower MD, 15 mg at 11/08/19 2315  •  morphine injection 1 mg, 1 mg, Intravenous, Q3H PRN, Gino Bower MD, 1 mg at 11/08/19 2350  •  sodium chloride tablet 1 g, 1 g, Oral, Daily, Cecilia Briceño MD, 1 g at 11/09/19 1145      Assessment/Plan     -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 Owens today and he plans to take the patient to the operating room Monday for a left total hip arthroplasty.  Will ask hematology to see the patient preop due to her history of PEs and of bleeding while on anticoagulation.  Patient's pain is currently controlled.  She only has pain with movement at this time.  Will order SCDs for DVT prophylaxis at this time.    -Chronic hyponatremia:  The patient's salt tablets were held initially on admission.  I will resume them today.  The patient was only taking 1 tablet a day at home but has previously been on as many as 4 tablets a day.  Today I will initiate her salt tablets at twice daily dosing.  Will monitor sodium level closely.    -Remote history of pulmonary emboli with IVC filter in place:  -History of bleeding including a traumatic subdural hematoma back in 2015:  Family notes that the patient is not supposed to be anticoagulated even on aspirin.  Per family report prior hypercoagulable work-up was negative.  Per orthopedic surgery request will ask hematology to evaluate the patient for risk and recommendations regarding DVT prophylaxis given her need for a left total hip  arthroplasty on Monday.    -Leukocytosis:  Likely reactive.  Trending down without any intervention. Monitor.    -Hypothyroidism:  TSH is within normal limits.  Patient continued on home supplementation.    -H/O Hypertension:  Patient is no longer on antihypertensive medications as an outpatient.  Today patient's blood pressure has been mostly within normal limits.  Continue to monitor.    -Chronic kidney disease stage III:  Baseline creatinine has ranged from 0.8-1.1.  Her only renal function is at baseline.  No acute issues here.  Will monitor perioperatively.    -Acute on chronic normocytic anemia:  Hemoglobin is slightly lower this morning likely from IV fluids the patient received overnight.  I will continue to monitor.  Patient with no overt signs or symptoms of bleeding. Will check Iron, B12 and folate.    -Vitamin D deficiency:  25 hydroxy vitamin D level was 19.5 here.  Will initiate supplementation.    -Anxiety and depression:  Patient continued on her home dose of Remeron here.  Will resume her home dose of BuSpar.  No acute issues here.    Plan for disposition: Unclear at this time.    Cecilia Briceño MD  11/09/19  7:05 PM

## 2019-11-10 NOTE — CONSULTS
Orthopedic Consult      Patient: Nicolasa Woodall    Date of Admission: 11/8/2019  3:33 PM    YOB: 1930    Medical Record Number: [id number]    Attending Physician: Gino Bower MD  Consulting Physician: Dr. Dagoberto Owens      Chief Complaints: Closed left hip fracture, initial encounter (CMS/[facility]) [S72.002A]  Closed left hip fracture, initial encounter (CMS/[facility]) [S72.002A]  Closed left hip fracture, initial encounter (CMS/[facility]) [S72.002A]      History of Present Illness: 89 y.o. female admitted to [facility] to services of Gino Bower MD with Closed left hip fracture, initial encounter (CMS/[facility]) [S72.002A]  Closed left hip fracture, initial encounter (CMS/[facility]) [S72.002A]  Closed left hip fracture, initial encounter (CMS/[facility]) [S72.002A]. I was consulted for further evaluation and treatment.  Patient notes that over the last 3 to 4 days she has had significant increasing pain to the left hip with near complete inability to bear weight on that side.  Her family states that she has practically required complete nonweightbearing status to the left leg which is nearly required them to carry her in and out of the house to the vehicle.  Given her increased pain she was brought to [facility] emergency department where imaging was completed indicating collapse of femoral head and prominence of prior percutaneous screws placed in 2012 for treatment of femoral neck fracture at that time.  Patient states that she has had some underlying groin pain over the last 3 years.  She did have x-rays back in April 2018 indicated acceptable position of screws at that time with no evidence of norma femoral head collapse.  Denies any recent trauma or falls.  Mild radiation of pain down into the anterior mid thigh region, rates current level pain is a 9 out of 10 with motion, 1-2 out of 10 at rest.  Denies associated numbness or tingling to her left lower extremity.  She does have significant history of DVT  and pulmonary embolus back in 2005, she did have some retroperitoneal bleeding while on anticoagulation.  Once this was stopped she had recurrent clotting and thus an IVC filter was placed and she was recommended to not take anticoagulation given the side effects from the bleeding.    No Known Allergies    Medications:   Home Medications:  Medications Prior to Admission   Medication Sig Dispense Refill Last Dose   • busPIRone (BUSPAR) 10 MG tablet Take 7.5 mg by mouth 2 (Two) Times a Day.   11/8/2019 at 0900   • levothyroxine (SYNTHROID, LEVOTHROID) 88 MCG tablet Take 88 mcg by mouth Daily.   11/8/2019 at 0900   • mirtazapine (REMERON) 15 MG tablet Take 15 mg by mouth Every Night.   11/7/2019 at 2100   • sodium chloride 1 g tablet Take 1 tablet by mouth 2 (Two) Times a Day With Meals. (Patient taking differently: Take 1 g by mouth Daily.)   11/8/2019 at 0900       Current Medications:  Scheduled Meds:  busPIRone 7.5 mg Oral BID   docusate sodium 100 mg Oral BID   influenza vaccine 0.5 mL Intramuscular Once   levothyroxine 88 mcg Oral Daily   melatonin 5 mg Oral Nightly   mirtazapine 15 mg Oral Nightly   QUEtiapine 12.5 mg Oral Nightly   sodium chloride 1 g Oral BID With Meals   Vitamin D3 50,000 Units Oral Q7 Days     Continuous Infusions:   PRN Meds:.•  acetaminophen  •  Morphine  •  polyethylene glycol       Past Medical History:   Diagnosis Date   • Anxiety    • Chronic kidney disease    • Depression     resolved after daughter completed chemotherapy    • Disease of thyroid gland    • Hypertension    • Metabolic encephalopathy    • Pulmonary embolism (CMS/HCC)     SPONTANEOUS HEMMORHAGE   • Subdural hematoma (CMS/HCC)         Past Surgical History:   Procedure Laterality Date   • BREAST BIOPSY      BENIGN   • HYSTERECTOMY     • JOINT REPLACEMENT     • THYROID SURGERY      PARATHYROID REMOVAL   • VENA CAVA FILTER INSERTION          Social History     Occupational History   • Not on file   Tobacco Use   • Smoking  status: Former Smoker     Packs/day: 0.50     Years: 25.00     Pack years: 12.50     Last attempt to quit:      Years since quittin.8   • Smokeless tobacco: Never Used   Substance and Sexual Activity   • Alcohol use: Yes     Alcohol/week: 0.6 oz     Types: 1 Glasses of wine per week     Comment: RARELY/Caffeine use   • Drug use: No   • Sexual activity: Defer    Social History     Social History Narrative   • Not on file        Family History   Problem Relation Age of Onset   • Breast cancer Daughter          Review of Systems:   HEENT: Patient denies any headaches, vision changes, change in hearing, or tinnitus, Patient denies any rhinorrhea,epistaxis, sinus pain, mouth or dental problems, sore throat or hoarseness, or dysphagia  Pulmonary: Patient denies any cough, congestion, SOA, or wheezing  Cardiovascular: Patient denies any chest pain, dyspnea, palpitations, weakness, intolerance of exercise, varicosities, swelling of extremities, known murmur  Gastrointestinal:  Patient denies nausea, vomiting, diarrhea, constipation, loss  of appetite, change in appetite, dysphagia, gas, heartburn, melena, change in bowel habits, use of laxatives or other drugs to alter the function of the gastrointestinal tract.  Genital/Urinary: Patient denies dysuria, change in color of urine, change in frequency of urination, pain with urgency, incontinence, retention, or nocturia.  Musculoskeletal: Patient denies increased warmth; redness; or swelling of joints; limitation of function; deformity; crepitation: notes pain in left hip and groin region as documented above in HPI.  Denies pain in low back or neck.    Neurological: Patient denies dizziness, tremor, ataxia, difficulty in speaking, change in speech, paresthesia, loss of sensation, seizures, syncope, changes in memory.  Endocrine system: Patient denies tremors, palpitations, intolerance of heat or cold, polyuria, polydipsia, polyphagia, diaphoresis, exophthalmos, or  goiter.  Psychological: Patient denies thoughts/plans or harming self or other; depression,  insomnia, night terrors, adriana, memory loss, disorientation.  Skin: Patient denies any bruising, rashes, discoloration, pruritus, wounds, ulcers, decubiti, changes in the hair or nails  Hematopoietic: Patient denies history of spontaneous or excessive bleeding, epistaxis, hematuria, melena, fatigue, enlarged or tender lymph nodes, pallor, history of anemia.    Physical Exam: 89 y.o. female  Vitals:    11/09/19 1900 11/10/19 0500 11/10/19 0900 11/10/19 1107   BP: 145/82 169/87  126/74   BP Location: Right arm Right arm  Right arm   Patient Position: Lying Lying  Lying   Pulse: 97 110 110 94   Resp: 16 18  18   Temp: 97.6 °F (36.4 °C) 98.4 °F (36.9 °C)  97.3 °F (36.3 °C)   TempSrc: Oral Oral  Oral   SpO2: 95% 92% 93% 97%   Weight:       Height:         General Appearance:    Alert, cooperative, in no acute distress                      Head:    Normocephalic, without obvious abnormality, atraumatic   Eyes:            Lids and lashes normal, conjunctivae and sclerae normal, no   icterus, no pallor, corneas clear, PERRLA   Ears:    Ears appear intact with no abnormalities noted   Throat:   No oral lesions, no thrush, oral mucosa moist   Neck:   No adenopathy, supple, trachea midline, no thyromegaly, no   carotid bruit, no JVD   Back:     No kyphosis present, no scoliosis present, no skin lesions,      erythema or scars, no tenderness to percussion or                   palpation,   range of motion normal   Lungs:     Clear to auscultation,respirations regular, even and                  unlabored    Heart:    Regular rhythm and normal rate, normal S1 and S2, no            murmur, no gallop, no rub, no click   Chest Wall:    No abnormalities observed   Abdomen:     Normal bowel sounds, no masses, no organomegaly, soft        non-tender, non-distended, no guarding, no rebound                tenderness   Rectal:     Deferred    Extremities:   Tenderness noted at left hip and groin region, exacerbated with gentle logroll exam.  Prior lateral incision well-healed.  No erythema or fluctuance noted.  Patient is able to dorsiflex plantarflex left ankle as well as flex and extend toes left foot with 4 out of 5 strength, positive sensation light touch all distributions left foot symmetrical right.  Brisk cap refill, 1+ dorsalis pedis pulse.  Refuses to range knee secondary to pain.  No pain over right hip on logroll or Stinchfield exam.  Moves all remaining extremities well, no edema, no cyanosis, no redness   Pulses:   Pulses palpable and equal bilaterally   Skin:   No bleeding, bruising or rash   Lymph nodes:   No palpable adenopathy   Neurologic:   Cranial nerves 2 - 12 grossly intact, sensation intact, DTR       present and equal bilaterally           Diagnostic Tests:  Admission on 11/08/2019   Component Date Value Ref Range Status   • Magnesium 11/08/2019 2.0  1.6 - 2.4 mg/dL Final   • Glucose 11/08/2019 117* 65 - 99 mg/dL Final   • BUN 11/08/2019 30* 8 - 23 mg/dL Final   • Creatinine 11/08/2019 1.13* 0.57 - 1.00 mg/dL Final   • Sodium 11/08/2019 128* 136 - 145 mmol/L Final   • Potassium 11/08/2019 4.7  3.5 - 5.2 mmol/L Final   • Chloride 11/08/2019 90* 98 - 107 mmol/L Final   • CO2 11/08/2019 25.7  22.0 - 29.0 mmol/L Final   • Calcium 11/08/2019 9.1  8.6 - 10.5 mg/dL Final   • Total Protein 11/08/2019 6.6  6.0 - 8.5 g/dL Final   • Albumin 11/08/2019 3.70  3.50 - 5.20 g/dL Final   • ALT (SGPT) 11/08/2019 13  1 - 33 U/L Final   • AST (SGOT) 11/08/2019 22  1 - 32 U/L Final   • Alkaline Phosphatase 11/08/2019 115  39 - 117 U/L Final   • Total Bilirubin 11/08/2019 0.6  0.2 - 1.2 mg/dL Final   • eGFR Non African Amer 11/08/2019 45* >60 mL/min/1.73 Final   • Globulin 11/08/2019 2.9  gm/dL Final   • A/G Ratio 11/08/2019 1.3  g/dL Final   • BUN/Creatinine Ratio 11/08/2019 26.5* 7.0 - 25.0 Final   • Anion Gap 11/08/2019 12.3  5.0 - 15.0 mmol/L  Final   • WBC 11/08/2019 14.44* 3.40 - 10.80 10*3/mm3 Final   • RBC 11/08/2019 3.60* 3.77 - 5.28 10*6/mm3 Final   • Hemoglobin 11/08/2019 10.9* 12.0 - 15.9 g/dL Final   • Hematocrit 11/08/2019 32.7* 34.0 - 46.6 % Final   • MCV 11/08/2019 90.8  79.0 - 97.0 fL Final   • MCH 11/08/2019 30.3  26.6 - 33.0 pg Final   • MCHC 11/08/2019 33.3  31.5 - 35.7 g/dL Final   • RDW 11/08/2019 12.9  12.3 - 15.4 % Final   • RDW-SD 11/08/2019 42.7  37.0 - 54.0 fl Final   • MPV 11/08/2019 8.8  6.0 - 12.0 fL Final   • Platelets 11/08/2019 370  140 - 450 10*3/mm3 Final   • Neutrophil % 11/08/2019 89.5* 42.7 - 76.0 % Final   • Lymphocyte % 11/08/2019 2.9* 19.6 - 45.3 % Final   • Monocyte % 11/08/2019 6.3  5.0 - 12.0 % Final   • Eosinophil % 11/08/2019 0.0* 0.3 - 6.2 % Final   • Basophil % 11/08/2019 0.1  0.0 - 1.5 % Final   • Immature Grans % 11/08/2019 1.2* 0.0 - 0.5 % Final   • Neutrophils, Absolute 11/08/2019 12.91* 1.70 - 7.00 10*3/mm3 Final   • Lymphocytes, Absolute 11/08/2019 0.42* 0.70 - 3.10 10*3/mm3 Final   • Monocytes, Absolute 11/08/2019 0.91* 0.10 - 0.90 10*3/mm3 Final   • Eosinophils, Absolute 11/08/2019 0.00  0.00 - 0.40 10*3/mm3 Final   • Basophils, Absolute 11/08/2019 0.02  0.00 - 0.20 10*3/mm3 Final   • Immature Grans, Absolute 11/08/2019 0.18* 0.00 - 0.05 10*3/mm3 Final   • nRBC 11/08/2019 0.0  0.0 - 0.2 /100 WBC Final   • TSH 11/08/2019 1.870  0.270 - 4.200 uIU/mL Final   • Glucose 11/09/2019 96  65 - 99 mg/dL Final   • BUN 11/09/2019 24* 8 - 23 mg/dL Final   • Creatinine 11/09/2019 0.91  0.57 - 1.00 mg/dL Final   • Sodium 11/09/2019 127* 136 - 145 mmol/L Final   • Potassium 11/09/2019 4.0  3.5 - 5.2 mmol/L Final   • Chloride 11/09/2019 92* 98 - 107 mmol/L Final   • CO2 11/09/2019 24.1  22.0 - 29.0 mmol/L Final   • Calcium 11/09/2019 7.7* 8.6 - 10.5 mg/dL Final   • Total Protein 11/09/2019 5.8* 6.0 - 8.5 g/dL Final   • Albumin 11/09/2019 2.90* 3.50 - 5.20 g/dL Final   • ALT (SGPT) 11/09/2019 10  1 - 33 U/L Final   • AST  (SGOT) 11/09/2019 20  1 - 32 U/L Final   • Alkaline Phosphatase 11/09/2019 94  39 - 117 U/L Final   • Total Bilirubin 11/09/2019 0.5  0.2 - 1.2 mg/dL Final   • eGFR Non African Amer 11/09/2019 58* >60 mL/min/1.73 Final   • Globulin 11/09/2019 2.9  gm/dL Final   • A/G Ratio 11/09/2019 1.0  g/dL Final   • BUN/Creatinine Ratio 11/09/2019 26.4* 7.0 - 25.0 Final   • Anion Gap 11/09/2019 10.9  5.0 - 15.0 mmol/L Final   • 25 Hydroxy, Vitamin D 11/09/2019 19.5* 30.0 - 100.0 ng/ml Final   • WBC 11/09/2019 11.71* 3.40 - 10.80 10*3/mm3 Final   • RBC 11/09/2019 3.20* 3.77 - 5.28 10*6/mm3 Final   • Hemoglobin 11/09/2019 9.7* 12.0 - 15.9 g/dL Final   • Hematocrit 11/09/2019 29.2* 34.0 - 46.6 % Final   • MCV 11/09/2019 91.3  79.0 - 97.0 fL Final   • MCH 11/09/2019 30.3  26.6 - 33.0 pg Final   • MCHC 11/09/2019 33.2  31.5 - 35.7 g/dL Final   • RDW 11/09/2019 12.9  12.3 - 15.4 % Final   • RDW-SD 11/09/2019 43.0  37.0 - 54.0 fl Final   • MPV 11/09/2019 9.2  6.0 - 12.0 fL Final   • Platelets 11/09/2019 365  140 - 450 10*3/mm3 Final   • Neutrophil % 11/09/2019 77.0* 42.7 - 76.0 % Final   • Lymphocyte % 11/09/2019 12.4* 19.6 - 45.3 % Final   • Monocyte % 11/09/2019 9.5  5.0 - 12.0 % Final   • Eosinophil % 11/09/2019 0.3  0.3 - 6.2 % Final   • Basophil % 11/09/2019 0.2  0.0 - 1.5 % Final   • Immature Grans % 11/09/2019 0.6* 0.0 - 0.5 % Final   • Neutrophils, Absolute 11/09/2019 9.03* 1.70 - 7.00 10*3/mm3 Final   • Lymphocytes, Absolute 11/09/2019 1.45  0.70 - 3.10 10*3/mm3 Final   • Monocytes, Absolute 11/09/2019 1.11* 0.10 - 0.90 10*3/mm3 Final   • Eosinophils, Absolute 11/09/2019 0.03  0.00 - 0.40 10*3/mm3 Final   • Basophils, Absolute 11/09/2019 0.02  0.00 - 0.20 10*3/mm3 Final   • Immature Grans, Absolute 11/09/2019 0.07* 0.00 - 0.05 10*3/mm3 Final   • nRBC 11/09/2019 0.0  0.0 - 0.2 /100 WBC Final   • Protime 11/09/2019 14.4  12.1 - 15.0 Seconds Final   • INR 11/09/2019 1.15* 0.90 - 1.10 Final   • Iron 11/09/2019 22* 37 - 145 mcg/dL  Final   • Iron Saturation 11/09/2019 11* 20 - 50 % Final   • UIBC 11/09/2019 172  112 - 346 mcg/dL Final   • TIBC 11/09/2019 194* 298 - 536 mcg/dL Final   • Vitamin B-12 11/09/2019 368  211 - 946 pg/mL Final   • Folate 11/09/2019 14.30  4.78 - 24.20 ng/mL Final   • Ferritin 11/09/2019 377.00* 13.00 - 150.00 ng/mL Final   • Glucose 11/10/2019 103* 65 - 99 mg/dL Final   • BUN 11/10/2019 17  8 - 23 mg/dL Final   • Creatinine 11/10/2019 0.86  0.57 - 1.00 mg/dL Final   • Sodium 11/10/2019 129* 136 - 145 mmol/L Final   • Potassium 11/10/2019 4.2  3.5 - 5.2 mmol/L Final   • Chloride 11/10/2019 94* 98 - 107 mmol/L Final   • CO2 11/10/2019 22.8  22.0 - 29.0 mmol/L Final   • Calcium 11/10/2019 7.9* 8.6 - 10.5 mg/dL Final   • eGFR Non African Amer 11/10/2019 62  >60 mL/min/1.73 Final   • BUN/Creatinine Ratio 11/10/2019 19.8  7.0 - 25.0 Final   • Anion Gap 11/10/2019 12.2  5.0 - 15.0 mmol/L Final   • WBC 11/10/2019 13.11* 3.40 - 10.80 10*3/mm3 Final   • RBC 11/10/2019 3.66* 3.77 - 5.28 10*6/mm3 Final   • Hemoglobin 11/10/2019 11.1* 12.0 - 15.9 g/dL Final   • Hematocrit 11/10/2019 33.5* 34.0 - 46.6 % Final   • MCV 11/10/2019 91.5  79.0 - 97.0 fL Final   • MCH 11/10/2019 30.3  26.6 - 33.0 pg Final   • MCHC 11/10/2019 33.1  31.5 - 35.7 g/dL Final   • RDW 11/10/2019 12.9  12.3 - 15.4 % Final   • RDW-SD 11/10/2019 43.0  37.0 - 54.0 fl Final   • MPV 11/10/2019 9.4  6.0 - 12.0 fL Final   • Platelets 11/10/2019 401  140 - 450 10*3/mm3 Final   • Neutrophil % 11/10/2019 79.0* 42.7 - 76.0 % Final   • Lymphocyte % 11/10/2019 9.5* 19.6 - 45.3 % Final   • Monocyte % 11/10/2019 8.2  5.0 - 12.0 % Final   • Eosinophil % 11/10/2019 2.6  0.3 - 6.2 % Final   • Basophil % 11/10/2019 0.2  0.0 - 1.5 % Final   • Immature Grans % 11/10/2019 0.5  0.0 - 0.5 % Final   • Neutrophils, Absolute 11/10/2019 10.36* 1.70 - 7.00 10*3/mm3 Final   • Lymphocytes, Absolute 11/10/2019 1.24  0.70 - 3.10 10*3/mm3 Final   • Monocytes, Absolute 11/10/2019 1.07* 0.10 -  0.90 10*3/mm3 Final   • Eosinophils, Absolute 11/10/2019 0.34  0.00 - 0.40 10*3/mm3 Final   • Basophils, Absolute 11/10/2019 0.03  0.00 - 0.20 10*3/mm3 Final   • Immature Grans, Absolute 11/10/2019 0.07* 0.00 - 0.05 10*3/mm3 Final   • nRBC 11/10/2019 0.0  0.0 - 0.2 /100 WBC Final     Ct Pelvis Without Contrast    Result Date: 11/8/2019  Impression: 1. Patient's had previous left hip pinning and there has been collapse of the left femoral head such that at least 2 of the 3 left femoral pins impinge upon the acetabulum with associated bony remodeling of the superior acetabulum. Additionally there are multiple bone fragments within the left hip joint related to the femoral head collapse. 2. The bones appear osteopenic, particularly the sacrum. Signer Name: Renee Soto MD  Signed: 11/8/2019 8:29 PM  Workstation Name: [facility]  [facility]    Xr Chest 1 View    Result Date: 11/9/2019  Impression: No active disease Signer Name: Brody Gaspar MD  Signed: 11/9/2019 12:43 PM  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 Patient's 3 cannulated screws now appear to engage the acetabulum and may represent a source of irritation. These findings do not appear acute. Signer Name: DENNIS Bradshaw MD  Signed: 11/8/2019 5:02 PM  Workstation Name: [id number]  [facility]      Assessment:  Patient Active Problem List   Diagnosis   • Traumatic intraventricular hemorrhage (CMS/HCC)   • Hyponatremia   • Traumatic rhabdomyolysis (CMS/HCC)   • Syncope   • Abnormal urinalysis   • Essential hypertension   • Chronic kidney disease, stage 3 (CMS/HCC)   • AMI (acute kidney injury) (CMS/HCC)   • Dehydration   • Collapse of vertebra (CMS/HCC)   • PAC  (premature atrial contraction)   • Closed left hip fracture, initial encounter (CMS/[facility])           Plan:  The patient voiced understanding of the risks, benefits, and alternative forms of treatment that were discussed and the patient consents to proceed with left hip arthroplasty and all associated procedures as well as hardware removal.  Given acetabular damage secondary to prominent percutaneous screws, I discussed with the patient and her family that my recommendation would be for consideration of total hip arthroplasty as hemiarthroplasty would incompletely address the additional destruction from this hardware.  She is certainly at risk for periprosthetic fracture especially given her osteopenia on her CT. Patient was explained details of procedure as well as risks, benefits, and alternatives with the risks including but not limited to neurovascular damage, bleeding, infection, chronic pain, periprosthetic fracture, dislocation, loss of motion, leg length discrepancy, weakness, DVT, pulmonary embolus, death, stroke, complex regional pain syndrome, myocardial infarction, need for additional procedures.  Patient and family understood all these had all questions answered prior to signing the operative consent form.  No guarantees were given in regards to results of the surgery.  We will plan on proceeding with surgery once patient is medically optimized.    We will have hematology evaluate the patient as well given her significant prior history of bilateral PE, current IVC filter, and bleeding issues on anticoagulation.  For now we will continue sequential compression devices and plan on proceeding with surgery at time of medical optimization.       Dagoberto Owens MD      Dictated utilizing Dragon dictation

## 2019-11-10 NOTE — CONSULTS
.     REASON FOR CONSULTATION:   Patient with h/o PEs and recurrent episodes of bleeding with AC, Ortho concerned about DVT prophy s/p total hip replacement planned for monday  Provide an opinion on any further workup or treatment                             REQUESTING PHYSICIAN: No ref. provider found  RECORDS OBTAINED:  Records of the patients history including those from the electronic medical record were reviewed and summarized in detail.    HISTORY OF PRESENT ILLNESS:  The patient is a 89 y.o. year old female  who is here for follow-up with the above-mentioned history.    Patient admitted through the ER on 11/8/2019 due to progressive left hip pain.    Left hip pinning 2012 after a fall.    Daughter reports unprovoked PE 2005. No recent surgeries, long travels, or immobility.  This led to a 77 day hospitalization with multiple health problems.  While on therapeutic anticoagulation for that PE during the prolonged hospital stay, she had a retroperitoneal bleed.  She was told to never take blood thinners including aspirin again.  Later, during that same hospital stay, developed DVTs while off anticoagulation.  Then, IVC filter placed, which she still has.  These were her only clotting events.  Only one other notable bleeding event: fall resulting in traumatic subdural hematoma 4/2018 (not on anticoagulation), managed conservatively.      Planning left hip replacement tomorrow.  I was asked to make prophylactic anticoag recommendations.      Past Medical History:   Diagnosis Date   • Anxiety    • Chronic kidney disease    • Depression     resolved after daughter completed chemotherapy    • Disease of thyroid gland    • Hypertension    • Metabolic encephalopathy    • Pulmonary embolism (CMS/HCC)     SPONTANEOUS HEMMORHAGE   • Subdural hematoma (CMS/HCC)      Past Surgical History:   Procedure Laterality Date   • BREAST BIOPSY      BENIGN   • HYSTERECTOMY     • JOINT REPLACEMENT     • THYROID SURGERY       PARATHYROID REMOVAL   • VENA CAVA FILTER INSERTION         MEDICATIONS    Current Facility-Administered Medications:   •  acetaminophen (TYLENOL) tablet 650 mg, 650 mg, Oral, Q6H PRN, Gino Bower MD  •  busPIRone (BUSPAR) tablet 7.5 mg, 7.5 mg, Oral, BID, Cecilia Briceño MD, 7.5 mg at 11/10/19 0950  •  Influenza Vac Subunit Quad (FLUCELVAX) injection 0.5 mL, 0.5 mL, Intramuscular, Once, Gino Bower MD  •  levothyroxine (SYNTHROID, LEVOTHROID) tablet 88 mcg, 88 mcg, Oral, Daily, Gino Bower MD, 88 mcg at 11/10/19 0950  •  mirtazapine (REMERON) tablet 15 mg, 15 mg, Oral, Nightly, Gino Bower MD, 15 mg at 19 2205  •  morphine injection 1 mg, 1 mg, Intravenous, Q3H PRN, Gino Bower MD, 1 mg at 19 2350  •  sodium chloride tablet 1 g, 1 g, Oral, BID With Meals, Cecilia Briceño MD, 1 g at 11/10/19 0949  •  Vitamin D3 50,000 Units, 50,000 Units, Oral, Q7 Days, Cecilia Briceño MD    ALLERGIES:   No Known Allergies    SOCIAL HISTORY:       Social History     Socioeconomic History   • Marital status:      Spouse name: Not on file   • Number of children: Not on file   • Years of education: Not on file   • Highest education level: Not on file   Tobacco Use   • Smoking status: Former Smoker     Packs/day: 0.50     Years: 25.00     Pack years: 12.50     Last attempt to quit: 1974     Years since quittin.8   • Smokeless tobacco: Never Used   Substance and Sexual Activity   • Alcohol use: Yes     Alcohol/week: 0.6 oz     Types: 1 Glasses of wine per week     Comment: RARELY/Caffeine use   • Drug use: No   • Sexual activity: Defer         FAMILY HISTORY:  Family History   Problem Relation Age of Onset   • Breast cancer Daughter        REVIEW OF SYSTEMS:  Review of Systems   Constitutional: Negative for activity change.   HENT: Negative for nosebleeds and trouble swallowing.    Respiratory: Negative for shortness of breath and wheezing.     Cardiovascular: Negative for chest pain and palpitations.   Gastrointestinal: Negative for constipation, diarrhea and nausea.   Genitourinary: Negative for dysuria and hematuria.   Musculoskeletal: Negative for arthralgias and myalgias.   Skin: Negative for rash and wound.   Neurological: Negative for seizures and syncope.   Hematological: Negative for adenopathy. Does not bruise/bleed easily.   Psychiatric/Behavioral: Negative for confusion.              Vitals:    11/09/19 1900 11/10/19 0500 11/10/19 0900 11/10/19 1107   BP: 145/82 169/87  126/74   BP Location: Right arm Right arm  Right arm   Patient Position: Lying Lying  Lying   Pulse: 97 110 110 94   Resp: 16 18  18   Temp: 97.6 °F (36.4 °C) 98.4 °F (36.9 °C)  97.3 °F (36.3 °C)   TempSrc: Oral Oral  Oral   SpO2: 95% 92% 93% 97%   Weight:       Height:         No flowsheet data found.   PHYSICAL EXAM:    CONSTITUTIONAL:  Vital signs reviewed.  No distress, looks comfortable.  EYES:  Conjunctiva and lids unremarkable.  PERRLA  EARS,NOSE,MOUTH,THROAT:  Ears and nose appear unremarkable.  Lips, teeth, gums appear unremarkable.  RESPIRATORY:  Normal respiratory effort.  Lungs clear to auscultation bilaterally.  CARDIOVASCULAR:  Normal S1, S2.  No murmurs rubs or gallops.  No significant lower extremity edema.  GASTROINTESTINAL: Abdomen appears unremarkable.  Nontender.  No hepatomegaly.  No splenomegaly.  LYMPHATIC:  No cervical, supraclavicular, axillary lymphadenopathy.  NEURO: cranial nerves 2-12 grossly intact.  No focal deficits.  Appears to have equal strength all 4 extremities.  MUSCULOSKELETAL:  Unremarkable digits/nails.  No cyanosis or clubbing.  No apparent joint deformities.  SKIN:  Warm.  No rashes.  PSYCHIATRIC:  Normal judgment and insight.  Normal mood and affect.     RECENT LABS:        WBC   Date Value Ref Range Status   11/10/2019 13.11 (H) 3.40 - 10.80 10*3/mm3 Final   11/09/2019 11.71 (H) 3.40 - 10.80 10*3/mm3 Final   11/08/2019 14.44 (H)  3.40 - 10.80 10*3/mm3 Final     Hemoglobin   Date Value Ref Range Status   11/10/2019 11.1 (L) 12.0 - 15.9 g/dL Final   11/09/2019 9.7 (L) 12.0 - 15.9 g/dL Final   11/08/2019 10.9 (L) 12.0 - 15.9 g/dL Final     Platelets   Date Value Ref Range Status   11/10/2019 401 140 - 450 10*3/mm3 Final   11/09/2019 365 140 - 450 10*3/mm3 Final   11/08/2019 370 140 - 450 10*3/mm3 Final       Assessment/Plan   * plans for left hip replacement tomorrow.    *unprovoked PE 2005. No recent surgeries, long travels, or immobility.  This led to a 77 day hospitalization with multiple health problems.  While on therapeutic anticoagulation for that PE during the prolonged hospital stay, she had a retroperitoneal bleed.  She was told to never take blood thinners including aspirin again.  Later, during that same hospital stay, developed DVTs while off anticoagulation.  Then, IVC filter placed, which she still has.  These were her only clotting events.  Only one other notable bleeding event: fall resulting in traumatic subdural hematoma 4/2018 (not on anticoagulation), managed conservatively.      *prophylactic anticoagulation recommendations:  · Long discussion with daughter who is an excellent historian.  · I explained hip replacement is high risk of clotting, and she is at even higher risk due to prior unprovoked PE.  · I realize she had a significant bleed while on therapeutic anticoag in 2005.  · Weighing risks and benefits, I think it is in her best interest to take lovenox 40mg subcut daily (prophylactic dosing).  · I explained to patient and daughter, clotting and bleeding can both be deadly or significantly debilitating.  They both understand and want to try prophylactic lovenox.  · I would recommend prophylactic lovenox at 40mg subcut daily for a minimum of 10 days, but up to 35 days.  Of course, this can be stopped if bleeding becomes an issue.    *anemia.  Mild.    Unremarkable: b12, serum folate.  Although iron saturation 11%,  ferritin is 377.  Therefore, I would not recommend any iv iron.  Could try oral iron.     I don't think I'm adding much at this point.  Will sign off.  Please call if I can be of any further assistance.  Thanks for the opportunity to help.

## 2019-11-11 ENCOUNTER — APPOINTMENT (OUTPATIENT)
Dept: GENERAL RADIOLOGY | Facility: HOSPITAL | Age: 84
End: 2019-11-11

## 2019-11-11 ENCOUNTER — ANESTHESIA (OUTPATIENT)
Dept: PERIOP | Facility: HOSPITAL | Age: 84
End: 2019-11-11

## 2019-11-11 ENCOUNTER — ANESTHESIA EVENT (OUTPATIENT)
Dept: PERIOP | Facility: HOSPITAL | Age: 84
End: 2019-11-11

## 2019-11-11 LAB
ANION GAP SERPL CALCULATED.3IONS-SCNC: 10.3 MMOL/L (ref 5–15)
BACTERIA UR QL AUTO: ABNORMAL /HPF
BASOPHILS # BLD AUTO: 0.04 10*3/MM3 (ref 0–0.2)
BASOPHILS NFR BLD AUTO: 0.4 % (ref 0–1.5)
BILIRUB UR QL STRIP: NEGATIVE
BUN BLD-MCNC: 18 MG/DL (ref 8–23)
BUN/CREAT SERPL: 19.6 (ref 7–25)
CALCIUM SPEC-SCNC: 7.9 MG/DL (ref 8.6–10.5)
CHLORIDE SERPL-SCNC: 90 MMOL/L (ref 98–107)
CLARITY UR: ABNORMAL
CO2 SERPL-SCNC: 24.7 MMOL/L (ref 22–29)
COLOR UR: YELLOW
CREAT BLD-MCNC: 0.92 MG/DL (ref 0.57–1)
DEPRECATED RDW RBC AUTO: 43.9 FL (ref 37–54)
EOSINOPHIL # BLD AUTO: 0.43 10*3/MM3 (ref 0–0.4)
EOSINOPHIL NFR BLD AUTO: 4.1 % (ref 0.3–6.2)
ERYTHROCYTE [DISTWIDTH] IN BLOOD BY AUTOMATED COUNT: 13 % (ref 12.3–15.4)
GFR SERPL CREATININE-BSD FRML MDRD: 57 ML/MIN/1.73
GLUCOSE BLD-MCNC: 103 MG/DL (ref 65–99)
GLUCOSE UR STRIP-MCNC: NEGATIVE MG/DL
HCT VFR BLD AUTO: 32.8 % (ref 34–46.6)
HCT VFR BLD AUTO: 33.7 % (ref 34–46.6)
HGB BLD-MCNC: 10.8 G/DL (ref 12–15.9)
HGB BLD-MCNC: 11.1 G/DL (ref 12–15.9)
HGB UR QL STRIP.AUTO: ABNORMAL
HYALINE CASTS UR QL AUTO: ABNORMAL /LPF
IMM GRANULOCYTES # BLD AUTO: 0.06 10*3/MM3 (ref 0–0.05)
IMM GRANULOCYTES NFR BLD AUTO: 0.6 % (ref 0–0.5)
KETONES UR QL STRIP: NEGATIVE
LEUKOCYTE ESTERASE UR QL STRIP.AUTO: ABNORMAL
LYMPHOCYTES # BLD AUTO: 1.4 10*3/MM3 (ref 0.7–3.1)
LYMPHOCYTES NFR BLD AUTO: 13.2 % (ref 19.6–45.3)
MCH RBC QN AUTO: 30.5 PG (ref 26.6–33)
MCHC RBC AUTO-ENTMCNC: 32.9 G/DL (ref 31.5–35.7)
MCV RBC AUTO: 92.6 FL (ref 79–97)
MONOCYTES # BLD AUTO: 1.02 10*3/MM3 (ref 0.1–0.9)
MONOCYTES NFR BLD AUTO: 9.6 % (ref 5–12)
NEUTROPHILS # BLD AUTO: 7.63 10*3/MM3 (ref 1.7–7)
NEUTROPHILS NFR BLD AUTO: 72.1 % (ref 42.7–76)
NITRITE UR QL STRIP: POSITIVE
NRBC BLD AUTO-RTO: 0 /100 WBC (ref 0–0.2)
PH UR STRIP.AUTO: 6 [PH] (ref 4.5–8)
PLATELET # BLD AUTO: 419 10*3/MM3 (ref 140–450)
PMV BLD AUTO: 9.3 FL (ref 6–12)
POTASSIUM BLD-SCNC: 4.3 MMOL/L (ref 3.5–5.2)
PROT UR QL STRIP: ABNORMAL
RBC # BLD AUTO: 3.64 10*6/MM3 (ref 3.77–5.28)
RBC # UR: ABNORMAL /HPF
REF LAB TEST METHOD: ABNORMAL
SODIUM BLD-SCNC: 125 MMOL/L (ref 136–145)
SP GR UR STRIP: 1.02 (ref 1–1.03)
SQUAMOUS #/AREA URNS HPF: ABNORMAL /HPF
UROBILINOGEN UR QL STRIP: ABNORMAL
WBC NRBC COR # BLD: 10.58 10*3/MM3 (ref 3.4–10.8)
WBC UR QL AUTO: ABNORMAL /HPF

## 2019-11-11 PROCEDURE — 25010000002 DEXAMETHASONE PER 1 MG: Performed by: NURSE ANESTHETIST, CERTIFIED REGISTERED

## 2019-11-11 PROCEDURE — C9290 INJ, BUPIVACAINE LIPOSOME: HCPCS | Performed by: ORTHOPAEDIC SURGERY

## 2019-11-11 PROCEDURE — 99232 SBSQ HOSP IP/OBS MODERATE 35: CPT | Performed by: NURSE PRACTITIONER

## 2019-11-11 PROCEDURE — 87186 SC STD MICRODIL/AGAR DIL: CPT | Performed by: NURSE PRACTITIONER

## 2019-11-11 PROCEDURE — 25010000003 BUPIVACAINE LIPOSOME 1.3 % SUSPENSION 20 ML VIAL: Performed by: ORTHOPAEDIC SURGERY

## 2019-11-11 PROCEDURE — 85025 COMPLETE CBC W/AUTO DIFF WBC: CPT | Performed by: HOSPITALIST

## 2019-11-11 PROCEDURE — C1713 ANCHOR/SCREW BN/BN,TIS/BN: HCPCS | Performed by: ORTHOPAEDIC SURGERY

## 2019-11-11 PROCEDURE — 87086 URINE CULTURE/COLONY COUNT: CPT | Performed by: NURSE PRACTITIONER

## 2019-11-11 PROCEDURE — 27130 TOTAL HIP ARTHROPLASTY: CPT | Performed by: ORTHOPAEDIC SURGERY

## 2019-11-11 PROCEDURE — 25010000002 VANCOMYCIN 750 MG RECONSTITUTED SOLUTION 1 EACH VIAL: Performed by: ORTHOPAEDIC SURGERY

## 2019-11-11 PROCEDURE — 85018 HEMOGLOBIN: CPT | Performed by: ORTHOPAEDIC SURGERY

## 2019-11-11 PROCEDURE — 93010 ELECTROCARDIOGRAM REPORT: CPT | Performed by: INTERNAL MEDICINE

## 2019-11-11 PROCEDURE — 0SRB0J9 REPLACEMENT OF LEFT HIP JOINT WITH SYNTHETIC SUBSTITUTE, CEMENTED, OPEN APPROACH: ICD-10-PCS | Performed by: ORTHOPAEDIC SURGERY

## 2019-11-11 PROCEDURE — C1776 JOINT DEVICE (IMPLANTABLE): HCPCS | Performed by: ORTHOPAEDIC SURGERY

## 2019-11-11 PROCEDURE — 93005 ELECTROCARDIOGRAM TRACING: CPT | Performed by: NURSE PRACTITIONER

## 2019-11-11 PROCEDURE — 73501 X-RAY EXAM HIP UNI 1 VIEW: CPT

## 2019-11-11 PROCEDURE — 25010000002 PHENYLEPHRINE PER 1 ML: Performed by: NURSE ANESTHETIST, CERTIFIED REGISTERED

## 2019-11-11 PROCEDURE — 81001 URINALYSIS AUTO W/SCOPE: CPT | Performed by: NURSE PRACTITIONER

## 2019-11-11 PROCEDURE — 93005 ELECTROCARDIOGRAM TRACING: CPT | Performed by: NURSE ANESTHETIST, CERTIFIED REGISTERED

## 2019-11-11 PROCEDURE — 80048 BASIC METABOLIC PNL TOTAL CA: CPT | Performed by: HOSPITALIST

## 2019-11-11 PROCEDURE — 25010000002 MORPHINE PER 10 MG: Performed by: HOSPITALIST

## 2019-11-11 PROCEDURE — 25010000002 ONDANSETRON PER 1 MG: Performed by: NURSE ANESTHETIST, CERTIFIED REGISTERED

## 2019-11-11 PROCEDURE — 87077 CULTURE AEROBIC IDENTIFY: CPT | Performed by: NURSE PRACTITIONER

## 2019-11-11 PROCEDURE — 25010000002 VANCOMYCIN 1 G RECONSTITUTED SOLUTION: Performed by: ORTHOPAEDIC SURGERY

## 2019-11-11 PROCEDURE — 0QP704Z REMOVAL OF INTERNAL FIXATION DEVICE FROM LEFT UPPER FEMUR, OPEN APPROACH: ICD-10-PCS | Performed by: ORTHOPAEDIC SURGERY

## 2019-11-11 PROCEDURE — 94770: CPT

## 2019-11-11 PROCEDURE — 25010000002 PROPOFOL 10 MG/ML EMULSION: Performed by: NURSE ANESTHETIST, CERTIFIED REGISTERED

## 2019-11-11 PROCEDURE — 85014 HEMATOCRIT: CPT | Performed by: ORTHOPAEDIC SURGERY

## 2019-11-11 DEVICE — SCRW ACET CORT TRILOGY S/TAP 6.5X20: Type: IMPLANTABLE DEVICE | Site: HIP | Status: FUNCTIONAL

## 2019-11-11 DEVICE — WAX BONE HEMO NAT 2.5G: Type: IMPLANTABLE DEVICE | Site: HIP | Status: FUNCTIONAL

## 2019-11-11 DEVICE — CENTRLZR VERSYS DIST 9MM: Type: IMPLANTABLE DEVICE | Site: HIP | Status: FUNCTIONAL

## 2019-11-11 DEVICE — IMPLANTABLE DEVICE: Type: IMPLANTABLE DEVICE | Site: HIP | Status: FUNCTIONAL

## 2019-11-11 DEVICE — CAP TOTL HIP PRIMARY MEDIUM DEMAND: Type: IMPLANTABLE DEVICE | Site: HIP | Status: FUNCTIONAL

## 2019-11-11 DEVICE — SCRW ACET CORT TRILOGY S/TAP 6.5X30: Type: IMPLANTABLE DEVICE | Site: HIP | Status: FUNCTIONAL

## 2019-11-11 DEVICE — BONE PREPARATION KIT
Type: IMPLANTABLE DEVICE | Site: HIP | Status: FUNCTIONAL
Brand: BIOPREP

## 2019-11-11 DEVICE — CAP HIP TM UPCHRG: Type: IMPLANTABLE DEVICE | Site: HIP | Status: FUNCTIONAL

## 2019-11-11 DEVICE — SMARTSET GMV HIGH PERFORMANCE GENTAMICIN MEDIUM VISCOSITY BONE CEMENT 40G
Type: IMPLANTABLE DEVICE | Site: HIP | Status: FUNCTIONAL
Brand: SMARTSET

## 2019-11-11 DEVICE — STEM FEM/HIP VERSYS ADVOCATE NONVLIGN STD/OFFST SZ11 120MM: Type: IMPLANTABLE DEVICE | Site: HIP | Status: FUNCTIONAL

## 2019-11-11 RX ORDER — BUPIVACAINE HYDROCHLORIDE 5 MG/ML
INJECTION, SOLUTION PERINEURAL
Status: COMPLETED | OUTPATIENT
Start: 2019-11-11 | End: 2019-11-11

## 2019-11-11 RX ORDER — SODIUM CHLORIDE 9 MG/ML
75 INJECTION, SOLUTION INTRAVENOUS CONTINUOUS
Status: DISCONTINUED | OUTPATIENT
Start: 2019-11-11 | End: 2019-11-13

## 2019-11-11 RX ORDER — DEXAMETHASONE SODIUM PHOSPHATE 4 MG/ML
8 INJECTION, SOLUTION INTRA-ARTICULAR; INTRALESIONAL; INTRAMUSCULAR; INTRAVENOUS; SOFT TISSUE ONCE AS NEEDED
Status: COMPLETED | OUTPATIENT
Start: 2019-11-11 | End: 2019-11-11

## 2019-11-11 RX ORDER — VANCOMYCIN HYDROCHLORIDE 1 G/20ML
INJECTION, POWDER, LYOPHILIZED, FOR SOLUTION INTRAVENOUS AS NEEDED
Status: DISCONTINUED | OUTPATIENT
Start: 2019-11-11 | End: 2019-11-11 | Stop reason: HOSPADM

## 2019-11-11 RX ORDER — ONDANSETRON 4 MG/1
4 TABLET, FILM COATED ORAL EVERY 6 HOURS PRN
Status: DISCONTINUED | OUTPATIENT
Start: 2019-11-11 | End: 2019-11-13 | Stop reason: HOSPADM

## 2019-11-11 RX ORDER — NALOXONE HCL 0.4 MG/ML
0.4 VIAL (ML) INJECTION
Status: DISCONTINUED | OUTPATIENT
Start: 2019-11-11 | End: 2019-11-13 | Stop reason: HOSPADM

## 2019-11-11 RX ORDER — PROPOFOL 10 MG/ML
VIAL (ML) INTRAVENOUS AS NEEDED
Status: DISCONTINUED | OUTPATIENT
Start: 2019-11-11 | End: 2019-11-11 | Stop reason: SURG

## 2019-11-11 RX ORDER — HYDROMORPHONE HYDROCHLORIDE 1 MG/ML
0.5 INJECTION, SOLUTION INTRAMUSCULAR; INTRAVENOUS; SUBCUTANEOUS
Status: DISCONTINUED | OUTPATIENT
Start: 2019-11-11 | End: 2019-11-11 | Stop reason: HOSPADM

## 2019-11-11 RX ORDER — MIDAZOLAM HYDROCHLORIDE 1 MG/ML
2 INJECTION INTRAMUSCULAR; INTRAVENOUS
Status: DISCONTINUED | OUTPATIENT
Start: 2019-11-11 | End: 2019-11-11 | Stop reason: HOSPADM

## 2019-11-11 RX ORDER — HYDROCODONE BITARTRATE AND ACETAMINOPHEN 7.5; 325 MG/1; MG/1
2 TABLET ORAL EVERY 4 HOURS PRN
Status: DISCONTINUED | OUTPATIENT
Start: 2019-11-11 | End: 2019-11-13 | Stop reason: HOSPADM

## 2019-11-11 RX ORDER — MAGNESIUM HYDROXIDE 1200 MG/15ML
LIQUID ORAL AS NEEDED
Status: DISCONTINUED | OUTPATIENT
Start: 2019-11-11 | End: 2019-11-11 | Stop reason: HOSPADM

## 2019-11-11 RX ORDER — ACETAMINOPHEN 650 MG/1
650 SUPPOSITORY RECTAL ONCE AS NEEDED
Status: DISCONTINUED | OUTPATIENT
Start: 2019-11-11 | End: 2019-11-11 | Stop reason: HOSPADM

## 2019-11-11 RX ORDER — HYDROCODONE BITARTRATE AND ACETAMINOPHEN 7.5; 325 MG/1; MG/1
1 TABLET ORAL EVERY 4 HOURS PRN
Status: DISCONTINUED | OUTPATIENT
Start: 2019-11-11 | End: 2019-11-13 | Stop reason: HOSPADM

## 2019-11-11 RX ORDER — DIPHENHYDRAMINE HYDROCHLORIDE 50 MG/ML
12.5 INJECTION INTRAMUSCULAR; INTRAVENOUS
Status: DISCONTINUED | OUTPATIENT
Start: 2019-11-11 | End: 2019-11-11 | Stop reason: HOSPADM

## 2019-11-11 RX ORDER — ONDANSETRON 2 MG/ML
4 INJECTION INTRAMUSCULAR; INTRAVENOUS ONCE AS NEEDED
Status: COMPLETED | OUTPATIENT
Start: 2019-11-11 | End: 2019-11-11

## 2019-11-11 RX ORDER — MEPERIDINE HYDROCHLORIDE 25 MG/ML
12.5 INJECTION INTRAMUSCULAR; INTRAVENOUS; SUBCUTANEOUS
Status: DISCONTINUED | OUTPATIENT
Start: 2019-11-11 | End: 2019-11-11 | Stop reason: HOSPADM

## 2019-11-11 RX ORDER — KETAMINE HYDROCHLORIDE 100 MG/ML
INJECTION INTRAMUSCULAR; INTRAVENOUS AS NEEDED
Status: DISCONTINUED | OUTPATIENT
Start: 2019-11-11 | End: 2019-11-11 | Stop reason: SURG

## 2019-11-11 RX ORDER — ONDANSETRON 2 MG/ML
4 INJECTION INTRAMUSCULAR; INTRAVENOUS EVERY 6 HOURS PRN
Status: DISCONTINUED | OUTPATIENT
Start: 2019-11-11 | End: 2019-11-13 | Stop reason: HOSPADM

## 2019-11-11 RX ORDER — MIDAZOLAM HYDROCHLORIDE 1 MG/ML
1 INJECTION INTRAMUSCULAR; INTRAVENOUS
Status: DISCONTINUED | OUTPATIENT
Start: 2019-11-11 | End: 2019-11-11 | Stop reason: HOSPADM

## 2019-11-11 RX ORDER — ACETAMINOPHEN 325 MG/1
650 TABLET ORAL ONCE AS NEEDED
Status: DISCONTINUED | OUTPATIENT
Start: 2019-11-11 | End: 2019-11-11 | Stop reason: HOSPADM

## 2019-11-11 RX ORDER — MORPHINE SULFATE 10 MG/ML
6 INJECTION INTRAMUSCULAR; INTRAVENOUS; SUBCUTANEOUS
Status: DISCONTINUED | OUTPATIENT
Start: 2019-11-11 | End: 2019-11-13 | Stop reason: HOSPADM

## 2019-11-11 RX ORDER — SODIUM CHLORIDE, SODIUM LACTATE, POTASSIUM CHLORIDE, CALCIUM CHLORIDE 600; 310; 30; 20 MG/100ML; MG/100ML; MG/100ML; MG/100ML
INJECTION, SOLUTION INTRAVENOUS CONTINUOUS PRN
Status: DISCONTINUED | OUTPATIENT
Start: 2019-11-11 | End: 2019-11-11 | Stop reason: SURG

## 2019-11-11 RX ORDER — ONDANSETRON 2 MG/ML
4 INJECTION INTRAMUSCULAR; INTRAVENOUS ONCE AS NEEDED
Status: DISCONTINUED | OUTPATIENT
Start: 2019-11-11 | End: 2019-11-11 | Stop reason: HOSPADM

## 2019-11-11 RX ADMIN — PHENYLEPHRINE HYDROCHLORIDE 100 MCG: 10 INJECTION INTRAVENOUS at 17:59

## 2019-11-11 RX ADMIN — KETAMINE HYDROCHLORIDE 10 MG: 100 INJECTION INTRAMUSCULAR; INTRAVENOUS at 17:27

## 2019-11-11 RX ADMIN — KETAMINE HYDROCHLORIDE 10 MG: 100 INJECTION INTRAMUSCULAR; INTRAVENOUS at 19:30

## 2019-11-11 RX ADMIN — BUPIVACAINE HYDROCHLORIDE 2 ML: 5 INJECTION, SOLUTION PERINEURAL at 16:53

## 2019-11-11 RX ADMIN — PHENYLEPHRINE HYDROCHLORIDE 100 MCG: 10 INJECTION INTRAVENOUS at 18:11

## 2019-11-11 RX ADMIN — DEXAMETHASONE SODIUM PHOSPHATE 8 MG: 4 INJECTION, SOLUTION INTRAMUSCULAR; INTRAVENOUS at 16:36

## 2019-11-11 RX ADMIN — FAMOTIDINE 20 MG: 10 INJECTION, SOLUTION INTRAVENOUS at 16:39

## 2019-11-11 RX ADMIN — MORPHINE SULFATE 1 MG: 2 INJECTION, SOLUTION INTRAMUSCULAR; INTRAVENOUS at 11:17

## 2019-11-11 RX ADMIN — EPHEDRINE SULFATE 10 MG: 50 INJECTION INTRAMUSCULAR; INTRAVENOUS; SUBCUTANEOUS at 17:54

## 2019-11-11 RX ADMIN — PROPOFOL 10 MG: 10 INJECTION, EMULSION INTRAVENOUS at 19:20

## 2019-11-11 RX ADMIN — ONDANSETRON 4 MG: 2 INJECTION, SOLUTION INTRAMUSCULAR; INTRAVENOUS at 16:38

## 2019-11-11 RX ADMIN — SODIUM CHLORIDE, POTASSIUM CHLORIDE, SODIUM LACTATE AND CALCIUM CHLORIDE: 600; 310; 30; 20 INJECTION, SOLUTION INTRAVENOUS at 17:23

## 2019-11-11 RX ADMIN — PROPOFOL 20 MG: 10 INJECTION, EMULSION INTRAVENOUS at 17:40

## 2019-11-11 RX ADMIN — SODIUM CHLORIDE 75 ML/HR: 9 INJECTION, SOLUTION INTRAVENOUS at 11:17

## 2019-11-11 RX ADMIN — PROPOFOL 10 MG: 10 INJECTION, EMULSION INTRAVENOUS at 18:30

## 2019-11-11 RX ADMIN — PHENYLEPHRINE HYDROCHLORIDE 100 MCG: 10 INJECTION INTRAVENOUS at 18:21

## 2019-11-11 RX ADMIN — PHENYLEPHRINE HYDROCHLORIDE 100 MCG: 10 INJECTION INTRAVENOUS at 19:02

## 2019-11-11 RX ADMIN — DOCUSATE SODIUM 100 MG: 100 CAPSULE, LIQUID FILLED ORAL at 08:59

## 2019-11-11 RX ADMIN — VANCOMYCIN HYDROCHLORIDE 750 MG: 750 INJECTION, POWDER, LYOPHILIZED, FOR SOLUTION INTRAVENOUS at 16:36

## 2019-11-11 RX ADMIN — LEVOTHYROXINE SODIUM 88 MCG: 88 TABLET ORAL at 08:59

## 2019-11-11 RX ADMIN — PROPOFOL 10 MG: 10 INJECTION, EMULSION INTRAVENOUS at 19:05

## 2019-11-11 RX ADMIN — PHENYLEPHRINE HYDROCHLORIDE 100 MCG: 10 INJECTION INTRAVENOUS at 17:46

## 2019-11-11 RX ADMIN — KETAMINE HYDROCHLORIDE 10 MG: 100 INJECTION INTRAMUSCULAR; INTRAVENOUS at 18:03

## 2019-11-11 RX ADMIN — PROPOFOL 10 MG: 10 INJECTION, EMULSION INTRAVENOUS at 18:15

## 2019-11-11 RX ADMIN — PROPOFOL 20 MG: 10 INJECTION, EMULSION INTRAVENOUS at 17:28

## 2019-11-11 RX ADMIN — PROPOFOL 10 MG: 10 INJECTION, EMULSION INTRAVENOUS at 19:45

## 2019-11-11 RX ADMIN — KETAMINE HYDROCHLORIDE 10 MG: 100 INJECTION INTRAMUSCULAR; INTRAVENOUS at 18:45

## 2019-11-11 RX ADMIN — PROPOFOL 10 MG: 10 INJECTION, EMULSION INTRAVENOUS at 20:15

## 2019-11-11 RX ADMIN — BUSPIRONE HYDROCHLORIDE 7.5 MG: 15 TABLET ORAL at 08:59

## 2019-11-11 RX ADMIN — EPHEDRINE SULFATE 10 MG: 50 INJECTION INTRAMUSCULAR; INTRAVENOUS; SUBCUTANEOUS at 17:35

## 2019-11-11 RX ADMIN — KETAMINE HYDROCHLORIDE 10 MG: 100 INJECTION INTRAMUSCULAR; INTRAVENOUS at 16:45

## 2019-11-11 NOTE — ANESTHESIA PREPROCEDURE EVALUATION
Anesthesia Evaluation     Patient summary reviewed and Nursing notes reviewed   no history of anesthetic complications:  NPO Solid Status: > 8 hours  NPO Liquid Status: > 8 hours           Airway   Mallampati: II  TM distance: >3 FB  Neck ROM: full  No difficulty expected  Dental    (+) edentulous    Pulmonary - normal exam    breath sounds clear to auscultation  (+) a smoker (quit 1978) Former,   Sleep apnea: snores. Pulmonary embolism: 2005.  Cardiovascular     ECG reviewed  Rhythm: irregular  Rate: abnormal    (+) valvular problems/murmurs (denies symptoms) murmur,   Hypertension: off BP meds for 'more than 1 year'    ROS comment: Echo 4/18/18: Interpretation Summary     · Left ventricular systolic function is hyperdynamic (EF > 70).  · There is mild calcification of the aortic valve.        Neuro/Psych  (+) psychiatric history Anxiety and Depression,       ROS Comment: Subdural hematoma 1year ago . No residual symptoms    GI/Hepatic/Renal/Endo    (+)   thyroid problem hypothyroidism    Musculoskeletal     Abdominal  - normal exam   Substance History - negative use     OB/GYN          Other   arthritis,                    Anesthesia Plan    ASA 3     spinal and MAC     intravenous induction     Anesthetic plan, all risks, benefits, and alternatives have been provided, discussed and informed consent has been obtained with: patient.  Use of blood products discussed with patient  Consented to blood products.

## 2019-11-11 NOTE — NURSING NOTE
Continued Stay Note  HENNY Nguyen     Patient Name: Nicolasa Woodall  MRN: [id number]  Today's Date: 11/11/2019    Admit Date: 11/8/2019    Discharge Plan     Row Name 11/11/19 1122       Plan    Plan Comments  Patient and family awaiting surgery today.  Plan is rehab post op.  Will continue to follow        Discharge Codes    No documentation.             Christina Rojas RN

## 2019-11-11 NOTE — PROGRESS NOTES
"SERVICE: [facility] HOSPITALIST    CONSULTANTS: Orthopedic surgery, hematology    CHIEF COMPLAINT: Follow-up hyponatremia, left hip pinning collapse    SUBJECTIVE: Patient notes that she is having no pain unless she moves.  She notes she is feeling anxious but just received her home BuSpar.  Daughter at bedside is requesting rehab in TCU at discharge. She otherwise denies f/c/cough/soa/chest pain/n/v/d/abdominal pain or other new concerns.    OBJECTIVE:    /95 (BP Location: Left arm, Patient Position: Lying)   Pulse 103   Temp 97 °F (36.1 °C) (Oral)   Resp 18   Ht 152.4 cm (60\")   Wt 51.9 kg (114 lb 6.4 oz)   SpO2 96%   BMI 22.34 kg/m²     MEDS/LABS REVIEWED AND ORDERED    busPIRone 7.5 mg Oral BID   docusate sodium 100 mg Oral BID   influenza vaccine 0.5 mL Intramuscular Once   levothyroxine 88 mcg Oral Daily   melatonin 5 mg Oral Nightly   mirtazapine 15 mg Oral Nightly   polyethylene glycol 17 g Oral Daily   QUEtiapine 12.5 mg Oral Nightly   sodium chloride 1 g Oral BID With Meals   Vitamin D3 50,000 Units Oral Q7 Days     Physical Exam   Constitutional: She is oriented to person, place, and time. She appears well-developed and well-nourished.   HENT:   Head: Normocephalic and atraumatic.   Eyes: EOM are normal. Pupils are equal, round, and reactive to light.   Cardiovascular: Normal rate.   Irregular rhythm, frequent PACs noted   Pulmonary/Chest: Effort normal and breath sounds normal.   Abdominal: Soft.   Musculoskeletal: She exhibits no edema.   LLE mildly shortened, externally rotated  CMS intact to L LE   Neurological: She is alert and oriented to person, place, and time.   Skin: Skin is warm and dry. No erythema.   Psychiatric: She has a normal mood and affect. Her behavior is normal.   Vitals reviewed.    LAB/DIAGNOSTICS:    Lab Results (last 24 hours)     Procedure Component Value Units Date/Time    Basic Metabolic Panel [[id number]]  (Abnormal) Collected:  11/11/19 0331    " Specimen:  Blood Updated:  11/11/19 0559     Glucose 103 mg/dL      BUN 18 mg/dL      Creatinine 0.92 mg/dL      Sodium 125 mmol/L      Potassium 4.3 mmol/L      Chloride 90 mmol/L      CO2 24.7 mmol/L      Calcium 7.9 mg/dL      eGFR Non African Amer 57 mL/min/1.73      BUN/Creatinine Ratio 19.6     Anion Gap 10.3 mmol/L     Narrative:       GFR Normal >60  Chronic Kidney Disease <60  Kidney Failure <15    CBC & Differential [[id number]] Collected:  11/11/19 0331    Specimen:  Blood Updated:  11/11/19 0539    Narrative:       The following orders were created for panel order CBC & Differential.  Procedure                               Abnormality         Status                     ---------                               -----------         ------                     CBC Auto Differential[[id number]]        Abnormal            Final result                 Please view results for these tests on the individual orders.    CBC Auto Differential [[id number]]  (Abnormal) Collected:  11/11/19 0331    Specimen:  Blood Updated:  11/11/19 0539     WBC 10.58 10*3/mm3      RBC 3.64 10*6/mm3      Hemoglobin 11.1 g/dL      Hematocrit 33.7 %      MCV 92.6 fL      MCH 30.5 pg      MCHC 32.9 g/dL      RDW 13.0 %      RDW-SD 43.9 fl      MPV 9.3 fL      Platelets 419 10*3/mm3      Neutrophil % 72.1 %      Lymphocyte % 13.2 %      Monocyte % 9.6 %      Eosinophil % 4.1 %      Basophil % 0.4 %      Immature Grans % 0.6 %      Neutrophils, Absolute 7.63 10*3/mm3      Lymphocytes, Absolute 1.40 10*3/mm3      Monocytes, Absolute 1.02 10*3/mm3      Eosinophils, Absolute 0.43 10*3/mm3      Basophils, Absolute 0.04 10*3/mm3      Immature Grans, Absolute 0.06 10*3/mm3      nRBC 0.0 /100 WBC         ECG 12 Lead   Preliminary Result   HEART RATE= 106  bpm   RR Interval= 567  ms   MA Interval= 144  ms   P Horizontal Axis= 4  deg   P Front Axis= 51  deg   QRSD Interval= 85  ms   QT Interval= 354  ms   QRS Axis= -2  deg   T Wave Axis= 1  deg   -  BORDERLINE ECG -   Sinus tachycardia with irregular rate   Electronically Signed By:    Date and Time of Study: 2019-11-11 10:12:45      ECG 12 Lead   Final Result   HEART RATE= 111  bpm   RR Interval= 580  ms   [place] Interval= 178  ms   P Horizontal Axis= 47  deg   P Front Axis= 38  deg   QRSD Interval= 90  ms   QT Interval= 344  ms   QRS Axis= -18  deg   T Wave Axis= 24  deg   - ABNORMAL ECG -   Sinus tachycardia   Atrial premature complexes   Consider RVH or posterior infarct   Inferior infarct, old   No change from prior tracing   Electronically Signed By: Sydney Sierra ([facility]) 09-Nov-2019 17:29:22   Date and Time of Study: 2019-11-08 17:39:52        Results for orders placed during the hospital encounter of 04/18/18   Adult Transthoracic Echo Complete W/ Cont if Necessary Per Protocol    Narrative · Left ventricular systolic function is hyperdynamic (EF > 70).  · There is mild calcification of the aortic valve.        No radiology results for the last day    ASSESSMENT/PLAN:  Left femoral head collapse, previous hip pinning: Dr. Owens following  N.p.o. for surgery this afternoon  Add IV hydration with normal saline  Control pain and monitor    Acute on chronic normocytic anemia:  Baseline hemoglobin approximately 11, today back to baseline 11.1  No active blood loss noted, monitor    H/O DVT/PEs with IVC filter: Hematology following  H/O retroperitoneal hematoma/subdural bleeding:   Intolerant of anticoagulation    Chronic hyponatremia:  Baseline 124 to 129  Currently 125, remains on home dose salt tablets  Add normal saline as patient is n.p.o.   Recheck in a.m.    CKD stage III:  Baseline creatinine approximately 0.8-1.1, currently 0.92, no acute issues    H/O hypertension:   -169, control pain, monitor    Insomnia: Resolved with Seroquel and melatonin last night    Hypothyroidism: TSH normal on home    Leukocytosis: resolved without antibiotic  Check UA now, none done on admit    Anxiety/depression:  "No acute issues on home BuSpar and Remeron    Chronic constipation: Continues Colace and MiraLAX, monitor postoperatively for need to add other agents    Vitamin D deficiency: Supplement started this admission    PLAN FOR DISPOSITION: Rehab likely    JOÃO Williamson  Hospitalist, [facility]  11/11/19  7:55 AM    \"Dictated utilizing Dragon dictation\"    "

## 2019-11-11 NOTE — ANESTHESIA PROCEDURE NOTES
Spinal Block      Patient location during procedure: pre-op  Indication:at surgeon's request and post-op pain management  Performed By  CRNA: Eric Del Valle CRNA  Preanesthetic Checklist  Completed: patient identified, surgical consent, pre-op evaluation, timeout performed, IV checked, risks and benefits discussed and monitors and equipment checked  Spinal Block Prep:  Patient Position:sitting  Sterile Tech:cap, gown, mask, gloves and sterile barriers  Prep:Betadine  Patient Monitoring:blood pressure monitoring, continuous pulse oximetry and EKG  Spinal Block Procedure  Approach:midline  Guidance:landmark technique and palpation technique  Location:L3-L4  Needle Type:Quincke  Needle Gauge:25 G  Placement of Spinal needle event:cerebrospinal fluid aspirated  Paresthesia: no  Fluid Appearance:clear  Medications: bupivacaine (MARCAINE) injection 0.5%, 2 mL  Med Administered at 11/11/2019 4:53 PM   Post Assessment  Patient Tolerance:patient tolerated the procedure well with no apparent complications  Complications no

## 2019-11-11 NOTE — PLAN OF CARE
Problem: Patient Care Overview  Goal: Plan of Care Review  Outcome: Ongoing (interventions implemented as appropriate)   11/11/19 0416   Coping/Psychosocial   Plan of Care Reviewed With patient   OTHER   Outcome Summary pt npo after mn, for scheduled surgery. consent signed in chart, denies pain/discomfort. daughter at bedside. purewick to lws with clear yellow urine in container.     Goal: Individualization and Mutuality  Outcome: Ongoing (interventions implemented as appropriate)    Goal: Discharge Needs Assessment  Outcome: Ongoing (interventions implemented as appropriate)    Goal: Interprofessional Rounds/Family Conf  Outcome: Ongoing (interventions implemented as appropriate)      Problem: Pain, Acute (Adult)  Goal: Identify Related Risk Factors and Signs and Symptoms  Outcome: Ongoing (interventions implemented as appropriate)    Goal: Acceptable Pain Control/Comfort Level  Outcome: Ongoing (interventions implemented as appropriate)

## 2019-11-12 LAB
ANION GAP SERPL CALCULATED.3IONS-SCNC: 10.3 MMOL/L (ref 5–15)
BUN BLD-MCNC: 24 MG/DL (ref 8–23)
BUN/CREAT SERPL: 31.2 (ref 7–25)
CALCIUM SPEC-SCNC: 7.6 MG/DL (ref 8.6–10.5)
CHLORIDE SERPL-SCNC: 98 MMOL/L (ref 98–107)
CO2 SERPL-SCNC: 22.7 MMOL/L (ref 22–29)
CREAT BLD-MCNC: 0.77 MG/DL (ref 0.57–1)
GFR SERPL CREATININE-BSD FRML MDRD: 71 ML/MIN/1.73
GLUCOSE BLD-MCNC: 147 MG/DL (ref 65–99)
HCT VFR BLD AUTO: 29.1 % (ref 34–46.6)
HGB BLD-MCNC: 9.6 G/DL (ref 12–15.9)
POTASSIUM BLD-SCNC: 5.1 MMOL/L (ref 3.5–5.2)
SODIUM BLD-SCNC: 131 MMOL/L (ref 136–145)

## 2019-11-12 PROCEDURE — 99232 SBSQ HOSP IP/OBS MODERATE 35: CPT | Performed by: NURSE PRACTITIONER

## 2019-11-12 PROCEDURE — 97110 THERAPEUTIC EXERCISES: CPT

## 2019-11-12 PROCEDURE — 97165 OT EVAL LOW COMPLEX 30 MIN: CPT

## 2019-11-12 PROCEDURE — 85014 HEMATOCRIT: CPT | Performed by: ORTHOPAEDIC SURGERY

## 2019-11-12 PROCEDURE — 97161 PT EVAL LOW COMPLEX 20 MIN: CPT

## 2019-11-12 PROCEDURE — 25010000002 CEFTRIAXONE SODIUM-DEXTROSE 1-3.74 GM-%(50ML) RECONSTITUTED SOLUTION: Performed by: NURSE PRACTITIONER

## 2019-11-12 PROCEDURE — 85018 HEMOGLOBIN: CPT | Performed by: ORTHOPAEDIC SURGERY

## 2019-11-12 PROCEDURE — 97116 GAIT TRAINING THERAPY: CPT

## 2019-11-12 PROCEDURE — 94770: CPT

## 2019-11-12 PROCEDURE — 25010000002 VANCOMYCIN 1 G RECONSTITUTED SOLUTION 1 EACH VIAL: Performed by: ORTHOPAEDIC SURGERY

## 2019-11-12 PROCEDURE — 80048 BASIC METABOLIC PNL TOTAL CA: CPT | Performed by: HOSPITALIST

## 2019-11-12 PROCEDURE — 25010000002 ENOXAPARIN PER 10 MG: Performed by: ORTHOPAEDIC SURGERY

## 2019-11-12 RX ORDER — SODIUM CHLORIDE 9 MG/ML
INJECTION, SOLUTION INTRAVENOUS
Status: COMPLETED
Start: 2019-11-12 | End: 2019-11-12

## 2019-11-12 RX ORDER — VANCOMYCIN HYDROCHLORIDE 1 G/20ML
INJECTION, POWDER, LYOPHILIZED, FOR SOLUTION INTRAVENOUS
Status: DISCONTINUED
Start: 2019-11-12 | End: 2019-11-12 | Stop reason: WASHOUT

## 2019-11-12 RX ORDER — CEFTRIAXONE 1 G/50ML
1 INJECTION, SOLUTION INTRAVENOUS EVERY 24 HOURS
Status: DISCONTINUED | OUTPATIENT
Start: 2019-11-12 | End: 2019-11-13

## 2019-11-12 RX ORDER — VANCOMYCIN HYDROCHLORIDE 500 MG/10ML
INJECTION, POWDER, LYOPHILIZED, FOR SOLUTION INTRAVENOUS
Status: DISPENSED
Start: 2019-11-12 | End: 2019-11-12

## 2019-11-12 RX ADMIN — MELATONIN TAB 5 MG 5 MG: 5 TAB at 21:08

## 2019-11-12 RX ADMIN — VANCOMYCIN HYDROCHLORIDE 750 MG: 1 INJECTION, POWDER, LYOPHILIZED, FOR SOLUTION INTRAVENOUS at 04:13

## 2019-11-12 RX ADMIN — SODIUM CHLORIDE TAB 1 GM 1 G: 1 TAB at 18:40

## 2019-11-12 RX ADMIN — DOCUSATE SODIUM 100 MG: 100 CAPSULE, LIQUID FILLED ORAL at 08:10

## 2019-11-12 RX ADMIN — CEFTRIAXONE 1 G: 1 INJECTION, SOLUTION INTRAVENOUS at 09:57

## 2019-11-12 RX ADMIN — SODIUM CHLORIDE 250 ML: 9 INJECTION, SOLUTION INTRAVENOUS at 04:13

## 2019-11-12 RX ADMIN — DOCUSATE SODIUM 100 MG: 100 CAPSULE, LIQUID FILLED ORAL at 21:08

## 2019-11-12 RX ADMIN — BUSPIRONE HYDROCHLORIDE 7.5 MG: 15 TABLET ORAL at 08:09

## 2019-11-12 RX ADMIN — SODIUM CHLORIDE 75 ML/HR: 9 INJECTION, SOLUTION INTRAVENOUS at 07:31

## 2019-11-12 RX ADMIN — ENOXAPARIN SODIUM 40 MG: 40 INJECTION SUBCUTANEOUS at 08:19

## 2019-11-12 RX ADMIN — SODIUM CHLORIDE TAB 1 GM 1 G: 1 TAB at 08:09

## 2019-11-12 RX ADMIN — MIRTAZAPINE 15 MG: 15 TABLET, FILM COATED ORAL at 21:08

## 2019-11-12 RX ADMIN — HYDROCODONE BITARTRATE AND ACETAMINOPHEN 1 TABLET: 7.5; 325 TABLET ORAL at 08:04

## 2019-11-12 RX ADMIN — HYDROCODONE BITARTRATE AND ACETAMINOPHEN 1 TABLET: 7.5; 325 TABLET ORAL at 13:01

## 2019-11-12 RX ADMIN — BUSPIRONE HYDROCHLORIDE 7.5 MG: 15 TABLET ORAL at 21:08

## 2019-11-12 RX ADMIN — POLYETHYLENE GLYCOL 3350 17 G: 17 POWDER, FOR SOLUTION ORAL at 08:20

## 2019-11-12 RX ADMIN — QUETIAPINE FUMARATE 12.5 MG: 25 TABLET ORAL at 21:08

## 2019-11-12 RX ADMIN — LEVOTHYROXINE SODIUM 88 MCG: 88 TABLET ORAL at 08:20

## 2019-11-12 NOTE — THERAPY EVALUATION
Acute Care - Occupational Therapy Initial Evaluation   Stormy Rivera     Patient Name: Nicolasa Woodall  : 1930  MRN: [id number]  Today's Date: 2019  Onset of Illness/Injury or Date of Surgery: 19  Date of Referral to OT: 19  Referring Physician: Roman    Admit Date: 2019       ICD-10-CM ICD-9-CM   1. Closed left hip fracture, initial encounter (CMS/[facility]) S72.002A 820.8     Patient Active Problem List   Diagnosis   • Traumatic intraventricular hemorrhage (CMS/[facility])   • Hyponatremia   • Traumatic rhabdomyolysis (CMS/[facility])   • Syncope   • Abnormal urinalysis   • Essential hypertension   • Chronic kidney disease, stage 3 (CMS/[facility])   • AMI (acute kidney injury) (CMS/[facility])   • Dehydration   • Collapse of vertebra (CMS/[facility])   • PAC (premature atrial contraction)   • Closed left hip fracture, initial encounter (CMS/[facility])     Past Medical History:   Diagnosis Date   • Anxiety    • Chronic kidney disease    • Depression     resolved after daughter completed chemotherapy    • Disease of thyroid gland    • Hypertension    • Metabolic encephalopathy    • Pulmonary embolism (CMS/[facility])     SPONTANEOUS HEMMORHAGE   • Subdural hematoma (CMS/[facility])      Past Surgical History:   Procedure Laterality Date   • BREAST BIOPSY      BENIGN   • HYSTERECTOMY     • JOINT REPLACEMENT     • THYROID SURGERY      PARATHYROID REMOVAL   • VENA CAVA FILTER INSERTION            OT ASSESSMENT FLOWSHEET (last 12 hours)      Occupational Therapy Evaluation     Row Name 19 0950                   OT Evaluation Time/Intention    Subjective Information  no complaints  -SD        Document Type  evaluation  -SD        Mode of Treatment  occupational therapy  -SD        Patient Effort  good  -SD           General Information    Patient Profile Reviewed?  yes  -SD        Onset of Illness/Injury or Date of Surgery  19  -SD        Referring Physician  Rmoan  -SD        Patient Observations  alert;cooperative;agree to therapy   -SD        Patient/Family Observations  Pt reclined in chair. Daughter in room. Pt agreeable to therapy.  -SD        Prior Level of Function  independent:;ADL's;all household mobility pt had assistance for home management tasks  -SD        Equipment Currently Used at Home  rollator;shower chair;commode, 3-in-1 pt has a rolling walker and straight cane  -SD        Pertinent History of Current Functional Problem  89 y/o  female who presents to the ER this evening w/ progressively worsening left hip pain. Her family states that she has practically required complete nonweightbearing status to the left leg which is nearly required them to carry her in and out of the house to the vehicle.  Given her increased pain she was brought to [facility] emergency department where imaging was completed indicating collapse of femoral head and prominence of prior percutaneous screws placed in 2012 for treatment of femoral neck fracture at that time. Pt with Left ELAINE on 11-11-19.  Prior to hospitalization pt was living home alone with support of family for HH management. Pt was independent with basic adl tasks and light home tasks .   -SD        Existing Precautions/Restrictions  hip, posterior;left;fall  -SD        Risks Reviewed  patient and family:;LOB;increased discomfort education regarding TH precautions  -SD        Benefits Reviewed  patient and family:;improve function  -SD        Barriers to Rehab  none identified  -SD           Relationship/Environment    Lives With  alone  -SD           Resource/Environmental Concerns    Current Living Arrangements  home/apartment/condo  -SD           Cognitive Assessment/Intervention- PT/OT    Orientation Status (Cognition)  oriented x 4 oriented to month/year  -SD        Follows Commands (Cognition)  WFL  -SD        Cognitive Assessment/Intervention Comment  Pt required cues for TH precautions during transfers.   -SD           Functional Mobility    Functional Mobility- Ind.  Level  minimum assist (75% patient effort);verbal cues required  -SD        Functional Mobility- Device  rolling walker  -SD        Functional Mobility-Distance (Feet)  20  -SD        Functional Mobility- Comment  pt required assistance to manuever the walker during a turn.  -SD           Sit-Stand Transfer    Sit-Stand [place] (Transfers)  minimum assist (75% patient effort);2 person assist;verbal cues  -SD        Assistive Device (Sit-Stand Transfers)  walker, front-wheeled  -SD           Stand-Sit Transfer    Stand-Sit [place] (Transfers)  minimum assist (75% patient effort);verbal cues  -SD        Assistive Device (Stand-Sit Transfers)  walker, front-wheeled  -SD           Bathing Assessment/Intervention    Bathing [place] Level  bathing skills;lower body;moderate assist (50% patient effort)  -SD        Comment (Bathing)  Pt requires assistance due to TH precautions  -SD           Lower Body Dressing Assessment/Training    Lower Body Dressing [place] Level  lower body dressing skills;maximum assist (25% patient effort)  -SD        Comment (Lower Body Dressing)  Pt requires assistance due to TH precuations. Pt needs education regarding compensatory strategies and use of LH AE for lower body adl's  -SD           BADL Safety/Performance    Impairments, BADL Safety/Performance  balance;range of motion;strength left TH precautions impact function  -SD        Skilled BADL Treatment/Intervention  compensatory training;BADL process/adaptation training  -SD           General ROM    GENERAL ROM COMMENTS  BUE rom wfl  -SD           MMT (Manual Muscle Testing)    General MMT Comments  BUE strength wfl  -SD           Positioning and Restraints    Pre-Treatment Position  sitting in chair/recliner  -SD        Post Treatment Position  chair  -SD        In Chair  reclined;call light within reach;encouraged to call for assist;with family/caregiver  -SD           Pain Assessment    Additional Documentation  --  no pain reported  -SD           Wound 11/11/19 2011 Left hip Incision    Wound - Properties Group Date first assessed: 11/11/19  -TG Time first assessed: 2011  -TG Side: Left  -TG Location: hip  -TG Primary Wound Type: Incision  -TG       Plan of Care Review    Plan of Care Reviewed With  patient;daughter  -SD           Clinical Impression (OT)    Date of Referral to OT  11/11/19  -SD        OT Diagnosis  left ELAINE, decreased adl function  -SD        Prognosis (OT Eval)  good  -SD        Functional Level at Time of Evaluation (OT Eval)   Pt performed transfers and mobility x 20 feet using a rolling walker with min assist. Pt requires mod/max assist for lower body adl's currenlty due to posterior hip precautions. Rec OT for education regarding compensatory strategies and use of adaptive equipment to manage adl activity while adhering to hip precautions. Rec STR upon discharge from acute care.   -SD        Criteria for Skilled Therapeutic Interventions Met (OT Eval)  yes;treatment indicated  -SD        Rehab Potential (OT Eval)  good, to achieve stated therapy goals  -SD        Therapy Frequency (OT Eval)  5 times/wk  -SD        Predicted Duration of Therapy Intervention (Therapy Eval)  anticipate discharge from acute care in 2-3 days  -SD        Care Plan Review (OT)  evaluation/treatment results reviewed;care plan/treatment goals reviewed;risks/benefits reviewed;current/potential barriers reviewed;patient/other agree to care plan  -SD        Care Plan Review, Other Participant (OT Eval)  daughter  -SD        Anticipated Equipment Needs at Discharge (OT)  -- Pt will need LH AE  -SD        Anticipated Discharge Disposition (OT)  skilled nursing facility  -SD           Dressing Goal 1 (OT)    Activity/Assistive Device (Dressing Goal 1, OT)  lower body dressing;sock-aid;reacher;long handled shoe horn  -SD        [place]/Cues Needed (Dressing Goal 1, OT)  minimum assist (75% or more patient effort);verbal cues  required  -SD        Time Frame (Dressing Goal 1, OT)  by discharge  -SD        Barriers (Dressing Goal 1, OT)  TH precautions  -SD        Progress/Outcome (Dressing Goal 1, OT)  -- new goal  -SD           Patient Education Goal (OT)    Activity (Patient Education Goal, OT)  Pt to verbalize 3/3 TH precautions  -SD        [place]/Cues/Accuracy (Memory Goal 2, OT)  verbalizes understanding  -SD        Time Frame (Patient Education Goal, OT)  by discharge  -SD        Progress/Outcome (Patient Education Goal, OT)  other (see comments) new goal  -SD           Living Environment    Home Accessibility  -- one step to enter per pt report  -SD          User Key  (r) = Recorded By, (t) = Taken By, (c) = Cosigned By    Initials Name Effective Dates    Jovani Sethi OTR 06/22/16 -     TG Pili Polo RN 03/02/18 -          Occupational Therapy Education     Title: PT OT SLP Therapies (Done)     Topic: Occupational Therapy (Done)     Point: ADL training (Done)     Description: Instruct learner(s) on proper safety adaptation and remediation techniques during self care or transfers.   Instruct in proper use of assistive devices.    Learning Progress Summary           Patient Acceptance, E,TB,D, VU by SD at 11/12/2019 10:14 AM    Comment:  Education regarding OT services, benefits of activity, TH precautions and impact of TH precautions on daily tasks.   Family Acceptance, E,TB,D, VU by SD at 11/12/2019 10:14 AM    Comment:  Education regarding OT services, benefits of activity, TH precautions and impact of TH precautions on daily tasks.                               User Key     Initials Effective Dates Name Provider Type Discipline    SD 06/22/16 -  Jovani Cantu OTR Occupational Therapist OT                  OT Recommendation and Plan  Outcome Summary/Treatment Plan (OT)  Anticipated Equipment Needs at Discharge (OT): (Pt will need LH AE)  Anticipated Discharge Disposition (OT): skilled nursing  facility  Therapy Frequency (OT Eval): 5 times/wk  Plan of Care Review  Plan of Care Reviewed With: patient, daughter  Plan of Care Reviewed With: patient, daughter  Outcome Summary: OT evaluation completed. Pt performed transfers and mobility x 20 feet using rolling walker with min assist. Pt requires mod/max assist for lower body adl's currenlty due to posterior hip precautions. Rec OT for education regarding compensatory strategies and use of adaptive equipment to manage adl activity while adhering to hip precautions. Rec STR upon discharge from acute care.     Outcome Measures     Row Name 11/12/19 0930             How much help from another is currently needed...    Putting on and taking off regular lower body clothing?  2  -SD      Bathing (including washing, rinsing, and drying)  2  -SD      Toileting (which includes using toilet bed pan or urinal)  2  -SD      Putting on and taking off regular upper body clothing  4  -SD      Taking care of personal grooming (such as brushing teeth)  4  -SD      Eating meals  4  -SD      AM-PAC 6 Clicks Score (OT)  18  -SD         Functional Assessment    Outcome Measure Options  AM-PAC 6 Clicks Daily Activity (OT)  -SD        User Key  (r) = Recorded By, (t) = Taken By, (c) = Cosigned By    Initials Name Provider Type    Jovani Sethi OTR Occupational Therapist          Time Calculation:   Time Calculation- OT     Row Name 11/12/19 1013             Time Calculation- OT    OT Start Time  0921  -SD      OT Stop Time  0946  -SD      OT Time Calculation (min)  25 min  -SD        User Key  (r) = Recorded By, (t) = Taken By, (c) = Cosigned By    Initials Name Provider Type    Jovani Sethi OTR Occupational Therapist        Therapy Charges for Today     Code Description Service Date Service Provider Modifiers Qty    [id number]  OT EVAL LOW COMPLEXITY 2 11/12/2019 Jovani Cantu OTR GO 1               ARNAV Gonzales  11/12/2019

## 2019-11-12 NOTE — ANESTHESIA POSTPROCEDURE EVALUATION
Patient: Nicolasa Woodall    Procedure Summary     Date:  11/11/19 Room / Location:   [facility] 2 /  [facility]    Anesthesia Start:  1723 Anesthesia Stop:  2059    Procedures:       FEMUR HARDWARE REMOVAL (Left Thigh)      TOTAL HIP ARTHROPLASTY (Left Hip) Diagnosis:       Closed left hip fracture, initial encounter (CMS/[facility])      (Closed left hip fracture, initial encounter (CMS/[facility]) [S72.002A])    Surgeon:  Dagoberto Owens MD Provider:  Eric Del Valle CRNA    Anesthesia Type:  spinal, MAC ASA Status:  3          Anesthesia Type: spinal, MAC  Last vitals  BP   136/85 (11/11/19 2110)   Temp   97.7 °F (36.5 °C) (11/11/19 2054)   Pulse   93 (11/11/19 2110)   Resp   20 (11/11/19 2110)     SpO2   95 % (11/11/19 2110)     Post Anesthesia Care and Evaluation    Patient location during evaluation: bedside  Patient participation: complete - patient participated  Level of consciousness: awake and alert  Pain score: 0  Pain management: adequate  Airway patency: patent  Anesthetic complications: No anesthetic complications    Cardiovascular status: acceptable  Respiratory status: acceptable  Hydration status: acceptable

## 2019-11-12 NOTE — OP NOTE
Date of Operation: 11/11/2019    PREOPERATIVE DIAGNOSIS:  1. Left hip femoral head collapse  2. Left hip retained hardware     POSTOPERATIVE DIAGNOSIS:   1. Left hip femoral head collapse  2. Left hip retained hardware     PROCEDURE PERFORMED:   1. Left total hip arthroplasty  2. Left hip hardware removal     SURGEON: Dagoberto Owens MD     ASSISTANT: Sonam Shah    ANESTHESIA: Spinal anesthesia     ESTIMATED BLOOD LOSS:<500ml     URINE OUTPUT: Not recorded.     FLUIDS: Per Anesthesia.     COMPLICATION: None.     SPECIMEN:none    DRAIN: None.     IMPLANT: Chaz VerSys total hip arthroplasty system with a size 11 cemented femoral stem, continue home multihole 48 mm shell with acetabular screw x2, 32 mm inner diameter rim liner, 32 mm +3.5 mm femoral head, antibiotic cement.     INDICATIONS: The patient is a pleasant 89 y.o. who has been evaluated for left hip pain, noted to have femoral head collapse on preoperative imaging.  She had prior percutaneous fixation of a femoral neck fracture but has had increasing pain that became acutely worse in the last 3 to 4 days, presents emergency department, noted to have complete collapse of femoral head with perforation into the acetabulum of the percutaneous screws. I discussed treatment options with the patient and family. Wished to proceed with the above-mentioned surgery. Reviewed risks, benefits, and alternatives of surgery as documented on preoperative history and physical. Patient and family expressed understanding of these and had all questions answered prior to signing operative consent. No guarantees were given in regards to the surgery.     DESCRIPTION OF PROCEDURE: The patient was seen, evaluated, and cleared for surgery by Anesthesia, met in the preoperative holding area. The operative site was marked, consent was reviewed, History and Physical was updated, and preoperative labs were reviewed. A regional block was then placed per Anesthesia. The patient was  then taken to the operating room and placed in the supine position on a regular OR table. After successful spinal placement per Anesthesia, the patient was turned in the lateral decubitus position and positioned with the universal hip positioner. An axillary roll was placed. The patient was secured to the table by a strap. All bony prominences were well-padded. The left lower extremity was then sterilely prepped and draped in a standard fashion.   Formal timeout was completed, including confirmation of History and Physical, operative consent, surgical site, patient identification number, and preoperative antibiotic administration. The procedure was begun with the a 10 cm curvilinear incision over the left hip through skin and subcutaneous tissue with a #15 blade. The incision was then carried down to the level of the IT band which was opened in a longitudinal fashion in line with the femur and curving posterior proximally at the posterosuperior edge of the greater trochanter. A Charnley retractor was placed at this point in time. Trochanteric bursa was excised. Sciatic nerve was identified and protected throughout the remainder of the case.  Attention was then turned to removal of hardware of the prominence percutaneous screws adjacent to the lateral femoral cortex.  Screwdriver was used to remove screws at this point time without difficulty.  Attention was then returned to the hip with further exposure by identifying the piriformis tendon which was then released followed by short external rotators consistent with a standard posterior approach to the hip. Capsule was then opened in a T-shaped fashion with the leaflets of the capsule being tagged with #2 FiberWire. The hip was then maximally internally rotated and flexed identifying the femoral head collapse site at this point in time. A femoral neck cut was made at this point in time with templating from a flag, 1 fingerbreadth above the lesser trochanter. The neck  osteotomy was completed laterally with the use of an osteotome protecting the greater trochanter. Once this was completed the bone hook was used to pull the proximal femur out of the way while the remainder of the collapsed femoral head was extracted from the acetabulum. Ligamentum teres was resected at this point in time. Acetabular cartilage was evaluated with significant cartilaginous wear as well as small region of bony defect along the superior lateral margin of the acetabulum.    Acetabulum was then thoroughly irrigated with normal saline at this point in time.  Attention was then turned to preparation of the acetabulum with resection of the labrum from superior, anterior, and posterior aspects of the acetabular rim.  Steinmann pin was placed superiorly and bent to allow for retraction while pointed Hohmann was placed both anterior and posterior to allow for full visualization of the acetabulum.  Reaming was begun with a 43 mm reamer down to the medial cortex in the central portion of the sacrum followed by reaming in line position with 40 degrees of abduction and 20 degrees of anteversion.  Reaming was progressed in stepwise fashion to a 47 mm reamer with good fit primarily between the anterior and posterior rim noted at this time.  There is minimal residual defect superiorly from the screw perforation once reaming was completed.  Trial acetabular shell was placed at this point time with good position and fit noted.  Thus trial was removed, Betadine lavage followed by normal saline lavage of the acetabulum was thoroughly completed at this time.  48 mm acetabular shell was opened on the back table inserted onto insertion handle.  Acetabular shell was impacted into position with 40 degrees of abduction and 20 degrees of anteversion.  Show was noted to be completely stable at this point time but given the minimal residual defect superiorly 2 screws were drilled in the anterior superior zone, depth measured, and  placed at this time with good fixation noted.  Trial lipped liner was placed with rim directed posterior inferior.  The proximal femur was then internally rotated and flexed with preparation of the femoral side with a box osteotome, followed by a canal entering reamer, followed by a lateralizing reamer. Broaching was then accomplished starting with a size 9 proceeding up to a size 11 with good fit noted proximally. Trial reduction was carried out at this point in time with a +3.5 neck noted to be appropriate length, assessed for stability on reduction as well as position of femoral head in relation to tip of trochanter, and these were noted to be appropriate.   Trial components were removed at this point in time.  Acetabular rim liner was then opened on the back table, trial liner was removed, and final implant liner was impacted in position with rim directed posterior inferior.  Good fit noted at this time.   The femoral canal was thoroughly irrigated with normal saline and the final components were opened on the back table except for the head and neck portions. The distal cement plug was placed as well as the centralizer on the distal end of the stem. Antibiotic cement was prepared on the back table and inserted with third-generation cement technique under pressurization into the femoral canal. Femoral stem was then placed with appropriate anteversion to match the patient's native anteversion with extraneous cement being removed with freers. Once cement had appropriately cured, a 2nd trial reduction was carried out at this point in time with a +3.5 head and leg lengths were noted to be grossly symmetric at this point in time with good stability to the hip to 90° of flexion and 90° of internal rotation. No evidence of anterior or posterior impingement through a full range of motion. Negative quadriceps reflex test.   The femur was once again dislocated at this point in time. The trial head component was removed.  Acetabulum and stem were then thoroughly irrigated with normal saline. The final femoral head implant was opened on the back table and placed onto the femoral stem and impacted, engaging the Soto taper at this time. The hip was then once again reduced and stability noted to be appropriate. Capsulotomy was closed with #2 FiberWire. Piriformis was repaired to the gluteus medius insertion with #2 FiberWire. The wound was then thoroughly irrigated additionally in the deep layer at this point in time, with a betadine lavage, followed by irrigation with normal saline.   Charnley retractor was then removed and IT band was closed with 0 strata fix and running locking fashion followed by closure of the subcutaneous layer with 2-0 Vicryl and skin closure with 3-0 strata-fix and Prineo mesh with glue. The wound was dressed with Telfa, 4 x 4's, ABD pad, and Medipore tape. The patient was placed in an abduction pillow.   At the end of the procedure all lap, needle, and sponge, and instrument counts were correct x2. The patient had brisk capillary refill to all digits of the left lower extremity. Compartments were soft and easily compressible at the end of the procedure.   DISPOSITION: The patient was extubated per Anesthesia and taken to the recovery room in stable condition. Will be admitted to the Hospitalist service postoperatively. Weight-bear as tolerated to left lower extremity and posterior hip precautions for 6 weeks. Results of the procedure were discussed immediately postoperatively with the patient's family and they had all questions answered at that point in time.  Lovenox for DVT prophylaxis for 4 weeks.

## 2019-11-12 NOTE — PLAN OF CARE
Problem: Patient Care Overview  Goal: Interprofessional Rounds/Family Conf  Outcome: Ongoing (interventions implemented as appropriate)      Problem: Fall Risk (Adult)  Goal: Identify Related Risk Factors and Signs and Symptoms  Outcome: Ongoing (interventions implemented as appropriate)    Goal: Absence of Fall  Outcome: Ongoing (interventions implemented as appropriate)      Problem: Skin Injury Risk (Adult)  Goal: Identify Related Risk Factors and Signs and Symptoms  Outcome: Ongoing (interventions implemented as appropriate)    Goal: Skin Health and Integrity  Outcome: Ongoing (interventions implemented as appropriate)      Problem: Pain, Acute (Adult)  Goal: Identify Related Risk Factors and Signs and Symptoms  Outcome: Ongoing (interventions implemented as appropriate)    Goal: Acceptable Pain Control/Comfort Level  Outcome: Ongoing (interventions implemented as appropriate)

## 2019-11-12 NOTE — THERAPY EVALUATION
"Acute Care - Physical Therapy Initial Evaluation   [place]     Patient Name: Nicolasa Woodall  : 1930  MRN: [id number]  Today's Date: 2019   Onset of Illness/Injury or Date of Surgery: 19  Date of Referral to PT: 19  Referring Physician: Roman      Admit Date: 2019    Visit Dx:     ICD-10-CM ICD-9-CM   1. Closed left hip fracture, initial encounter (CMS/[facility]) S72.002A 820.8     Patient Active Problem List   Diagnosis   • Traumatic intraventricular hemorrhage (CMS/[facility])   • Hyponatremia   • Traumatic rhabdomyolysis (CMS/[facility])   • Syncope   • Abnormal urinalysis   • Essential hypertension   • Chronic kidney disease, stage 3 (CMS/[facility])   • AMI (acute kidney injury) (CMS/[facility])   • Dehydration   • Collapse of vertebra (CMS/[facility])   • PAC (premature atrial contraction)   • Closed left hip fracture, initial encounter (CMS/[facility])     Past Medical History:   Diagnosis Date   • Anxiety    • Chronic kidney disease    • Depression     resolved after daughter completed chemotherapy    • Disease of thyroid gland    • Hypertension    • Metabolic encephalopathy    • Pulmonary embolism (CMS/[facility])     SPONTANEOUS HEMMORHAGE   • Subdural hematoma (CMS/[facility])      Past Surgical History:   Procedure Laterality Date   • BREAST BIOPSY      BENIGN   • HYSTERECTOMY     • JOINT REPLACEMENT     • THYROID SURGERY      PARATHYROID REMOVAL   • VENA CAVA FILTER INSERTION          PT ASSESSMENT (last 12 hours)      Physical Therapy Evaluation     Row Name 19 0921          PT Evaluation Time/Intention    Subjective Information  no complaints  -BP     Document Type  evaluation  -BP     Mode of Treatment  physical therapy  -BP     Patient Effort  good  -BP     Symptoms Noted During/After Treatment  other (see comments) Reports \"feeling different\" upon standing  -BP     Row Name 19 0921          General Information    Patient Profile Reviewed?  yes  -BP     Onset of Illness/Injury or Date of Surgery  19  -BP  "    Referring Physician  Dr. Owens  -BP     Patient Observations  alert;cooperative;agree to therapy  -BP     Patient/Family Observations  Patient reclined in bedside chair. Daughter present. Patient agreeable to PT evaluation.   -BP     Prior Level of Function  -- see pertinent history  -BP     Equipment Currently Used at Home  rollator;commode, bedside;shower chair owns FWW but did not use   -BP     Pertinent History of Current Functional Problem  Patient presents to ED secondary to worsening L hip pain.  Patient diagnosed with a closed L hip fracture. Patient with previous hip pinning in 2012 with loosening of hardware. Patient now s/p hardware removal and total hip replacement. Patient lives alone. Daughter is POA and involved in care. Per patient reports she uses a rollator for mobility however due to worsening hip pain patient gait distance has been limited to household.  Daughter provides transport and does the grocery shopping.   -BP     Existing Precautions/Restrictions  fall;left;hip, posterior  -BP     Risks Reviewed  patient:;LOB;increased discomfort  -BP     Benefits Reviewed  patient:;improve function;increase independence;increase strength  -BP     Barriers to Rehab  none identified  -BP     Row Name 11/12/19 0921          Relationship/Environment    Lives With  alone  -BP     Row Name 11/12/19 0921          Resource/Environmental Concerns    Current Living Arrangements  home/apartment/condo  -BP     Row Name 11/12/19 0921          Living Environment    Home Accessibility  stairs to enter home  -BP     Row Name 11/12/19 0921          Home Main Entrance    Number of Stairs, Main Entrance  one  -BP     Stair Railings, Main Entrance  railings on both sides of stairs Unable to reach both sides   -BP     Row Name 11/12/19 0921          Cognitive Assessment/Interventions    Additional Documentation  Cognitive Assessment/Intervention (Group)  -BP     Row Name 11/12/19 0921          Cognitive  Assessment/Intervention- PT/OT    Orientation Status (Cognition)  oriented x 4  -BP     Follows Commands (Cognition)  WFL  -BP     Personal Safety Interventions  gait belt;nonskid shoes/slippers when out of bed  -     Row Name 11/12/19 0921          Safety Issues, Functional Mobility    Comment, Safety Issues/Impairments (Mobility)  requires cues to adhere to hip precautions   -     Row Name 11/12/19 0921          Bed Mobility Assessment/Treatment    Comment (Bed Mobility)  deferred, patient up in chair   -     Row Name 11/12/19 0921          Transfer Assessment/Treatment    Transfer Assessment/Treatment  sit-stand transfer;stand-sit transfer  -BP     Comment (Transfers)  verbal cues for hand placement and footplacement in order to adhere to hip precuations.   -BP     Sit-Stand [place] (Transfers)  minimum assist (75% patient effort);2 person assist;verbal cues  -BP     Stand-Sit [place] (Transfers)  minimum assist (75% patient effort);verbal cues;2 person assist  -     Row Name 11/12/19 0921          Sit-Stand Transfer    Assistive Device (Sit-Stand Transfers)  walker, front-wheeled  -BP     Row Name 11/12/19 0921          Stand-Sit Transfer    Assistive Device (Stand-Sit Transfers)  walker, front-wheeled  -BP     Row Name 11/12/19 0921          Gait/Stairs Assessment/Training    [place] Level (Gait)  minimum assist (75% patient effort);verbal cues  -     Assistive Device (Gait)  walker, front-wheeled  -BP     Distance in Feet (Gait)  20  -BP     Pattern (Gait)  swing-to  -BP     Deviations/Abnormal Patterns (Gait)  base of support, wide;stride length decreased;gait speed decreased  -BP     Bilateral Gait Deviations  forward flexed posture  -BP     Comment (Gait/Stairs)  Patient requires min A to manage AD in forward direction as well as with directional changes. Verbal cues for upright posture and improved step length  -     Row Name 11/12/19 0921          General ROM    GENERAL ROM  COMMENTS  R LE AROM. L knee and ankle AROM WFL. L hip ROM not tested.   -BP     Row Name 11/12/19 0921          MMT (Manual Muscle Testing)    General MMT Comments  R LE gross MMT 4+/5. L LE strength testing deferred.   -     Row Name 11/12/19 0921          Pain Assessment    Additional Documentation  Pain Scale: Numbers Pre/Post-Treatment (Group)  -BP     Row Name 11/12/19 0921          Pain Scale: Numbers Pre/Post-Treatment    Pain Scale: Numbers, Pretreatment  0/10 - no pain  -BP     Pain Scale: Numbers, Post-Treatment  0/10 - no pain  -BP     Pre/Post Treatment Pain Comment  Patient denies pain at rest and with mobility  -BP     Pain Intervention(s)  Medication (See MAR);Ambulation/increased activity;Repositioned  -     Row Name             Wound 11/11/19 2011 Left hip Incision    Wound - Properties Group Date first assessed: 11/11/19  -TG Time first assessed: 2011  -TG Side: Left  -TG Location: hip  -TG Primary Wound Type: Incision  -TG    Row Name 11/12/19 0921          Plan of Care Review    Plan of Care Reviewed With  patient  -[facility] Name 11/12/19 0921          Physical Therapy Clinical Impression    Date of Referral to PT  11/11/19  -BP     PT Diagnosis (PT Clinical Impression)  Decreased functional mobility   -     Criteria for Skilled Interventions Met (PT Clinical Impression)  yes;treatment indicated  -     Rehab Potential (PT Clinical Summary)  good, to achieve stated therapy goals  -BP     Predicted Duration of Therapy (PT)  3 days   -     Care Plan Review (PT)  evaluation/treatment results reviewed;care plan/treatment goals reviewed;risks/benefits reviewed  -BP     Care Plan Review, Other Participant (PT Clinical Impression)  daughter  -     Row Name 11/12/19 0921          Physical Therapy Goals    Bed Mobility Goal Selection (PT)  bed mobility, PT goal 1  -BP     Transfer Goal Selection (PT)  transfer, PT goal 1  -BP     Gait Training Goal Selection (PT)  gait training, PT goal 1   -BP     Precaution Goal Selection (PT)  precaution, PT goal 1  -BP     Additional Documentation  Precaution Goal Selection (PT) (Row)  -BP     Row Name 11/12/19 0921          Bed Mobility Goal 1 (PT)    Activity/Assistive Device (Bed Mobility Goal 1, PT)  bed mobility activities, all  -BP     [place] Level/Cues Needed (Bed Mobility Goal 1, PT)  minimum assist (75% or more patient effort)  -BP     Time Frame (Bed Mobility Goal 1, PT)  3 days  -BP     Progress/Outcomes (Bed Mobility Goal 1, PT)  goal ongoing  -BP     Row Name 11/12/19 0921          Transfer Goal 1 (PT)    Activity/Assistive Device (Transfer Goal 1, PT)  sit-to-stand/stand-to-sit;walker, rolling  -BP     [place] Level/Cues Needed (Transfer Goal 1, PT)  standby assist  -BP     Time Frame (Transfer Goal 1, PT)  3 days  -BP     Progress/Outcome (Transfer Goal 1, PT)  goal ongoing  -BP     Row Name 11/12/19 0921          Gait Training Goal 1 (PT)    Activity/Assistive Device (Gait Training Goal 1, PT)  gait (walking locomotion);walker, rolling  -BP     [place] Level (Gait Training Goal 1, PT)  contact guard assist  -BP     Distance (Gait Goal 1, PT)  75  -BP     Time Frame (Gait Training Goal 1, PT)  3 days  -BP     Progress/Outcome (Gait Training Goal 1, PT)  goal ongoing  -BP     Row Name 11/12/19 0921          Precaution Goal 1 (PT)    Activity (Precaution Goal 1, PT)  hip precautions  -BP     [place] Level/Cues Needed (Precautions Goal 1, PT)  physical/tactile cues;verbal cues/redirection  -BP     Time Frame (Precaution Goal 1, PT)  3 days  -BP     Progress/Outcome (Precaution Goal 1, PT)  goal ongoing  -BP     Row Name 11/12/19 0921          Positioning and Restraints    Pre-Treatment Position  sitting in chair/recliner  -BP     Post Treatment Position  chair  -BP     In Chair  reclined;call light within reach;encouraged to call for assist;with family/caregiver  -BP       User Key  (r) = Recorded By, (t) = Taken By, (c) = Cosigned  By    Initials Name Provider Type    BP Isac Garcia, PT Physical Therapist    TG Pili Polo, RN Registered Nurse        Physical Therapy Education     Title: PT OT SLP Therapies (In Progress)     Topic: Physical Therapy (In Progress)     Point: Mobility training (Done)     Learning Progress Summary           Patient Acceptance, E,TB, VU by  at 11/12/2019 10:42 AM   Family Acceptance, E,TB, VU by  at 11/12/2019 10:42 AM                   Point: Precautions (Done)     Learning Progress Summary           Patient Acceptance, E,TB, VU by  at 11/12/2019 10:42 AM   Family Acceptance, E,TB, VU by  at 11/12/2019 10:42 AM                               User Key     Initials Effective Dates Name Provider Type Discipline     04/03/18 -  Isac Garcia, PT Physical Therapist PT              PT Recommendation and Plan  Anticipated Discharge Disposition (PT): skilled nursing facility  Planned Therapy Interventions (PT Eval): bed mobility training, gait training, home exercise program, patient/family education, strengthening, transfer training  Therapy Frequency (PT Clinical Impression): 2 times/day  Outcome Summary/Treatment Plan (PT)  Anticipated Discharge Disposition (PT): skilled nursing facility  Plan of Care Reviewed With: patient, daughter  Outcome Summary: PT Evaluation Complete: Patient performs sit to/from stand transfers with min A x 2 and gait x 20 feet with min A with FWW.  Patient requires cues and education regarding posterior hip precautions and ues to adhere to them with mobility. Patient would benefit from skilled PT services to address deficits in funtional mobility and LE strength. Plan to see patient 2x/day. Recommend STR at discharge from acute care.   Outcome Measures     Row Name 11/12/19 0930 11/12/19 0923          How much help from another person do you currently need...    Turning from your back to your side while in flat bed without using bedrails?  --  3  -BP     Moving from  lying on back to sitting on the side of a flat bed without bedrails?  --  3  -BP     Moving to and from a bed to a chair (including a wheelchair)?  --  3  -BP     Standing up from a chair using your arms (e.g., wheelchair, bedside chair)?  --  3  -BP     Climbing 3-5 steps with a railing?  --  2  -BP     To walk in hospital room?  --  1  -BP     AM-PAC 6 Clicks Score (PT)  --  15  -BP        How much help from another is currently needed...    Putting on and taking off regular lower body clothing?  2  -SD  --     Bathing (including washing, rinsing, and drying)  2  -SD  --     Toileting (which includes using toilet bed pan or urinal)  2  -SD  --     Putting on and taking off regular upper body clothing  4  -SD  --     Taking care of personal grooming (such as brushing teeth)  4  -SD  --     Eating meals  4  -SD  --     AM-PAC 6 Clicks Score (OT)  18  -SD  --        Functional Assessment    Outcome Measure Options  AM-PAC 6 Clicks Daily Activity (OT)  -SD  AM-PAC 6 Clicks Basic Mobility (PT)  -BP       User Key  (r) = Recorded By, (t) = Taken By, (c) = Cosigned By    Initials Name Provider Type    Jovani Sethi, OTR Occupational Therapist    Isac Thakkar, PT Physical Therapist         Time Calculation:   PT Charges     Row Name 11/12/19 1049             Time Calculation    Start Time  0923  -BP      PT Received On  11/12/19  -BP      PT - Next Appointment  11/13/19  -BP        User Key  (r) = Recorded By, (t) = Taken By, (c) = Cosigned By    Initials Name Provider Type    Isac Thakkar PT Physical Therapist        Therapy Charges for Today     Code Description Service Date Service Provider Modifiers Qty    86996304444 HC PT EVAL LOW COMPLEXITY 2 11/12/2019 Isac Garcia, PT GP 1          PT G-Codes  Outcome Measure Options: AM-PAC 6 Clicks Daily Activity (OT)  AM-PAC 6 Clicks Score (PT): 15  AM-PAC 6 Clicks Score (OT): 18      Isac Garcia PT  11/12/2019

## 2019-11-12 NOTE — PROGRESS NOTES
Patient: Nicolasa Woodall  Procedure(s) with comments:  FEMUR HARDWARE REMOVAL - three cannulated screws removed from previous surgery  TOTAL HIP ARTHROPLASTY  Anesthesia type: [unfilled]    Patient location: [facility] Floor  Vitals:    11/12/19 0317 11/12/19 0606 11/12/19 1005 11/12/19 1109   BP: 116/59 157/83  114/73   BP Location: Left arm Left arm  Left arm   Patient Position: Lying Lying  Sitting   Pulse: 81 95 95 74   Resp: 20 20 20 18   Temp:  97.6 °F (36.4 °C)  96.6 °F (35.9 °C)   TempSrc: Oral Oral  Oral   SpO2: 97% 99% 98% 97%   Weight:       Height:         Level of consciousness: awake, alert and oriented    Post-anesthesia pain: adequate analgesia  Airway patency: patent  Respiratory: unassisted  Cardiovascular: stable and blood pressure at baseline  Hydration: euvolemic    Anesthetic complications: no

## 2019-11-12 NOTE — PROGRESS NOTES
"SERVICE: [facility] HOSPITALIST    CONSULTANTS: Orthopedic surgery    CHIEF COMPLAINT: Follow-up left ELAINE, POD 1, left hip fracture    SUBJECTIVE: The patient reports she is doing very well with PT and OT and pain is well controlled at this time.  She feels ready to go to rehab and motivated to do well to return home.  Daughter at bedside confirms. Tolerating po.  No acute concerns overnight.  The patient denies  f/c/cough/soa/chest pain/n/v/d/abdominal pain or other new concerns.    OBJECTIVE:    /73 (BP Location: Left arm, Patient Position: Sitting)   Pulse 74   Temp 96.6 °F (35.9 °C) (Oral)   Resp 18   Ht 152.4 cm (60\")   Wt 51.9 kg (114 lb 6.4 oz)   SpO2 97%   BMI 22.34 kg/m²     MEDS/LABS REVIEWED AND ORDERED    busPIRone 7.5 mg Oral BID   cefTRIAXone 1 g Intravenous Q24H   docusate sodium 100 mg Oral BID   enoxaparin 40 mg Subcutaneous Daily   influenza vaccine 0.5 mL Intramuscular Once   levothyroxine 88 mcg Oral Daily   melatonin 5 mg Oral Nightly   mirtazapine 15 mg Oral Nightly   polyethylene glycol 17 g Oral Daily   QUEtiapine 12.5 mg Oral Nightly   sodium chloride 1 g Oral BID With Meals   vancomycin      Vitamin D3 50,000 Units Oral Q7 Days     Physical Exam   Constitutional: She is oriented to person, place, and time. She appears well-developed and well-nourished.   HENT:   Head: Normocephalic and atraumatic.   Eyes: EOM are normal. Pupils are equal, round, and reactive to light.   Cardiovascular: Normal rate and regular rhythm.   Pulmonary/Chest: Effort normal and breath sounds normal.   Abdominal: Soft. Bowel sounds are normal. She exhibits no distension. There is no tenderness. There is no guarding.   Large umbilical hernia, easily reducible   Musculoskeletal: She exhibits no edema.   Neurological: She is alert and oriented to person, place, and time.   Skin: Skin is warm and dry. No erythema.   Left hip incision dressing clean, dry, intact   Psychiatric: She has a " normal mood and affect. Her behavior is normal.   Vitals reviewed.    LAB/DIAGNOSTICS:    Lab Results (last 24 hours)     Procedure Component Value Units Date/Time    Urine Culture - Urine, Urine, Clean Catch [[id number]]  (Abnormal) Collected:  11/11/19 1407    Specimen:  Urine, Clean Catch Updated:  11/12/19 0942     Urine Culture >100,000 CFU/mL Gram Negative Bacilli    Basic Metabolic Panel [[id number]]  (Abnormal) Collected:  11/12/19 0427    Specimen:  Blood Updated:  11/12/19 0526     Glucose 147 mg/dL      BUN 24 mg/dL      Creatinine 0.77 mg/dL      Sodium 131 mmol/L      Potassium 5.1 mmol/L      Chloride 98 mmol/L      CO2 22.7 mmol/L      Calcium 7.6 mg/dL      eGFR Non African Amer 71 mL/min/1.73      BUN/Creatinine Ratio 31.2     Anion Gap 10.3 mmol/L     Narrative:       GFR Normal >60  Chronic Kidney Disease <60  Kidney Failure <15    Hemoglobin & Hematocrit, Blood [[id number]]  (Abnormal) Collected:  11/12/19 0427    Specimen:  Blood Updated:  11/12/19 0500     Hemoglobin 9.6 g/dL      Hematocrit 29.1 %     Hemoglobin & Hematocrit, Blood [[id number]]  (Abnormal) Collected:  11/11/19 2226    Specimen:  Blood Updated:  11/11/19 2230     Hemoglobin 10.8 g/dL      Hematocrit 32.8 %     Urinalysis, Microscopic Only - Urine, Clean Catch [[id number]]  (Abnormal) Collected:  11/11/19 1407    Specimen:  Urine, Clean Catch Updated:  11/11/19 1453     RBC, UA Too Numerous to Count /HPF      WBC, UA Too Numerous to Count /HPF      Bacteria, UA 1+ /HPF      Squamous Epithelial Cells, UA None Seen /HPF      Hyaline Casts, UA None Seen /LPF      Methodology Manual Light Microscopy    Urinalysis With Culture If Indicated - Urine, Clean Catch [[id number]]  (Abnormal) Collected:  11/11/19 1407    Specimen:  Urine, Clean Catch Updated:  11/11/19 1438     Color, UA Yellow     Appearance, UA Cloudy     pH, UA 6.0     Specific Gravity, UA 1.020     Glucose, UA Negative     Ketones, UA Negative     Bilirubin, UA Negative      Blood, UA Moderate (2+)     Protein,  mg/dL (2+)     Leuk Esterase, UA Large (3+)     Nitrite, UA Positive     Urobilinogen, UA 0.2 E.U./dL        ECG 12 Lead   Final Result   HEART RATE= 106  bpm   RR Interval= 644  ms   OH Interval= 140  ms   P Horizontal Axis= 52  deg   P Front Axis= 45  deg   QRSD Interval= 81  ms   QT Interval= 365  ms   QRS Axis= 3  deg   T Wave Axis= 9  deg   - ABNORMAL ECG -   Sinus tachycardia   Atrial premature complexes   NO SIGNIFICANT CHANGE FROM PREVIOUS ECG   Electronically Signed By: Shelby Nuñez ([facility]) 12-Nov-2019 09:44:41   Date and Time of Study: 2019-11-11 16:22:58      ECG 12 Lead   Final Result   HEART RATE= 106  bpm   RR Interval= 567  ms   OH Interval= 144  ms   P Horizontal Axis= 4  deg   P Front Axis= 51  deg   QRSD Interval= 85  ms   QT Interval= 354  ms   QRS Axis= -2  deg   T Wave Axis= 1  deg   - BORDERLINE ECG -   Sinus tachycardia with irregular rate   NO SIGNIFICANT CHANGE FROM PREVIOUS ECG   Electronically Signed By: Emir Grant ([facility]) 11-Nov-2019 13:15:43   Date and Time of Study: 2019-11-11 10:12:45      ECG 12 Lead   Final Result   HEART RATE= 111  bpm   RR Interval= 580  ms   OH Interval= 178  ms   P Horizontal Axis= 47  deg   P Front Axis= 38  deg   QRSD Interval= 90  ms   QT Interval= 344  ms   QRS Axis= -18  deg   T Wave Axis= 24  deg   - ABNORMAL ECG -   Sinus tachycardia   Atrial premature complexes   Consider RVH or posterior infarct   Inferior infarct, old   No change from prior tracing   Electronically Signed By: Sydney Sierra ([facility]) 09-Nov-2019 17:29:22   Date and Time of Study: 2019-11-08 17:39:52        Results for orders placed during the hospital encounter of 04/18/18   Adult Transthoracic Echo Complete W/ Cont if Necessary Per Protocol    Narrative · Left ventricular systolic function is hyperdynamic (EF > 70).  · There is mild calcification of the aortic valve.        No radiology results for the last day    ASSESSMENT/PLAN:  Left ELAINE  "2/2 left femoral head collapse, previous hip pinning, POD 1: Orthopedic surgery following  Continue PT/OT, Lovenox for DVT prophylaxis  TCU referral made, doing well    Leukocytosis 2/2 acute GNB UTI: Add Rocephin pending culture and sensitivity     Acute on chronic normocytic anemia:  Baseline hemoglobin approximately 11, today 9.6  No active blood loss noted, monitor     H/O DVT/PEs with IVC filter: Hematology following  H/O retroperitoneal hematoma/subdural bleeding:   Intolerant of anticoagulation, monitor on lovenox      Chronic hyponatremia:  Baseline 124 to 129, currently 131  Continue home salt tablets  Monitor my recheck in a.m.     CKD stage III:  Baseline creatinine approximately 0.8-1.1, currently 0.77, no acute issues     H/O hypertension:   No acute issues, SBP 110s     Insomnia: Resolved with Seroquel and melatonin, continue     Hypothyroidism: TSH normal on home levothyroxine 88 mcg daily     Anxiety/depression: No acute issues on home BuSpar and Remeron    Insomnia: No acute issues on Seroquel 12.5 mg nightly, melatonin 5 mg nightly     Chronic constipation: Continues Colace and MiraLAX, monitor postoperatively for need to add other agents     Vitamin D deficiency: Supplement started this admission    PLAN FOR DISPOSITION: Rehab when able    JOÃO Williamson  Hospitalist, [facility]  11/12/19  2:17 PM    \"Dictated utilizing Dragon dictation\"    "

## 2019-11-12 NOTE — THERAPY TREATMENT NOTE
Acute Care - Physical Therapy Treatment Note  HENNY Nguyen     Patient Name: Nicolasa Woodall  : 1930  MRN: [id number]  Today's Date: 2019  Onset of Illness/Injury or Date of Surgery: 19  Date of Referral to PT: 19  Referring Physician: Roman    Admit Date: 2019    Visit Dx:    ICD-10-CM ICD-9-CM   1. Closed left hip fracture, initial encounter (CMS/[facility]) S72.002A 820.8     Patient Active Problem List   Diagnosis   • Traumatic intraventricular hemorrhage (CMS/[facility])   • Hyponatremia   • Traumatic rhabdomyolysis (CMS/[facility])   • Syncope   • Abnormal urinalysis   • Essential hypertension   • Chronic kidney disease, stage 3 (CMS/[facility])   • AMI (acute kidney injury) (CMS/[facility])   • Dehydration   • Collapse of vertebra (CMS/[facility])   • PAC (premature atrial contraction)   • Closed left hip fracture, initial encounter (CMS/[facility])       Therapy Treatment    Rehabilitation Treatment Summary     Row Name 19 1308             Treatment Time/Intention    Discipline  physical therapist  -BP      Document Type  therapy note (daily note)  -BP      Subjective Information  no complaints  -BP      Mode of Treatment  physical therapy  -BP      Patient/Family Observations  Patient reclined in bedside chair. Agreeable to PT treatment   -BP      Care Plan Review  risks/benefits reviewed  -BP      Patient Effort  good  -BP      Existing Precautions/Restrictions  fall  -BP      Treatment Considerations/Comments  Patient able to recall 2/3 of hip precautions without cues, she recalls the third with cues.   -BP      Recorded by [BP] Isac Garcia, PT 19 1353      Row Name 19 1308             Vital Signs    Pre SpO2 (%)  94  -BP      O2 Delivery Pre Treatment  room air  -BP      Post SpO2 (%)  98  -BP      O2 Delivery Post Treatment  room air  -BP      Recorded by [BP] Isac Garcia, PT 19 1353      Row Name 19 1308             Cognitive Assessment/Intervention    Additional Documentation   Cognitive Assessment/Intervention (Group)  -BP      Recorded by [BP] Isac Garcia, PT 11/12/19 1353      Row Name 11/12/19 1308             Cognitive Assessment/Intervention- PT/OT    Personal Safety Interventions  gait belt;supervised activity  -BP      Recorded by [BP] Isac Garcia, PT 11/12/19 1353      Row Name 11/12/19 1308             Safety Issues, Functional Mobility    Safety Issues Affecting Function (Mobility)  safety precautions follow-through/compliance  -BP      Comment, Safety Issues/Impairments (Mobility)  Requires cues to adhere to hip precautions during mobility   -BP      Recorded by [BP] Isac Garcia, PT 11/12/19 1353      Row Name 11/12/19 1308             Bed Mobility Assessment/Treatment    Bed Mobility Assessment/Treatment  sit-supine  -BP      Sit-Supine [place] (Bed Mobility)  minimum assist (75% patient effort);2 person assist;verbal cues  -BP      Assistive Device (Bed Mobility)  bed rails  -BP      Recorded by [BP] Isac Garcia, PT 11/12/19 1353      Row Name 11/12/19 1308             Transfer Assessment/Treatment    Transfer Assessment/Treatment  sit-stand transfer;stand-sit transfer;toilet transfer  -BP      Comment (Transfers)  Verbal cues for hand placement  -BP      Recorded by [BP] Isac Garcia, PT 11/12/19 1353      Row Name 11/12/19 1308             Sit-Stand Transfer    Sit-Stand [place] (Transfers)  minimum assist (75% patient effort);2 person assist;verbal cues  -BP      Assistive Device (Sit-Stand Transfers)  walker, front-wheeled  -BP      Recorded by [BP] Isac Garcia, PT 11/12/19 1353      Row Name 11/12/19 1308             Stand-Sit Transfer    Stand-Sit [place] (Transfers)  minimum assist (75% patient effort);2 person assist;verbal cues  -BP      Assistive Device (Stand-Sit Transfers)  walker, front-wheeled  -BP      Recorded by [BP] Isac Garcia, PT 11/12/19 1353      Row Name 11/12/19 1308             Toilet Transfer     Type (Toilet Transfer)  sit-stand;stand-sit  -BP      [place] Level (Toilet Transfer)  minimum assist (75% patient effort);2 person assist;verbal cues  -BP      Assistive Device (Toilet Transfer)  walker, front-wheeled;commode, 3-in-1  -BP      Recorded by [BP] Isac Garcia, PT 11/12/19 1353      Row Name 11/12/19 1301             Gait/Stairs Assessment/Training    [place] Level (Gait)  contact guard;verbal cues  -BP      Assistive Device (Gait)  walker, front-wheeled  -BP      Distance in Feet (Gait)  58  -BP      Pattern (Gait)  swing-to  -BP      Deviations/Abnormal Patterns (Gait)  base of support, wide;stride length decreased;gait speed decreased  -BP      Bilateral Gait Deviations  forward flexed posture  -BP      Comment (Gait/Stairs)  Patient initially demonstrates swing to however able to progress to swing through. Patient able to manage AD without assist.   -BP      Recorded by [BP] Isac Garcia, PT 11/12/19 1353      Row Name 11/12/19 1305             Positioning and Restraints    Pre-Treatment Position  sitting in chair/recliner  -BP      Post Treatment Position  bed  -BP      In Bed  notified nsg;supine;call light within reach;encouraged to call for assist  -BP      Recorded by [BP] Isac Garcia, PT 11/12/19 1353      Row Name 11/12/19 1309             Pain Assessment    Additional Documentation  Pain Scale: Numbers Pre/Post-Treatment (Group)  -BP      Recorded by [BP] Isac Garcia, PT 11/12/19 1353      Row Name 11/12/19 1301             Pain Scale: Numbers Pre/Post-Treatment    Pain Scale: Numbers, Pretreatment  0/10 - no pain  -BP      Pain Scale: Numbers, Post-Treatment  0/10 - no pain  -BP      Pre/Post Treatment Pain Comment  patient continues to deny pain with mobility and at rest   -BP      Recorded by [BP] Isac Garcia, PT 11/12/19 1353      Row Name                Wound 11/11/19 2011 Left hip Incision    Wound - Properties Group Date first assessed:  11/11/19 [TG] Time first assessed: 2011 [TG] Side: Left [TG] Location: hip [TG] Primary Wound Type: Incision [TG] Recorded by:  [TG] Pili Polo RN 11/11/19 2011    Row Name 11/12/19 1308             Plan of Care Review    Plan of Care Reviewed With  patient  -BP      Recorded by [BP] Isac Garcia, PT 11/12/19 1353      Row Name 11/12/19 1308             Outcome Summary/Treatment Plan (PT)    Daily Summary of Progress (PT)  progress toward functional goals as expected  -BP      Recorded by [BP] Isac Garcia, PT 11/12/19 1353        User Key  (r) = Recorded By, (t) = Taken By, (c) = Cosigned By    Initials Name Effective Dates Discipline    BP Isac Garcia, PT 04/03/18 -  PT    TG Pili Polo RN 03/02/18 -  Nurse          Wound 11/11/19 2011 Left hip Incision (Active)   Dressing Appearance dry;intact 11/12/2019  8:04 AM   Closure WINNIE 11/12/2019  8:04 AM       Rehab Goal Summary     Row Name 11/12/19 0950 11/12/19 0921          Physical Therapy Goals    Bed Mobility Goal Selection (PT)  --  bed mobility, PT goal 1  -BP     Transfer Goal Selection (PT)  --  transfer, PT goal 1  -BP     Gait Training Goal Selection (PT)  --  gait training, PT goal 1  -BP     Precaution Goal Selection (PT)  --  precaution, PT goal 1  -BP        Bed Mobility Goal 1 (PT)    Activity/Assistive Device (Bed Mobility Goal 1, PT)  --  bed mobility activities, all  -BP     [place] Level/Cues Needed (Bed Mobility Goal 1, PT)  --  minimum assist (75% or more patient effort)  -BP     Time Frame (Bed Mobility Goal 1, PT)  --  3 days  -BP     Progress/Outcomes (Bed Mobility Goal 1, PT)  --  goal ongoing  -BP        Transfer Goal 1 (PT)    Activity/Assistive Device (Transfer Goal 1, PT)  --  sit-to-stand/stand-to-sit;walker, rolling  -BP     [place] Level/Cues Needed (Transfer Goal 1, PT)  --  standby assist  -BP     Time Frame (Transfer Goal 1, PT)  --  3 days  -BP     Progress/Outcome (Transfer Goal 1, PT)  --   goal ongoing  -BP        Gait Training Goal 1 (PT)    Activity/Assistive Device (Gait Training Goal 1, PT)  --  gait (walking locomotion);walker, rolling  -BP     [place] Level (Gait Training Goal 1, PT)  --  contact guard assist  -BP     Distance (Gait Goal 1, PT)  --  75  -BP     Time Frame (Gait Training Goal 1, PT)  --  3 days  -BP     Progress/Outcome (Gait Training Goal 1, PT)  --  goal ongoing  -BP        Precaution Goal 1 (PT)    Activity (Precaution Goal 1, PT)  --  hip precautions  -BP     [place] Level/Cues Needed (Precautions Goal 1, PT)  --  physical/tactile cues;verbal cues/redirection  -BP     Time Frame (Precaution Goal 1, PT)  --  3 days  -BP     Progress/Outcome (Precaution Goal 1, PT)  --  goal ongoing  -BP        Dressing Goal 1 (OT)    Activity/Assistive Device (Dressing Goal 1, OT)  lower body dressing;sock-aid;reacher;long handled shoe horn  -SD  --     [place]/Cues Needed (Dressing Goal 1, OT)  minimum assist (75% or more patient effort);verbal cues required  -SD  --     Time Frame (Dressing Goal 1, OT)  by discharge  -SD  --     Barriers (Dressing Goal 1, OT)  TH precautions  -SD  --     Progress/Outcome (Dressing Goal 1, OT)  -- new goal  -SD  --        Patient Education Goal (OT)    Activity (Patient Education Goal, OT)  Pt to verbalize 3/3 TH precautions  -SD  --     [place]/Cues/Accuracy (Memory Goal 2, OT)  verbalizes understanding  -SD  --     Time Frame (Patient Education Goal, OT)  by discharge  -SD  --     Progress/Outcome (Patient Education Goal, OT)  other (see comments) new goal  -SD  --       User Key  (r) = Recorded By, (t) = Taken By, (c) = Cosigned By    Initials Name Provider Type Discipline    SD Jovani Cantu OTR Occupational Therapist OT    Isac Thakkar, PT Physical Therapist PT          Physical Therapy Education     Title: PT OT SLP Therapies (In Progress)     Topic: Physical Therapy (In Progress)     Point: Mobility training (Done)      Learning Progress Summary           Patient Acceptance, E,TB, VU by BP at 11/12/2019  1:53 PM    Acceptance, E,TB, VU by BP at 11/12/2019 10:42 AM   Family Acceptance, E,TB, VU by BP at 11/12/2019 10:42 AM                   Point: Precautions (Done)     Learning Progress Summary           Patient Acceptance, E,TB, VU by BP at 11/12/2019  1:53 PM    Acceptance, E,TB, VU by BP at 11/12/2019 10:42 AM   Family Acceptance, E,TB, VU by BP at 11/12/2019 10:42 AM                               User Key     Initials Effective Dates Name Provider Type Discipline     04/03/18 -  Isac Garcia, PT Physical Therapist PT                PT Recommendation and Plan  Anticipated Discharge Disposition (PT): skilled nursing facility  Planned Therapy Interventions (PT Eval): bed mobility training, gait training, home exercise program, patient/family education, strengthening, transfer training  Therapy Frequency (PT Clinical Impression): 2 times/day  Outcome Summary/Treatment Plan (PT)  Daily Summary of Progress (PT): progress toward functional goals as expected  Anticipated Discharge Disposition (PT): skilled nursing facility  Plan of Care Reviewed With: patient  Outcome Summary: PT: Patient performs sit to supine transfer with min A x 2, sit to/from stand transfers with min A x 2 and gait x 58 feet with CGA with use of FWW. Patient able to recall 2/3 hip precautions without cues and the third with cues. She requires cues to adhere to them with mobility. Continue to recommend STR at discharge.   Outcome Measures     Row Name 11/12/19 1308 11/12/19 0930 11/12/19 0923       How much help from another person do you currently need...    Turning from your back to your side while in flat bed without using bedrails?  3  -BP  --  3  -BP    Moving from lying on back to sitting on the side of a flat bed without bedrails?  3  -BP  --  3  -BP    Moving to and from a bed to a chair (including a wheelchair)?  3  -BP  --  3  -BP    Standing up  from a chair using your arms (e.g., wheelchair, bedside chair)?  3  -BP  --  3  -BP    Climbing 3-5 steps with a railing?  2  -BP  --  1  -BP    To walk in hospital room?  3  -BP  --  2  -BP    AM-PAC 6 Clicks Score (PT)  17  -BP  --  15  -BP       How much help from another is currently needed...    Putting on and taking off regular lower body clothing?  --  2  -SD  --    Bathing (including washing, rinsing, and drying)  --  2  -SD  --    Toileting (which includes using toilet bed pan or urinal)  --  2  -SD  --    Putting on and taking off regular upper body clothing  --  4  -SD  --    Taking care of personal grooming (such as brushing teeth)  --  4  -SD  --    Eating meals  --  4  -SD  --    AM-PAC 6 Clicks Score (OT)  --  18  -SD  --       Functional Assessment    Outcome Measure Options  AM-PAC 6 Clicks Basic Mobility (PT)  -BP  AM-PAC 6 Clicks Daily Activity (OT)  -SD  AM-PAC 6 Clicks Basic Mobility (PT)  -BP      User Key  (r) = Recorded By, (t) = Taken By, (c) = Cosigned By    Initials Name Provider Type    SD Jovani Cantu, OTR Occupational Therapist    Isac Thakkar PT Physical Therapist         Time Calculation:   PT Charges     Row Name 11/12/19 1356 11/12/19 1049          Time Calculation    Start Time  1308  -BP  0923  -BP     Stop Time  1340  -BP  --     Time Calculation (min)  32 min  -BP  --     PT Received On  11/12/19  -BP  11/12/19  -BP     PT - Next Appointment  11/13/19  -BP  11/13/19  -BP        Timed Charges    11406 - PT Therapeutic Exercise Minutes  17  -BP  --     97546 - Gait Training Minutes   15  -BP  --       User Key  (r) = Recorded By, (t) = Taken By, (c) = Cosigned By    Initials Name Provider Type    Isac Thakkar, PT Physical Therapist        Therapy Charges for Today     Code Description Service Date Service Provider Modifiers Qty    09809931614 HC PT EVAL LOW COMPLEXITY 2 11/12/2019 Isac Garcia, PT GP 1    07935958119 HC PT THER PROC EA 15 MIN 11/12/2019  Isac Garcia, PT GP 1    93344196941 HC GAIT TRAINING EA 15 MIN 11/12/2019 Isac Garcia, PT GP 1    98779370020 HC PT THER SUPP EA 15 MIN 11/12/2019 Isac Garcia, PT GP 2          PT G-Codes  Outcome Measure Options: AM-PAC 6 Clicks Basic Mobility (PT)  AM-PAC 6 Clicks Score (PT): 17  AM-PAC 6 Clicks Score (OT): 18    Isac Garcia, PT  11/12/2019

## 2019-11-12 NOTE — PLAN OF CARE
Problem: Patient Care Overview  Goal: Plan of Care Review   11/12/19 1015   OTHER   Outcome Summary OT evaluation completed. Pt performed transfers and mobility x 20 feet using a rolling walker with min assist. Pt requires mod/max assist for lower body adl's currenlty due to posterior hip precautions. Rec OT for education regarding compensatory strategies and use of adaptive equipment to manage adl activity while adhering to hip precautions. Rec STR upon discharge from acute care.

## 2019-11-12 NOTE — PROGRESS NOTES
POD# 1 s/p left ELAINE    Subjective: Nicolasa Woodall resting in bed this am. Pain fairly well controlled with medication, increased pain with motion, relief with rest. Denies presence of fevers, sweats, chills overnight. Denies presence numbness/tingling left lower extremity.     Objective:  Vitals:    11/12/19 0100 11/12/19 0202 11/12/19 0317 11/12/19 0606   BP: 117/69 119/77 116/59 157/83   BP Location: Left arm Left arm Left arm Left arm   Patient Position: Lying Lying Lying Lying   Pulse: 85 87 81 95   Resp: 20 20 20 20   Temp:    97.6 °F (36.4 °C)   TempSrc:   Oral Oral   SpO2: 97% 95% 97% 99%   Weight:       Height:           Results from last 7 days   Lab Units 11/12/19  0427 11/11/19  2226 11/11/19  0331 11/10/19  0345 11/09/19  0357   WBC 10*3/mm3  --   --  10.58 13.11* 11.71*   HEMOGLOBIN g/dL 9.6* 10.8* 11.1* 11.1* 9.7*   HEMATOCRIT % 29.1* 32.8* 33.7* 33.5* 29.2*   PLATELETS 10*3/mm3  --   --  419 401 365       Results from last 7 days   Lab Units 11/12/19  0427 11/11/19  0331 11/10/19  0345   SODIUM mmol/L 131* 125* 129*   POTASSIUM mmol/L 5.1 4.3 4.2   CHLORIDE mmol/L 98 90* 94*   CO2 mmol/L 22.7 24.7 22.8   BUN mg/dL 24* 18 17   CREATININE mg/dL 0.77 0.92 0.86   GLUCOSE mg/dL 147* 103* 103*   CALCIUM mg/dL 7.6* 7.9* 7.9*       Exam:    Left lower extremity   Dressing clean, dry, intact  Positive sensation light touch all distributions left lower extremity  Negative calf tenderness, negative Homans sign  Flex extend all digits left foot, full range of motion ankle  2+ DP pulse    Impression: s/p left ELAINE    Plan:  1. PT/OT-weightbearing as tolerated, posterior hip precautions  2. Pain control- percocet  3. DVT prophylaxis- Lovenox  4. Wound care- dressing remain intact until follow-up in office  5. Disposition- short-term rehab once medically stable and cleared PT/OT

## 2019-11-12 NOTE — PLAN OF CARE
Problem: Patient Care Overview  Goal: Plan of Care Review   11/12/19 1042   Coping/Psychosocial   Plan of Care Reviewed With patient;daughter   OTHER   Outcome Summary PT Evaluation Complete: Patient performs sit to/from stand transfers with min A x 2 and gait x 20 feet with min A with FWW. Patient requires cues and education regarding posterior hip precautions and ues to adhere to them with mobility. Patient would benefit from skilled PT services to address deficits in funtional mobility and LE strength. Plan to see patient 2x/day. Recommend STR at discharge from acute care.

## 2019-11-12 NOTE — PLAN OF CARE
Problem: Patient Care Overview  Goal: Plan of Care Review   11/12/19 5403   Coping/Psychosocial   Plan of Care Reviewed With patient   OTHER   Outcome Summary PT: Patient performs sit to supine transfer with min A x 2, sit to/from stand transfers with min A x 2 and gait x 58 feet with CGA with use of FWW. Patient able to recall 2/3 hip precautions without cues and the third with cues. She requires cues to adhere to them with mobility. Continue to recommend STR at discharge.

## 2019-11-12 NOTE — NURSING NOTE
Continued Stay Note  HENNY Nguyen     Patient Name: Nicolasa Woodall  MRN: [id number]  Today's Date: 11/12/2019    Admit Date: 11/8/2019    Discharge Plan     Row Name 11/12/19 1437       Plan    Plan [facility]    Patient/Family in Agreement with Plan  yes    Plan Comments  Followed up with patient today post surgery. Patient is currently resting in bed with multiple family membber at beds. Permission granted to speak with family present. Patient state she would like [facility] for short term rehab prior to returning home. CM informed her that [facility] aware and is aware and will be following closely for discharge. Patient had no other questions or concerns regarding discharge plans at this time. Name and number placed on white board in room. CM will continue to follow for needs.        Discharge Codes    No documentation.             Michaela Pinto, RN

## 2019-11-13 ENCOUNTER — HOSPITAL ENCOUNTER (INPATIENT)
Facility: HOSPITAL | Age: 84
LOS: 14 days | Discharge: HOME HEALTH | End: 2019-11-27
Attending: HOSPITALIST | Admitting: HOSPITALIST

## 2019-11-13 VITALS
BODY MASS INDEX: 22.46 KG/M2 | WEIGHT: 114.4 LBS | TEMPERATURE: 97 F | DIASTOLIC BLOOD PRESSURE: 75 MMHG | HEIGHT: 60 IN | SYSTOLIC BLOOD PRESSURE: 136 MMHG | RESPIRATION RATE: 18 BRPM | OXYGEN SATURATION: 97 % | HEART RATE: 93 BPM

## 2019-11-13 PROBLEM — R53.1 WEAKNESS GENERALIZED: Status: ACTIVE | Noted: 2019-11-13

## 2019-11-13 LAB
ANION GAP SERPL CALCULATED.3IONS-SCNC: 9.3 MMOL/L (ref 5–15)
BACTERIA SPEC AEROBE CULT: ABNORMAL
BASOPHILS # BLD AUTO: 0.05 10*3/MM3 (ref 0–0.2)
BASOPHILS NFR BLD AUTO: 0.5 % (ref 0–1.5)
BUN BLD-MCNC: 29 MG/DL (ref 8–23)
BUN/CREAT SERPL: 29.3 (ref 7–25)
CALCIUM SPEC-SCNC: 8.1 MG/DL (ref 8.6–10.5)
CHLORIDE SERPL-SCNC: 93 MMOL/L (ref 98–107)
CO2 SERPL-SCNC: 26.7 MMOL/L (ref 22–29)
CREAT BLD-MCNC: 0.99 MG/DL (ref 0.57–1)
DEPRECATED RDW RBC AUTO: 43.8 FL (ref 37–54)
EOSINOPHIL # BLD AUTO: 0.2 10*3/MM3 (ref 0–0.4)
EOSINOPHIL NFR BLD AUTO: 1.9 % (ref 0.3–6.2)
ERYTHROCYTE [DISTWIDTH] IN BLOOD BY AUTOMATED COUNT: 12.8 % (ref 12.3–15.4)
GFR SERPL CREATININE-BSD FRML MDRD: 53 ML/MIN/1.73
GLUCOSE BLD-MCNC: 98 MG/DL (ref 65–99)
HCT VFR BLD AUTO: 25.5 % (ref 34–46.6)
HGB BLD-MCNC: 8.2 G/DL (ref 12–15.9)
IMM GRANULOCYTES # BLD AUTO: 0.09 10*3/MM3 (ref 0–0.05)
IMM GRANULOCYTES NFR BLD AUTO: 0.9 % (ref 0–0.5)
LYMPHOCYTES # BLD AUTO: 1.76 10*3/MM3 (ref 0.7–3.1)
LYMPHOCYTES NFR BLD AUTO: 16.8 % (ref 19.6–45.3)
MCH RBC QN AUTO: 29.6 PG (ref 26.6–33)
MCHC RBC AUTO-ENTMCNC: 32.2 G/DL (ref 31.5–35.7)
MCV RBC AUTO: 92.1 FL (ref 79–97)
MONOCYTES # BLD AUTO: 1.04 10*3/MM3 (ref 0.1–0.9)
MONOCYTES NFR BLD AUTO: 9.9 % (ref 5–12)
NEUTROPHILS # BLD AUTO: 7.34 10*3/MM3 (ref 1.7–7)
NEUTROPHILS NFR BLD AUTO: 70 % (ref 42.7–76)
NRBC BLD AUTO-RTO: 0 /100 WBC (ref 0–0.2)
PLATELET # BLD AUTO: 412 10*3/MM3 (ref 140–450)
PMV BLD AUTO: 9.2 FL (ref 6–12)
POTASSIUM BLD-SCNC: 5.1 MMOL/L (ref 3.5–5.2)
RBC # BLD AUTO: 2.77 10*6/MM3 (ref 3.77–5.28)
SODIUM BLD-SCNC: 129 MMOL/L (ref 136–145)
WBC NRBC COR # BLD: 10.48 10*3/MM3 (ref 3.4–10.8)

## 2019-11-13 PROCEDURE — 25010000002 CEFTRIAXONE SODIUM-DEXTROSE 1-3.74 GM-%(50ML) RECONSTITUTED SOLUTION: Performed by: NURSE PRACTITIONER

## 2019-11-13 PROCEDURE — 25010000002 ENOXAPARIN PER 10 MG: Performed by: ORTHOPAEDIC SURGERY

## 2019-11-13 PROCEDURE — 25010000002 INFLUENZA VAC SUBUNIT QUAD 0.5 ML SUSPENSION PREFILLED SYRINGE: Performed by: HOSPITALIST

## 2019-11-13 PROCEDURE — 90732 PPSV23 VACC 2 YRS+ SUBQ/IM: CPT | Performed by: NURSE PRACTITIONER

## 2019-11-13 PROCEDURE — G0008 ADMIN INFLUENZA VIRUS VAC: HCPCS | Performed by: HOSPITALIST

## 2019-11-13 PROCEDURE — 90674 CCIIV4 VAC NO PRSV 0.5 ML IM: CPT | Performed by: HOSPITALIST

## 2019-11-13 PROCEDURE — 80048 BASIC METABOLIC PNL TOTAL CA: CPT | Performed by: HOSPITALIST

## 2019-11-13 PROCEDURE — 97161 PT EVAL LOW COMPLEX 20 MIN: CPT

## 2019-11-13 PROCEDURE — G0009 ADMIN PNEUMOCOCCAL VACCINE: HCPCS | Performed by: NURSE PRACTITIONER

## 2019-11-13 PROCEDURE — 97165 OT EVAL LOW COMPLEX 30 MIN: CPT

## 2019-11-13 PROCEDURE — 99239 HOSP IP/OBS DSCHRG MGMT >30: CPT | Performed by: NURSE PRACTITIONER

## 2019-11-13 PROCEDURE — 85025 COMPLETE CBC W/AUTO DIFF WBC: CPT | Performed by: NURSE PRACTITIONER

## 2019-11-13 PROCEDURE — 99304 1ST NF CARE SF/LOW MDM 25: CPT | Performed by: HOSPITALIST

## 2019-11-13 PROCEDURE — 25010000002 PNEUMOCOCCAL VAC POLYVALENT PER 0.5 ML: Performed by: NURSE PRACTITIONER

## 2019-11-13 PROCEDURE — 97535 SELF CARE MNGMENT TRAINING: CPT

## 2019-11-13 RX ORDER — HYDROCODONE BITARTRATE AND ACETAMINOPHEN 7.5; 325 MG/1; MG/1
1 TABLET ORAL EVERY 4 HOURS PRN
Status: CANCELLED | OUTPATIENT
Start: 2019-11-13 | End: 2019-11-21

## 2019-11-13 RX ORDER — QUETIAPINE FUMARATE 25 MG/1
12.5 TABLET, FILM COATED ORAL NIGHTLY
Status: CANCELLED | OUTPATIENT
Start: 2019-11-13

## 2019-11-13 RX ORDER — MIRTAZAPINE 15 MG/1
15 TABLET, FILM COATED ORAL NIGHTLY
Status: CANCELLED | OUTPATIENT
Start: 2019-11-13

## 2019-11-13 RX ORDER — DOCUSATE SODIUM 100 MG/1
100 CAPSULE, LIQUID FILLED ORAL 2 TIMES DAILY
Status: CANCELLED | OUTPATIENT
Start: 2019-11-13

## 2019-11-13 RX ORDER — ONDANSETRON 4 MG/1
4 TABLET, FILM COATED ORAL EVERY 6 HOURS PRN
Status: DISCONTINUED | OUTPATIENT
Start: 2019-11-13 | End: 2019-11-27 | Stop reason: HOSPADM

## 2019-11-13 RX ORDER — DOCUSATE SODIUM 100 MG/1
100 CAPSULE, LIQUID FILLED ORAL 2 TIMES DAILY
Status: DISCONTINUED | OUTPATIENT
Start: 2019-11-13 | End: 2019-11-27 | Stop reason: HOSPADM

## 2019-11-13 RX ORDER — ACETAMINOPHEN 325 MG/1
650 TABLET ORAL EVERY 6 HOURS PRN
Status: CANCELLED | OUTPATIENT
Start: 2019-11-13

## 2019-11-13 RX ORDER — ONDANSETRON 4 MG/1
4 TABLET, FILM COATED ORAL EVERY 6 HOURS PRN
Status: CANCELLED | OUTPATIENT
Start: 2019-11-13

## 2019-11-13 RX ORDER — SENNA AND DOCUSATE SODIUM 50; 8.6 MG/1; MG/1
2 TABLET, FILM COATED ORAL 2 TIMES DAILY PRN
Status: DISCONTINUED | OUTPATIENT
Start: 2019-11-13 | End: 2019-11-27 | Stop reason: HOSPADM

## 2019-11-13 RX ORDER — QUETIAPINE FUMARATE 25 MG/1
12.5 TABLET, FILM COATED ORAL NIGHTLY
Status: DISCONTINUED | OUTPATIENT
Start: 2019-11-13 | End: 2019-11-27 | Stop reason: HOSPADM

## 2019-11-13 RX ORDER — DOXYCYCLINE HYCLATE 50 MG/1
324 CAPSULE, GELATIN COATED ORAL
Status: CANCELLED | OUTPATIENT
Start: 2019-11-13

## 2019-11-13 RX ORDER — ONDANSETRON 2 MG/ML
4 INJECTION INTRAMUSCULAR; INTRAVENOUS EVERY 6 HOURS PRN
Status: DISCONTINUED | OUTPATIENT
Start: 2019-11-13 | End: 2019-11-27 | Stop reason: HOSPADM

## 2019-11-13 RX ORDER — CHOLECALCIFEROL (VITAMIN D3) 1250 MCG
50000 CAPSULE ORAL
Status: DISCONTINUED | OUTPATIENT
Start: 2019-11-16 | End: 2019-11-27 | Stop reason: HOSPADM

## 2019-11-13 RX ORDER — BUSPIRONE HYDROCHLORIDE 15 MG/1
7.5 TABLET ORAL 2 TIMES DAILY
Status: DISCONTINUED | OUTPATIENT
Start: 2019-11-13 | End: 2019-11-27 | Stop reason: HOSPADM

## 2019-11-13 RX ORDER — CHOLECALCIFEROL (VITAMIN D3) 125 MCG
5 CAPSULE ORAL NIGHTLY
Status: CANCELLED | OUTPATIENT
Start: 2019-11-13

## 2019-11-13 RX ORDER — BUSPIRONE HYDROCHLORIDE 15 MG/1
7.5 TABLET ORAL 2 TIMES DAILY
Status: CANCELLED | OUTPATIENT
Start: 2019-11-13

## 2019-11-13 RX ORDER — CEFUROXIME AXETIL 250 MG/1
250 TABLET ORAL EVERY 12 HOURS SCHEDULED
Status: DISCONTINUED | OUTPATIENT
Start: 2019-11-13 | End: 2019-11-13 | Stop reason: HOSPADM

## 2019-11-13 RX ORDER — LEVOTHYROXINE SODIUM 88 UG/1
88 TABLET ORAL DAILY
Status: CANCELLED | OUTPATIENT
Start: 2019-11-14

## 2019-11-13 RX ORDER — DOCUSATE SODIUM 100 MG/1
100 CAPSULE, LIQUID FILLED ORAL 2 TIMES DAILY PRN
Status: DISCONTINUED | OUTPATIENT
Start: 2019-11-13 | End: 2019-11-27 | Stop reason: HOSPADM

## 2019-11-13 RX ORDER — CHOLECALCIFEROL (VITAMIN D3) 1250 MCG
50000 CAPSULE ORAL
Status: CANCELLED | OUTPATIENT
Start: 2019-11-16

## 2019-11-13 RX ORDER — LEVOTHYROXINE SODIUM 88 UG/1
88 TABLET ORAL DAILY
Status: DISCONTINUED | OUTPATIENT
Start: 2019-11-14 | End: 2019-11-27 | Stop reason: HOSPADM

## 2019-11-13 RX ORDER — DOXYCYCLINE HYCLATE 50 MG/1
324 CAPSULE, GELATIN COATED ORAL
Status: DISCONTINUED | OUTPATIENT
Start: 2019-11-13 | End: 2019-11-13 | Stop reason: HOSPADM

## 2019-11-13 RX ORDER — DOCUSATE SODIUM 100 MG/1
100 CAPSULE, LIQUID FILLED ORAL 2 TIMES DAILY PRN
Status: CANCELLED | OUTPATIENT
Start: 2019-11-13

## 2019-11-13 RX ORDER — MIRTAZAPINE 15 MG/1
15 TABLET, FILM COATED ORAL NIGHTLY
Status: DISCONTINUED | OUTPATIENT
Start: 2019-11-13 | End: 2019-11-27 | Stop reason: HOSPADM

## 2019-11-13 RX ORDER — DOXYCYCLINE HYCLATE 50 MG/1
324 CAPSULE, GELATIN COATED ORAL
Status: DISCONTINUED | OUTPATIENT
Start: 2019-11-14 | End: 2019-11-20

## 2019-11-13 RX ORDER — SODIUM CHLORIDE 1000 MG
1 TABLET, SOLUBLE MISCELLANEOUS 2 TIMES DAILY WITH MEALS
Status: DISCONTINUED | OUTPATIENT
Start: 2019-11-13 | End: 2019-11-14

## 2019-11-13 RX ORDER — ACETAMINOPHEN 325 MG/1
650 TABLET ORAL EVERY 4 HOURS PRN
Status: DISCONTINUED | OUTPATIENT
Start: 2019-11-13 | End: 2019-11-27 | Stop reason: HOSPADM

## 2019-11-13 RX ORDER — SENNA AND DOCUSATE SODIUM 50; 8.6 MG/1; MG/1
2 TABLET, FILM COATED ORAL 2 TIMES DAILY PRN
Status: CANCELLED | OUTPATIENT
Start: 2019-11-13

## 2019-11-13 RX ORDER — SODIUM PHOSPHATE, DIBASIC AND SODIUM PHOSPHATE, MONOBASIC 7; 19 G/133ML; G/133ML
1 ENEMA RECTAL ONCE
Status: DISCONTINUED | OUTPATIENT
Start: 2019-11-13 | End: 2019-11-27 | Stop reason: HOSPADM

## 2019-11-13 RX ORDER — CHOLECALCIFEROL (VITAMIN D3) 125 MCG
5 CAPSULE ORAL NIGHTLY
Status: DISCONTINUED | OUTPATIENT
Start: 2019-11-13 | End: 2019-11-27 | Stop reason: HOSPADM

## 2019-11-13 RX ORDER — HYDROCODONE BITARTRATE AND ACETAMINOPHEN 7.5; 325 MG/1; MG/1
1 TABLET ORAL EVERY 4 HOURS PRN
Status: DISCONTINUED | OUTPATIENT
Start: 2019-11-13 | End: 2019-11-27 | Stop reason: HOSPADM

## 2019-11-13 RX ORDER — ONDANSETRON 2 MG/ML
4 INJECTION INTRAMUSCULAR; INTRAVENOUS EVERY 6 HOURS PRN
Status: CANCELLED | OUTPATIENT
Start: 2019-11-13

## 2019-11-13 RX ORDER — ACETAMINOPHEN 325 MG/1
650 TABLET ORAL EVERY 6 HOURS PRN
Status: DISCONTINUED | OUTPATIENT
Start: 2019-11-13 | End: 2019-11-27 | Stop reason: HOSPADM

## 2019-11-13 RX ORDER — SODIUM CHLORIDE 1000 MG
1 TABLET, SOLUBLE MISCELLANEOUS 2 TIMES DAILY WITH MEALS
Status: CANCELLED | OUTPATIENT
Start: 2019-11-13

## 2019-11-13 RX ORDER — ACETAMINOPHEN 325 MG/1
650 TABLET ORAL EVERY 4 HOURS PRN
Status: CANCELLED | OUTPATIENT
Start: 2019-11-13

## 2019-11-13 RX ADMIN — POLYETHYLENE GLYCOL 3350 17 G: 17 POWDER, FOR SOLUTION ORAL at 14:07

## 2019-11-13 RX ADMIN — SODIUM CHLORIDE TAB 1 GM 1 G: 1 TAB at 08:40

## 2019-11-13 RX ADMIN — QUETIAPINE FUMARATE 12.5 MG: 25 TABLET ORAL at 21:02

## 2019-11-13 RX ADMIN — BUSPIRONE HYDROCHLORIDE 7.5 MG: 15 TABLET ORAL at 21:01

## 2019-11-13 RX ADMIN — BUSPIRONE HYDROCHLORIDE 7.5 MG: 15 TABLET ORAL at 08:40

## 2019-11-13 RX ADMIN — PNEUMOCOCCAL VACCINE POLYVALENT 0.5 ML
25; 25; 25; 25; 25; 25; 25; 25; 25; 25; 25; 25; 25; 25; 25; 25; 25; 25; 25; 25; 25; 25; 25 INJECTION, SOLUTION INTRAMUSCULAR; SUBCUTANEOUS at 18:48

## 2019-11-13 RX ADMIN — SODIUM CHLORIDE TAB 1 GM 1 G: 1 TAB at 17:59

## 2019-11-13 RX ADMIN — INFLUENZA A VIRUS A/IDAHO/07/2018 (H1N1) ANTIGEN (MDCK CELL DERIVED, PROPIOLACTONE INACTIVATED, INFLUENZA A VIRUS A/INDIANA/08/2018 (H3N2) ANTIGEN (MDCK CELL DERIVED, PROPIOLACTONE INACTIVATED), INFLUENZA B VIRUS B/SINGAPORE/INFTT-16-0610/2016 ANTIGEN (MDCK CELL DERIVED, PROPIOLACTONE INACTIVATED), INFLUENZA B VIRUS B/IOWA/06/2017 ANTIGEN (MDCK CELL DERIVED, PROPIOLACTONE INACTIVATED) 0.5 ML: 15; 15; 15; 15 INJECTION, SUSPENSION INTRAMUSCULAR at 08:50

## 2019-11-13 RX ADMIN — DOCUSATE SODIUM 100 MG: 100 CAPSULE, LIQUID FILLED ORAL at 21:03

## 2019-11-13 RX ADMIN — DOCUSATE SODIUM AND SENNA 2 TABLET: 50; 8.6 TABLET, FILM COATED ORAL at 15:25

## 2019-11-13 RX ADMIN — DOCUSATE SODIUM 100 MG: 100 CAPSULE, LIQUID FILLED ORAL at 08:41

## 2019-11-13 RX ADMIN — POLYETHYLENE GLYCOL 3350 17 G: 17 POWDER, FOR SOLUTION ORAL at 08:40

## 2019-11-13 RX ADMIN — ENOXAPARIN SODIUM 40 MG: 40 INJECTION SUBCUTANEOUS at 08:40

## 2019-11-13 RX ADMIN — LEVOTHYROXINE SODIUM 88 MCG: 88 TABLET ORAL at 08:40

## 2019-11-13 RX ADMIN — MAGNESIUM HYDROXIDE 10 ML: 400 SUSPENSION ORAL at 15:23

## 2019-11-13 RX ADMIN — TUBERCULIN PURIFIED PROTEIN DERIVATIVE 5 UNITS: 5 INJECTION INTRADERMAL at 11:30

## 2019-11-13 RX ADMIN — DOCUSATE SODIUM 100 MG: 100 CAPSULE, LIQUID FILLED ORAL at 14:07

## 2019-11-13 RX ADMIN — MIRTAZAPINE 15 MG: 15 TABLET, FILM COATED ORAL at 21:02

## 2019-11-13 RX ADMIN — MELATONIN TAB 5 MG 5 MG: 5 TAB at 21:02

## 2019-11-13 RX ADMIN — CEFUROXIME AXETIL 250 MG: 250 TABLET, FILM COATED ORAL at 09:23

## 2019-11-13 RX ADMIN — HYDROCODONE BITARTRATE AND ACETAMINOPHEN 1 TABLET: 7.5; 325 TABLET ORAL at 09:23

## 2019-11-13 RX ADMIN — FERROUS GLUCONATE 324 MG: 324 TABLET ORAL at 09:23

## 2019-11-13 RX ADMIN — CEFTRIAXONE 1 G: 1 INJECTION, SOLUTION INTRAVENOUS at 08:51

## 2019-11-13 NOTE — PLAN OF CARE
Problem: Patient Care Overview  Goal: Plan of Care Review   11/13/19 8139   OTHER   Outcome Summary OT: Education regarding TH precautions and impact on daily tasks. Pt was able to independently recall 1/3 posterior hip precauations this am. Demonstration and handout with pictures provided to reinforce TH precautions. Pt plan is for STR. She may need increased supervision/support at home after discharge from STR if she continues to have difficulty recalling TH precautions.

## 2019-11-13 NOTE — PLAN OF CARE
Problem: Patient Care Overview  Goal: Plan of Care Review  Outcome: Ongoing (interventions implemented as appropriate)   11/12/19 1800   Coping/Psychosocial   Plan of Care Reviewed With patient   Plan of Care Review   Progress improving   OTHER   Outcome Summary POD #1. Up to chair. Ambulating w/ assist and walker. PO pain meds providing good pain control.

## 2019-11-13 NOTE — DISCHARGE SUMMARY
"Nicolasa Woodall  9/19/1930  [id number]    Hospitalists Discharge Summary    Date of Admission: 11/8/2019  Date of Discharge:  11/13/2019    History of Present Illness: Taken from [facility] on admit:  \"Ms. Woodall is a pleasant, 89 y/o  female who presents to the ER this evening w/ progressively worsening left hip pain.  She underwent pinning of her left hip in 2012 after a fall.  Family report she has done well over the years until her birthday this past September.  She was starting to complain of hip pain w/ activity but none at rest.  She was compliant w/ PT up until they released her.  Family notes she was not entirely compliant w/ their instructions (ie excersises, crossing legs, etc).  She reports the pain was increasing w/ activity.  Family could not get her to even go for a walk.  She was able to get up to the bathroom OK, but hadn't done much else.  Last evening, they took her out to celebrate her daughter's birthday, and she required much more help than usual.  This morning, the picture was much of the same so she was brought to the ER.     Her activity has been limited by pain and relieved w/ rest.  She denies chest pain and dyspnea.  She doesn't each much even when her daughter cooks for her and brings it into the home.  She was on appetite stiumlants w/o much improvement.  She has maintained her baseline mental state.  She denies numbness in her legs, but does note some weakness given her limited activity.\"     Hospital Course Summary/Primary Discharge Diagnoses:   Left ELAINE 2/2 left femoral head collapse, previous hip pinning, POD 2: Orthopedic surgery followed  Continue PT/OT, Lovenox for DVT prophylaxis  TCU today for further rehab     Leukocytosis 2/2 acute pan-sensitive e-coli UTI:   Resolving on Rocephin, changed to Ceftin x5 more days   Add probiotic   Recheck UA C&S in 1 week   Secondary Discharge Diagnoses:    Acute on chronic normocytic anemia: Hematology followed  OK for lovenox and oral " iron supplement per Hem recommendation  Baseline hemoglobin approximately 11, today 8.2  Recheck in am  Start oral supplement today  Monitor      H/O DVT/PEs with IVC filter: Hematology followed  H/O retroperitoneal hematoma/subdural bleeding:   Previously intolerant of anticoagulation, as above, recommended per Hem  Continue lovenox, monitor      Chronic hyponatremia:  Baseline 124 to 129, currently 129  Continue home salt tablets  Monitor at intervals     CKD stage III:  Baseline creatinine approximately 0.8-1.1, currently 0.99, no acute issues     H/O hypertension:   No acute issues, SBP at goal for age     Insomnia: Resolved with Seroquel and melatonin, continue     Hypothyroidism: TSH normal on home levothyroxine 88 mcg daily     Anxiety/depression: No acute issues on home BuSpar and Remeron     Chronic constipation:   Continue Colace and MiraLAX scheduled, add as needed medications     Vitamin D deficiency: Supplement started this admission    PCP  Patient Care Team:  Dion Ann MD as PCP - General (Family Medicine)  Dion Ann MD as PCP - Claims Attributed    Consults:   Consults     Date and Time Order Name Status Description    11/9/2019 1510 Hematology & Oncology Inpatient Consult Completed     11/8/2019 2021 Inpatient Orthopedic Surgery Consult Completed         Operations and Procedures Performed:  Procedure(s):  FEMUR HARDWARE REMOVAL  TOTAL HIP ARTHROPLASTY     Ct Pelvis Without Contrast    Result Date: 11/8/2019  Narrative: INDICATION:  Abnormal plain films. Left anterior groin pain for several months and pain is getting worse. History of hip pinning in 2012. Cannot bear weight on left leg. TECHNIQUE: CT of the pelvis without contrast. Coronal and sagittal reconstructions were obtained.  Radiation dose reduction techniques included automated exposure control or exposure modulation based on body size. Radiation audit for number of CT and nuclear cardiology exams performed in the last  year: 0.  COMPARISON:  Plain films from 11/8/2019. FINDINGS: There is an abnormal appearance of the left hip. The patient has had previous left hip pinning with 3 screws. There is collapse of the left femoral head and at least 2 of the screws now pass into the left acetabulum. There is erosion of the acetabulum around the superiormost screw with deformity/remodeling of the superolateral acetabulum. There are multiple bone fragments within the hip joint related to the femoral head collapse. The femoral head collapse is new from 4/18/2018. There are degenerative changes in the spine. The bones are osteopenic. Vascular calcifications present and there is an IVC filter partly seen. The patient is apparently post hysterectomy. There is sigmoid diverticulosis. There is a moderate amount of colonic gas and stool particularly in the cecum.     Impression: 1. Patient's had previous left hip pinning and there has been collapse of the left femoral head such that at least 2 of the 3 left femoral pins impinge upon the acetabulum with associated bony remodeling of the superior acetabulum. Additionally there are multiple bone fragments within the left hip joint related to the femoral head collapse. 2. The bones appear osteopenic, particularly the sacrum. Signer Name: Renee Soto MD  Signed: 11/8/2019 8:29 PM  Workstation Name: [email or web address]  [facility]    Xr Chest 1 View    Result Date: 11/9/2019  Narrative: Portable chest Indication preop hip surgery COMPARISON: 9/4/2018 FINDINGS: The heart size and vascular normal lungs are clear the bones are normal.     Impression: No active disease Signer Name: Brody Gaspar MD  Signed: 11/9/2019 12:43 PM  Workstation Name: [email or web address]  [facility]    Xr Hip With Or Without Pelvis 1 View Left    Result Date: 11/11/2019  Narrative: CR Hip 1 Vw LT INDICATION: Postop total hip replacement COMPARISON: None available. FINDINGS: Status post left total hip  replacement. Signer Name: Vinicius Lim MD  Signed: 11/11/2019 9:33 PM  Workstation Name: [email or web address]  [facility] [place]    Xr Hip With Or Without Pelvis 2 - 3 View Left    Result Date: 11/8/2019  Narrative: CR Hip Uni Comp Min 2 Vws LT INDICATION: Left anterior groin pain for several months. Pain getting worse. History of hip pinning in 2012. COMPARISON: 4/18/2018 FINDINGS: AP and frog-leg lateral view(s) of the left hip. Developing arthrosis of the left hip with near-complete collapse of the left femoral head with fragmentation. No significant change in positioning of the cannulated screws but due to femoral head collapse these now engage the acetabulum. There is some destruction of the left acetabular joint. Right hip unremarkable. Bone mineralization appears normal. IVC filter noted.     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 Patient's 3 cannulated screws now appear to engage the acetabulum and may represent a source of irritation. These findings do not appear acute. Signer Name: DENNIS Bradshaw MD  Signed: 11/8/2019 5:02 PM  Workstation Name: [id number]  [facility] [place]    Allergies:  has No Known Allergies.    Masood  Dronabinol 5/2019 per report of 11/8/2019, reviewed by me    Discharge Medications: PLEASE SEE DISCHARGE READMIT MEDICATIONS UPON RELEASE BY RN AT [facility] F[facility]    Last Lab Results:   Lab Results (most recent)     Procedure Component Value Units Date/Time    Basic Metabolic Panel [[id number]]  (Abnormal) Collected:  11/13/19 0403    Specimen:  Blood Updated:  11/13/19 0539     Glucose 98 mg/dL      BUN 29 mg/dL      Creatinine 0.99 mg/dL      Sodium 129 mmol/L      Potassium 5.1 mmol/L      Chloride 93 mmol/L      CO2 26.7 mmol/L      Calcium 8.1 mg/dL      eGFR Non African Amer 53 mL/min/1.73      BUN/Creatinine Ratio 29.3     Anion Gap  9.3 mmol/L     Narrative:       GFR Normal >60  Chronic Kidney Disease <60  Kidney Failure <15    CBC & Differential [[id number]] Collected:  11/13/19 0403    Specimen:  Blood Updated:  11/13/19 0513    Narrative:       The following orders were created for panel order CBC & Differential.  Procedure                               Abnormality         Status                     ---------                               -----------         ------                     CBC Auto Differential[[id number]]        Abnormal            Final result                 Please view results for these tests on the individual orders.    CBC Auto Differential [[id number]]  (Abnormal) Collected:  11/13/19 0403    Specimen:  Blood Updated:  11/13/19 0513     WBC 10.48 10*3/mm3      RBC 2.77 10*6/mm3      Hemoglobin 8.2 g/dL      Hematocrit 25.5 %      MCV 92.1 fL      MCH 29.6 pg      MCHC 32.2 g/dL      RDW 12.8 %      RDW-SD 43.8 fl      MPV 9.2 fL      Platelets 412 10*3/mm3      Neutrophil % 70.0 %      Lymphocyte % 16.8 %      Monocyte % 9.9 %      Eosinophil % 1.9 %      Basophil % 0.5 %      Immature Grans % 0.9 %      Neutrophils, Absolute 7.34 10*3/mm3      Lymphocytes, Absolute 1.76 10*3/mm3      Monocytes, Absolute 1.04 10*3/mm3      Eosinophils, Absolute 0.20 10*3/mm3      Basophils, Absolute 0.05 10*3/mm3      Immature Grans, Absolute 0.09 10*3/mm3      nRBC 0.0 /100 WBC     Urine Culture - Urine, Urine, Clean Catch [[id number]]  (Abnormal)  (Susceptibility) Collected:  11/11/19 1407    Specimen:  Urine, Clean Catch Updated:  11/13/19 0305     Urine Culture >100,000 CFU/mL Escherichia coli    Susceptibility      Escherichia coli     MANUEL     Ampicillin Susceptible     Ampicillin + Sulbactam Susceptible     Cefazolin Susceptible     Cefepime Susceptible     Ceftazidime Susceptible     Ceftriaxone Susceptible     Gentamicin Susceptible     Levofloxacin Susceptible     Nitrofurantoin Susceptible     Piperacillin + Tazobactam Susceptible      Tetracycline Susceptible     Trimethoprim + Sulfamethoxazole Susceptible                    Basic Metabolic Panel [[id number]]  (Abnormal) Collected:  11/12/19 0427    Specimen:  Blood Updated:  11/12/19 0526     Glucose 147 mg/dL      BUN 24 mg/dL      Creatinine 0.77 mg/dL      Sodium 131 mmol/L      Potassium 5.1 mmol/L      Chloride 98 mmol/L      CO2 22.7 mmol/L      Calcium 7.6 mg/dL      eGFR Non African Amer 71 mL/min/1.73      BUN/Creatinine Ratio 31.2     Anion Gap 10.3 mmol/L     Narrative:       GFR Normal >60  Chronic Kidney Disease <60  Kidney Failure <15    Hemoglobin & Hematocrit, Blood [[id number]]  (Abnormal) Collected:  11/12/19 0427    Specimen:  Blood Updated:  11/12/19 0500     Hemoglobin 9.6 g/dL      Hematocrit 29.1 %     Hemoglobin & Hematocrit, Blood [[id number]]  (Abnormal) Collected:  11/11/19 2226    Specimen:  Blood Updated:  11/11/19 2230     Hemoglobin 10.8 g/dL      Hematocrit 32.8 %     Urinalysis, Microscopic Only - Urine, Clean Catch [[id number]]  (Abnormal) Collected:  11/11/19 1407    Specimen:  Urine, Clean Catch Updated:  11/11/19 1453     RBC, UA Too Numerous to Count /HPF      WBC, UA Too Numerous to Count /HPF      Bacteria, UA 1+ /HPF      Squamous Epithelial Cells, UA None Seen /HPF      Hyaline Casts, UA None Seen /LPF      Methodology Manual Light Microscopy    Urinalysis With Culture If Indicated - Urine, Clean Catch [[id number]]  (Abnormal) Collected:  11/11/19 1407    Specimen:  Urine, Clean Catch Updated:  11/11/19 1438     Color, UA Yellow     Appearance, UA Cloudy     pH, UA 6.0     Specific Gravity, UA 1.020     Glucose, UA Negative     Ketones, UA Negative     Bilirubin, UA Negative     Blood, UA Moderate (2+)     Protein,  mg/dL (2+)     Leuk Esterase, UA Large (3+)     Nitrite, UA Positive     Urobilinogen, UA 0.2 E.U./dL    CBC & Differential [[id number]] Collected:  11/11/19 0331    Specimen:  Blood Updated:  11/11/19 0539    Narrative:       The  following orders were created for panel order CBC & Differential.  Procedure                               Abnormality         Status                     ---------                               -----------         ------                     CBC Auto Differential[[id number]]        Abnormal            Final result                 Please view results for these tests on the individual orders.    CBC Auto Differential [[id number]]  (Abnormal) Collected:  11/11/19 0331    Specimen:  Blood Updated:  11/11/19 0539     WBC 10.58 10*3/mm3      RBC 3.64 10*6/mm3      Hemoglobin 11.1 g/dL      Hematocrit 33.7 %      MCV 92.6 fL      MCH 30.5 pg      MCHC 32.9 g/dL      RDW 13.0 %      RDW-SD 43.9 fl      MPV 9.3 fL      Platelets 419 10*3/mm3      Neutrophil % 72.1 %      Lymphocyte % 13.2 %      Monocyte % 9.6 %      Eosinophil % 4.1 %      Basophil % 0.4 %      Immature Grans % 0.6 %      Neutrophils, Absolute 7.63 10*3/mm3      Lymphocytes, Absolute 1.40 10*3/mm3      Monocytes, Absolute 1.02 10*3/mm3      Eosinophils, Absolute 0.43 10*3/mm3      Basophils, Absolute 0.04 10*3/mm3      Immature Grans, Absolute 0.06 10*3/mm3      nRBC 0.0 /100 WBC     Iron Profile [[id number]]  (Abnormal) Collected:  11/09/19 0357    Specimen:  Blood Updated:  11/10/19 0637     Iron 22 mcg/dL      Iron Saturation 11 %      UIBC 172 mcg/dL      TIBC 194 mcg/dL     Ferritin [[id number]]  (Abnormal) Collected:  11/09/19 0357    Specimen:  Blood Updated:  11/10/19 0637     Ferritin 377.00 ng/mL     Vitamin B12 [[id number]]  (Normal) Collected:  11/09/19 0357    Specimen:  Blood Updated:  11/09/19 2043     Vitamin B-12 368 pg/mL     Folate [[id number]]  (Normal) Collected:  11/09/19 0357    Specimen:  Blood Updated:  11/09/19 2043     Folate 14.30 ng/mL     Vitamin D 25 Hydroxy [[id number]]  (Abnormal) Collected:  11/09/19 0357    Specimen:  Blood Updated:  11/09/19 1204     25 Hydroxy, Vitamin D 19.5 ng/ml     Narrative:       Reference Range for Total  Vitamin D 25(OH)     Deficiency <20.0 ng/mL   Insufficiency 21-29 ng/mL   Sufficiency  ng/mL  Toxicity >100 ng/ml    Protime-INR [[id number]]  (Abnormal) Collected:  11/09/19 0357    Specimen:  Blood Updated:  11/09/19 1012     Protime 14.4 Seconds      INR 1.15    Narrative:       Therapeutic Ranges for INR: 2.0-3.0 (PT 20-30)                              2.5-3.5 (PT 25-34)    Comprehensive Metabolic Panel [[id number]]  (Abnormal) Collected:  11/09/19 0357    Specimen:  Blood Updated:  11/09/19 0603     Glucose 96 mg/dL      BUN 24 mg/dL      Creatinine 0.91 mg/dL      Sodium 127 mmol/L      Potassium 4.0 mmol/L      Chloride 92 mmol/L      CO2 24.1 mmol/L      Calcium 7.7 mg/dL      Total Protein 5.8 g/dL      Albumin 2.90 g/dL      ALT (SGPT) 10 U/L      AST (SGOT) 20 U/L      Alkaline Phosphatase 94 U/L      Total Bilirubin 0.5 mg/dL      eGFR Non African Amer 58 mL/min/1.73      Globulin 2.9 gm/dL      A/G Ratio 1.0 g/dL      BUN/Creatinine Ratio 26.4     Anion Gap 10.9 mmol/L     Narrative:       GFR Normal >60  Chronic Kidney Disease <60  Kidney Failure <15    TSH [[id number]]  (Normal) Collected:  11/08/19 1703    Specimen:  Blood Updated:  11/08/19 2319     TSH 1.870 uIU/mL     Magnesium [[id number]]  (Normal) Collected:  11/08/19 1703    Specimen:  Blood Updated:  11/08/19 1735     Magnesium 2.0 mg/dL     Comprehensive Metabolic Panel [[id number]]  (Abnormal) Collected:  11/08/19 1703    Specimen:  Blood Updated:  11/08/19 1735     Glucose 117 mg/dL      BUN 30 mg/dL      Creatinine 1.13 mg/dL      Sodium 128 mmol/L      Potassium 4.7 mmol/L      Chloride 90 mmol/L      CO2 25.7 mmol/L      Calcium 9.1 mg/dL      Total Protein 6.6 g/dL      Albumin 3.70 g/dL      ALT (SGPT) 13 U/L      AST (SGOT) 22 U/L      Alkaline Phosphatase 115 U/L      Total Bilirubin 0.6 mg/dL      eGFR Non African Amer 45 mL/min/1.73      Globulin 2.9 gm/dL      A/G Ratio 1.3 g/dL      BUN/Creatinine Ratio 26.5     Anion Gap 12.3  mmol/L     Narrative:       GFR Normal >60  Chronic Kidney Disease <60  Kidney Failure <15        Imaging Results (Most Recent)     Procedure Component Value Units Date/Time    XR Hip With or Without Pelvis 1 View Left [[id number]] Collected:  11/11/19 2133     Updated:  11/11/19 2135    Narrative:       CR Hip 1 Vw LT    INDICATION:   Postop total hip replacement    COMPARISON:   None available.    FINDINGS:  Status post left total hip replacement.    Signer Name: Vinicius Lim MD   Signed: 11/11/2019 9:33 PM   Workstation Name: [email or web address]    [facility]    XR Chest 1 View [[id number]] Collected:  11/09/19 1243     Updated:  11/09/19 1245    Narrative:       Portable chest    Indication preop hip surgery    COMPARISON: 9/4/2018    FINDINGS: The heart size and vascular normal lungs are clear the bones are normal.      Impression:       No active disease    Signer Name: Brody Gaspar MD   Signed: 11/9/2019 12:43 PM   Workstation Name: [email or web address]    [facility]    CT Pelvis Without Contrast [[id number]] Collected:  11/08/19 2029     Updated:  11/08/19 2032    Narrative:       INDICATION:    Abnormal plain films. Left anterior groin pain for several months and pain is getting worse. History of hip pinning in 2012. Cannot bear weight on left leg.    TECHNIQUE:   CT of the pelvis without contrast. Coronal and sagittal reconstructions were obtained.  Radiation dose reduction techniques included automated exposure control or exposure modulation based on body size. Radiation audit for number of CT and nuclear  cardiology exams performed in the last year: 0.      COMPARISON:    Plain films from 11/8/2019.    FINDINGS:  There is an abnormal appearance of the left hip. The patient has had previous left hip pinning with 3 screws. There is collapse of the left femoral head and at least 2 of the screws now pass into the left acetabulum. There is erosion of the acetabulum  around the  superiormost screw with deformity/remodeling of the superolateral acetabulum. There are multiple bone fragments within the hip joint related to the femoral head collapse. The femoral head collapse is new from 4/18/2018.    There are degenerative changes in the spine. The bones are osteopenic. Vascular calcifications present and there is an IVC filter partly seen. The patient is apparently post hysterectomy. There is sigmoid diverticulosis. There is a moderate amount of  colonic gas and stool particularly in the cecum.      Impression:         1. Patient's had previous left hip pinning and there has been collapse of the left femoral head such that at least 2 of the 3 left femoral pins impinge upon the acetabulum with associated bony remodeling of the superior acetabulum. Additionally there are  multiple bone fragments within the left hip joint related to the femoral head collapse.  2. The bones appear osteopenic, particularly the sacrum.    Signer Name: Renee Soto MD   Signed: 11/8/2019 8:29 PM   Workstation Name: [email or web address]    [facility] [place]    XR Hip With or Without Pelvis 2 - 3 View Left [[id number]] Collected:  11/08/19 1702     Updated:  11/08/19 1704    Narrative:       CR Hip Uni Comp Min 2 Vws LT    INDICATION:   Left anterior groin pain for several months. Pain getting worse. History of hip pinning in 2012.    COMPARISON:   4/18/2018    FINDINGS:  AP and frog-leg lateral view(s) of the left hip. Developing arthrosis of the left hip with near-complete collapse of the left femoral head with fragmentation. No significant change in positioning of the cannulated screws but due to femoral head collapse  these now engage the acetabulum. There is some destruction of the left acetabular joint. Right hip unremarkable. Bone mineralization appears normal. IVC filter noted.      Impression:       Interval development advanced arthrosis of the left hip with collapse of the left femoral head and  extensive destruction of the left hip joint. Patient's 3 cannulated screws now appear to engage the acetabulum and may represent a source of irritation.  These findings do not appear acute.    Signer Name: DENNIS Bradshaw MD   Signed: 11/8/2019 5:02 PM   Workstation Name: [id number]    [facility] [place]        PROCEDURES  Procedure(s):  FEMUR HARDWARE REMOVAL  TOTAL HIP ARTHROPLASTY    Condition on Discharge: Stable    Physical Exam at Discharge  Vital Signs  Temp:  [96.6 °F (35.9 °C)-97.9 °F (36.6 °C)] 97 °F (36.1 °C)  Heart Rate:  [74-95] 93  Resp:  [18-20] 18  BP: (114-140)/(73-84) 136/75   Body mass index is 22.34 kg/m².    Physical Exam   Constitutional: She is oriented to person, place, and time. She appears well-developed and well-nourished.   HENT:   Head: Normocephalic and atraumatic.   Eyes: EOM are normal. Pupils are equal, round, and reactive to light.   Cardiovascular: Normal rate.   Irregular rhythm, grade 2/6 systolic murmur   Pulmonary/Chest: Effort normal and breath sounds normal. No stridor. No respiratory distress. She has no wheezes.   Abdominal: Soft. Bowel sounds are normal. She exhibits no distension. There is no tenderness. There is no guarding.   Large umbilical hernia, easily reducible, nontender   Musculoskeletal:   Mild left thigh area edema, CMS intact to left leg   Neurological: She is alert and oriented to person, place, and time.   Skin: Skin is warm and dry. No erythema.   Left hip incision dressing clean, dry, intact   Psychiatric: She has a normal mood and affect. Her behavior is normal.   Vitals reviewed.    Discharge Disposition  TCU    Visiting Nurse:    As per facility    PT/OT:  Yes     Safety Evaluation:  Yes     DME  TBD    Discharge Diet: Regular diet    Activity at Discharge:  As per Dr. Owens    Pre-discharge education  Cardiac, Wound Care, Injectables, medications      Follow-up Appointments: TBD  No future appointments.    Test Results Pending at  Discharge: None     Irasema Mcmillan, APRN  11/13/19  9:08 AM    Time: Discharge Over 30 min (if over 30 minutes give explanation as to why it took greater than 30 minutes)  Secondary to:   Coordination of care/follow up  Medication reconciliation  D/W patient and family

## 2019-11-13 NOTE — THERAPY TREATMENT NOTE
Acute Care - Occupational Therapy Treatment Note   Stormy Rivera     Patient Name: Nicolasa Woodall  : 1930  MRN: [id number]  Today's Date: 2019  Onset of Illness/Injury or Date of Surgery: 19  Date of Referral to OT: 19  Referring Physician: Roman    Admit Date: 2019       ICD-10-CM ICD-9-CM   1. Closed left hip fracture, initial encounter (CMS/[facility]) S72.002A 820.8     Patient Active Problem List   Diagnosis   • Traumatic intraventricular hemorrhage (CMS/[facility])   • Hyponatremia   • Traumatic rhabdomyolysis (CMS/[facility])   • Syncope   • Abnormal urinalysis   • Essential hypertension   • Chronic kidney disease, stage 3 (CMS/[facility])   • AMI (acute kidney injury) (CMS/[facility])   • Dehydration   • Collapse of vertebra (CMS/[facility])   • PAC (premature atrial contraction)   • Closed left hip fracture, initial encounter (CMS/[facility])     Past Medical History:   Diagnosis Date   • Anxiety    • Chronic kidney disease    • Depression     resolved after daughter completed chemotherapy    • Disease of thyroid gland    • Hypertension    • Metabolic encephalopathy    • Pulmonary embolism (CMS/[facility])     SPONTANEOUS HEMMORHAGE   • Subdural hematoma (CMS/[facility])      Past Surgical History:   Procedure Laterality Date   • BREAST BIOPSY      BENIGN   • HARDWARE REMOVAL Left 2019    Procedure: FEMUR HARDWARE REMOVAL;  Surgeon: Dagoberto Owens MD;  Location: [facility] OR;  Service: Orthopedics   • HYSTERECTOMY     • JOINT REPLACEMENT     • THYROID SURGERY      PARATHYROID REMOVAL   • TOTAL HIP ARTHROPLASTY Left 2019    Procedure: TOTAL HIP ARTHROPLASTY;  Surgeon: Dagoberto Owens MD;  Location: [facility] OR;  Service: Orthopedics   • VENA CAVA FILTER INSERTION         Therapy Treatment    Rehabilitation Treatment Summary     Row Name 19 0835             Treatment Time/Intention    Discipline  occupational therapist  -SD      Document Type  therapy note (daily note)  -SD      Subjective Information  complains  of;fatigue  -SD      Mode of Treatment  occupational therapy  -SD      Patient/Family Observations  Pt sitting in a recliner finishing her breakfast.   -SD      Care Plan Review  risks/benefits reviewed;current/potential barriers reviewed  -SD      Patient Effort  adequate  -SD      Existing Precautions/Restrictions  hip, posterior;left  -SD      Treatment Considerations/Comments  Education regarding TH precautions and impact on daily tasks. Pt was able to independently recall 1/3 posterior hip precauations this am. Demonstration and handout with pictures provided to reinforce TH precautions. Pt plan is for STR. She may need increased supervision/support at home after discharge from STR if she continues to have difficulty recalling TH precautions.  -SD      Recorded by [SD] Jovani Cantu, OTR 11/13/19 0926      Row Name 11/13/19 0862             Transfer Assessment/Treatment    Comment (Transfers)  pt declined transfer activity at this time. Pt requested therapist follow up later in am.  -SD      Recorded by [SD] Jovani Cantu OTR 11/13/19 0926      Row Name 11/13/19 0835             Positioning and Restraints    Pre-Treatment Position  sitting in chair/recliner  -SD      Post Treatment Position  chair  -SD      In Chair  sitting;call light within reach;encouraged to call for assist  -SD      Recorded by [SD] Jovani Cantu OTR 11/13/19 0926      Row Name 11/13/19 0821             Pain Scale: Numbers Pre/Post-Treatment    Pain Scale: Numbers, Pretreatment  0/10 - no pain  -SD      Pain Scale: Numbers, Post-Treatment  0/10 - no pain  -SD      Recorded by [SD] Jovani Cantu, OTR 11/13/19 0926      Row Name                Wound 11/11/19 2011 Left hip Incision    Wound - Properties Group Date first assessed: 11/11/19 [TG] Time first assessed: 2011 [TG] Side: Left [TG] Location: hip [TG] Primary Wound Type: Incision [TG] Recorded by:  [TG] Pili Polo RN 11/11/19 2011    Row Name 11/13/19 0813              Plan of Care Review    Plan of Care Reviewed With  patient  -SD      Recorded by [SD] Jovani Cantu, OTR 11/13/19 0998      Row Name 11/13/19 0876             Outcome Summary/Treatment Plan (OT)    Daily Summary of Progress (OT)  progress towards functional goals is fair  -SD      Plan for Continued Treatment (OT)  continue with plan  -SD      Recorded by [SD] Jovani Cantu OTR 11/13/19 0928        User Key  (r) = Recorded By, (t) = Taken By, (c) = Cosigned By    Initials Name Effective Dates Discipline    SD Jovani Cantu OTR 06/22/16 -  OT    TG Pili Polo RN 03/02/18 -  Nurse        Wound 11/11/19 2011 Left hip Incision (Active)   Dressing Appearance dry;intact 11/12/2019  8:57 PM   Closure WINNIE 11/12/2019  8:57 PM   Drainage Amount none 11/12/2019  8:57 PM     Rehab Goal Summary     Row Name 11/13/19 0900             Patient Education Goal (OT)    Activity (Patient Education Goal, OT)  Pt to verbalize 3/3 TH precautions  -SD      [place]/Cues/Accuracy (Memory Goal 2, OT)  verbalizes understanding  -SD      Time Frame (Patient Education Goal, OT)  by discharge  -SD      Progress/Outcome (Patient Education Goal, OT)  goal ongoing  -SD        User Key  (r) = Recorded By, (t) = Taken By, (c) = Cosigned By    Initials Name Provider Type Discipline    SD Jovani Cantu, OTR Occupational Therapist OT        Occupational Therapy Education     Title: PT OT SLP Therapies (In Progress)     Topic: Occupational Therapy (In Progress)     Point: ADL training (In Progress)     Description: Instruct learner(s) on proper safety adaptation and remediation techniques during self care or transfers.   Instruct in proper use of assistive devices.    Learning Progress Summary           Patient Acceptance, E,TB,D, NR by SD at 11/13/2019  9:17 AM    Comment:  Education regarding posterior  hip precautions. Pt able to recall 1/3 TH precautions. After verbal cues/demonstration, pt verbalized 3/3 TH  precautions.    Acceptance, E,TB,D, VU by SD at 11/12/2019 10:14 AM    Comment:  Education regarding OT services, benefits of activity, TH precautions and impact of TH precautions on daily tasks.   Family Acceptance, E,TB,D, VU by SD at 11/12/2019 10:14 AM    Comment:  Education regarding OT services, benefits of activity, TH precautions and impact of TH precautions on daily tasks.                               User Key     Initials Effective Dates Name Provider Type Discipline    SD 06/22/16 -  Jovani Cantu OTR Occupational Therapist OT                OT Recommendation and Plan  Outcome Summary/Treatment Plan (OT)  Daily Summary of Progress (OT): progress towards functional goals is fair  Plan for Continued Treatment (OT): continue with plan  Anticipated Equipment Needs at Discharge (OT): (Pt will need LH AE)  Anticipated Discharge Disposition (OT): skilled nursing facility  Therapy Frequency (OT Eval): 5 times/wk  Daily Summary of Progress (OT): progress towards functional goals is fair  Plan of Care Review  Plan of Care Reviewed With: patient  Plan of Care Reviewed With: patient  Outcome Summary: OT: Education regarding TH precautions and impact on daily tasks. Pt was able to independently recall 1/3 posterior hip precauations this am. Demonstration and handout with pictures provided to reinforce TH precautions. Pt plan is for STR. She may need increased supervision/support at home after discharge from STR if she continues to have difficulty recalling TH precautions.  Outcome Measures     Row Name 11/13/19 0840 11/12/19 1308 11/12/19 0930       How much help from another person do you currently need...    Turning from your back to your side while in flat bed without using bedrails?  --  3  -BP  --    Moving from lying on back to sitting on the side of a flat bed without bedrails?  --  3  -BP  --    Moving to and from a bed to a chair (including a wheelchair)?  --  3  -BP  --    Standing up from a chair  using your arms (e.g., wheelchair, bedside chair)?  --  3  -BP  --    Climbing 3-5 steps with a railing?  --  2  -BP  --    To walk in hospital room?  --  3  -BP  --    AM-PAC 6 Clicks Score (PT)  --  17  -BP  --       How much help from another is currently needed...    Putting on and taking off regular lower body clothing?  2  -SD  --  2  -SD    Bathing (including washing, rinsing, and drying)  2  -SD  --  2  -SD    Toileting (which includes using toilet bed pan or urinal)  2  -SD  --  2  -SD    Putting on and taking off regular upper body clothing  4  -SD  --  4  -SD    Taking care of personal grooming (such as brushing teeth)  4  -SD  --  4  -SD    Eating meals  4  -SD  --  4  -SD    AM-PAC 6 Clicks Score (OT)  18  -SD  --  18  -SD       Functional Assessment    Outcome Measure Options  AM-PAC 6 Clicks Daily Activity (OT)  -SD  AM-PAC 6 Clicks Basic Mobility (PT)  -BP  AM-PAC 6 Clicks Daily Activity (OT)  -SD    Row Name 11/12/19 0923             How much help from another person do you currently need...    Turning from your back to your side while in flat bed without using bedrails?  3  -BP      Moving from lying on back to sitting on the side of a flat bed without bedrails?  3  -BP      Moving to and from a bed to a chair (including a wheelchair)?  3  -BP      Standing up from a chair using your arms (e.g., wheelchair, bedside chair)?  3  -BP      Climbing 3-5 steps with a railing?  1  -BP      To walk in hospital room?  2  -BP      AM-PAC 6 Clicks Score (PT)  15  -BP         Functional Assessment    Outcome Measure Options  AM-PAC 6 Clicks Basic Mobility (PT)  -BP        User Key  (r) = Recorded By, (t) = Taken By, (c) = Cosigned By    Initials Name Provider Type    Jovani Sethi, OTR Occupational Therapist    Isac Thakkar, PT Physical Therapist           Time Calculation:   Time Calculation- OT     Row Name 11/13/19 0929             Time Calculation- OT    OT Start Time  0835  -SD        User  Key  (r) = Recorded By, (t) = Taken By, (c) = Cosigned By    Initials Name Provider Type    SD Jovani Cantu OTR Occupational Therapist        Therapy Charges for Today     Code Description Service Date Service Provider Modifiers Qty    [id number] HC OT SELF CARE/MGMT/TRAIN EA 15 MIN 11/13/2019 Jovani Cantu OTR GO 1               ARNAV Gonzales  11/13/2019

## 2019-11-13 NOTE — PLAN OF CARE
Problem: Patient Care Overview  Goal: Plan of Care Review  Outcome: Ongoing (interventions implemented as appropriate)   11/13/19 0439   Coping/Psychosocial   Plan of Care Reviewed With patient   Plan of Care Review   Progress improving   OTHER   Outcome Summary POD2 Rested well through out the night. Pain Controlled. VSS     Goal: Individualization and Mutuality  Outcome: Ongoing (interventions implemented as appropriate)    Goal: Discharge Needs Assessment  Outcome: Ongoing (interventions implemented as appropriate)      Problem: Fall Risk (Adult)  Goal: Identify Related Risk Factors and Signs and Symptoms  Outcome: Ongoing (interventions implemented as appropriate)    Goal: Absence of Fall  Outcome: Ongoing (interventions implemented as appropriate)      Problem: Skin Injury Risk (Adult)  Goal: Identify Related Risk Factors and Signs and Symptoms  Outcome: Ongoing (interventions implemented as appropriate)    Goal: Skin Health and Integrity  Outcome: Ongoing (interventions implemented as appropriate)      Problem: Pain, Acute (Adult)  Goal: Identify Related Risk Factors and Signs and Symptoms  Outcome: Ongoing (interventions implemented as appropriate)    Goal: Acceptable Pain Control/Comfort Level  Outcome: Ongoing (interventions implemented as appropriate)      Problem: Surgery Nonspecified (Adult)  Goal: Signs and Symptoms of Listed Potential Problems Will be Absent, Minimized or Managed (Surgery Nonspecified)  Outcome: Ongoing (interventions implemented as appropriate)    Goal: Anesthesia/Sedation Recovery  Outcome: Ongoing (interventions implemented as appropriate)

## 2019-11-13 NOTE — NURSING NOTE
Case Management Discharge Note    Final Note: Discharged to [facility] skilled.    Destination - Selection Complete      Service Provider Request Status Selected Services Address Phone Number Fax Number    [facility] Selected Skilled Nursing [address] [address] [phone]      Durable Medical Equipment      No service has been selected for the patient.      Dialysis/Infusion      No service has been selected for the patient.      Home Medical Care      No service has been selected for the patient.      Therapy      No service has been selected for the patient.      Community Resources      No service has been selected for the patient.             Final Discharge Disposition Code: 03 - skilled nursing facility (SNF)

## 2019-11-13 NOTE — NURSING NOTE
"Continued Stay Note  HENNY Nguyen     Patient Name: Nicolasa Woodall  MRN: [id number]  Today's Date: 11/13/2019    Admit Date: 11/8/2019    Discharge Plan     Row Name 11/13/19 0906       Plan    Plan Comments  [facility] patient today if stable. Updated patient of such and updated IMM at [facility] and patient states \"I would like to stay another day here\". Informed patient that they decision would be up to the provider and encourgaed her to voice her concerns when the doctor sees her today. Provider aware of acceptance to TCU. Patient had no other questions or concerns regarding discharge plans at this time. CM will continue to follow for needs.        Discharge Codes    No documentation.       Expected Discharge Date and Time     Expected Discharge Date Expected Discharge Time    Nov 13, 2019             Michaela Pinto RN    "

## 2019-11-13 NOTE — PROGRESS NOTES
POD# 2 s/p left ELAINE    Subjective:Nicolasa Woodall resting in chair this am. Pain control better this am with medication. Denies presence of fevers, sweats, chills overnight. Denies presence numbness/tingling left lower extremity.     Objective:  Vitals:    11/12/19 1109 11/12/19 1522 11/12/19 1929 11/13/19 0552   BP: 114/73 126/84 140/77 136/75   BP Location: Left arm Left arm Left arm Left arm   Patient Position: Sitting Lying Lying Lying   Pulse: 74 74 85 93   Resp: 18  18 18   Temp: 96.6 °F (35.9 °C) 97.3 °F (36.3 °C) 97.9 °F (36.6 °C) 97 °F (36.1 °C)   TempSrc: Oral Oral Oral Oral   SpO2: 97% 97% 100% 97%   Weight:       Height:           Results from last 7 days   Lab Units 11/13/19  0403 11/12/19  0427 11/11/19  2226 11/11/19  0331 11/10/19  0345   WBC 10*3/mm3 10.48  --   --  10.58 13.11*   HEMOGLOBIN g/dL 8.2* 9.6* 10.8* 11.1* 11.1*   HEMATOCRIT % 25.5* 29.1* 32.8* 33.7* 33.5*   PLATELETS 10*3/mm3 412  --   --  419 401       Results from last 7 days   Lab Units 11/13/19  0403 11/12/19  0427 11/11/19  0331   SODIUM mmol/L 129* 131* 125*   POTASSIUM mmol/L 5.1 5.1 4.3   CHLORIDE mmol/L 93* 98 90*   CO2 mmol/L 26.7 22.7 24.7   BUN mg/dL 29* 24* 18   CREATININE mg/dL 0.99 0.77 0.92   GLUCOSE mg/dL 98 147* 103*   CALCIUM mg/dL 8.1* 7.6* 7.9*       Exam:    Left lower extremity   Dressing clean, dry, intact  Positive sensation light touch all distributions left lower extremity  Negative calf tenderness, negative Homans sign  Flex extend all digits left foot, full range of motion ankle  2+ DP pulse    Impression: s/p left lower extremity    Plan:  1. PT/OT- weightbearing as tolerated, posterior hip precautions  2. Pain control- norco  3. DVT prophylaxis- Lovenox  4. Wound care- dressing remain intact until follow-up in office   5. Disposition- short-term rehab TCU [facility]

## 2019-11-14 LAB
ABO + RH BLD: NORMAL
ABO + RH BLD: NORMAL
BASOPHILS # BLD AUTO: 0.05 10*3/MM3 (ref 0–0.2)
BASOPHILS NFR BLD AUTO: 0.4 % (ref 0–1.5)
BH BB BLOOD EXPIRATION DATE: NORMAL
BH BB BLOOD EXPIRATION DATE: NORMAL
BH BB BLOOD TYPE BARCODE: 5100
BH BB BLOOD TYPE BARCODE: 5100
BH BB DISPENSE STATUS: NORMAL
BH BB DISPENSE STATUS: NORMAL
BH BB PRODUCT CODE: NORMAL
BH BB PRODUCT CODE: NORMAL
BH BB UNIT NUMBER: NORMAL
BH BB UNIT NUMBER: NORMAL
CROSSMATCH INTERPRETATION: NORMAL
CROSSMATCH INTERPRETATION: NORMAL
DEPRECATED RDW RBC AUTO: 44.1 FL (ref 37–54)
EOSINOPHIL # BLD AUTO: 0.64 10*3/MM3 (ref 0–0.4)
EOSINOPHIL NFR BLD AUTO: 5.6 % (ref 0.3–6.2)
ERYTHROCYTE [DISTWIDTH] IN BLOOD BY AUTOMATED COUNT: 13.1 % (ref 12.3–15.4)
HCT VFR BLD AUTO: 24.9 % (ref 34–46.6)
HGB BLD-MCNC: 8.1 G/DL (ref 12–15.9)
IMM GRANULOCYTES # BLD AUTO: 0.12 10*3/MM3 (ref 0–0.05)
IMM GRANULOCYTES NFR BLD AUTO: 1 % (ref 0–0.5)
LYMPHOCYTES # BLD AUTO: 1.67 10*3/MM3 (ref 0.7–3.1)
LYMPHOCYTES NFR BLD AUTO: 14.6 % (ref 19.6–45.3)
MCH RBC QN AUTO: 30.1 PG (ref 26.6–33)
MCHC RBC AUTO-ENTMCNC: 32.5 G/DL (ref 31.5–35.7)
MCV RBC AUTO: 92.6 FL (ref 79–97)
MONOCYTES # BLD AUTO: 0.88 10*3/MM3 (ref 0.1–0.9)
MONOCYTES NFR BLD AUTO: 7.7 % (ref 5–12)
NEUTROPHILS # BLD AUTO: 8.09 10*3/MM3 (ref 1.7–7)
NEUTROPHILS NFR BLD AUTO: 70.7 % (ref 42.7–76)
NRBC BLD AUTO-RTO: 0 /100 WBC (ref 0–0.2)
PLATELET # BLD AUTO: 437 10*3/MM3 (ref 140–450)
PMV BLD AUTO: 9.2 FL (ref 6–12)
RBC # BLD AUTO: 2.69 10*6/MM3 (ref 3.77–5.28)
UNIT  ABO: NORMAL
UNIT  ABO: NORMAL
UNIT  RH: NORMAL
UNIT  RH: NORMAL
WBC NRBC COR # BLD: 11.45 10*3/MM3 (ref 3.4–10.8)

## 2019-11-14 PROCEDURE — 97535 SELF CARE MNGMENT TRAINING: CPT

## 2019-11-14 PROCEDURE — 97110 THERAPEUTIC EXERCISES: CPT

## 2019-11-14 PROCEDURE — 25010000002 ENOXAPARIN PER 10 MG: Performed by: NURSE PRACTITIONER

## 2019-11-14 PROCEDURE — 85025 COMPLETE CBC W/AUTO DIFF WBC: CPT | Performed by: NURSE PRACTITIONER

## 2019-11-14 PROCEDURE — 97116 GAIT TRAINING THERAPY: CPT

## 2019-11-14 RX ORDER — METAXALONE 800 MG/1
800 TABLET ORAL 3 TIMES DAILY PRN
Status: DISCONTINUED | OUTPATIENT
Start: 2019-11-14 | End: 2019-11-27 | Stop reason: HOSPADM

## 2019-11-14 RX ORDER — CYCLOBENZAPRINE HCL 10 MG
TABLET ORAL
Status: DISPENSED
Start: 2019-11-14 | End: 2019-11-15

## 2019-11-14 RX ORDER — CYCLOBENZAPRINE HCL 5 MG
5 TABLET ORAL ONCE
Status: COMPLETED | OUTPATIENT
Start: 2019-11-14 | End: 2019-11-14

## 2019-11-14 RX ORDER — CEFUROXIME AXETIL 250 MG/1
250 TABLET ORAL EVERY 12 HOURS SCHEDULED
Status: COMPLETED | OUTPATIENT
Start: 2019-11-14 | End: 2019-11-18

## 2019-11-14 RX ORDER — SODIUM CHLORIDE 1000 MG
1 TABLET, SOLUBLE MISCELLANEOUS
Status: DISCONTINUED | OUTPATIENT
Start: 2019-11-15 | End: 2019-11-27 | Stop reason: HOSPADM

## 2019-11-14 RX ADMIN — BUSPIRONE HYDROCHLORIDE 7.5 MG: 15 TABLET ORAL at 20:29

## 2019-11-14 RX ADMIN — ACETAMINOPHEN 650 MG: 325 TABLET, FILM COATED ORAL at 21:22

## 2019-11-14 RX ADMIN — MELATONIN TAB 5 MG 5 MG: 5 TAB at 20:29

## 2019-11-14 RX ADMIN — BUSPIRONE HYDROCHLORIDE 7.5 MG: 15 TABLET ORAL at 08:13

## 2019-11-14 RX ADMIN — SODIUM CHLORIDE TAB 1 GM 1 G: 1 TAB at 17:33

## 2019-11-14 RX ADMIN — CEFUROXIME AXETIL 250 MG: 250 TABLET ORAL at 20:29

## 2019-11-14 RX ADMIN — ACETAMINOPHEN 650 MG: 325 TABLET, FILM COATED ORAL at 02:48

## 2019-11-14 RX ADMIN — DOCUSATE SODIUM 100 MG: 100 CAPSULE, LIQUID FILLED ORAL at 20:29

## 2019-11-14 RX ADMIN — QUETIAPINE FUMARATE 12.5 MG: 25 TABLET ORAL at 20:29

## 2019-11-14 RX ADMIN — LEVOTHYROXINE SODIUM 88 MCG: 88 TABLET ORAL at 08:14

## 2019-11-14 RX ADMIN — CEFUROXIME AXETIL 250 MG: 250 TABLET ORAL at 08:13

## 2019-11-14 RX ADMIN — METAXALONE 800 MG: 800 TABLET ORAL at 19:24

## 2019-11-14 RX ADMIN — ENOXAPARIN SODIUM 40 MG: 40 INJECTION SUBCUTANEOUS at 08:13

## 2019-11-14 RX ADMIN — DOCUSATE SODIUM 100 MG: 100 CAPSULE, LIQUID FILLED ORAL at 08:14

## 2019-11-14 RX ADMIN — MIRTAZAPINE 15 MG: 15 TABLET, FILM COATED ORAL at 20:29

## 2019-11-14 RX ADMIN — SODIUM CHLORIDE TAB 1 GM 1 G: 1 TAB at 08:13

## 2019-11-14 RX ADMIN — CYCLOBENZAPRINE HYDROCHLORIDE 5 MG: 10 TABLET, FILM COATED ORAL at 23:12

## 2019-11-14 RX ADMIN — POLYETHYLENE GLYCOL 3350 17 G: 17 POWDER, FOR SOLUTION ORAL at 08:13

## 2019-11-14 RX ADMIN — ACETAMINOPHEN 650 MG: 325 TABLET, FILM COATED ORAL at 15:42

## 2019-11-14 RX ADMIN — FERROUS GLUCONATE 324 MG: 324 TABLET ORAL at 08:13

## 2019-11-14 RX ADMIN — CEFUROXIME AXETIL 250 MG: 250 TABLET ORAL at 02:07

## 2019-11-15 PROCEDURE — 97535 SELF CARE MNGMENT TRAINING: CPT

## 2019-11-15 PROCEDURE — 97116 GAIT TRAINING THERAPY: CPT

## 2019-11-15 PROCEDURE — 25010000002 ENOXAPARIN PER 10 MG: Performed by: NURSE PRACTITIONER

## 2019-11-15 PROCEDURE — 97110 THERAPEUTIC EXERCISES: CPT

## 2019-11-15 RX ADMIN — ACETAMINOPHEN 650 MG: 325 TABLET, FILM COATED ORAL at 01:47

## 2019-11-15 RX ADMIN — SODIUM CHLORIDE TAB 1 GM 1 G: 1 TAB at 09:42

## 2019-11-15 RX ADMIN — SODIUM CHLORIDE TAB 1 GM 1 G: 1 TAB at 12:07

## 2019-11-15 RX ADMIN — FERROUS GLUCONATE 324 MG: 324 TABLET ORAL at 09:42

## 2019-11-15 RX ADMIN — METAXALONE 800 MG: 800 TABLET ORAL at 22:45

## 2019-11-15 RX ADMIN — ACETAMINOPHEN 650 MG: 325 TABLET, FILM COATED ORAL at 21:02

## 2019-11-15 RX ADMIN — CEFUROXIME AXETIL 250 MG: 250 TABLET ORAL at 09:42

## 2019-11-15 RX ADMIN — LEVOTHYROXINE SODIUM 88 MCG: 88 TABLET ORAL at 09:42

## 2019-11-15 RX ADMIN — DOCUSATE SODIUM 100 MG: 100 CAPSULE, LIQUID FILLED ORAL at 09:42

## 2019-11-15 RX ADMIN — MIRTAZAPINE 15 MG: 15 TABLET, FILM COATED ORAL at 20:59

## 2019-11-15 RX ADMIN — CEFUROXIME AXETIL 250 MG: 250 TABLET ORAL at 21:03

## 2019-11-15 RX ADMIN — POLYETHYLENE GLYCOL 3350 17 G: 17 POWDER, FOR SOLUTION ORAL at 09:41

## 2019-11-15 RX ADMIN — SODIUM CHLORIDE TAB 1 GM 1 G: 1 TAB at 17:40

## 2019-11-15 RX ADMIN — ENOXAPARIN SODIUM 40 MG: 40 INJECTION SUBCUTANEOUS at 09:41

## 2019-11-15 RX ADMIN — BUSPIRONE HYDROCHLORIDE 7.5 MG: 15 TABLET ORAL at 09:42

## 2019-11-15 RX ADMIN — BUSPIRONE HYDROCHLORIDE 7.5 MG: 15 TABLET ORAL at 20:59

## 2019-11-15 RX ADMIN — MELATONIN TAB 5 MG 5 MG: 5 TAB at 20:59

## 2019-11-15 RX ADMIN — QUETIAPINE FUMARATE 12.5 MG: 25 TABLET ORAL at 20:59

## 2019-11-16 PROCEDURE — 25010000002 ENOXAPARIN PER 10 MG: Performed by: NURSE PRACTITIONER

## 2019-11-16 RX ADMIN — METAXALONE 800 MG: 800 TABLET ORAL at 08:32

## 2019-11-16 RX ADMIN — SODIUM CHLORIDE TAB 1 GM 1 G: 1 TAB at 13:08

## 2019-11-16 RX ADMIN — LEVOTHYROXINE SODIUM 88 MCG: 88 TABLET ORAL at 08:36

## 2019-11-16 RX ADMIN — ENOXAPARIN SODIUM 40 MG: 40 INJECTION SUBCUTANEOUS at 08:36

## 2019-11-16 RX ADMIN — HYDROCODONE BITARTRATE AND ACETAMINOPHEN 1 TABLET: 7.5; 325 TABLET ORAL at 03:10

## 2019-11-16 RX ADMIN — HYDROCODONE BITARTRATE AND ACETAMINOPHEN 1 TABLET: 7.5; 325 TABLET ORAL at 22:51

## 2019-11-16 RX ADMIN — FERROUS GLUCONATE 324 MG: 324 TABLET ORAL at 08:31

## 2019-11-16 RX ADMIN — DOCUSATE SODIUM 100 MG: 100 CAPSULE, LIQUID FILLED ORAL at 20:40

## 2019-11-16 RX ADMIN — BUSPIRONE HYDROCHLORIDE 7.5 MG: 15 TABLET ORAL at 08:32

## 2019-11-16 RX ADMIN — SODIUM CHLORIDE TAB 1 GM 1 G: 1 TAB at 17:46

## 2019-11-16 RX ADMIN — QUETIAPINE FUMARATE 12.5 MG: 25 TABLET ORAL at 20:41

## 2019-11-16 RX ADMIN — MELATONIN TAB 5 MG 5 MG: 5 TAB at 20:41

## 2019-11-16 RX ADMIN — BUSPIRONE HYDROCHLORIDE 7.5 MG: 15 TABLET ORAL at 20:40

## 2019-11-16 RX ADMIN — MIRTAZAPINE 15 MG: 15 TABLET, FILM COATED ORAL at 20:40

## 2019-11-16 RX ADMIN — CEFUROXIME AXETIL 250 MG: 250 TABLET ORAL at 08:31

## 2019-11-16 RX ADMIN — SODIUM CHLORIDE TAB 1 GM 1 G: 1 TAB at 08:31

## 2019-11-16 RX ADMIN — CEFUROXIME AXETIL 250 MG: 250 TABLET ORAL at 20:40

## 2019-11-17 LAB
ALBUMIN SERPL-MCNC: 2.7 G/DL (ref 3.5–5.2)
ALBUMIN/GLOB SERPL: 1.1 G/DL
ALP SERPL-CCNC: 86 U/L (ref 39–117)
ALT SERPL W P-5'-P-CCNC: 22 U/L (ref 1–33)
ANION GAP SERPL CALCULATED.3IONS-SCNC: 7.2 MMOL/L (ref 5–15)
AST SERPL-CCNC: 27 U/L (ref 1–32)
BASOPHILS # BLD AUTO: 0.05 10*3/MM3 (ref 0–0.2)
BASOPHILS NFR BLD AUTO: 0.5 % (ref 0–1.5)
BILIRUB SERPL-MCNC: 0.4 MG/DL (ref 0.2–1.2)
BUN BLD-MCNC: 14 MG/DL (ref 8–23)
BUN/CREAT SERPL: 14.9 (ref 7–25)
CALCIUM SPEC-SCNC: 8.1 MG/DL (ref 8.6–10.5)
CHLORIDE SERPL-SCNC: 89 MMOL/L (ref 98–107)
CO2 SERPL-SCNC: 29.8 MMOL/L (ref 22–29)
CREAT BLD-MCNC: 0.94 MG/DL (ref 0.57–1)
DEPRECATED RDW RBC AUTO: 45 FL (ref 37–54)
EOSINOPHIL # BLD AUTO: 0.56 10*3/MM3 (ref 0–0.4)
EOSINOPHIL NFR BLD AUTO: 5.1 % (ref 0.3–6.2)
ERYTHROCYTE [DISTWIDTH] IN BLOOD BY AUTOMATED COUNT: 13.2 % (ref 12.3–15.4)
GFR SERPL CREATININE-BSD FRML MDRD: 56 ML/MIN/1.73
GLOBULIN UR ELPH-MCNC: 2.4 GM/DL
GLUCOSE BLD-MCNC: 88 MG/DL (ref 65–99)
HCT VFR BLD AUTO: 26.7 % (ref 34–46.6)
HGB BLD-MCNC: 8.6 G/DL (ref 12–15.9)
IMM GRANULOCYTES # BLD AUTO: 0.16 10*3/MM3 (ref 0–0.05)
IMM GRANULOCYTES NFR BLD AUTO: 1.4 % (ref 0–0.5)
LYMPHOCYTES # BLD AUTO: 1.35 10*3/MM3 (ref 0.7–3.1)
LYMPHOCYTES NFR BLD AUTO: 12.2 % (ref 19.6–45.3)
MCH RBC QN AUTO: 30.1 PG (ref 26.6–33)
MCHC RBC AUTO-ENTMCNC: 32.2 G/DL (ref 31.5–35.7)
MCV RBC AUTO: 93.4 FL (ref 79–97)
MONOCYTES # BLD AUTO: 0.92 10*3/MM3 (ref 0.1–0.9)
MONOCYTES NFR BLD AUTO: 8.3 % (ref 5–12)
NEUTROPHILS # BLD AUTO: 8.01 10*3/MM3 (ref 1.7–7)
NEUTROPHILS NFR BLD AUTO: 72.5 % (ref 42.7–76)
NRBC BLD AUTO-RTO: 0 /100 WBC (ref 0–0.2)
PLATELET # BLD AUTO: 536 10*3/MM3 (ref 140–450)
PMV BLD AUTO: 8.6 FL (ref 6–12)
POTASSIUM BLD-SCNC: 4.2 MMOL/L (ref 3.5–5.2)
PROT SERPL-MCNC: 5.1 G/DL (ref 6–8.5)
RBC # BLD AUTO: 2.86 10*6/MM3 (ref 3.77–5.28)
SODIUM BLD-SCNC: 126 MMOL/L (ref 136–145)
WBC NRBC COR # BLD: 11.05 10*3/MM3 (ref 3.4–10.8)

## 2019-11-17 PROCEDURE — 85025 COMPLETE CBC W/AUTO DIFF WBC: CPT | Performed by: HOSPITALIST

## 2019-11-17 PROCEDURE — 25010000002 ENOXAPARIN PER 10 MG: Performed by: NURSE PRACTITIONER

## 2019-11-17 PROCEDURE — 80053 COMPREHEN METABOLIC PANEL: CPT | Performed by: HOSPITALIST

## 2019-11-17 PROCEDURE — 97116 GAIT TRAINING THERAPY: CPT

## 2019-11-17 RX ADMIN — BUSPIRONE HYDROCHLORIDE 7.5 MG: 15 TABLET ORAL at 20:01

## 2019-11-17 RX ADMIN — MIRTAZAPINE 15 MG: 15 TABLET, FILM COATED ORAL at 20:04

## 2019-11-17 RX ADMIN — SODIUM CHLORIDE TAB 1 GM 1 G: 1 TAB at 18:07

## 2019-11-17 RX ADMIN — LEVOTHYROXINE SODIUM 88 MCG: 88 TABLET ORAL at 08:40

## 2019-11-17 RX ADMIN — ACETAMINOPHEN 650 MG: 325 TABLET, FILM COATED ORAL at 22:53

## 2019-11-17 RX ADMIN — ENOXAPARIN SODIUM 40 MG: 40 INJECTION SUBCUTANEOUS at 08:41

## 2019-11-17 RX ADMIN — SODIUM CHLORIDE TAB 1 GM 1 G: 1 TAB at 11:34

## 2019-11-17 RX ADMIN — BUSPIRONE HYDROCHLORIDE 7.5 MG: 15 TABLET ORAL at 08:40

## 2019-11-17 RX ADMIN — SODIUM CHLORIDE TAB 1 GM 1 G: 1 TAB at 08:40

## 2019-11-17 RX ADMIN — FERROUS GLUCONATE 324 MG: 324 TABLET ORAL at 08:41

## 2019-11-17 RX ADMIN — DOCUSATE SODIUM 100 MG: 100 CAPSULE, LIQUID FILLED ORAL at 08:41

## 2019-11-17 RX ADMIN — DOCUSATE SODIUM 100 MG: 100 CAPSULE, LIQUID FILLED ORAL at 20:01

## 2019-11-17 RX ADMIN — OFLOXACIN 50000 UNITS: 300 TABLET, COATED ORAL at 08:41

## 2019-11-17 RX ADMIN — MELATONIN TAB 5 MG 5 MG: 5 TAB at 20:01

## 2019-11-17 RX ADMIN — QUETIAPINE FUMARATE 12.5 MG: 25 TABLET ORAL at 20:01

## 2019-11-17 RX ADMIN — CEFUROXIME AXETIL 250 MG: 250 TABLET ORAL at 20:01

## 2019-11-17 RX ADMIN — METAXALONE 800 MG: 800 TABLET ORAL at 22:58

## 2019-11-17 RX ADMIN — CEFUROXIME AXETIL 250 MG: 250 TABLET ORAL at 08:40

## 2019-11-18 PROCEDURE — 25010000002 ENOXAPARIN PER 10 MG: Performed by: NURSE PRACTITIONER

## 2019-11-18 PROCEDURE — 97116 GAIT TRAINING THERAPY: CPT

## 2019-11-18 PROCEDURE — 97535 SELF CARE MNGMENT TRAINING: CPT

## 2019-11-18 RX ADMIN — ENOXAPARIN SODIUM 40 MG: 40 INJECTION SUBCUTANEOUS at 08:21

## 2019-11-18 RX ADMIN — QUETIAPINE FUMARATE 12.5 MG: 25 TABLET ORAL at 20:20

## 2019-11-18 RX ADMIN — LEVOTHYROXINE SODIUM 88 MCG: 88 TABLET ORAL at 08:21

## 2019-11-18 RX ADMIN — BUSPIRONE HYDROCHLORIDE 7.5 MG: 15 TABLET ORAL at 20:20

## 2019-11-18 RX ADMIN — DOCUSATE SODIUM 100 MG: 100 CAPSULE, LIQUID FILLED ORAL at 20:20

## 2019-11-18 RX ADMIN — MIRTAZAPINE 15 MG: 15 TABLET, FILM COATED ORAL at 20:20

## 2019-11-18 RX ADMIN — BUSPIRONE HYDROCHLORIDE 7.5 MG: 15 TABLET ORAL at 08:21

## 2019-11-18 RX ADMIN — CEFUROXIME AXETIL 250 MG: 250 TABLET ORAL at 08:21

## 2019-11-18 RX ADMIN — MELATONIN TAB 5 MG 5 MG: 5 TAB at 20:20

## 2019-11-18 RX ADMIN — ACETAMINOPHEN 650 MG: 325 TABLET, FILM COATED ORAL at 22:33

## 2019-11-18 RX ADMIN — DOCUSATE SODIUM 100 MG: 100 CAPSULE, LIQUID FILLED ORAL at 08:21

## 2019-11-18 RX ADMIN — FERROUS GLUCONATE 324 MG: 324 TABLET ORAL at 08:21

## 2019-11-18 RX ADMIN — SODIUM CHLORIDE TAB 1 GM 1 G: 1 TAB at 08:21

## 2019-11-18 RX ADMIN — SODIUM CHLORIDE TAB 1 GM 1 G: 1 TAB at 13:00

## 2019-11-18 RX ADMIN — SODIUM CHLORIDE TAB 1 GM 1 G: 1 TAB at 18:16

## 2019-11-18 RX ADMIN — METAXALONE 800 MG: 800 TABLET ORAL at 22:33

## 2019-11-19 PROCEDURE — 97116 GAIT TRAINING THERAPY: CPT

## 2019-11-19 PROCEDURE — 97535 SELF CARE MNGMENT TRAINING: CPT

## 2019-11-19 PROCEDURE — 25010000002 ENOXAPARIN PER 10 MG: Performed by: NURSE PRACTITIONER

## 2019-11-19 RX ADMIN — DOCUSATE SODIUM 100 MG: 100 CAPSULE, LIQUID FILLED ORAL at 20:38

## 2019-11-19 RX ADMIN — MELATONIN TAB 5 MG 5 MG: 5 TAB at 20:38

## 2019-11-19 RX ADMIN — BUSPIRONE HYDROCHLORIDE 7.5 MG: 15 TABLET ORAL at 20:39

## 2019-11-19 RX ADMIN — SODIUM CHLORIDE TAB 1 GM 1 G: 1 TAB at 11:54

## 2019-11-19 RX ADMIN — ENOXAPARIN SODIUM 40 MG: 40 INJECTION SUBCUTANEOUS at 09:19

## 2019-11-19 RX ADMIN — LEVOTHYROXINE SODIUM 88 MCG: 88 TABLET ORAL at 10:14

## 2019-11-19 RX ADMIN — FERROUS GLUCONATE 324 MG: 324 TABLET ORAL at 09:17

## 2019-11-19 RX ADMIN — DOCUSATE SODIUM 100 MG: 100 CAPSULE, LIQUID FILLED ORAL at 09:19

## 2019-11-19 RX ADMIN — SODIUM CHLORIDE TAB 1 GM 1 G: 1 TAB at 18:19

## 2019-11-19 RX ADMIN — QUETIAPINE FUMARATE 12.5 MG: 25 TABLET ORAL at 20:39

## 2019-11-19 RX ADMIN — SODIUM CHLORIDE TAB 1 GM 1 G: 1 TAB at 09:19

## 2019-11-19 RX ADMIN — MIRTAZAPINE 15 MG: 15 TABLET, FILM COATED ORAL at 20:38

## 2019-11-19 RX ADMIN — BUSPIRONE HYDROCHLORIDE 7.5 MG: 15 TABLET ORAL at 09:17

## 2019-11-20 LAB
ANION GAP SERPL CALCULATED.3IONS-SCNC: 10.5 MMOL/L (ref 5–15)
BASOPHILS # BLD AUTO: 0.07 10*3/MM3 (ref 0–0.2)
BASOPHILS NFR BLD AUTO: 0.7 % (ref 0–1.5)
BUN BLD-MCNC: 11 MG/DL (ref 8–23)
BUN/CREAT SERPL: 12.4 (ref 7–25)
CALCIUM SPEC-SCNC: 8 MG/DL (ref 8.6–10.5)
CHLORIDE SERPL-SCNC: 93 MMOL/L (ref 98–107)
CO2 SERPL-SCNC: 27.5 MMOL/L (ref 22–29)
CREAT BLD-MCNC: 0.89 MG/DL (ref 0.57–1)
DEPRECATED RDW RBC AUTO: 46.5 FL (ref 37–54)
EOSINOPHIL # BLD AUTO: 0.54 10*3/MM3 (ref 0–0.4)
EOSINOPHIL NFR BLD AUTO: 5.4 % (ref 0.3–6.2)
ERYTHROCYTE [DISTWIDTH] IN BLOOD BY AUTOMATED COUNT: 14.2 % (ref 12.3–15.4)
GFR SERPL CREATININE-BSD FRML MDRD: 60 ML/MIN/1.73
GLUCOSE BLD-MCNC: 89 MG/DL (ref 65–99)
HCT VFR BLD AUTO: 25.5 % (ref 34–46.6)
HGB BLD-MCNC: 8.2 G/DL (ref 12–15.9)
IMM GRANULOCYTES # BLD AUTO: 0.37 10*3/MM3 (ref 0–0.05)
IMM GRANULOCYTES NFR BLD AUTO: 3.7 % (ref 0–0.5)
LYMPHOCYTES # BLD AUTO: 1.55 10*3/MM3 (ref 0.7–3.1)
LYMPHOCYTES NFR BLD AUTO: 15.4 % (ref 19.6–45.3)
MCH RBC QN AUTO: 30.6 PG (ref 26.6–33)
MCHC RBC AUTO-ENTMCNC: 32.2 G/DL (ref 31.5–35.7)
MCV RBC AUTO: 95.1 FL (ref 79–97)
MONOCYTES # BLD AUTO: 1.01 10*3/MM3 (ref 0.1–0.9)
MONOCYTES NFR BLD AUTO: 10 % (ref 5–12)
NEUTROPHILS # BLD AUTO: 6.53 10*3/MM3 (ref 1.7–7)
NEUTROPHILS NFR BLD AUTO: 64.8 % (ref 42.7–76)
NRBC BLD AUTO-RTO: 0 /100 WBC (ref 0–0.2)
PLATELET # BLD AUTO: 484 10*3/MM3 (ref 140–450)
PMV BLD AUTO: 8.6 FL (ref 6–12)
POTASSIUM BLD-SCNC: 4.1 MMOL/L (ref 3.5–5.2)
RBC # BLD AUTO: 2.68 10*6/MM3 (ref 3.77–5.28)
SODIUM BLD-SCNC: 131 MMOL/L (ref 136–145)
WBC NRBC COR # BLD: 10.07 10*3/MM3 (ref 3.4–10.8)

## 2019-11-20 PROCEDURE — 25010000002 ENOXAPARIN PER 10 MG: Performed by: NURSE PRACTITIONER

## 2019-11-20 PROCEDURE — 80048 BASIC METABOLIC PNL TOTAL CA: CPT | Performed by: NURSE PRACTITIONER

## 2019-11-20 PROCEDURE — 97116 GAIT TRAINING THERAPY: CPT

## 2019-11-20 PROCEDURE — 85025 COMPLETE CBC W/AUTO DIFF WBC: CPT | Performed by: NURSE PRACTITIONER

## 2019-11-20 PROCEDURE — 99309 SBSQ NF CARE MODERATE MDM 30: CPT | Performed by: NURSE PRACTITIONER

## 2019-11-20 PROCEDURE — 97535 SELF CARE MNGMENT TRAINING: CPT

## 2019-11-20 PROCEDURE — 97110 THERAPEUTIC EXERCISES: CPT

## 2019-11-20 RX ORDER — DOXYCYCLINE HYCLATE 50 MG/1
324 CAPSULE, GELATIN COATED ORAL 2 TIMES DAILY WITH MEALS
Status: DISCONTINUED | OUTPATIENT
Start: 2019-11-20 | End: 2019-11-27 | Stop reason: HOSPADM

## 2019-11-20 RX ADMIN — QUETIAPINE FUMARATE 12.5 MG: 25 TABLET ORAL at 21:37

## 2019-11-20 RX ADMIN — SODIUM CHLORIDE TAB 1 GM 1 G: 1 TAB at 17:35

## 2019-11-20 RX ADMIN — LEVOTHYROXINE SODIUM 88 MCG: 88 TABLET ORAL at 06:05

## 2019-11-20 RX ADMIN — POLYETHYLENE GLYCOL 3350 17 G: 17 POWDER, FOR SOLUTION ORAL at 08:10

## 2019-11-20 RX ADMIN — MELATONIN TAB 5 MG 5 MG: 5 TAB at 21:37

## 2019-11-20 RX ADMIN — ENOXAPARIN SODIUM 40 MG: 40 INJECTION SUBCUTANEOUS at 08:10

## 2019-11-20 RX ADMIN — DOCUSATE SODIUM 100 MG: 100 CAPSULE, LIQUID FILLED ORAL at 21:37

## 2019-11-20 RX ADMIN — DOCUSATE SODIUM 100 MG: 100 CAPSULE, LIQUID FILLED ORAL at 08:10

## 2019-11-20 RX ADMIN — SODIUM CHLORIDE TAB 1 GM 1 G: 1 TAB at 11:21

## 2019-11-20 RX ADMIN — MIRTAZAPINE 15 MG: 15 TABLET, FILM COATED ORAL at 21:37

## 2019-11-20 RX ADMIN — FERROUS GLUCONATE 324 MG: 324 TABLET ORAL at 08:10

## 2019-11-20 RX ADMIN — SODIUM CHLORIDE TAB 1 GM 1 G: 1 TAB at 08:10

## 2019-11-20 RX ADMIN — BUSPIRONE HYDROCHLORIDE 7.5 MG: 15 TABLET ORAL at 21:37

## 2019-11-21 LAB
ANION GAP SERPL CALCULATED.3IONS-SCNC: 9.5 MMOL/L (ref 5–15)
BASOPHILS # BLD AUTO: 0.05 10*3/MM3 (ref 0–0.2)
BASOPHILS NFR BLD AUTO: 0.6 % (ref 0–1.5)
BILIRUB UR QL STRIP: NEGATIVE
BUN BLD-MCNC: 11 MG/DL (ref 8–23)
BUN/CREAT SERPL: 11.5 (ref 7–25)
CALCIUM SPEC-SCNC: 8.3 MG/DL (ref 8.6–10.5)
CHLORIDE SERPL-SCNC: 96 MMOL/L (ref 98–107)
CLARITY UR: CLEAR
CO2 SERPL-SCNC: 28.5 MMOL/L (ref 22–29)
COLOR UR: YELLOW
CREAT BLD-MCNC: 0.96 MG/DL (ref 0.57–1)
DEPRECATED RDW RBC AUTO: 47.9 FL (ref 37–54)
EOSINOPHIL # BLD AUTO: 0.41 10*3/MM3 (ref 0–0.4)
EOSINOPHIL NFR BLD AUTO: 4.6 % (ref 0.3–6.2)
ERYTHROCYTE [DISTWIDTH] IN BLOOD BY AUTOMATED COUNT: 14.6 % (ref 12.3–15.4)
GFR SERPL CREATININE-BSD FRML MDRD: 55 ML/MIN/1.73
GLUCOSE BLD-MCNC: 98 MG/DL (ref 65–99)
GLUCOSE UR STRIP-MCNC: NEGATIVE MG/DL
HCT VFR BLD AUTO: 28.1 % (ref 34–46.6)
HGB BLD-MCNC: 9 G/DL (ref 12–15.9)
HGB UR QL STRIP.AUTO: NEGATIVE
IMM GRANULOCYTES # BLD AUTO: 0.23 10*3/MM3 (ref 0–0.05)
IMM GRANULOCYTES NFR BLD AUTO: 2.6 % (ref 0–0.5)
KETONES UR QL STRIP: NEGATIVE
LEUKOCYTE ESTERASE UR QL STRIP.AUTO: NEGATIVE
LYMPHOCYTES # BLD AUTO: 1.7 10*3/MM3 (ref 0.7–3.1)
LYMPHOCYTES NFR BLD AUTO: 18.9 % (ref 19.6–45.3)
MCH RBC QN AUTO: 30.6 PG (ref 26.6–33)
MCHC RBC AUTO-ENTMCNC: 32 G/DL (ref 31.5–35.7)
MCV RBC AUTO: 95.6 FL (ref 79–97)
MONOCYTES # BLD AUTO: 0.91 10*3/MM3 (ref 0.1–0.9)
MONOCYTES NFR BLD AUTO: 10.1 % (ref 5–12)
NEUTROPHILS # BLD AUTO: 5.71 10*3/MM3 (ref 1.7–7)
NEUTROPHILS NFR BLD AUTO: 63.2 % (ref 42.7–76)
NITRITE UR QL STRIP: NEGATIVE
NRBC BLD AUTO-RTO: 0 /100 WBC (ref 0–0.2)
PH UR STRIP.AUTO: 7 [PH] (ref 4.5–8)
PLATELET # BLD AUTO: 516 10*3/MM3 (ref 140–450)
PMV BLD AUTO: 8.6 FL (ref 6–12)
POTASSIUM BLD-SCNC: 4.2 MMOL/L (ref 3.5–5.2)
PROT UR QL STRIP: NEGATIVE
RBC # BLD AUTO: 2.94 10*6/MM3 (ref 3.77–5.28)
SODIUM BLD-SCNC: 134 MMOL/L (ref 136–145)
SP GR UR STRIP: 1.02 (ref 1–1.03)
UROBILINOGEN UR QL STRIP: NORMAL
WBC NRBC COR # BLD: 9.01 10*3/MM3 (ref 3.4–10.8)

## 2019-11-21 PROCEDURE — 81003 URINALYSIS AUTO W/O SCOPE: CPT | Performed by: NURSE PRACTITIONER

## 2019-11-21 PROCEDURE — 80048 BASIC METABOLIC PNL TOTAL CA: CPT | Performed by: NURSE PRACTITIONER

## 2019-11-21 PROCEDURE — 25010000002 ENOXAPARIN PER 10 MG: Performed by: NURSE PRACTITIONER

## 2019-11-21 PROCEDURE — 97116 GAIT TRAINING THERAPY: CPT

## 2019-11-21 PROCEDURE — 85025 COMPLETE CBC W/AUTO DIFF WBC: CPT | Performed by: NURSE PRACTITIONER

## 2019-11-21 PROCEDURE — 97535 SELF CARE MNGMENT TRAINING: CPT

## 2019-11-21 PROCEDURE — 97110 THERAPEUTIC EXERCISES: CPT

## 2019-11-21 RX ADMIN — ENOXAPARIN SODIUM 40 MG: 40 INJECTION SUBCUTANEOUS at 09:38

## 2019-11-21 RX ADMIN — FERROUS GLUCONATE 324 MG: 324 TABLET ORAL at 17:32

## 2019-11-21 RX ADMIN — SODIUM CHLORIDE TAB 1 GM 1 G: 1 TAB at 17:32

## 2019-11-21 RX ADMIN — BUSPIRONE HYDROCHLORIDE 7.5 MG: 15 TABLET ORAL at 21:27

## 2019-11-21 RX ADMIN — DOCUSATE SODIUM 100 MG: 100 CAPSULE, LIQUID FILLED ORAL at 09:38

## 2019-11-21 RX ADMIN — MIRTAZAPINE 15 MG: 15 TABLET, FILM COATED ORAL at 21:27

## 2019-11-21 RX ADMIN — SODIUM CHLORIDE TAB 1 GM 1 G: 1 TAB at 13:00

## 2019-11-21 RX ADMIN — SODIUM CHLORIDE TAB 1 GM 1 G: 1 TAB at 09:36

## 2019-11-21 RX ADMIN — MELATONIN TAB 5 MG 5 MG: 5 TAB at 21:27

## 2019-11-21 RX ADMIN — DOCUSATE SODIUM 100 MG: 100 CAPSULE, LIQUID FILLED ORAL at 21:27

## 2019-11-21 RX ADMIN — LEVOTHYROXINE SODIUM 88 MCG: 88 TABLET ORAL at 05:32

## 2019-11-21 RX ADMIN — BUSPIRONE HYDROCHLORIDE 7.5 MG: 15 TABLET ORAL at 09:37

## 2019-11-21 RX ADMIN — QUETIAPINE FUMARATE 12.5 MG: 25 TABLET ORAL at 21:27

## 2019-11-21 RX ADMIN — FERROUS GLUCONATE 324 MG: 324 TABLET ORAL at 09:36

## 2019-11-22 LAB
ANION GAP SERPL CALCULATED.3IONS-SCNC: 9.3 MMOL/L (ref 5–15)
BASOPHILS # BLD AUTO: 0.06 10*3/MM3 (ref 0–0.2)
BASOPHILS NFR BLD AUTO: 0.6 % (ref 0–1.5)
BUN BLD-MCNC: 12 MG/DL (ref 8–23)
BUN/CREAT SERPL: 12 (ref 7–25)
CALCIUM SPEC-SCNC: 8.4 MG/DL (ref 8.6–10.5)
CHLORIDE SERPL-SCNC: 94 MMOL/L (ref 98–107)
CO2 SERPL-SCNC: 29.7 MMOL/L (ref 22–29)
CREAT BLD-MCNC: 1 MG/DL (ref 0.57–1)
DEPRECATED RDW RBC AUTO: 48.5 FL (ref 37–54)
EOSINOPHIL # BLD AUTO: 0.41 10*3/MM3 (ref 0–0.4)
EOSINOPHIL NFR BLD AUTO: 4.4 % (ref 0.3–6.2)
ERYTHROCYTE [DISTWIDTH] IN BLOOD BY AUTOMATED COUNT: 15 % (ref 12.3–15.4)
GFR SERPL CREATININE-BSD FRML MDRD: 52 ML/MIN/1.73
GLUCOSE BLD-MCNC: 100 MG/DL (ref 65–99)
HCT VFR BLD AUTO: 27.7 % (ref 34–46.6)
HGB BLD-MCNC: 8.7 G/DL (ref 12–15.9)
IMM GRANULOCYTES # BLD AUTO: 0.15 10*3/MM3 (ref 0–0.05)
IMM GRANULOCYTES NFR BLD AUTO: 1.6 % (ref 0–0.5)
LYMPHOCYTES # BLD AUTO: 1.97 10*3/MM3 (ref 0.7–3.1)
LYMPHOCYTES NFR BLD AUTO: 21.1 % (ref 19.6–45.3)
MCH RBC QN AUTO: 30 PG (ref 26.6–33)
MCHC RBC AUTO-ENTMCNC: 31.4 G/DL (ref 31.5–35.7)
MCV RBC AUTO: 95.5 FL (ref 79–97)
MONOCYTES # BLD AUTO: 0.83 10*3/MM3 (ref 0.1–0.9)
MONOCYTES NFR BLD AUTO: 8.9 % (ref 5–12)
NEUTROPHILS # BLD AUTO: 5.92 10*3/MM3 (ref 1.7–7)
NEUTROPHILS NFR BLD AUTO: 63.4 % (ref 42.7–76)
NRBC BLD AUTO-RTO: 0 /100 WBC (ref 0–0.2)
PLATELET # BLD AUTO: 504 10*3/MM3 (ref 140–450)
PMV BLD AUTO: 8.7 FL (ref 6–12)
POTASSIUM BLD-SCNC: 4.3 MMOL/L (ref 3.5–5.2)
RBC # BLD AUTO: 2.9 10*6/MM3 (ref 3.77–5.28)
SODIUM BLD-SCNC: 133 MMOL/L (ref 136–145)
WBC NRBC COR # BLD: 9.34 10*3/MM3 (ref 3.4–10.8)

## 2019-11-22 PROCEDURE — 80048 BASIC METABOLIC PNL TOTAL CA: CPT | Performed by: NURSE PRACTITIONER

## 2019-11-22 PROCEDURE — 25010000002 ENOXAPARIN PER 10 MG: Performed by: NURSE PRACTITIONER

## 2019-11-22 PROCEDURE — 97116 GAIT TRAINING THERAPY: CPT

## 2019-11-22 PROCEDURE — 97110 THERAPEUTIC EXERCISES: CPT

## 2019-11-22 PROCEDURE — 85025 COMPLETE CBC W/AUTO DIFF WBC: CPT | Performed by: NURSE PRACTITIONER

## 2019-11-22 PROCEDURE — 97535 SELF CARE MNGMENT TRAINING: CPT

## 2019-11-22 RX ADMIN — DOCUSATE SODIUM 100 MG: 100 CAPSULE, LIQUID FILLED ORAL at 08:30

## 2019-11-22 RX ADMIN — FERROUS GLUCONATE 324 MG: 324 TABLET ORAL at 18:05

## 2019-11-22 RX ADMIN — SODIUM CHLORIDE TAB 1 GM 1 G: 1 TAB at 18:05

## 2019-11-22 RX ADMIN — HYDROCODONE BITARTRATE AND ACETAMINOPHEN 1 TABLET: 7.5; 325 TABLET ORAL at 00:32

## 2019-11-22 RX ADMIN — FERROUS GLUCONATE 324 MG: 324 TABLET ORAL at 08:30

## 2019-11-22 RX ADMIN — QUETIAPINE FUMARATE 12.5 MG: 25 TABLET ORAL at 21:29

## 2019-11-22 RX ADMIN — METAXALONE 800 MG: 800 TABLET ORAL at 22:23

## 2019-11-22 RX ADMIN — ENOXAPARIN SODIUM 40 MG: 40 INJECTION SUBCUTANEOUS at 08:30

## 2019-11-22 RX ADMIN — LEVOTHYROXINE SODIUM 88 MCG: 88 TABLET ORAL at 06:15

## 2019-11-22 RX ADMIN — BUSPIRONE HYDROCHLORIDE 7.5 MG: 15 TABLET ORAL at 21:29

## 2019-11-22 RX ADMIN — SODIUM CHLORIDE TAB 1 GM 1 G: 1 TAB at 08:30

## 2019-11-22 RX ADMIN — SODIUM CHLORIDE TAB 1 GM 1 G: 1 TAB at 12:22

## 2019-11-22 RX ADMIN — HYDROCODONE BITARTRATE AND ACETAMINOPHEN 1 TABLET: 7.5; 325 TABLET ORAL at 22:48

## 2019-11-22 RX ADMIN — METAXALONE 800 MG: 800 TABLET ORAL at 08:30

## 2019-11-22 RX ADMIN — DOCUSATE SODIUM 100 MG: 100 CAPSULE, LIQUID FILLED ORAL at 21:29

## 2019-11-22 RX ADMIN — MELATONIN TAB 5 MG 5 MG: 5 TAB at 21:29

## 2019-11-22 RX ADMIN — MIRTAZAPINE 15 MG: 15 TABLET, FILM COATED ORAL at 21:29

## 2019-11-22 RX ADMIN — BUSPIRONE HYDROCHLORIDE 7.5 MG: 15 TABLET ORAL at 08:30

## 2019-11-23 LAB
ANION GAP SERPL CALCULATED.3IONS-SCNC: 13.8 MMOL/L (ref 5–15)
BASOPHILS # BLD AUTO: 0.06 10*3/MM3 (ref 0–0.2)
BASOPHILS NFR BLD AUTO: 0.7 % (ref 0–1.5)
BUN BLD-MCNC: 13 MG/DL (ref 8–23)
BUN/CREAT SERPL: 14.6 (ref 7–25)
CALCIUM SPEC-SCNC: 8.4 MG/DL (ref 8.6–10.5)
CHLORIDE SERPL-SCNC: 94 MMOL/L (ref 98–107)
CO2 SERPL-SCNC: 25.2 MMOL/L (ref 22–29)
CREAT BLD-MCNC: 0.89 MG/DL (ref 0.57–1)
DEPRECATED RDW RBC AUTO: 49.6 FL (ref 37–54)
EOSINOPHIL # BLD AUTO: 0.5 10*3/MM3 (ref 0–0.4)
EOSINOPHIL NFR BLD AUTO: 6.1 % (ref 0.3–6.2)
ERYTHROCYTE [DISTWIDTH] IN BLOOD BY AUTOMATED COUNT: 15.1 % (ref 12.3–15.4)
GFR SERPL CREATININE-BSD FRML MDRD: 60 ML/MIN/1.73
GLUCOSE BLD-MCNC: 88 MG/DL (ref 65–99)
HCT VFR BLD AUTO: 26.9 % (ref 34–46.6)
HGB BLD-MCNC: 8.5 G/DL (ref 12–15.9)
IMM GRANULOCYTES # BLD AUTO: 0.12 10*3/MM3 (ref 0–0.05)
IMM GRANULOCYTES NFR BLD AUTO: 1.5 % (ref 0–0.5)
LYMPHOCYTES # BLD AUTO: 1.34 10*3/MM3 (ref 0.7–3.1)
LYMPHOCYTES NFR BLD AUTO: 16.4 % (ref 19.6–45.3)
MCH RBC QN AUTO: 30.4 PG (ref 26.6–33)
MCHC RBC AUTO-ENTMCNC: 31.6 G/DL (ref 31.5–35.7)
MCV RBC AUTO: 96.1 FL (ref 79–97)
MONOCYTES # BLD AUTO: 0.63 10*3/MM3 (ref 0.1–0.9)
MONOCYTES NFR BLD AUTO: 7.7 % (ref 5–12)
NEUTROPHILS # BLD AUTO: 5.5 10*3/MM3 (ref 1.7–7)
NEUTROPHILS NFR BLD AUTO: 67.6 % (ref 42.7–76)
NRBC BLD AUTO-RTO: 0 /100 WBC (ref 0–0.2)
PLATELET # BLD AUTO: 455 10*3/MM3 (ref 140–450)
PMV BLD AUTO: 8.7 FL (ref 6–12)
POTASSIUM BLD-SCNC: 4.4 MMOL/L (ref 3.5–5.2)
RBC # BLD AUTO: 2.8 10*6/MM3 (ref 3.77–5.28)
SODIUM BLD-SCNC: 133 MMOL/L (ref 136–145)
WBC NRBC COR # BLD: 8.15 10*3/MM3 (ref 3.4–10.8)

## 2019-11-23 PROCEDURE — 80048 BASIC METABOLIC PNL TOTAL CA: CPT | Performed by: NURSE PRACTITIONER

## 2019-11-23 PROCEDURE — 97116 GAIT TRAINING THERAPY: CPT

## 2019-11-23 PROCEDURE — 85025 COMPLETE CBC W/AUTO DIFF WBC: CPT | Performed by: NURSE PRACTITIONER

## 2019-11-23 PROCEDURE — 25010000002 ENOXAPARIN PER 10 MG: Performed by: NURSE PRACTITIONER

## 2019-11-23 RX ADMIN — BUSPIRONE HYDROCHLORIDE 7.5 MG: 15 TABLET ORAL at 08:09

## 2019-11-23 RX ADMIN — MIRTAZAPINE 15 MG: 15 TABLET, FILM COATED ORAL at 19:37

## 2019-11-23 RX ADMIN — MELATONIN TAB 5 MG 5 MG: 5 TAB at 19:36

## 2019-11-23 RX ADMIN — ENOXAPARIN SODIUM 40 MG: 40 INJECTION SUBCUTANEOUS at 08:08

## 2019-11-23 RX ADMIN — SODIUM CHLORIDE TAB 1 GM 1 G: 1 TAB at 08:09

## 2019-11-23 RX ADMIN — QUETIAPINE FUMARATE 12.5 MG: 25 TABLET ORAL at 19:36

## 2019-11-23 RX ADMIN — FERROUS GLUCONATE 324 MG: 324 TABLET ORAL at 08:09

## 2019-11-23 RX ADMIN — DOCUSATE SODIUM 100 MG: 100 CAPSULE, LIQUID FILLED ORAL at 08:09

## 2019-11-23 RX ADMIN — SODIUM CHLORIDE TAB 1 GM 1 G: 1 TAB at 17:35

## 2019-11-23 RX ADMIN — FERROUS GLUCONATE 324 MG: 324 TABLET ORAL at 17:35

## 2019-11-23 RX ADMIN — BUSPIRONE HYDROCHLORIDE 7.5 MG: 15 TABLET ORAL at 19:37

## 2019-11-23 RX ADMIN — SODIUM CHLORIDE TAB 1 GM 1 G: 1 TAB at 13:00

## 2019-11-23 RX ADMIN — HYDROCODONE BITARTRATE AND ACETAMINOPHEN 1 TABLET: 7.5; 325 TABLET ORAL at 22:56

## 2019-11-23 RX ADMIN — DOCUSATE SODIUM 100 MG: 100 CAPSULE, LIQUID FILLED ORAL at 19:37

## 2019-11-23 RX ADMIN — LEVOTHYROXINE SODIUM 88 MCG: 88 TABLET ORAL at 05:43

## 2019-11-23 RX ADMIN — POLYETHYLENE GLYCOL 3350 17 G: 17 POWDER, FOR SOLUTION ORAL at 08:09

## 2019-11-24 LAB
ANION GAP SERPL CALCULATED.3IONS-SCNC: 12.6 MMOL/L (ref 5–15)
BASOPHILS # BLD AUTO: 0.06 10*3/MM3 (ref 0–0.2)
BASOPHILS NFR BLD AUTO: 0.8 % (ref 0–1.5)
BUN BLD-MCNC: 13 MG/DL (ref 8–23)
BUN/CREAT SERPL: 12.9 (ref 7–25)
CALCIUM SPEC-SCNC: 8.4 MG/DL (ref 8.6–10.5)
CHLORIDE SERPL-SCNC: 96 MMOL/L (ref 98–107)
CO2 SERPL-SCNC: 26.4 MMOL/L (ref 22–29)
CREAT BLD-MCNC: 1.01 MG/DL (ref 0.57–1)
DEPRECATED RDW RBC AUTO: 52.6 FL (ref 37–54)
EOSINOPHIL # BLD AUTO: 0.46 10*3/MM3 (ref 0–0.4)
EOSINOPHIL NFR BLD AUTO: 6.3 % (ref 0.3–6.2)
ERYTHROCYTE [DISTWIDTH] IN BLOOD BY AUTOMATED COUNT: 15.6 % (ref 12.3–15.4)
GFR SERPL CREATININE-BSD FRML MDRD: 52 ML/MIN/1.73
GLUCOSE BLD-MCNC: 95 MG/DL (ref 65–99)
HCT VFR BLD AUTO: 27.2 % (ref 34–46.6)
HGB BLD-MCNC: 8.6 G/DL (ref 12–15.9)
IMM GRANULOCYTES # BLD AUTO: 0.07 10*3/MM3 (ref 0–0.05)
IMM GRANULOCYTES NFR BLD AUTO: 1 % (ref 0–0.5)
LYMPHOCYTES # BLD AUTO: 1.45 10*3/MM3 (ref 0.7–3.1)
LYMPHOCYTES NFR BLD AUTO: 19.9 % (ref 19.6–45.3)
MCH RBC QN AUTO: 30.8 PG (ref 26.6–33)
MCHC RBC AUTO-ENTMCNC: 31.6 G/DL (ref 31.5–35.7)
MCV RBC AUTO: 97.5 FL (ref 79–97)
MONOCYTES # BLD AUTO: 0.68 10*3/MM3 (ref 0.1–0.9)
MONOCYTES NFR BLD AUTO: 9.3 % (ref 5–12)
NEUTROPHILS # BLD AUTO: 4.58 10*3/MM3 (ref 1.7–7)
NEUTROPHILS NFR BLD AUTO: 62.7 % (ref 42.7–76)
PLATELET # BLD AUTO: 425 10*3/MM3 (ref 140–450)
PMV BLD AUTO: 8.6 FL (ref 6–12)
POTASSIUM BLD-SCNC: 4.1 MMOL/L (ref 3.5–5.2)
RBC # BLD AUTO: 2.79 10*6/MM3 (ref 3.77–5.28)
SODIUM BLD-SCNC: 135 MMOL/L (ref 136–145)
WBC NRBC COR # BLD: 7.3 10*3/MM3 (ref 3.4–10.8)

## 2019-11-24 PROCEDURE — 85025 COMPLETE CBC W/AUTO DIFF WBC: CPT | Performed by: NURSE PRACTITIONER

## 2019-11-24 PROCEDURE — 80048 BASIC METABOLIC PNL TOTAL CA: CPT | Performed by: NURSE PRACTITIONER

## 2019-11-24 PROCEDURE — 25010000002 ENOXAPARIN PER 10 MG: Performed by: NURSE PRACTITIONER

## 2019-11-24 RX ADMIN — DOCUSATE SODIUM 100 MG: 100 CAPSULE, LIQUID FILLED ORAL at 09:47

## 2019-11-24 RX ADMIN — POLYETHYLENE GLYCOL 3350 17 G: 17 POWDER, FOR SOLUTION ORAL at 09:47

## 2019-11-24 RX ADMIN — BUSPIRONE HYDROCHLORIDE 7.5 MG: 15 TABLET ORAL at 09:48

## 2019-11-24 RX ADMIN — DOCUSATE SODIUM 100 MG: 100 CAPSULE, LIQUID FILLED ORAL at 20:27

## 2019-11-24 RX ADMIN — FERROUS GLUCONATE 324 MG: 324 TABLET ORAL at 18:07

## 2019-11-24 RX ADMIN — MELATONIN TAB 5 MG 5 MG: 5 TAB at 20:28

## 2019-11-24 RX ADMIN — LEVOTHYROXINE SODIUM 88 MCG: 88 TABLET ORAL at 06:10

## 2019-11-24 RX ADMIN — BUSPIRONE HYDROCHLORIDE 7.5 MG: 15 TABLET ORAL at 20:27

## 2019-11-24 RX ADMIN — SODIUM CHLORIDE TAB 1 GM 1 G: 1 TAB at 09:00

## 2019-11-24 RX ADMIN — ENOXAPARIN SODIUM 40 MG: 40 INJECTION SUBCUTANEOUS at 09:47

## 2019-11-24 RX ADMIN — SODIUM CHLORIDE TAB 1 GM 1 G: 1 TAB at 13:00

## 2019-11-24 RX ADMIN — SODIUM CHLORIDE TAB 1 GM 1 G: 1 TAB at 18:07

## 2019-11-24 RX ADMIN — QUETIAPINE FUMARATE 12.5 MG: 25 TABLET ORAL at 20:28

## 2019-11-24 RX ADMIN — HYDROCODONE BITARTRATE AND ACETAMINOPHEN 1 TABLET: 7.5; 325 TABLET ORAL at 20:28

## 2019-11-24 RX ADMIN — MIRTAZAPINE 15 MG: 15 TABLET, FILM COATED ORAL at 20:28

## 2019-11-24 RX ADMIN — OFLOXACIN 50000 UNITS: 300 TABLET, COATED ORAL at 09:47

## 2019-11-24 RX ADMIN — FERROUS GLUCONATE 324 MG: 324 TABLET ORAL at 09:00

## 2019-11-25 LAB
ANION GAP SERPL CALCULATED.3IONS-SCNC: 13.9 MMOL/L (ref 5–15)
BASOPHILS # BLD AUTO: 0.04 10*3/MM3 (ref 0–0.2)
BASOPHILS NFR BLD AUTO: 0.6 % (ref 0–1.5)
BUN BLD-MCNC: 13 MG/DL (ref 8–23)
BUN/CREAT SERPL: 12.7 (ref 7–25)
CALCIUM SPEC-SCNC: 8.5 MG/DL (ref 8.6–10.5)
CHLORIDE SERPL-SCNC: 97 MMOL/L (ref 98–107)
CO2 SERPL-SCNC: 26.1 MMOL/L (ref 22–29)
CREAT BLD-MCNC: 1.02 MG/DL (ref 0.57–1)
DEPRECATED RDW RBC AUTO: 54.7 FL (ref 37–54)
EOSINOPHIL # BLD AUTO: 0.41 10*3/MM3 (ref 0–0.4)
EOSINOPHIL NFR BLD AUTO: 5.7 % (ref 0.3–6.2)
ERYTHROCYTE [DISTWIDTH] IN BLOOD BY AUTOMATED COUNT: 16 % (ref 12.3–15.4)
GFR SERPL CREATININE-BSD FRML MDRD: 51 ML/MIN/1.73
GLUCOSE BLD-MCNC: 87 MG/DL (ref 65–99)
HCT VFR BLD AUTO: 27.4 % (ref 34–46.6)
HGB BLD-MCNC: 8.7 G/DL (ref 12–15.9)
IMM GRANULOCYTES # BLD AUTO: 0.08 10*3/MM3 (ref 0–0.05)
IMM GRANULOCYTES NFR BLD AUTO: 1.1 % (ref 0–0.5)
LYMPHOCYTES # BLD AUTO: 2.17 10*3/MM3 (ref 0.7–3.1)
LYMPHOCYTES NFR BLD AUTO: 30.3 % (ref 19.6–45.3)
MCH RBC QN AUTO: 30.9 PG (ref 26.6–33)
MCHC RBC AUTO-ENTMCNC: 31.8 G/DL (ref 31.5–35.7)
MCV RBC AUTO: 97.2 FL (ref 79–97)
MONOCYTES # BLD AUTO: 0.78 10*3/MM3 (ref 0.1–0.9)
MONOCYTES NFR BLD AUTO: 10.9 % (ref 5–12)
NEUTROPHILS # BLD AUTO: 3.69 10*3/MM3 (ref 1.7–7)
NEUTROPHILS NFR BLD AUTO: 51.4 % (ref 42.7–76)
NRBC BLD AUTO-RTO: 0 /100 WBC (ref 0–0.2)
PLATELET # BLD AUTO: 442 10*3/MM3 (ref 140–450)
PMV BLD AUTO: 8.7 FL (ref 6–12)
POTASSIUM BLD-SCNC: 4.3 MMOL/L (ref 3.5–5.2)
RBC # BLD AUTO: 2.82 10*6/MM3 (ref 3.77–5.28)
SODIUM BLD-SCNC: 137 MMOL/L (ref 136–145)
WBC NRBC COR # BLD: 7.17 10*3/MM3 (ref 3.4–10.8)

## 2019-11-25 PROCEDURE — 97116 GAIT TRAINING THERAPY: CPT

## 2019-11-25 PROCEDURE — 97110 THERAPEUTIC EXERCISES: CPT

## 2019-11-25 PROCEDURE — 25010000002 ENOXAPARIN PER 10 MG: Performed by: NURSE PRACTITIONER

## 2019-11-25 PROCEDURE — 97535 SELF CARE MNGMENT TRAINING: CPT

## 2019-11-25 PROCEDURE — 85025 COMPLETE CBC W/AUTO DIFF WBC: CPT | Performed by: NURSE PRACTITIONER

## 2019-11-25 PROCEDURE — 80048 BASIC METABOLIC PNL TOTAL CA: CPT | Performed by: NURSE PRACTITIONER

## 2019-11-25 RX ADMIN — SODIUM CHLORIDE TAB 1 GM 1 G: 1 TAB at 12:30

## 2019-11-25 RX ADMIN — BUSPIRONE HYDROCHLORIDE 7.5 MG: 15 TABLET ORAL at 19:49

## 2019-11-25 RX ADMIN — MELATONIN TAB 5 MG 5 MG: 5 TAB at 19:48

## 2019-11-25 RX ADMIN — LEVOTHYROXINE SODIUM 88 MCG: 88 TABLET ORAL at 05:42

## 2019-11-25 RX ADMIN — MIRTAZAPINE 15 MG: 15 TABLET, FILM COATED ORAL at 19:49

## 2019-11-25 RX ADMIN — SODIUM CHLORIDE TAB 1 GM 1 G: 1 TAB at 18:30

## 2019-11-25 RX ADMIN — HYDROCODONE BITARTRATE AND ACETAMINOPHEN 1 TABLET: 7.5; 325 TABLET ORAL at 19:48

## 2019-11-25 RX ADMIN — BUSPIRONE HYDROCHLORIDE 7.5 MG: 15 TABLET ORAL at 08:02

## 2019-11-25 RX ADMIN — QUETIAPINE FUMARATE 12.5 MG: 25 TABLET ORAL at 19:49

## 2019-11-25 RX ADMIN — ENOXAPARIN SODIUM 40 MG: 40 INJECTION SUBCUTANEOUS at 08:02

## 2019-11-25 RX ADMIN — DOCUSATE SODIUM 100 MG: 100 CAPSULE, LIQUID FILLED ORAL at 08:02

## 2019-11-25 RX ADMIN — SODIUM CHLORIDE TAB 1 GM 1 G: 1 TAB at 08:02

## 2019-11-25 RX ADMIN — FERROUS GLUCONATE 324 MG: 324 TABLET ORAL at 18:30

## 2019-11-25 RX ADMIN — METAXALONE 800 MG: 800 TABLET ORAL at 08:02

## 2019-11-25 RX ADMIN — POLYETHYLENE GLYCOL 3350 17 G: 17 POWDER, FOR SOLUTION ORAL at 08:02

## 2019-11-25 RX ADMIN — DOCUSATE SODIUM 100 MG: 100 CAPSULE, LIQUID FILLED ORAL at 19:49

## 2019-11-25 RX ADMIN — FERROUS GLUCONATE 324 MG: 324 TABLET ORAL at 08:02

## 2019-11-26 LAB
ANION GAP SERPL CALCULATED.3IONS-SCNC: 11.3 MMOL/L (ref 5–15)
BASOPHILS # BLD AUTO: 0.08 10*3/MM3 (ref 0–0.2)
BASOPHILS NFR BLD AUTO: 1.3 % (ref 0–1.5)
BUN BLD-MCNC: 11 MG/DL (ref 8–23)
BUN/CREAT SERPL: 10.7 (ref 7–25)
CALCIUM SPEC-SCNC: 8.3 MG/DL (ref 8.6–10.5)
CHLORIDE SERPL-SCNC: 95 MMOL/L (ref 98–107)
CO2 SERPL-SCNC: 26.7 MMOL/L (ref 22–29)
CREAT BLD-MCNC: 1.03 MG/DL (ref 0.57–1)
DEPRECATED RDW RBC AUTO: 55.9 FL (ref 37–54)
EOSINOPHIL # BLD AUTO: 0.46 10*3/MM3 (ref 0–0.4)
EOSINOPHIL NFR BLD AUTO: 7.4 % (ref 0.3–6.2)
ERYTHROCYTE [DISTWIDTH] IN BLOOD BY AUTOMATED COUNT: 16.2 % (ref 12.3–15.4)
GFR SERPL CREATININE-BSD FRML MDRD: 50 ML/MIN/1.73
GLUCOSE BLD-MCNC: 86 MG/DL (ref 65–99)
HCT VFR BLD AUTO: 27.9 % (ref 34–46.6)
HGB BLD-MCNC: 8.7 G/DL (ref 12–15.9)
IMM GRANULOCYTES # BLD AUTO: 0.06 10*3/MM3 (ref 0–0.05)
IMM GRANULOCYTES NFR BLD AUTO: 1 % (ref 0–0.5)
LYMPHOCYTES # BLD AUTO: 1.51 10*3/MM3 (ref 0.7–3.1)
LYMPHOCYTES NFR BLD AUTO: 24.3 % (ref 19.6–45.3)
MCH RBC QN AUTO: 30.6 PG (ref 26.6–33)
MCHC RBC AUTO-ENTMCNC: 31.2 G/DL (ref 31.5–35.7)
MCV RBC AUTO: 98.2 FL (ref 79–97)
MONOCYTES # BLD AUTO: 0.74 10*3/MM3 (ref 0.1–0.9)
MONOCYTES NFR BLD AUTO: 11.9 % (ref 5–12)
NEUTROPHILS # BLD AUTO: 3.37 10*3/MM3 (ref 1.7–7)
NEUTROPHILS NFR BLD AUTO: 54.1 % (ref 42.7–76)
NRBC BLD AUTO-RTO: 0 /100 WBC (ref 0–0.2)
PLATELET # BLD AUTO: 427 10*3/MM3 (ref 140–450)
PMV BLD AUTO: 8.6 FL (ref 6–12)
POTASSIUM BLD-SCNC: 4.2 MMOL/L (ref 3.5–5.2)
RBC # BLD AUTO: 2.84 10*6/MM3 (ref 3.77–5.28)
SODIUM BLD-SCNC: 133 MMOL/L (ref 136–145)
WBC NRBC COR # BLD: 6.22 10*3/MM3 (ref 3.4–10.8)

## 2019-11-26 PROCEDURE — 85025 COMPLETE CBC W/AUTO DIFF WBC: CPT | Performed by: NURSE PRACTITIONER

## 2019-11-26 PROCEDURE — 97535 SELF CARE MNGMENT TRAINING: CPT

## 2019-11-26 PROCEDURE — 80048 BASIC METABOLIC PNL TOTAL CA: CPT | Performed by: NURSE PRACTITIONER

## 2019-11-26 PROCEDURE — 25010000002 ENOXAPARIN PER 10 MG: Performed by: NURSE PRACTITIONER

## 2019-11-26 RX ADMIN — DOCUSATE SODIUM 100 MG: 100 CAPSULE, LIQUID FILLED ORAL at 08:31

## 2019-11-26 RX ADMIN — SODIUM CHLORIDE TAB 1 GM 1 G: 1 TAB at 08:31

## 2019-11-26 RX ADMIN — BUSPIRONE HYDROCHLORIDE 7.5 MG: 15 TABLET ORAL at 20:07

## 2019-11-26 RX ADMIN — BUSPIRONE HYDROCHLORIDE 7.5 MG: 15 TABLET ORAL at 08:31

## 2019-11-26 RX ADMIN — FERROUS GLUCONATE 324 MG: 324 TABLET ORAL at 20:07

## 2019-11-26 RX ADMIN — LEVOTHYROXINE SODIUM 88 MCG: 88 TABLET ORAL at 05:49

## 2019-11-26 RX ADMIN — QUETIAPINE FUMARATE 12.5 MG: 25 TABLET ORAL at 20:06

## 2019-11-26 RX ADMIN — METAXALONE 800 MG: 800 TABLET ORAL at 08:31

## 2019-11-26 RX ADMIN — SODIUM CHLORIDE TAB 1 GM 1 G: 1 TAB at 13:11

## 2019-11-26 RX ADMIN — MIRTAZAPINE 15 MG: 15 TABLET, FILM COATED ORAL at 20:06

## 2019-11-26 RX ADMIN — DOCUSATE SODIUM 100 MG: 100 CAPSULE, LIQUID FILLED ORAL at 20:06

## 2019-11-26 RX ADMIN — ENOXAPARIN SODIUM 40 MG: 40 INJECTION SUBCUTANEOUS at 08:31

## 2019-11-26 RX ADMIN — MELATONIN TAB 5 MG 5 MG: 5 TAB at 20:07

## 2019-11-26 RX ADMIN — SODIUM CHLORIDE TAB 1 GM 1 G: 1 TAB at 18:27

## 2019-11-26 RX ADMIN — FERROUS GLUCONATE 324 MG: 324 TABLET ORAL at 08:31

## 2019-11-27 VITALS
HEIGHT: 64 IN | TEMPERATURE: 97.9 F | OXYGEN SATURATION: 97 % | SYSTOLIC BLOOD PRESSURE: 128 MMHG | RESPIRATION RATE: 16 BRPM | HEART RATE: 78 BPM | WEIGHT: 112.6 LBS | BODY MASS INDEX: 19.22 KG/M2 | DIASTOLIC BLOOD PRESSURE: 60 MMHG

## 2019-11-27 LAB
ANION GAP SERPL CALCULATED.3IONS-SCNC: 9.7 MMOL/L (ref 5–15)
BASOPHILS # BLD AUTO: 0.07 10*3/MM3 (ref 0–0.2)
BASOPHILS NFR BLD AUTO: 1 % (ref 0–1.5)
BUN BLD-MCNC: 12 MG/DL (ref 8–23)
BUN/CREAT SERPL: 12 (ref 7–25)
CALCIUM SPEC-SCNC: 8.7 MG/DL (ref 8.6–10.5)
CHLORIDE SERPL-SCNC: 95 MMOL/L (ref 98–107)
CO2 SERPL-SCNC: 29.3 MMOL/L (ref 22–29)
CREAT BLD-MCNC: 1 MG/DL (ref 0.57–1)
DEPRECATED RDW RBC AUTO: 57 FL (ref 37–54)
EOSINOPHIL # BLD AUTO: 0.4 10*3/MM3 (ref 0–0.4)
EOSINOPHIL NFR BLD AUTO: 5.8 % (ref 0.3–6.2)
ERYTHROCYTE [DISTWIDTH] IN BLOOD BY AUTOMATED COUNT: 16.4 % (ref 12.3–15.4)
GFR SERPL CREATININE-BSD FRML MDRD: 52 ML/MIN/1.73
GLUCOSE BLD-MCNC: 90 MG/DL (ref 65–99)
HCT VFR BLD AUTO: 27.8 % (ref 34–46.6)
HGB BLD-MCNC: 8.8 G/DL (ref 12–15.9)
IMM GRANULOCYTES # BLD AUTO: 0.04 10*3/MM3 (ref 0–0.05)
IMM GRANULOCYTES NFR BLD AUTO: 0.6 % (ref 0–0.5)
LYMPHOCYTES # BLD AUTO: 1.65 10*3/MM3 (ref 0.7–3.1)
LYMPHOCYTES NFR BLD AUTO: 24 % (ref 19.6–45.3)
MCH RBC QN AUTO: 30.8 PG (ref 26.6–33)
MCHC RBC AUTO-ENTMCNC: 31.7 G/DL (ref 31.5–35.7)
MCV RBC AUTO: 97.2 FL (ref 79–97)
MONOCYTES # BLD AUTO: 0.69 10*3/MM3 (ref 0.1–0.9)
MONOCYTES NFR BLD AUTO: 10 % (ref 5–12)
NEUTROPHILS # BLD AUTO: 4.02 10*3/MM3 (ref 1.7–7)
NEUTROPHILS NFR BLD AUTO: 58.6 % (ref 42.7–76)
NRBC BLD AUTO-RTO: 0 /100 WBC (ref 0–0.2)
PLATELET # BLD AUTO: 431 10*3/MM3 (ref 140–450)
PMV BLD AUTO: 8.6 FL (ref 6–12)
POTASSIUM BLD-SCNC: 4.1 MMOL/L (ref 3.5–5.2)
RBC # BLD AUTO: 2.86 10*6/MM3 (ref 3.77–5.28)
SODIUM BLD-SCNC: 134 MMOL/L (ref 136–145)
WBC NRBC COR # BLD: 6.87 10*3/MM3 (ref 3.4–10.8)

## 2019-11-27 PROCEDURE — 99316 NF DSCHRG MGMT 30 MIN+: CPT | Performed by: NURSE PRACTITIONER

## 2019-11-27 PROCEDURE — 25010000002 ENOXAPARIN PER 10 MG: Performed by: NURSE PRACTITIONER

## 2019-11-27 PROCEDURE — 85025 COMPLETE CBC W/AUTO DIFF WBC: CPT | Performed by: NURSE PRACTITIONER

## 2019-11-27 PROCEDURE — 80048 BASIC METABOLIC PNL TOTAL CA: CPT | Performed by: NURSE PRACTITIONER

## 2019-11-27 RX ORDER — DOXYCYCLINE HYCLATE 50 MG/1
324 CAPSULE, GELATIN COATED ORAL 2 TIMES DAILY WITH MEALS
Qty: 60 TABLET | Refills: 1 | Status: SHIPPED | OUTPATIENT
Start: 2019-11-27 | End: 2020-11-20

## 2019-11-27 RX ORDER — QUETIAPINE FUMARATE 25 MG/1
12.5 TABLET, FILM COATED ORAL NIGHTLY
Qty: 30 TABLET | Refills: 1 | Status: ON HOLD | OUTPATIENT
Start: 2019-11-27 | End: 2020-12-23

## 2019-11-27 RX ORDER — HYDROCODONE BITARTRATE AND ACETAMINOPHEN 7.5; 325 MG/1; MG/1
1 TABLET ORAL EVERY 4 HOURS PRN
Qty: 18 TABLET | Refills: 0 | Status: SHIPPED | OUTPATIENT
Start: 2019-11-27 | End: 2019-11-30

## 2019-11-27 RX ORDER — ACETAMINOPHEN 325 MG/1
650 TABLET ORAL EVERY 4 HOURS PRN
Start: 2019-11-27 | End: 2020-11-20

## 2019-11-27 RX ORDER — CHOLECALCIFEROL (VITAMIN D3) 125 MCG
5 CAPSULE ORAL NIGHTLY
Start: 2019-11-27 | End: 2020-11-20

## 2019-11-27 RX ORDER — CHOLECALCIFEROL (VITAMIN D3) 1250 MCG
50000 CAPSULE ORAL
Qty: 5 CAPSULE | Refills: 1 | Status: SHIPPED | OUTPATIENT
Start: 2019-12-01 | End: 2020-12-31

## 2019-11-27 RX ORDER — PSEUDOEPHEDRINE HCL 30 MG
100 TABLET ORAL 2 TIMES DAILY
Qty: 60 EACH | Refills: 1 | Status: SHIPPED | OUTPATIENT
Start: 2019-11-27 | End: 2020-11-20

## 2019-11-27 RX ORDER — BUSPIRONE HYDROCHLORIDE 7.5 MG/1
7.5 TABLET ORAL 2 TIMES DAILY
Qty: 30 TABLET | Refills: 1 | Status: ON HOLD | OUTPATIENT
Start: 2019-11-27 | End: 2020-12-23

## 2019-11-27 RX ORDER — SODIUM CHLORIDE 1000 MG
1 TABLET, SOLUBLE MISCELLANEOUS
Qty: 90 TABLET | Refills: 1 | Status: SHIPPED | OUTPATIENT
Start: 2019-11-27

## 2019-11-27 RX ADMIN — DOCUSATE SODIUM 100 MG: 100 CAPSULE, LIQUID FILLED ORAL at 09:20

## 2019-11-27 RX ADMIN — SODIUM CHLORIDE TAB 1 GM 1 G: 1 TAB at 09:20

## 2019-11-27 RX ADMIN — BUSPIRONE HYDROCHLORIDE 7.5 MG: 15 TABLET ORAL at 09:20

## 2019-11-27 RX ADMIN — ENOXAPARIN SODIUM 40 MG: 40 INJECTION SUBCUTANEOUS at 09:20

## 2019-11-27 RX ADMIN — SODIUM CHLORIDE TAB 1 GM 1 G: 1 TAB at 12:22

## 2019-11-27 RX ADMIN — LEVOTHYROXINE SODIUM 88 MCG: 88 TABLET ORAL at 05:04

## 2019-11-27 RX ADMIN — FERROUS GLUCONATE 324 MG: 324 TABLET ORAL at 09:20

## 2019-12-19 ENCOUNTER — OFFICE VISIT (OUTPATIENT)
Dept: ORTHOPEDIC SURGERY | Facility: CLINIC | Age: 84
End: 2019-12-19

## 2019-12-19 VITALS
HEART RATE: 91 BPM | BODY MASS INDEX: 21.99 KG/M2 | DIASTOLIC BLOOD PRESSURE: 80 MMHG | SYSTOLIC BLOOD PRESSURE: 147 MMHG | HEIGHT: 60 IN | WEIGHT: 112 LBS

## 2019-12-19 DIAGNOSIS — Z96.642 STATUS POST TOTAL HIP REPLACEMENT, LEFT: ICD-10-CM

## 2019-12-19 DIAGNOSIS — R52 PAIN: Primary | ICD-10-CM

## 2019-12-19 PROCEDURE — 73501 X-RAY EXAM HIP UNI 1 VIEW: CPT | Performed by: NURSE PRACTITIONER

## 2019-12-19 PROCEDURE — 99024 POSTOP FOLLOW-UP VISIT: CPT | Performed by: NURSE PRACTITIONER

## 2019-12-19 NOTE — PROGRESS NOTES
Subjective:     Patient ID: Nicolasa Woodall is a 89 y.o. female.    Chief Complaint:  Follow-up left total hip arthroplasty  History of Present Illness  Nicolasa Woodall returns to clinic with daughter for follow-up left lower extremity.  She has continued ambulating was discharged from TCU returned home has continued with home health physical therapy twice a week doing well.  Denies that she is experiencing pain radiating into the groin denies the pain is radiating down the left lower extremity and denies presence of numbness or tingling the left lower extremity.  Denies other concerns present this time.    Social History     Occupational History   • Not on file   Tobacco Use   • Smoking status: Former Smoker     Packs/day: 0.50     Years: 25.00     Pack years: 12.50     Last attempt to quit: 1974     Years since quittin.0   • Smokeless tobacco: Never Used   Substance and Sexual Activity   • Alcohol use: Yes     Alcohol/week: 1.0 standard drinks     Types: 1 Glasses of wine per week     Comment: RARELY/Caffeine use   • Drug use: No   • Sexual activity: Defer      Past Medical History:   Diagnosis Date   • Anxiety    • Chronic kidney disease    • Depression     resolved after daughter completed chemotherapy    • Disease of thyroid gland    • Hypertension    • Metabolic encephalopathy    • Pulmonary embolism (CMS/HCC)     SPONTANEOUS HEMMORHAGE   • Subdural hematoma (CMS/HCC)      Past Surgical History:   Procedure Laterality Date   • BREAST BIOPSY      BENIGN   • HARDWARE REMOVAL Left 2019    Procedure: FEMUR HARDWARE REMOVAL;  Surgeon: Dagoberto Owens MD;  Location: [facility] OR;  Service: Orthopedics   • HYSTERECTOMY     • JOINT REPLACEMENT     • THYROID SURGERY      PARATHYROID REMOVAL   • TOTAL HIP ARTHROPLASTY Left 2019    Procedure: TOTAL HIP ARTHROPLASTY;  Surgeon: Dagoberto Owens MD;  Location: [facility] OR;  Service: Orthopedics   • VENA CAVA FILTER INSERTION         Family History  "  Problem Relation Age of Onset   • Breast cancer Daughter          Review of Systems   Constitutional: Negative for chills, diaphoresis, fever and unexpected weight change.   HENT: Negative for hearing loss, nosebleeds, sore throat and tinnitus.    Eyes: Negative for pain and visual disturbance.   Respiratory: Negative for cough, shortness of breath and wheezing.    Cardiovascular: Negative for chest pain and palpitations.   Gastrointestinal: Negative for abdominal pain, diarrhea, nausea and vomiting.   Endocrine: Negative for cold intolerance, heat intolerance and polydipsia.   Genitourinary: Negative for difficulty urinating, dysuria and hematuria.   Musculoskeletal: Positive for arthralgias and myalgias. Negative for joint swelling.   Skin: Negative for rash and wound.   Allergic/Immunologic: Negative for environmental allergies.   Neurological: Negative for dizziness, syncope and numbness.   Hematological: Does not bruise/bleed easily.   Psychiatric/Behavioral: Negative for dysphoric mood and sleep disturbance. The patient is not nervous/anxious.            Objective:  Physical Exam    Vital signs reviewed.   General: No acute distress.  Eyes: conjunctiva clear; pupils equally round and reactive  ENT: external ears and nose atraumatic; oropharynx clear  CV: no peripheral edema  Resp: normal respiratory effort  Skin: no rashes or wounds; normal turgor  Psych: mood and affect appropriate; recent and remote memory intact    Vitals:    12/19/19 1114   BP: 147/80   BP Location: Left arm   Patient Position: Sitting   Cuff Size: Adult   Pulse: 91   Weight: 50.8 kg (112 lb)   Height: 152.4 cm (60\")         12/19/19  1114   Weight: 50.8 kg (112 lb)     Body mass index is 21.87 kg/m².      Ortho Exam     Left lower extremity examined  Incision clean dry and intact pernio dressing removed  Positive sensation light touch all distributions left lower extremity  Negative logroll exam, negative Stinchfield exam  Positive " sensation light touch all distributions left lower extremity including dorsal and plantar surface of foot  Flex extend all digits left foot  2+ dorsalis pedis pulse, 2+ posterior tibialis pulse  Negative calf tenderness, negative Homans sign bilaterally    Imaging:  Left Hip X-Ray  Indication: Pain  AP and Frog Leg views    Findings:  Radiographic review: Radiographs of the left  hip 2views, AP, frogleg, compared to immediate postop films, indication s/p ELAINE,  shows that the implant is in good position. There is no evidence of implant loosening or osteolysis, no dislocation or periprosthetic fractures. Overall alignment acceptable at this time.     Assessment:        1. Pain    2. Status post total hip replacement, left           Plan:  1.  Discussed plan of care with patient and daughter.  Encouraged to continue strengthening exercises left lower extremity, participating with home health physical therapy.  She is recovering well as expected we will plan to follow-up with her in 6 weeks repeat x-rays left hip at that time.  I do recommend she follow-up primary care to discuss Seroquel medication.  Patient and family verbalized understanding of information agrees with plan of care.  Denies other concerns present this time.  Orders:  Orders Placed This Encounter   Procedures   • XR Hip With or Without Pelvis 1 View Left         I ordered and reviewed the DELMAR today.     Dictated utilizing Dragon dictation

## 2019-12-24 PROBLEM — Z96.642 STATUS POST TOTAL HIP REPLACEMENT, LEFT: Status: ACTIVE | Noted: 2019-12-24

## 2020-02-21 ENCOUNTER — OFFICE VISIT (OUTPATIENT)
Dept: ORTHOPEDIC SURGERY | Facility: CLINIC | Age: 85
End: 2020-02-21

## 2020-02-21 DIAGNOSIS — Z96.642 STATUS POST TOTAL HIP REPLACEMENT, LEFT: Primary | ICD-10-CM

## 2020-02-21 PROCEDURE — 73502 X-RAY EXAM HIP UNI 2-3 VIEWS: CPT | Performed by: ORTHOPAEDIC SURGERY

## 2020-02-21 PROCEDURE — 99212 OFFICE O/P EST SF 10 MIN: CPT | Performed by: ORTHOPAEDIC SURGERY

## 2020-02-21 NOTE — PROGRESS NOTES
Subjective:     Patient ID: Nicolasa Woodall is a 89 y.o. female.    Chief Complaint: follow-up status post left total hip arthoplasty and hip hardware removal DOS: 19    History of Present Illness  Nicolasa Woodall returns to clinic today for evaluation of her left hip. She states she is doing very well. She does not have any residual pain and has been able to perform her at-home exercises. She has completed occupational therapy and home health therapy. She continues to ambulate with a walker.      Social History     Occupational History   • Not on file   Tobacco Use   • Smoking status: Former Smoker     Packs/day: 0.50     Years: 25.00     Pack years: 12.50     Last attempt to quit: 1974     Years since quittin.1   • Smokeless tobacco: Never Used   Substance and Sexual Activity   • Alcohol use: Yes     Alcohol/week: 1.0 standard drinks     Types: 1 Glasses of wine per week     Comment: RARELY/Caffeine use   • Drug use: No   • Sexual activity: Defer      Past Medical History:   Diagnosis Date   • Anxiety    • Chronic kidney disease    • Depression     resolved after daughter completed chemotherapy    • Disease of thyroid gland    • Hypertension    • Metabolic encephalopathy    • Pulmonary embolism (CMS/HCC)     SPONTANEOUS HEMMORHAGE   • Subdural hematoma (CMS/HCC)      Past Surgical History:   Procedure Laterality Date   • BREAST BIOPSY      BENIGN   • HARDWARE REMOVAL Left 2019    Procedure: FEMUR HARDWARE REMOVAL;  Surgeon: Dagoberto Owens MD;  Location: [facility] OR;  Service: Orthopedics   • HYSTERECTOMY     • JOINT REPLACEMENT     • THYROID SURGERY      PARATHYROID REMOVAL   • TOTAL HIP ARTHROPLASTY Left 2019    Procedure: TOTAL HIP ARTHROPLASTY;  Surgeon: Dagoberto Owens MD;  Location: [facility] OR;  Service: Orthopedics   • VENA CAVA FILTER INSERTION         Family History   Problem Relation Age of Onset   • Breast cancer Daughter          Review of Systems      14 point view of  systems negative other than noted in HPI  Objective:  There were no vitals filed for this visit.  There were no vitals filed for this visit.  There is no height or weight on file to calculate BMI.  General: No acute distress.  Resp: normal respiratory effort  Skin: no rashes or wounds; normal turgor  Psych: mood and affect appropriate; recent and remote memory intact      Ortho Exam       Left Hip-  Hip flexion to 110 degrees   Extension to 0 degrees   4+/5 strength   4/5 strength on abduction and adduction  5 degrees hamstring contracture of left knee     Imaging:  AP pelvis and frog lateral x-rays of left hip from today's visit, ordered and reviewed by me, indication status post total hip arthroplasty, compared to prior images from office.  Femoral head and acetabular components appear to be in stable position with no evidence of loosening or osteolysis, some additional evidence of heterotopic ossification adjacent to the superior lateral rim of the acetabular component is noted, no evidence of loosening or ostial lysis of femoral stem    Assessment:        1. Status post total hip replacement, left           Plan:          1. Discussed treatment options at length with patient at today's visit.   2. She may return to her activities as tolerated with no restriction.   3. Advised patient to continue at-home exercise program for improvement in strength, range of motion and function with daily activities.  4. Follow-up in 3 months with repeat xray of the left hip.     Nicolasa Woodall and her daughter were in agreement with plan and had all questions answered.     Orders:  Orders Placed This Encounter   Procedures   • XR Hip With or Without Pelvis 2 - 3 View Left       Medications:  No orders of the defined types were placed in this encounter.      Followup:  Return in about 3 months (around 5/21/2020) for xrays needed at follow up.    Nicolasa was seen today for follow-up.    Diagnoses and all orders for this  visit:    Status post total hip replacement, left  -     XR Hip With or Without Pelvis 2 - 3 View Left      By signing my name here, I Lakeisha Alvarenga, attest that all documentation on 02/24/20 at 8:17 AM has been prepared under the direction and in the presence of Dr. Dagoberto Owens.    I, Dr. Dagoberto Owens, personally performed the services described in this documentation, as scribed by Lakeisha Alvarenga, in my presence, and it is both accurate and complete.      Dictated utilizing Dragon dictation

## 2020-05-21 ENCOUNTER — OFFICE VISIT (OUTPATIENT)
Dept: ORTHOPEDIC SURGERY | Facility: CLINIC | Age: 85
End: 2020-05-21

## 2020-05-21 VITALS — HEIGHT: 63 IN | BODY MASS INDEX: 18.61 KG/M2 | WEIGHT: 105 LBS

## 2020-05-21 DIAGNOSIS — Z96.642 STATUS POST TOTAL HIP REPLACEMENT, LEFT: ICD-10-CM

## 2020-05-21 DIAGNOSIS — R52 PAIN: Primary | ICD-10-CM

## 2020-05-21 PROCEDURE — 73501 X-RAY EXAM HIP UNI 1 VIEW: CPT | Performed by: ORTHOPAEDIC SURGERY

## 2020-05-21 PROCEDURE — 99212 OFFICE O/P EST SF 10 MIN: CPT | Performed by: ORTHOPAEDIC SURGERY

## 2020-05-21 NOTE — PROGRESS NOTES
Subjective:     Patient ID: Nicolasa Woodall is a 89 y.o. female.    Chief Complaint: follow-up status post left total hip arthoplasty and hip hardware removal DOS: 19    History of Present Illness  Nicolasa Woodall returns to clinic today for evaluation of her left hip. She experiences mild irritation with certain physical therapy exercises such as lifting her left heel. She denies significant residual pain at this time. She denies associated numbness and tingling.     Social History     Occupational History   • Not on file   Tobacco Use   • Smoking status: Former Smoker     Packs/day: 0.50     Years: 25.00     Pack years: 12.50     Last attempt to quit:      Years since quittin.4   • Smokeless tobacco: Never Used   Substance and Sexual Activity   • Alcohol use: Yes     Alcohol/week: 1.0 standard drinks     Types: 1 Glasses of wine per week     Comment: RARELY/Caffeine use   • Drug use: No   • Sexual activity: Defer      Past Medical History:   Diagnosis Date   • Anxiety    • Chronic kidney disease    • Depression     resolved after daughter completed chemotherapy    • Disease of thyroid gland    • Hypertension    • Metabolic encephalopathy    • Pulmonary embolism (CMS/HCC)     SPONTANEOUS HEMMORHAGE   • Subdural hematoma (CMS/HCC)      Past Surgical History:   Procedure Laterality Date   • BREAST BIOPSY      BENIGN   • HARDWARE REMOVAL Left 2019    Procedure: FEMUR HARDWARE REMOVAL;  Surgeon: Dagoberto Owens MD;  Location: [facility] OR;  Service: Orthopedics   • HYSTERECTOMY     • JOINT REPLACEMENT     • THYROID SURGERY      PARATHYROID REMOVAL   • TOTAL HIP ARTHROPLASTY Left 2019    Procedure: TOTAL HIP ARTHROPLASTY;  Surgeon: Dagoberto Owens MD;  Location: [facility] OR;  Service: Orthopedics   • VENA CAVA FILTER INSERTION         Family History   Problem Relation Age of Onset   • Breast cancer Daughter          Review of Systems   Constitutional: Negative for chills, diaphoresis,  "fever and unexpected weight change.   HENT: Negative for hearing loss, nosebleeds, sore throat and tinnitus.    Eyes: Negative for pain and visual disturbance.   Respiratory: Negative for cough, shortness of breath and wheezing.    Cardiovascular: Negative for chest pain and palpitations.   Gastrointestinal: Negative for abdominal pain, diarrhea, nausea and vomiting.   Endocrine: Negative for cold intolerance, heat intolerance and polydipsia.   Genitourinary: Negative for difficulty urinating, dysuria and hematuria.   Musculoskeletal: Positive for arthralgias and myalgias. Negative for joint swelling.   Skin: Negative for rash and wound.   Allergic/Immunologic: Negative for environmental allergies.   Neurological: Negative for dizziness, syncope and numbness.   Hematological: Does not bruise/bleed easily.   Psychiatric/Behavioral: Negative for dysphoric mood and sleep disturbance. The patient is not nervous/anxious.            Objective:  Vitals:    05/21/20 1107   Weight: 47.6 kg (105 lb)   Height: 160 cm (63\")         05/21/20  1107   Weight: 47.6 kg (105 lb)     Body mass index is 18.6 kg/m².  General: No acute distress.  Resp: normal respiratory effort  Skin: no rashes or wounds; normal turgor  Psych: mood and affect appropriate; recent and remote memory intact      Ortho Exam       left hip-    Flexion 120, 4+/5 strength   ER  to 40 degrees     IR to 25 degrees    Abduction to 35 degrees, 4+/5 strength   Logroll-negative   Stinchfield-negative     Imaging:  Left Hip X-Ray  Indication: Status post total hip arthroplasty  AP pelvis and Frog Leg views    Findings:  Total hip arthroplasty components appear to be in stable position, reactive bone formation noted in posterior lateral capsule with no evidence of ostial lysis or loosening, no evidence of periprosthetic fracture.  Femoral head well centered and acetabular polyethylene component.  Compared to prior postoperative x-rays from office.      Assessment:      "   1. Pain    2. Status post total hip replacement, left           Plan:          1. Discussed treatment options at length with patient at today's visit.   2. Advised patient to continue at-home exercise program for improvement in strength, range of motion and function with daily activities.  3. Instructed patient on hamstring stretches to perform twice daily to reduce irritation and tightness.   4. Follow-up in 6 months with repeat xray of left hip.       Nicolasa Woodall was in agreement with plan and had all questions answered.     Orders:  Orders Placed This Encounter   Procedures   • XR Hip With or Without Pelvis 1 View Left       Medications:  No orders of the defined types were placed in this encounter.      Followup:  Return in about 6 months (around 11/21/2020) for xrays needed at follow up.    Nicolasa was seen today for follow-up.    Diagnoses and all orders for this visit:    Pain  -     XR Hip With or Without Pelvis 1 View Left    Status post total hip replacement, left        By signing my name here, I Lakeisha Alvarenga, attest that all documentation on 05/21/20 at 13:35 has been prepared under the direction and in the presence of Dr. Dagoberto Owens.    I, Dr. Dagoberto Owens, personally performed the services described in this documentation, as scribed by Lakeisha Alvarenga, in my presence, and it is both accurate and complete.    Dictated utilizing Dragon dictation

## 2020-07-30 ENCOUNTER — HOSPITAL ENCOUNTER (EMERGENCY)
Facility: HOSPITAL | Age: 85
Discharge: HOME | End: 2020-07-30
Attending: EMERGENCY MEDICINE | Admitting: EMERGENCY MEDICINE

## 2020-07-30 ENCOUNTER — APPOINTMENT (OUTPATIENT)
Dept: GENERAL RADIOLOGY | Facility: HOSPITAL | Age: 85
End: 2020-07-30

## 2020-07-30 VITALS
TEMPERATURE: 99.1 F | WEIGHT: 92.8 LBS | SYSTOLIC BLOOD PRESSURE: 180 MMHG | HEIGHT: 61 IN | DIASTOLIC BLOOD PRESSURE: 114 MMHG | RESPIRATION RATE: 16 BRPM | BODY MASS INDEX: 17.52 KG/M2 | OXYGEN SATURATION: 95 % | HEART RATE: 97 BPM

## 2020-07-30 DIAGNOSIS — R07.89 ATYPICAL CHEST PAIN: Primary | ICD-10-CM

## 2020-07-30 DIAGNOSIS — N28.9 RENAL INSUFFICIENCY: ICD-10-CM

## 2020-07-30 LAB
ALBUMIN SERPL-MCNC: 3.8 G/DL (ref 3.5–5.2)
ALBUMIN/GLOB SERPL: 1.2 G/DL
ALP SERPL-CCNC: 92 U/L (ref 39–117)
ALT SERPL W P-5'-P-CCNC: 11 U/L (ref 1–33)
ANION GAP SERPL CALCULATED.3IONS-SCNC: 11.8 MMOL/L (ref 5–15)
AST SERPL-CCNC: 17 U/L (ref 1–32)
BASOPHILS # BLD AUTO: 0.03 10*3/MM3 (ref 0–0.2)
BASOPHILS NFR BLD AUTO: 0.4 % (ref 0–1.5)
BILIRUB SERPL-MCNC: 0.3 MG/DL (ref 0–1.2)
BUN SERPL-MCNC: 24 MG/DL (ref 8–23)
BUN/CREAT SERPL: 20.7 (ref 7–25)
CALCIUM SPEC-SCNC: 9.2 MG/DL (ref 8.6–10.5)
CHLORIDE SERPL-SCNC: 99 MMOL/L (ref 98–107)
CO2 SERPL-SCNC: 28.2 MMOL/L (ref 22–29)
CREAT SERPL-MCNC: 1.16 MG/DL (ref 0.57–1)
DEPRECATED RDW RBC AUTO: 46.7 FL (ref 37–54)
EOSINOPHIL # BLD AUTO: 0.12 10*3/MM3 (ref 0–0.4)
EOSINOPHIL NFR BLD AUTO: 1.6 % (ref 0.3–6.2)
ERYTHROCYTE [DISTWIDTH] IN BLOOD BY AUTOMATED COUNT: 13.5 % (ref 12.3–15.4)
GFR SERPL CREATININE-BSD FRML MDRD: 44 ML/MIN/1.73
GLOBULIN UR ELPH-MCNC: 3.1 GM/DL
GLUCOSE SERPL-MCNC: 120 MG/DL (ref 65–99)
HCT VFR BLD AUTO: 37.5 % (ref 34–46.6)
HGB BLD-MCNC: 11.9 G/DL (ref 12–15.9)
IMM GRANULOCYTES # BLD AUTO: 0.03 10*3/MM3 (ref 0–0.05)
IMM GRANULOCYTES NFR BLD AUTO: 0.4 % (ref 0–0.5)
LYMPHOCYTES # BLD AUTO: 1.11 10*3/MM3 (ref 0.7–3.1)
LYMPHOCYTES NFR BLD AUTO: 14.8 % (ref 19.6–45.3)
MCH RBC QN AUTO: 30.1 PG (ref 26.6–33)
MCHC RBC AUTO-ENTMCNC: 31.7 G/DL (ref 31.5–35.7)
MCV RBC AUTO: 94.7 FL (ref 79–97)
MONOCYTES # BLD AUTO: 0.58 10*3/MM3 (ref 0.1–0.9)
MONOCYTES NFR BLD AUTO: 7.7 % (ref 5–12)
NEUTROPHILS NFR BLD AUTO: 5.63 10*3/MM3 (ref 1.7–7)
NEUTROPHILS NFR BLD AUTO: 75.1 % (ref 42.7–76)
NRBC BLD AUTO-RTO: 0 /100 WBC (ref 0–0.2)
PLATELET # BLD AUTO: 249 10*3/MM3 (ref 140–450)
PMV BLD AUTO: 9.3 FL (ref 6–12)
POTASSIUM SERPL-SCNC: 5.3 MMOL/L (ref 3.5–5.2)
PROT SERPL-MCNC: 6.9 G/DL (ref 6–8.5)
RBC # BLD AUTO: 3.96 10*6/MM3 (ref 3.77–5.28)
SODIUM SERPL-SCNC: 139 MMOL/L (ref 136–145)
TROPONIN T SERPL-MCNC: <0.01 NG/ML (ref 0–0.03)
TROPONIN T SERPL-MCNC: <0.01 NG/ML (ref 0–0.03)
WBC # BLD AUTO: 7.5 10*3/MM3 (ref 3.4–10.8)

## 2020-07-30 PROCEDURE — 93005 ELECTROCARDIOGRAM TRACING: CPT

## 2020-07-30 PROCEDURE — 93005 ELECTROCARDIOGRAM TRACING: CPT | Performed by: EMERGENCY MEDICINE

## 2020-07-30 PROCEDURE — 85025 COMPLETE CBC W/AUTO DIFF WBC: CPT | Performed by: EMERGENCY MEDICINE

## 2020-07-30 PROCEDURE — 71046 X-RAY EXAM CHEST 2 VIEWS: CPT

## 2020-07-30 PROCEDURE — 99284 EMERGENCY DEPT VISIT MOD MDM: CPT | Performed by: EMERGENCY MEDICINE

## 2020-07-30 PROCEDURE — 99284 EMERGENCY DEPT VISIT MOD MDM: CPT

## 2020-07-30 PROCEDURE — 93010 ELECTROCARDIOGRAM REPORT: CPT | Performed by: INTERNAL MEDICINE

## 2020-07-30 PROCEDURE — 80053 COMPREHEN METABOLIC PANEL: CPT | Performed by: EMERGENCY MEDICINE

## 2020-07-30 PROCEDURE — 84484 ASSAY OF TROPONIN QUANT: CPT | Performed by: EMERGENCY MEDICINE

## 2020-07-30 RX ORDER — SODIUM CHLORIDE 0.9 % (FLUSH) 0.9 %
10 SYRINGE (ML) INJECTION AS NEEDED
Status: DISCONTINUED | OUTPATIENT
Start: 2020-07-30 | End: 2020-07-30 | Stop reason: HOSPADM

## 2020-08-17 ENCOUNTER — HOSPITAL ENCOUNTER (OUTPATIENT)
Dept: ULTRASOUND IMAGING | Facility: HOSPITAL | Age: 85
Discharge: HOME | End: 2020-08-17
Admitting: INTERNAL MEDICINE

## 2020-08-17 ENCOUNTER — TRANSCRIBE ORDERS (OUTPATIENT)
Dept: ADMINISTRATIVE | Facility: HOSPITAL | Age: 85
End: 2020-08-17

## 2020-08-17 DIAGNOSIS — R60.0 LOCALIZED EDEMA: ICD-10-CM

## 2020-08-17 DIAGNOSIS — R60.0 LOCALIZED EDEMA: Primary | ICD-10-CM

## 2020-08-17 PROCEDURE — 93971 EXTREMITY STUDY: CPT

## 2020-11-20 ENCOUNTER — OFFICE VISIT (OUTPATIENT)
Dept: ORTHOPEDIC SURGERY | Facility: CLINIC | Age: 85
End: 2020-11-20

## 2020-11-20 VITALS — HEIGHT: 61 IN | WEIGHT: 92 LBS | BODY MASS INDEX: 17.37 KG/M2

## 2020-11-20 DIAGNOSIS — Z96.642 STATUS POST TOTAL HIP REPLACEMENT, LEFT: Primary | ICD-10-CM

## 2020-11-20 PROCEDURE — 73502 X-RAY EXAM HIP UNI 2-3 VIEWS: CPT | Performed by: ORTHOPAEDIC SURGERY

## 2020-11-20 PROCEDURE — 99212 OFFICE O/P EST SF 10 MIN: CPT | Performed by: ORTHOPAEDIC SURGERY

## 2020-11-20 NOTE — PROGRESS NOTES
Subjective:     Patient ID: Nicolasa Woodall is a 89 y.o. female.    Chief Complaint: follow-up status post left total hip arthoplasty and hip hardware removal DOS: 19    History of Present Illness  Nicolasa Woodall returns to clinic today for evaluation of her left hip. She is doing well overall at this time, noting minimal residual pain in the left hip. Denies numbness or tingling. She continues to use a rolling walker for ambulation. Patient has completed physical therapy.       Social History     Occupational History   • Not on file   Tobacco Use   • Smoking status: Former Smoker     Packs/day: 0.50     Years: 25.00     Pack years: 12.50     Quit date:      Years since quittin.9   • Smokeless tobacco: Never Used   Substance and Sexual Activity   • Alcohol use: Yes     Alcohol/week: 1.0 standard drinks     Types: 1 Glasses of wine per week     Comment: RARELY/Caffeine use   • Drug use: No   • Sexual activity: Defer      Past Medical History:   Diagnosis Date   • Anxiety    • Chronic kidney disease    • Depression     resolved after daughter completed chemotherapy    • Disease of thyroid gland    • Hypertension    • Metabolic encephalopathy    • Pulmonary embolism (CMS/HCC)     SPONTANEOUS HEMMORHAGE   • Subdural hematoma (CMS/HCC)      Past Surgical History:   Procedure Laterality Date   • BREAST BIOPSY      BENIGN   • HARDWARE REMOVAL Left 2019    Procedure: FEMUR HARDWARE REMOVAL;  Surgeon: Dagoberto Owens MD;  Location: [facility] OR;  Service: Orthopedics   • HYSTERECTOMY     • JOINT REPLACEMENT     • THYROID SURGERY      PARATHYROID REMOVAL   • TOTAL HIP ARTHROPLASTY Left 2019    Procedure: TOTAL HIP ARTHROPLASTY;  Surgeon: Dagoberto Owens MD;  Location: [facility] OR;  Service: Orthopedics   • VENA CAVA FILTER INSERTION         Family History   Problem Relation Age of Onset   • Breast cancer Daughter          Review of Systems   Constitutional: Negative for chills, diaphoresis,  "fever and unexpected weight change.   HENT: Negative for hearing loss, nosebleeds, sore throat and tinnitus.    Eyes: Negative for pain and visual disturbance.   Respiratory: Negative for cough, shortness of breath and wheezing.    Cardiovascular: Negative for chest pain and palpitations.   Gastrointestinal: Negative for abdominal pain, diarrhea, nausea and vomiting.   Endocrine: Negative for cold intolerance, heat intolerance and polydipsia.   Genitourinary: Negative for difficulty urinating, dysuria and hematuria.   Musculoskeletal: Positive for arthralgias and myalgias. Negative for joint swelling.   Skin: Negative for rash and wound.   Allergic/Immunologic: Negative for environmental allergies.   Neurological: Negative for dizziness, syncope and numbness.   Hematological: Does not bruise/bleed easily.   Psychiatric/Behavioral: Negative for dysphoric mood and sleep disturbance. The patient is not nervous/anxious.            Objective:  Vitals:    11/20/20 1143   Weight: 41.7 kg (92 lb)   Height: 154.9 cm (61\")         11/20/20  1143   Weight: 41.7 kg (92 lb)     Body mass index is 17.38 kg/m².    General: No acute distress.  Resp: normal respiratory effort  Skin: no rashes or wounds; normal turgor  Psych: mood and affect appropriate; recent and remote memory intact        Ortho Exam       Left hip-   Flexion 120, 4+/5 strength   Extension 0, 4+/5 strength   ER  40   IR 30   Abduction, 4/5 strength   Adduction, 4+/5 strength   Positive sensation light tough all distributions symmetric to contralateral side  Brisk cap refill all digits  2+ dorsalis pedis pulse      Imaging:  Left Hip X-Ray  Indication: Status post total hip arthroplasty  AP pelvis and Frog Leg views    Findings:  Total hip arthroplasty components are noted to be in stable position with good stability of leg lengths appreciated, mild reactive bone formation in the capsular region noted, minimally increased in comparison to prior films.  No evidence " of osteolysis or loosening around the implant on the acetabular or proximal femur regions.  Compared to prior office x-rays      Assessment:        1. Status post total hip replacement, left           Plan:          1. Discussed treatment options at length with patient at today's visit.   2. Advised patient to continue at-home exercise program for improvement in strength, range of motion and function with daily activities.  3. Follow-up with me in 1 year for repeat x-ray of the left hip.      Nicolasa MJ Woodall was in agreement with plan and had all questions answered.     Orders:  Orders Placed This Encounter   Procedures   • XR Hip With or Without Pelvis 2 - 3 View Left       Medications:  No orders of the defined types were placed in this encounter.      Followup:  Return in about 1 year (around 11/20/2021).    Diagnoses and all orders for this visit:    1. Status post total hip replacement, left (Primary)  -     Cancel: XR Hip With or Without Pelvis 2 - 3 View Left  -     XR Hip With or Without Pelvis 2 - 3 View Left           By signing my name here, I Barbara No attest that all documentation on 11/20/20 at 12:41 EST has been prepared under the direction and in the presence of Dr. Dagoberto Owens MD.    I, Dr. Dagoberto Owens, personally performed the services described in this documentation, as scribed by Barbara No, in my presence, and it is both accurate and complete.        Dictated utilizing Dragon dictation

## 2020-12-21 ENCOUNTER — TRANSCRIBE ORDERS (OUTPATIENT)
Dept: ADMINISTRATIVE | Facility: HOSPITAL | Age: 85
End: 2020-12-21

## 2020-12-21 ENCOUNTER — HOSPITAL ENCOUNTER (OUTPATIENT)
Dept: ULTRASOUND IMAGING | Facility: HOSPITAL | Age: 85
Discharge: HOME | End: 2020-12-21
Admitting: INTERNAL MEDICINE

## 2020-12-21 DIAGNOSIS — R60.0 LOCALIZED EDEMA: ICD-10-CM

## 2020-12-21 DIAGNOSIS — R60.0 LOCALIZED EDEMA: Primary | ICD-10-CM

## 2020-12-21 PROCEDURE — 93971 EXTREMITY STUDY: CPT

## 2020-12-23 ENCOUNTER — HOSPITAL ENCOUNTER (OUTPATIENT)
Facility: HOSPITAL | Age: 85
LOS: 1 days | Discharge: HOME HEALTH | End: 2020-12-28
Attending: EMERGENCY MEDICINE | Admitting: ORTHOPAEDIC SURGERY

## 2020-12-23 ENCOUNTER — ANESTHESIA EVENT (OUTPATIENT)
Dept: PERIOP | Facility: HOSPITAL | Age: 85
End: 2020-12-23

## 2020-12-23 ENCOUNTER — APPOINTMENT (OUTPATIENT)
Dept: GENERAL RADIOLOGY | Facility: HOSPITAL | Age: 85
End: 2020-12-23

## 2020-12-23 DIAGNOSIS — S72.001D CLOSED FRACTURE OF RIGHT HIP WITH ROUTINE HEALING, SUBSEQUENT ENCOUNTER: ICD-10-CM

## 2020-12-23 DIAGNOSIS — S72.001A CLOSED FRACTURE OF RIGHT HIP, INITIAL ENCOUNTER (HCC): Primary | ICD-10-CM

## 2020-12-23 PROBLEM — Z01.810 PREOP CARDIOVASCULAR EXAM: Status: ACTIVE | Noted: 2020-12-23

## 2020-12-23 LAB
ABO GROUP BLD: NORMAL
ALBUMIN SERPL-MCNC: 4 G/DL (ref 3.5–5.2)
ALBUMIN/GLOB SERPL: 1.5 G/DL
ALP SERPL-CCNC: 92 U/L (ref 39–117)
ALT SERPL W P-5'-P-CCNC: 14 U/L (ref 1–33)
ANION GAP SERPL CALCULATED.3IONS-SCNC: 7.1 MMOL/L (ref 5–15)
APTT PPP: 29.6 SECONDS (ref 24.3–38.1)
AST SERPL-CCNC: 20 U/L (ref 1–32)
BASOPHILS # BLD AUTO: 0.02 10*3/MM3 (ref 0–0.2)
BASOPHILS NFR BLD AUTO: 0.2 % (ref 0–1.5)
BILIRUB SERPL-MCNC: 0.5 MG/DL (ref 0–1.2)
BLD GP AB SCN SERPL QL: NEGATIVE
BUN SERPL-MCNC: 24 MG/DL (ref 8–23)
BUN/CREAT SERPL: 19.7 (ref 7–25)
CALCIUM SPEC-SCNC: 8.6 MG/DL (ref 8.2–9.6)
CHLORIDE SERPL-SCNC: 101 MMOL/L (ref 98–107)
CHOLEST SERPL-MCNC: 188 MG/DL (ref 0–200)
CO2 SERPL-SCNC: 26.9 MMOL/L (ref 22–29)
CREAT SERPL-MCNC: 1.22 MG/DL (ref 0.57–1)
DEPRECATED RDW RBC AUTO: 46.6 FL (ref 37–54)
EOSINOPHIL # BLD AUTO: 0.15 10*3/MM3 (ref 0–0.4)
EOSINOPHIL NFR BLD AUTO: 1.2 % (ref 0.3–6.2)
ERYTHROCYTE [DISTWIDTH] IN BLOOD BY AUTOMATED COUNT: 13.8 % (ref 12.3–15.4)
ERYTHROCYTE [SEDIMENTATION RATE] IN BLOOD: 24 MM/HR (ref 0–20)
GFR SERPL CREATININE-BSD FRML MDRD: 41 ML/MIN/1.73
GLOBULIN UR ELPH-MCNC: 2.6 GM/DL
GLUCOSE SERPL-MCNC: 105 MG/DL (ref 65–99)
HBA1C MFR BLD: 5.6 % (ref 4.8–5.6)
HCT VFR BLD AUTO: 33.6 % (ref 34–46.6)
HDLC SERPL-MCNC: 100 MG/DL (ref 40–60)
HGB BLD-MCNC: 10.7 G/DL (ref 12–15.9)
IMM GRANULOCYTES # BLD AUTO: 0.07 10*3/MM3 (ref 0–0.05)
IMM GRANULOCYTES NFR BLD AUTO: 0.6 % (ref 0–0.5)
INR PPP: 1.07 (ref 0.9–1.1)
LDLC SERPL CALC-MCNC: 75 MG/DL (ref 0–100)
LDLC/HDLC SERPL: 0.74 {RATIO}
LYMPHOCYTES # BLD AUTO: 0.95 10*3/MM3 (ref 0.7–3.1)
LYMPHOCYTES NFR BLD AUTO: 7.9 % (ref 19.6–45.3)
MCH RBC QN AUTO: 29.5 PG (ref 26.6–33)
MCHC RBC AUTO-ENTMCNC: 31.8 G/DL (ref 31.5–35.7)
MCV RBC AUTO: 92.6 FL (ref 79–97)
MONOCYTES # BLD AUTO: 0.76 10*3/MM3 (ref 0.1–0.9)
MONOCYTES NFR BLD AUTO: 6.3 % (ref 5–12)
NEUTROPHILS NFR BLD AUTO: 10.06 10*3/MM3 (ref 1.7–7)
NEUTROPHILS NFR BLD AUTO: 83.8 % (ref 42.7–76)
NRBC BLD AUTO-RTO: 0 /100 WBC (ref 0–0.2)
PLATELET # BLD AUTO: 280 10*3/MM3 (ref 140–450)
PMV BLD AUTO: 9.5 FL (ref 6–12)
POTASSIUM SERPL-SCNC: 4.5 MMOL/L (ref 3.5–5.2)
PROT SERPL-MCNC: 6.6 G/DL (ref 6–8.5)
PROTHROMBIN TIME: 13.6 SECONDS (ref 12.1–15)
RBC # BLD AUTO: 3.63 10*6/MM3 (ref 3.77–5.28)
RH BLD: POSITIVE
SARS-COV-2 RNA PNL SPEC NAA+PROBE: NOT DETECTED
SODIUM SERPL-SCNC: 135 MMOL/L (ref 136–145)
T&S EXPIRATION DATE: NORMAL
TRIGL SERPL-MCNC: 71 MG/DL (ref 0–150)
TSH SERPL DL<=0.05 MIU/L-ACNC: 0.96 UIU/ML (ref 0.27–4.2)
VLDLC SERPL-MCNC: 13 MG/DL (ref 5–40)
WBC # BLD AUTO: 12.01 10*3/MM3 (ref 3.4–10.8)

## 2020-12-23 PROCEDURE — 96374 THER/PROPH/DIAG INJ IV PUSH: CPT

## 2020-12-23 PROCEDURE — 84443 ASSAY THYROID STIM HORMONE: CPT | Performed by: INTERNAL MEDICINE

## 2020-12-23 PROCEDURE — 99214 OFFICE O/P EST MOD 30 MIN: CPT | Performed by: INTERNAL MEDICINE

## 2020-12-23 PROCEDURE — 99283 EMERGENCY DEPT VISIT LOW MDM: CPT | Performed by: EMERGENCY MEDICINE

## 2020-12-23 PROCEDURE — 83036 HEMOGLOBIN GLYCOSYLATED A1C: CPT | Performed by: INTERNAL MEDICINE

## 2020-12-23 PROCEDURE — 93005 ELECTROCARDIOGRAM TRACING: CPT | Performed by: EMERGENCY MEDICINE

## 2020-12-23 PROCEDURE — 25010000002 MORPHINE PER 10 MG: Performed by: INTERNAL MEDICINE

## 2020-12-23 PROCEDURE — 85025 COMPLETE CBC W/AUTO DIFF WBC: CPT | Performed by: EMERGENCY MEDICINE

## 2020-12-23 PROCEDURE — 86850 RBC ANTIBODY SCREEN: CPT | Performed by: EMERGENCY MEDICINE

## 2020-12-23 PROCEDURE — 94799 UNLISTED PULMONARY SVC/PX: CPT

## 2020-12-23 PROCEDURE — 85730 THROMBOPLASTIN TIME PARTIAL: CPT | Performed by: ORTHOPAEDIC SURGERY

## 2020-12-23 PROCEDURE — 73502 X-RAY EXAM HIP UNI 2-3 VIEWS: CPT

## 2020-12-23 PROCEDURE — 85610 PROTHROMBIN TIME: CPT | Performed by: ORTHOPAEDIC SURGERY

## 2020-12-23 PROCEDURE — 80061 LIPID PANEL: CPT | Performed by: INTERNAL MEDICINE

## 2020-12-23 PROCEDURE — G0378 HOSPITAL OBSERVATION PER HR: HCPCS

## 2020-12-23 PROCEDURE — 85652 RBC SED RATE AUTOMATED: CPT | Performed by: EMERGENCY MEDICINE

## 2020-12-23 PROCEDURE — 99215 OFFICE O/P EST HI 40 MIN: CPT | Performed by: ORTHOPAEDIC SURGERY

## 2020-12-23 PROCEDURE — 86901 BLOOD TYPING SEROLOGIC RH(D): CPT | Performed by: EMERGENCY MEDICINE

## 2020-12-23 PROCEDURE — 86900 BLOOD TYPING SEROLOGIC ABO: CPT | Performed by: EMERGENCY MEDICINE

## 2020-12-23 PROCEDURE — 87635 SARS-COV-2 COVID-19 AMP PRB: CPT | Performed by: EMERGENCY MEDICINE

## 2020-12-23 PROCEDURE — C9803 HOPD COVID-19 SPEC COLLECT: HCPCS

## 2020-12-23 PROCEDURE — 99220 PR INITIAL OBSERVATION CARE/DAY 70 MINUTES: CPT | Performed by: INTERNAL MEDICINE

## 2020-12-23 PROCEDURE — 80053 COMPREHEN METABOLIC PANEL: CPT | Performed by: EMERGENCY MEDICINE

## 2020-12-23 PROCEDURE — 99284 EMERGENCY DEPT VISIT MOD MDM: CPT

## 2020-12-23 PROCEDURE — 93010 ELECTROCARDIOGRAM REPORT: CPT | Performed by: INTERNAL MEDICINE

## 2020-12-23 RX ORDER — ONDANSETRON 4 MG/1
4 TABLET, FILM COATED ORAL EVERY 6 HOURS PRN
Status: DISCONTINUED | OUTPATIENT
Start: 2020-12-23 | End: 2020-12-28 | Stop reason: HOSPADM

## 2020-12-23 RX ORDER — SODIUM CHLORIDE 9 MG/ML
40 INJECTION, SOLUTION INTRAVENOUS AS NEEDED
Status: DISCONTINUED | OUTPATIENT
Start: 2020-12-23 | End: 2020-12-28 | Stop reason: HOSPADM

## 2020-12-23 RX ORDER — ONDANSETRON 2 MG/ML
4 INJECTION INTRAMUSCULAR; INTRAVENOUS EVERY 6 HOURS PRN
Status: DISCONTINUED | OUTPATIENT
Start: 2020-12-23 | End: 2020-12-28 | Stop reason: HOSPADM

## 2020-12-23 RX ORDER — DULOXETIN HYDROCHLORIDE 60 MG/1
60 CAPSULE, DELAYED RELEASE ORAL DAILY
Status: DISCONTINUED | OUTPATIENT
Start: 2020-12-24 | End: 2020-12-28 | Stop reason: HOSPADM

## 2020-12-23 RX ORDER — SODIUM CHLORIDE 1000 MG
1 TABLET, SOLUBLE MISCELLANEOUS
Status: DISCONTINUED | OUTPATIENT
Start: 2020-12-24 | End: 2020-12-28 | Stop reason: HOSPADM

## 2020-12-23 RX ORDER — CHOLECALCIFEROL (VITAMIN D3) 125 MCG
5 CAPSULE ORAL NIGHTLY PRN
Status: DISCONTINUED | OUTPATIENT
Start: 2020-12-23 | End: 2020-12-28 | Stop reason: HOSPADM

## 2020-12-23 RX ORDER — MIRTAZAPINE 15 MG/1
15 TABLET, FILM COATED ORAL NIGHTLY
Status: DISCONTINUED | OUTPATIENT
Start: 2020-12-23 | End: 2020-12-28 | Stop reason: HOSPADM

## 2020-12-23 RX ORDER — SODIUM CHLORIDE 0.9 % (FLUSH) 0.9 %
10 SYRINGE (ML) INJECTION AS NEEDED
Status: DISCONTINUED | OUTPATIENT
Start: 2020-12-23 | End: 2020-12-28 | Stop reason: HOSPADM

## 2020-12-23 RX ORDER — AMOXICILLIN 250 MG
2 CAPSULE ORAL 2 TIMES DAILY
Status: DISCONTINUED | OUTPATIENT
Start: 2020-12-23 | End: 2020-12-28 | Stop reason: HOSPADM

## 2020-12-23 RX ORDER — LEVOTHYROXINE SODIUM 88 UG/1
88 TABLET ORAL
Status: DISCONTINUED | OUTPATIENT
Start: 2020-12-24 | End: 2020-12-28 | Stop reason: HOSPADM

## 2020-12-23 RX ORDER — NALOXONE HCL 0.4 MG/ML
0.4 VIAL (ML) INJECTION
Status: DISCONTINUED | OUTPATIENT
Start: 2020-12-23 | End: 2020-12-28 | Stop reason: HOSPADM

## 2020-12-23 RX ORDER — HYDROCODONE BITARTRATE AND ACETAMINOPHEN 5; 325 MG/1; MG/1
0.5 TABLET ORAL EVERY 4 HOURS PRN
Status: DISCONTINUED | OUTPATIENT
Start: 2020-12-23 | End: 2020-12-28 | Stop reason: HOSPADM

## 2020-12-23 RX ORDER — MORPHINE SULFATE 2 MG/ML
2 INJECTION, SOLUTION INTRAMUSCULAR; INTRAVENOUS
Status: DISCONTINUED | OUTPATIENT
Start: 2020-12-23 | End: 2020-12-28 | Stop reason: HOSPADM

## 2020-12-23 RX ORDER — SODIUM CHLORIDE 0.9 % (FLUSH) 0.9 %
10 SYRINGE (ML) INJECTION EVERY 12 HOURS SCHEDULED
Status: DISCONTINUED | OUTPATIENT
Start: 2020-12-23 | End: 2020-12-28 | Stop reason: HOSPADM

## 2020-12-23 RX ORDER — HYDROCODONE BITARTRATE AND ACETAMINOPHEN 5; 325 MG/1; MG/1
1 TABLET ORAL EVERY 4 HOURS PRN
Status: DISCONTINUED | OUTPATIENT
Start: 2020-12-23 | End: 2020-12-23 | Stop reason: SDUPTHER

## 2020-12-23 RX ORDER — SODIUM CHLORIDE 0.9 % (FLUSH) 0.9 %
1-10 SYRINGE (ML) INJECTION AS NEEDED
Status: DISCONTINUED | OUTPATIENT
Start: 2020-12-23 | End: 2020-12-28 | Stop reason: HOSPADM

## 2020-12-23 RX ORDER — DULOXETIN HYDROCHLORIDE 60 MG/1
60 CAPSULE, DELAYED RELEASE ORAL DAILY
COMMUNITY

## 2020-12-23 RX ORDER — ACETAMINOPHEN 500 MG
1000 TABLET ORAL ONCE
Status: COMPLETED | OUTPATIENT
Start: 2020-12-23 | End: 2020-12-23

## 2020-12-23 RX ADMIN — DOCUSATE SODIUM 50 MG AND SENNOSIDES 8.6 MG 2 TABLET: 8.6; 5 TABLET, FILM COATED ORAL at 21:16

## 2020-12-23 RX ADMIN — MIRTAZAPINE 15 MG: 15 TABLET, FILM COATED ORAL at 22:05

## 2020-12-23 RX ADMIN — MELATONIN TAB 5 MG 5 MG: 5 TAB at 21:17

## 2020-12-23 RX ADMIN — ACETAMINOPHEN 1000 MG: 500 TABLET ORAL at 12:04

## 2020-12-23 RX ADMIN — HYDROCODONE BITARTRATE AND ACETAMINOPHEN 0.5 TABLET: 5; 325 TABLET ORAL at 16:33

## 2020-12-23 RX ADMIN — HYDROCODONE BITARTRATE AND ACETAMINOPHEN 0.5 TABLET: 5; 325 TABLET ORAL at 21:17

## 2020-12-23 RX ADMIN — MORPHINE SULFATE 2 MG: 2 INJECTION, SOLUTION INTRAMUSCULAR; INTRAVENOUS at 22:04

## 2020-12-24 ENCOUNTER — ANESTHESIA (OUTPATIENT)
Dept: PERIOP | Facility: HOSPITAL | Age: 85
End: 2020-12-24

## 2020-12-24 ENCOUNTER — APPOINTMENT (OUTPATIENT)
Dept: GENERAL RADIOLOGY | Facility: HOSPITAL | Age: 85
End: 2020-12-24

## 2020-12-24 LAB
ANION GAP SERPL CALCULATED.3IONS-SCNC: 8.3 MMOL/L (ref 5–15)
BASOPHILS # BLD AUTO: 0.02 10*3/MM3 (ref 0–0.2)
BASOPHILS NFR BLD AUTO: 0.2 % (ref 0–1.5)
BUN SERPL-MCNC: 23 MG/DL (ref 8–23)
BUN/CREAT SERPL: 19.8 (ref 7–25)
CALCIUM SPEC-SCNC: 8.2 MG/DL (ref 8.2–9.6)
CHLORIDE SERPL-SCNC: 99 MMOL/L (ref 98–107)
CO2 SERPL-SCNC: 27.7 MMOL/L (ref 22–29)
CREAT SERPL-MCNC: 1.16 MG/DL (ref 0.57–1)
DEPRECATED RDW RBC AUTO: 47.3 FL (ref 37–54)
EOSINOPHIL # BLD AUTO: 0.23 10*3/MM3 (ref 0–0.4)
EOSINOPHIL NFR BLD AUTO: 2.6 % (ref 0.3–6.2)
ERYTHROCYTE [DISTWIDTH] IN BLOOD BY AUTOMATED COUNT: 14 % (ref 12.3–15.4)
GFR SERPL CREATININE-BSD FRML MDRD: 44 ML/MIN/1.73
GLUCOSE SERPL-MCNC: 100 MG/DL (ref 65–99)
HCT VFR BLD AUTO: 32 % (ref 34–46.6)
HCT VFR BLD AUTO: 32.3 % (ref 34–46.6)
HGB BLD-MCNC: 10.4 G/DL (ref 12–15.9)
HGB BLD-MCNC: 9.9 G/DL (ref 12–15.9)
IMM GRANULOCYTES # BLD AUTO: 0.04 10*3/MM3 (ref 0–0.05)
IMM GRANULOCYTES NFR BLD AUTO: 0.4 % (ref 0–0.5)
LYMPHOCYTES # BLD AUTO: 1.16 10*3/MM3 (ref 0.7–3.1)
LYMPHOCYTES NFR BLD AUTO: 13 % (ref 19.6–45.3)
MCH RBC QN AUTO: 29.5 PG (ref 26.6–33)
MCHC RBC AUTO-ENTMCNC: 32.2 G/DL (ref 31.5–35.7)
MCV RBC AUTO: 91.8 FL (ref 79–97)
MONOCYTES # BLD AUTO: 0.82 10*3/MM3 (ref 0.1–0.9)
MONOCYTES NFR BLD AUTO: 9.2 % (ref 5–12)
NEUTROPHILS NFR BLD AUTO: 6.62 10*3/MM3 (ref 1.7–7)
NEUTROPHILS NFR BLD AUTO: 74.6 % (ref 42.7–76)
NRBC BLD AUTO-RTO: 0 /100 WBC (ref 0–0.2)
PLATELET # BLD AUTO: 248 10*3/MM3 (ref 140–450)
PMV BLD AUTO: 9.9 FL (ref 6–12)
POTASSIUM SERPL-SCNC: 4.4 MMOL/L (ref 3.5–5.2)
QT INTERVAL: 392 MS
RBC # BLD AUTO: 3.52 10*6/MM3 (ref 3.77–5.28)
SODIUM SERPL-SCNC: 135 MMOL/L (ref 136–145)
WBC # BLD AUTO: 8.89 10*3/MM3 (ref 3.4–10.8)

## 2020-12-24 PROCEDURE — A9270 NON-COVERED ITEM OR SERVICE: HCPCS | Performed by: ORTHOPAEDIC SURGERY

## 2020-12-24 PROCEDURE — 94799 UNLISTED PULMONARY SVC/PX: CPT

## 2020-12-24 PROCEDURE — 63710000001 MIRTAZAPINE 15 MG TABLET: Performed by: ORTHOPAEDIC SURGERY

## 2020-12-24 PROCEDURE — 25010000003 BUPIVACAINE LIPOSOME 1.3 % SUSPENSION 20 ML VIAL: Performed by: ORTHOPAEDIC SURGERY

## 2020-12-24 PROCEDURE — G0378 HOSPITAL OBSERVATION PER HR: HCPCS

## 2020-12-24 PROCEDURE — 25010000002 VANCOMYCIN 1 G RECONSTITUTED SOLUTION: Performed by: ORTHOPAEDIC SURGERY

## 2020-12-24 PROCEDURE — 63710000001 SENNOSIDES-DOCUSATE 8.6-50 MG TABLET: Performed by: ORTHOPAEDIC SURGERY

## 2020-12-24 PROCEDURE — 99213 OFFICE O/P EST LOW 20 MIN: CPT | Performed by: INTERNAL MEDICINE

## 2020-12-24 PROCEDURE — 25010000002 PROPOFOL 10 MG/ML EMULSION: Performed by: NURSE ANESTHETIST, CERTIFIED REGISTERED

## 2020-12-24 PROCEDURE — 80048 BASIC METABOLIC PNL TOTAL CA: CPT | Performed by: INTERNAL MEDICINE

## 2020-12-24 PROCEDURE — 97161 PT EVAL LOW COMPLEX 20 MIN: CPT

## 2020-12-24 PROCEDURE — 27236 TREAT THIGH FRACTURE: CPT | Performed by: ORTHOPAEDIC SURGERY

## 2020-12-24 PROCEDURE — 25010000002 ONDANSETRON PER 1 MG: Performed by: ORTHOPAEDIC SURGERY

## 2020-12-24 PROCEDURE — C1713 ANCHOR/SCREW BN/BN,TIS/BN: HCPCS | Performed by: ORTHOPAEDIC SURGERY

## 2020-12-24 PROCEDURE — 85018 HEMOGLOBIN: CPT | Performed by: ORTHOPAEDIC SURGERY

## 2020-12-24 PROCEDURE — 25010000002 ONDANSETRON PER 1 MG: Performed by: NURSE ANESTHETIST, CERTIFIED REGISTERED

## 2020-12-24 PROCEDURE — 94770: CPT

## 2020-12-24 PROCEDURE — 85025 COMPLETE CBC W/AUTO DIFF WBC: CPT | Performed by: INTERNAL MEDICINE

## 2020-12-24 PROCEDURE — 99225 PR SBSQ OBSERVATION CARE/DAY 25 MINUTES: CPT | Performed by: INTERNAL MEDICINE

## 2020-12-24 PROCEDURE — 97165 OT EVAL LOW COMPLEX 30 MIN: CPT

## 2020-12-24 PROCEDURE — 25010000002 VANCOMYCIN 750 MG RECONSTITUTED SOLUTION 1 EACH VIAL: Performed by: ORTHOPAEDIC SURGERY

## 2020-12-24 PROCEDURE — 85014 HEMATOCRIT: CPT | Performed by: ORTHOPAEDIC SURGERY

## 2020-12-24 PROCEDURE — 73501 X-RAY EXAM HIP UNI 1 VIEW: CPT

## 2020-12-24 PROCEDURE — 63710000001 LEVOTHYROXINE 88 MCG TABLET: Performed by: ORTHOPAEDIC SURGERY

## 2020-12-24 PROCEDURE — C1776 JOINT DEVICE (IMPLANTABLE): HCPCS | Performed by: ORTHOPAEDIC SURGERY

## 2020-12-24 PROCEDURE — 25010000002 PHENYLEPHRINE PER 1 ML: Performed by: NURSE ANESTHETIST, CERTIFIED REGISTERED

## 2020-12-24 PROCEDURE — 63710000001 HYDROCODONE-ACETAMINOPHEN 7.5-325 MG TABLET: Performed by: ORTHOPAEDIC SURGERY

## 2020-12-24 PROCEDURE — 63710000001 POVIDONE-IODINE 10 % SOLUTION 118 ML BOTTLE: Performed by: ORTHOPAEDIC SURGERY

## 2020-12-24 PROCEDURE — C9290 INJ, BUPIVACAINE LIPOSOME: HCPCS | Performed by: ORTHOPAEDIC SURGERY

## 2020-12-24 DEVICE — CUP ACET RINGLOC BIPOL 28MM 45MM: Type: IMPLANTABLE DEVICE | Site: HIP | Status: FUNCTIONAL

## 2020-12-24 DEVICE — STEM FEM/HIP VERSYS ADVOCATE NONVLIGN STD/OFFST SZ11 120MM: Type: IMPLANTABLE DEVICE | Site: HIP | Status: FUNCTIONAL

## 2020-12-24 DEVICE — BONE PREPARATION KIT
Type: IMPLANTABLE DEVICE | Site: HIP | Status: FUNCTIONAL
Brand: BIOPREP

## 2020-12-24 DEVICE — DEV WND/CLS CONTRL TISS STRATAFIX SYMM PDS PLS CTX 60CM VIL: Type: IMPLANTABLE DEVICE | Site: HIP | Status: FUNCTIONAL

## 2020-12-24 DEVICE — SMARTSET GMV HIGH PERFORMANCE GENTAMICIN MEDIUM VISCOSITY BONE CEMENT 40G
Type: IMPLANTABLE DEVICE | Site: HIP | Status: FUNCTIONAL
Brand: SMARTSET

## 2020-12-24 DEVICE — DEV CONTRL TISS STRATAFIX SPIRAL MNCRYL UD 3/0 PLS 45CM: Type: IMPLANTABLE DEVICE | Site: HIP | Status: FUNCTIONAL

## 2020-12-24 DEVICE — HD FEM/HIP VERSYS COCR 12/14TPR 28MM MIN3.5MM: Type: IMPLANTABLE DEVICE | Site: HIP | Status: FUNCTIONAL

## 2020-12-24 DEVICE — CAP PRT HIP BIPOL: Type: IMPLANTABLE DEVICE | Site: HIP | Status: FUNCTIONAL

## 2020-12-24 DEVICE — CENTRLZR VERSYS DIST 9MM: Type: IMPLANTABLE DEVICE | Site: HIP | Status: FUNCTIONAL

## 2020-12-24 DEVICE — SUT FW #2 W/TPR NDL 1/2 CIR 38IN 97CM 26.5MM BLU: Type: IMPLANTABLE DEVICE | Site: HIP | Status: FUNCTIONAL

## 2020-12-24 RX ORDER — HYDROCODONE BITARTRATE AND ACETAMINOPHEN 7.5; 325 MG/1; MG/1
1 TABLET ORAL EVERY 4 HOURS PRN
Status: DISCONTINUED | OUTPATIENT
Start: 2020-12-24 | End: 2020-12-28 | Stop reason: HOSPADM

## 2020-12-24 RX ORDER — FAMOTIDINE 10 MG/ML
20 INJECTION, SOLUTION INTRAVENOUS
Status: COMPLETED | OUTPATIENT
Start: 2020-12-24 | End: 2020-12-24

## 2020-12-24 RX ORDER — ONDANSETRON 2 MG/ML
4 INJECTION INTRAMUSCULAR; INTRAVENOUS EVERY 6 HOURS PRN
Status: DISCONTINUED | OUTPATIENT
Start: 2020-12-24 | End: 2020-12-28 | Stop reason: HOSPADM

## 2020-12-24 RX ORDER — HYDROMORPHONE HYDROCHLORIDE 1 MG/ML
0.5 INJECTION, SOLUTION INTRAMUSCULAR; INTRAVENOUS; SUBCUTANEOUS
Status: DISCONTINUED | OUTPATIENT
Start: 2020-12-24 | End: 2020-12-24 | Stop reason: HOSPADM

## 2020-12-24 RX ORDER — SODIUM CHLORIDE 9 MG/ML
50 INJECTION, SOLUTION INTRAVENOUS CONTINUOUS
Status: DISCONTINUED | OUTPATIENT
Start: 2020-12-24 | End: 2020-12-26

## 2020-12-24 RX ORDER — NALOXONE HCL 0.4 MG/ML
0.4 VIAL (ML) INJECTION
Status: DISCONTINUED | OUTPATIENT
Start: 2020-12-24 | End: 2020-12-28 | Stop reason: HOSPADM

## 2020-12-24 RX ORDER — MORPHINE SULFATE 10 MG/ML
6 INJECTION INTRAMUSCULAR; INTRAVENOUS; SUBCUTANEOUS
Status: DISCONTINUED | OUTPATIENT
Start: 2020-12-24 | End: 2020-12-28 | Stop reason: HOSPADM

## 2020-12-24 RX ORDER — SODIUM CHLORIDE 0.9 % (FLUSH) 0.9 %
10 SYRINGE (ML) INJECTION EVERY 12 HOURS SCHEDULED
Status: DISCONTINUED | OUTPATIENT
Start: 2020-12-24 | End: 2020-12-24 | Stop reason: HOSPADM

## 2020-12-24 RX ORDER — SODIUM CHLORIDE 9 MG/ML
40 INJECTION, SOLUTION INTRAVENOUS AS NEEDED
Status: DISCONTINUED | OUTPATIENT
Start: 2020-12-24 | End: 2020-12-24 | Stop reason: HOSPADM

## 2020-12-24 RX ORDER — PROPOFOL 10 MG/ML
VIAL (ML) INTRAVENOUS CONTINUOUS PRN
Status: DISCONTINUED | OUTPATIENT
Start: 2020-12-24 | End: 2020-12-24 | Stop reason: SURG

## 2020-12-24 RX ORDER — SODIUM CHLORIDE, SODIUM LACTATE, POTASSIUM CHLORIDE, CALCIUM CHLORIDE 600; 310; 30; 20 MG/100ML; MG/100ML; MG/100ML; MG/100ML
9 INJECTION, SOLUTION INTRAVENOUS CONTINUOUS
Status: DISCONTINUED | OUTPATIENT
Start: 2020-12-24 | End: 2020-12-24

## 2020-12-24 RX ORDER — ONDANSETRON 2 MG/ML
4 INJECTION INTRAMUSCULAR; INTRAVENOUS ONCE AS NEEDED
Status: COMPLETED | OUTPATIENT
Start: 2020-12-24 | End: 2020-12-24

## 2020-12-24 RX ORDER — TRANEXAMIC ACID 100 MG/ML
INJECTION, SOLUTION INTRAVENOUS AS NEEDED
Status: DISCONTINUED | OUTPATIENT
Start: 2020-12-24 | End: 2020-12-24 | Stop reason: SURG

## 2020-12-24 RX ORDER — ONDANSETRON 2 MG/ML
4 INJECTION INTRAMUSCULAR; INTRAVENOUS ONCE AS NEEDED
Status: DISCONTINUED | OUTPATIENT
Start: 2020-12-24 | End: 2020-12-24 | Stop reason: HOSPADM

## 2020-12-24 RX ORDER — LIDOCAINE HYDROCHLORIDE 10 MG/ML
0.5 INJECTION, SOLUTION EPIDURAL; INFILTRATION; INTRACAUDAL; PERINEURAL ONCE AS NEEDED
Status: DISCONTINUED | OUTPATIENT
Start: 2020-12-24 | End: 2020-12-24 | Stop reason: HOSPADM

## 2020-12-24 RX ORDER — VANCOMYCIN HYDROCHLORIDE 1 G/20ML
INJECTION, POWDER, LYOPHILIZED, FOR SOLUTION INTRAVENOUS AS NEEDED
Status: DISCONTINUED | OUTPATIENT
Start: 2020-12-24 | End: 2020-12-24 | Stop reason: HOSPADM

## 2020-12-24 RX ORDER — ONDANSETRON 4 MG/1
4 TABLET, FILM COATED ORAL EVERY 6 HOURS PRN
Status: DISCONTINUED | OUTPATIENT
Start: 2020-12-24 | End: 2020-12-28 | Stop reason: HOSPADM

## 2020-12-24 RX ORDER — MAGNESIUM HYDROXIDE 1200 MG/15ML
LIQUID ORAL AS NEEDED
Status: DISCONTINUED | OUTPATIENT
Start: 2020-12-24 | End: 2020-12-24 | Stop reason: HOSPADM

## 2020-12-24 RX ORDER — SODIUM CHLORIDE 0.9 % (FLUSH) 0.9 %
10 SYRINGE (ML) INJECTION AS NEEDED
Status: DISCONTINUED | OUTPATIENT
Start: 2020-12-24 | End: 2020-12-24 | Stop reason: HOSPADM

## 2020-12-24 RX ORDER — BUPIVACAINE HYDROCHLORIDE 5 MG/ML
INJECTION, SOLUTION PERINEURAL
Status: COMPLETED | OUTPATIENT
Start: 2020-12-24 | End: 2020-12-24

## 2020-12-24 RX ORDER — HYDROCODONE BITARTRATE AND ACETAMINOPHEN 7.5; 325 MG/1; MG/1
2 TABLET ORAL EVERY 4 HOURS PRN
Status: DISCONTINUED | OUTPATIENT
Start: 2020-12-24 | End: 2020-12-28 | Stop reason: HOSPADM

## 2020-12-24 RX ADMIN — EPHEDRINE SULFATE 5 MG: 50 INJECTION, SOLUTION INTRAVENOUS at 08:05

## 2020-12-24 RX ADMIN — PROPOFOL 25 MCG/KG/MIN: 10 INJECTION, EMULSION INTRAVENOUS at 08:05

## 2020-12-24 RX ADMIN — PHENYLEPHRINE HYDROCHLORIDE 50 MCG: 10 INJECTION, SOLUTION INTRAMUSCULAR; INTRAVENOUS; SUBCUTANEOUS at 09:17

## 2020-12-24 RX ADMIN — VANCOMYCIN HYDROCHLORIDE 750 MG: 750 INJECTION, POWDER, LYOPHILIZED, FOR SOLUTION INTRAVENOUS at 20:13

## 2020-12-24 RX ADMIN — PHENYLEPHRINE HYDROCHLORIDE 50 MCG: 10 INJECTION, SOLUTION INTRAMUSCULAR; INTRAVENOUS; SUBCUTANEOUS at 09:06

## 2020-12-24 RX ADMIN — PHENYLEPHRINE HYDROCHLORIDE 50 MCG: 10 INJECTION, SOLUTION INTRAMUSCULAR; INTRAVENOUS; SUBCUTANEOUS at 08:33

## 2020-12-24 RX ADMIN — PHENYLEPHRINE HYDROCHLORIDE 50 MCG: 10 INJECTION, SOLUTION INTRAMUSCULAR; INTRAVENOUS; SUBCUTANEOUS at 08:41

## 2020-12-24 RX ADMIN — TRANEXAMIC ACID 1000 MG: 100 INJECTION, SOLUTION INTRAVENOUS at 08:12

## 2020-12-24 RX ADMIN — EPHEDRINE SULFATE 10 MG: 50 INJECTION, SOLUTION INTRAVENOUS at 08:17

## 2020-12-24 RX ADMIN — TRANEXAMIC ACID 1000 MG: 100 INJECTION, SOLUTION INTRAVENOUS at 09:10

## 2020-12-24 RX ADMIN — EPHEDRINE SULFATE 10 MG: 50 INJECTION, SOLUTION INTRAVENOUS at 08:30

## 2020-12-24 RX ADMIN — SODIUM CHLORIDE, PRESERVATIVE FREE 10 ML: 5 INJECTION INTRAVENOUS at 20:17

## 2020-12-24 RX ADMIN — PHENYLEPHRINE HYDROCHLORIDE 50 MCG: 10 INJECTION, SOLUTION INTRAMUSCULAR; INTRAVENOUS; SUBCUTANEOUS at 08:48

## 2020-12-24 RX ADMIN — HYDROCODONE BITARTRATE AND ACETAMINOPHEN 1 TABLET: 7.5; 325 TABLET ORAL at 13:40

## 2020-12-24 RX ADMIN — EPHEDRINE SULFATE 10 MG: 50 INJECTION, SOLUTION INTRAVENOUS at 08:24

## 2020-12-24 RX ADMIN — MIRTAZAPINE 15 MG: 15 TABLET, FILM COATED ORAL at 20:14

## 2020-12-24 RX ADMIN — ONDANSETRON 4 MG: 2 INJECTION, SOLUTION INTRAMUSCULAR; INTRAVENOUS at 07:25

## 2020-12-24 RX ADMIN — FAMOTIDINE 20 MG: 10 INJECTION, SOLUTION INTRAVENOUS at 07:25

## 2020-12-24 RX ADMIN — VANCOMYCIN HYDROCHLORIDE 750 MG: 750 INJECTION, POWDER, LYOPHILIZED, FOR SOLUTION INTRAVENOUS at 07:21

## 2020-12-24 RX ADMIN — ONDANSETRON 4 MG: 2 INJECTION, SOLUTION INTRAMUSCULAR; INTRAVENOUS at 17:41

## 2020-12-24 RX ADMIN — DOCUSATE SODIUM 50 MG AND SENNOSIDES 8.6 MG 2 TABLET: 8.6; 5 TABLET, FILM COATED ORAL at 20:14

## 2020-12-24 RX ADMIN — SODIUM CHLORIDE, POTASSIUM CHLORIDE, SODIUM LACTATE AND CALCIUM CHLORIDE: 600; 310; 30; 20 INJECTION, SOLUTION INTRAVENOUS at 07:06

## 2020-12-24 RX ADMIN — PHENYLEPHRINE HYDROCHLORIDE 50 MCG: 10 INJECTION, SOLUTION INTRAMUSCULAR; INTRAVENOUS; SUBCUTANEOUS at 08:32

## 2020-12-24 RX ADMIN — LEVOTHYROXINE SODIUM 88 MCG: 88 TABLET ORAL at 13:18

## 2020-12-24 RX ADMIN — BUPIVACAINE HYDROCHLORIDE 2 ML: 5 INJECTION, SOLUTION PERINEURAL at 07:48

## 2020-12-24 NOTE — ANESTHESIA PROCEDURE NOTES
Spinal Block      Patient reassessed immediately prior to procedure    Patient location during procedure: pre-op  Start Time: 12/24/2020 7:41 AM  Stop Time: 12/24/2020 7:49 AM  Preanesthetic Checklist  Completed: patient identified, site marked, surgical consent, pre-op evaluation, timeout performed, IV checked, risks and benefits discussed and monitors and equipment checked  Spinal Block Prep:  Patient Position:left lateral decubitus  Sterile Tech:gloves, mask and sterile barriers  Prep:Betadine and Chloraprep  Patient Monitoring:blood pressure monitoring, continuous pulse oximetry and EKG  Spinal Block Procedure  Approach:midline  Guidance:landmark technique  Location:L2-L3  Needle Type:Sprotte  Needle Gauge:25 G  Placement of Spinal needle event:cerebrospinal fluid aspirated  Paresthesia: no  Fluid Appearance:clear  Medications: bupivacaine (MARCAINE) injection 0.5%, 2 mL  Med Administered at 12/24/2020 7:48 AM   Post Assessment  Patient Tolerance:patient tolerated the procedure well with no apparent complications  Complications no

## 2020-12-24 NOTE — ANESTHESIA PREPROCEDURE EVALUATION
Anesthesia Evaluation     Patient summary reviewed and Nursing notes reviewed   no history of anesthetic complications:  NPO Solid Status: > 8 hours  NPO Liquid Status: > 8 hours           Airway   Mallampati: II  TM distance: <3 FB  Neck ROM: full  No difficulty expected  Dental    (+) edentulous    Pulmonary - negative pulmonary ROS and normal exam   (-) pulmonary embolism  Cardiovascular - normal exam  Exercise tolerance: poor (<4 METS)    ECG reviewed  Rhythm: regular  Rate: normal    (-) hypertension    ROS comment: Sinus rhythm  Supraventricular bigeminy  Consider RVH or posterior infarct  Borderline prolonged QT interval    Neuro/Psych  (+) psychiatric history Depression,     GI/Hepatic/Renal/Endo    (+)   renal disease CRI, thyroid problem hypothyroidism    Musculoskeletal     Abdominal  - normal exam   Substance History - negative use     OB/GYN          Other   arthritis,                      Anesthesia Plan    ASA 2     spinal       Anesthetic plan, all risks, benefits, and alternatives have been provided, discussed and informed consent has been obtained with: patient.  Use of blood products discussed with patient  Consented to blood products.

## 2020-12-24 NOTE — ANESTHESIA POSTPROCEDURE EVALUATION
Patient: Nicolasa Woodall    Procedure Summary     Date: 12/24/20 Room / Location:  [facility] OR 3 /  [facility]    Anesthesia Start: 0756 Anesthesia Stop: 0950    Procedure: HIP HEMIARTHROPLASTY and all associated procedures (Right Hip) Diagnosis:       Closed fracture of right hip, initial encounter (CMS/[facility])      (Closed fracture of right hip, initial encounter (CMS/[facility]) [S72.001A])    Surgeon: Dagoberto Owens MD Provider: Silver Caputo CRNA    Anesthesia Type: spinal ASA Status: 2          Anesthesia Type: spinal    Vitals  Vitals Value Taken Time   /64 12/24/20 1000   Temp 99 °F (37.2 °C) 12/24/20 0950   Pulse 71 12/24/20 1005   Resp 15 12/24/20 1000   SpO2 97 % 12/24/20 1005   Vitals shown include unvalidated device data.        Post Anesthesia Care and Evaluation    Patient location during evaluation: PACU  Patient participation: complete - patient participated  Level of consciousness: awake and alert  Pain score: 0  Pain management: adequate  Airway patency: patent  PONV Status: none  Cardiovascular status: acceptable  Respiratory status: acceptable  Hydration status: acceptable

## 2020-12-25 LAB
ANION GAP SERPL CALCULATED.3IONS-SCNC: 7.6 MMOL/L (ref 5–15)
BACTERIA UR QL AUTO: ABNORMAL /HPF
BASOPHILS # BLD AUTO: 0.04 10*3/MM3 (ref 0–0.2)
BASOPHILS NFR BLD AUTO: 0.5 % (ref 0–1.5)
BILIRUB UR QL STRIP: NEGATIVE
BUN SERPL-MCNC: 25 MG/DL (ref 8–23)
BUN/CREAT SERPL: 20.5 (ref 7–25)
CALCIUM SPEC-SCNC: 8 MG/DL (ref 8.2–9.6)
CHLORIDE SERPL-SCNC: 100 MMOL/L (ref 98–107)
CLARITY UR: CLEAR
CO2 SERPL-SCNC: 25.4 MMOL/L (ref 22–29)
COARSE GRAN CASTS URNS QL MICRO: ABNORMAL /LPF
COLOR UR: YELLOW
CREAT SERPL-MCNC: 1.22 MG/DL (ref 0.57–1)
DEPRECATED RDW RBC AUTO: 48.4 FL (ref 37–54)
EOSINOPHIL # BLD AUTO: 0.05 10*3/MM3 (ref 0–0.4)
EOSINOPHIL NFR BLD AUTO: 0.6 % (ref 0.3–6.2)
ERYTHROCYTE [DISTWIDTH] IN BLOOD BY AUTOMATED COUNT: 14.1 % (ref 12.3–15.4)
GFR SERPL CREATININE-BSD FRML MDRD: 41 ML/MIN/1.73
GLUCOSE SERPL-MCNC: 112 MG/DL (ref 65–99)
GLUCOSE UR STRIP-MCNC: NEGATIVE MG/DL
HCT VFR BLD AUTO: 30.8 % (ref 34–46.6)
HGB BLD-MCNC: 9.5 G/DL (ref 12–15.9)
HGB UR QL STRIP.AUTO: ABNORMAL
HYALINE CASTS UR QL AUTO: ABNORMAL /LPF
IMM GRANULOCYTES # BLD AUTO: 0.02 10*3/MM3 (ref 0–0.05)
IMM GRANULOCYTES NFR BLD AUTO: 0.2 % (ref 0–0.5)
KETONES UR QL STRIP: NEGATIVE
LEUKOCYTE ESTERASE UR QL STRIP.AUTO: NEGATIVE
LYMPHOCYTES # BLD AUTO: 1.16 10*3/MM3 (ref 0.7–3.1)
LYMPHOCYTES NFR BLD AUTO: 13.2 % (ref 19.6–45.3)
MCH RBC QN AUTO: 28.9 PG (ref 26.6–33)
MCHC RBC AUTO-ENTMCNC: 30.8 G/DL (ref 31.5–35.7)
MCV RBC AUTO: 93.6 FL (ref 79–97)
MONOCYTES # BLD AUTO: 0.93 10*3/MM3 (ref 0.1–0.9)
MONOCYTES NFR BLD AUTO: 10.6 % (ref 5–12)
NEUTROPHILS NFR BLD AUTO: 6.61 10*3/MM3 (ref 1.7–7)
NEUTROPHILS NFR BLD AUTO: 74.9 % (ref 42.7–76)
NITRITE UR QL STRIP: NEGATIVE
NRBC BLD AUTO-RTO: 0 /100 WBC (ref 0–0.2)
PH UR STRIP.AUTO: 6 [PH] (ref 4.5–8)
PLATELET # BLD AUTO: 256 10*3/MM3 (ref 140–450)
PMV BLD AUTO: 9.9 FL (ref 6–12)
POTASSIUM SERPL-SCNC: 4.4 MMOL/L (ref 3.5–5.2)
PROT UR QL STRIP: ABNORMAL
RBC # BLD AUTO: 3.29 10*6/MM3 (ref 3.77–5.28)
RBC # UR: ABNORMAL /HPF
REF LAB TEST METHOD: ABNORMAL
SODIUM SERPL-SCNC: 133 MMOL/L (ref 136–145)
SP GR UR STRIP: 1.02 (ref 1–1.03)
SQUAMOUS #/AREA URNS HPF: ABNORMAL /HPF
UROBILINOGEN UR QL STRIP: ABNORMAL
WBC # BLD AUTO: 8.81 10*3/MM3 (ref 3.4–10.8)
WBC UR QL AUTO: ABNORMAL /HPF

## 2020-12-25 PROCEDURE — A9270 NON-COVERED ITEM OR SERVICE: HCPCS | Performed by: ORTHOPAEDIC SURGERY

## 2020-12-25 PROCEDURE — 63710000001 MIRTAZAPINE 15 MG TABLET: Performed by: ORTHOPAEDIC SURGERY

## 2020-12-25 PROCEDURE — 63710000001 SENNOSIDES-DOCUSATE 8.6-50 MG TABLET: Performed by: ORTHOPAEDIC SURGERY

## 2020-12-25 PROCEDURE — 25010000002 MORPHINE PER 10 MG: Performed by: ORTHOPAEDIC SURGERY

## 2020-12-25 PROCEDURE — 81001 URINALYSIS AUTO W/SCOPE: CPT | Performed by: ORTHOPAEDIC SURGERY

## 2020-12-25 PROCEDURE — 63710000001 LEVOTHYROXINE 88 MCG TABLET: Performed by: ORTHOPAEDIC SURGERY

## 2020-12-25 PROCEDURE — 80048 BASIC METABOLIC PNL TOTAL CA: CPT | Performed by: ORTHOPAEDIC SURGERY

## 2020-12-25 PROCEDURE — 85025 COMPLETE CBC W/AUTO DIFF WBC: CPT | Performed by: ORTHOPAEDIC SURGERY

## 2020-12-25 PROCEDURE — 99224 PR SBSQ OBSERVATION CARE/DAY 15 MINUTES: CPT | Performed by: HOSPITALIST

## 2020-12-25 PROCEDURE — G0378 HOSPITAL OBSERVATION PER HR: HCPCS

## 2020-12-25 PROCEDURE — 63710000001 DULOXETINE 60 MG CAPSULE DELAYED-RELEASE PARTICLES: Performed by: ORTHOPAEDIC SURGERY

## 2020-12-25 PROCEDURE — 63710000001 SODIUM CHLORIDE 1 G TABLET: Performed by: ORTHOPAEDIC SURGERY

## 2020-12-25 PROCEDURE — A9270 NON-COVERED ITEM OR SERVICE: HCPCS | Performed by: HOSPITALIST

## 2020-12-25 PROCEDURE — 63710000001 CEPHALEXIN 500 MG CAPSULE: Performed by: HOSPITALIST

## 2020-12-25 PROCEDURE — 99024 POSTOP FOLLOW-UP VISIT: CPT | Performed by: ORTHOPAEDIC SURGERY

## 2020-12-25 PROCEDURE — 25010000002 ENOXAPARIN PER 10 MG: Performed by: ORTHOPAEDIC SURGERY

## 2020-12-25 RX ORDER — CEPHALEXIN 500 MG/1
500 CAPSULE ORAL 3 TIMES DAILY
COMMUNITY
End: 2021-12-19

## 2020-12-25 RX ORDER — CEPHALEXIN 500 MG/1
500 CAPSULE ORAL EVERY 8 HOURS SCHEDULED
Status: DISCONTINUED | OUTPATIENT
Start: 2020-12-25 | End: 2020-12-28 | Stop reason: HOSPADM

## 2020-12-25 RX ADMIN — ENOXAPARIN SODIUM 40 MG: 40 INJECTION SUBCUTANEOUS at 09:44

## 2020-12-25 RX ADMIN — DULOXETINE HYDROCHLORIDE 60 MG: 60 CAPSULE, DELAYED RELEASE ORAL at 09:44

## 2020-12-25 RX ADMIN — CEPHALEXIN 500 MG: 500 CAPSULE ORAL at 21:57

## 2020-12-25 RX ADMIN — SODIUM CHLORIDE TAB 1 GM 1 G: 1 TAB at 19:11

## 2020-12-25 RX ADMIN — SODIUM CHLORIDE, PRESERVATIVE FREE 10 ML: 5 INJECTION INTRAVENOUS at 22:00

## 2020-12-25 RX ADMIN — DOCUSATE SODIUM 50 MG AND SENNOSIDES 8.6 MG 2 TABLET: 8.6; 5 TABLET, FILM COATED ORAL at 21:57

## 2020-12-25 RX ADMIN — CEPHALEXIN 500 MG: 500 CAPSULE ORAL at 15:25

## 2020-12-25 RX ADMIN — LEVOTHYROXINE SODIUM 88 MCG: 88 TABLET ORAL at 06:34

## 2020-12-25 RX ADMIN — SODIUM CHLORIDE, PRESERVATIVE FREE 10 ML: 5 INJECTION INTRAVENOUS at 09:45

## 2020-12-25 RX ADMIN — DOCUSATE SODIUM 50 MG AND SENNOSIDES 8.6 MG 2 TABLET: 8.6; 5 TABLET, FILM COATED ORAL at 09:44

## 2020-12-25 RX ADMIN — SODIUM CHLORIDE TAB 1 GM 1 G: 1 TAB at 13:29

## 2020-12-25 RX ADMIN — MIRTAZAPINE 15 MG: 15 TABLET, FILM COATED ORAL at 21:57

## 2020-12-25 RX ADMIN — MORPHINE SULFATE 2 MG: 2 INJECTION, SOLUTION INTRAMUSCULAR; INTRAVENOUS at 21:29

## 2020-12-25 RX ADMIN — SODIUM CHLORIDE TAB 1 GM 1 G: 1 TAB at 09:44

## 2020-12-26 LAB
ANION GAP SERPL CALCULATED.3IONS-SCNC: 7.1 MMOL/L (ref 5–15)
BASOPHILS # BLD AUTO: 0.01 10*3/MM3 (ref 0–0.2)
BASOPHILS NFR BLD AUTO: 0.1 % (ref 0–1.5)
BUN SERPL-MCNC: 24 MG/DL (ref 8–23)
BUN/CREAT SERPL: 22.9 (ref 7–25)
CALCIUM SPEC-SCNC: 8 MG/DL (ref 8.2–9.6)
CHLORIDE SERPL-SCNC: 103 MMOL/L (ref 98–107)
CO2 SERPL-SCNC: 24.9 MMOL/L (ref 22–29)
CREAT SERPL-MCNC: 1.05 MG/DL (ref 0.57–1)
DEPRECATED RDW RBC AUTO: 48.8 FL (ref 37–54)
EOSINOPHIL # BLD AUTO: 0.17 10*3/MM3 (ref 0–0.4)
EOSINOPHIL NFR BLD AUTO: 1.9 % (ref 0.3–6.2)
ERYTHROCYTE [DISTWIDTH] IN BLOOD BY AUTOMATED COUNT: 14 % (ref 12.3–15.4)
GFR SERPL CREATININE-BSD FRML MDRD: 49 ML/MIN/1.73
GLUCOSE SERPL-MCNC: 108 MG/DL (ref 65–99)
HCT VFR BLD AUTO: 29.3 % (ref 34–46.6)
HGB BLD-MCNC: 9.1 G/DL (ref 12–15.9)
IMM GRANULOCYTES # BLD AUTO: 0.02 10*3/MM3 (ref 0–0.05)
IMM GRANULOCYTES NFR BLD AUTO: 0.2 % (ref 0–0.5)
LYMPHOCYTES # BLD AUTO: 0.93 10*3/MM3 (ref 0.7–3.1)
LYMPHOCYTES NFR BLD AUTO: 10.1 % (ref 19.6–45.3)
MCH RBC QN AUTO: 29.4 PG (ref 26.6–33)
MCHC RBC AUTO-ENTMCNC: 31.1 G/DL (ref 31.5–35.7)
MCV RBC AUTO: 94.8 FL (ref 79–97)
MONOCYTES # BLD AUTO: 0.8 10*3/MM3 (ref 0.1–0.9)
MONOCYTES NFR BLD AUTO: 8.7 % (ref 5–12)
NEUTROPHILS NFR BLD AUTO: 7.25 10*3/MM3 (ref 1.7–7)
NEUTROPHILS NFR BLD AUTO: 79 % (ref 42.7–76)
PLATELET # BLD AUTO: 245 10*3/MM3 (ref 140–450)
PMV BLD AUTO: 9.8 FL (ref 6–12)
POTASSIUM SERPL-SCNC: 3.8 MMOL/L (ref 3.5–5.2)
RBC # BLD AUTO: 3.09 10*6/MM3 (ref 3.77–5.28)
SODIUM SERPL-SCNC: 135 MMOL/L (ref 136–145)
WBC # BLD AUTO: 9.18 10*3/MM3 (ref 3.4–10.8)

## 2020-12-26 PROCEDURE — 63710000001 DULOXETINE 60 MG CAPSULE DELAYED-RELEASE PARTICLES: Performed by: ORTHOPAEDIC SURGERY

## 2020-12-26 PROCEDURE — 80048 BASIC METABOLIC PNL TOTAL CA: CPT | Performed by: ORTHOPAEDIC SURGERY

## 2020-12-26 PROCEDURE — A9270 NON-COVERED ITEM OR SERVICE: HCPCS | Performed by: ORTHOPAEDIC SURGERY

## 2020-12-26 PROCEDURE — 99224 PR SBSQ OBSERVATION CARE/DAY 15 MINUTES: CPT | Performed by: HOSPITALIST

## 2020-12-26 PROCEDURE — 97530 THERAPEUTIC ACTIVITIES: CPT

## 2020-12-26 PROCEDURE — A9270 NON-COVERED ITEM OR SERVICE: HCPCS | Performed by: HOSPITALIST

## 2020-12-26 PROCEDURE — 63710000001 MELATONIN 5 MG TABLET: Performed by: ORTHOPAEDIC SURGERY

## 2020-12-26 PROCEDURE — 25010000002 ENOXAPARIN PER 10 MG: Performed by: ORTHOPAEDIC SURGERY

## 2020-12-26 PROCEDURE — 63710000001 CEPHALEXIN 500 MG CAPSULE: Performed by: HOSPITALIST

## 2020-12-26 PROCEDURE — 99024 POSTOP FOLLOW-UP VISIT: CPT | Performed by: ORTHOPAEDIC SURGERY

## 2020-12-26 PROCEDURE — G0378 HOSPITAL OBSERVATION PER HR: HCPCS

## 2020-12-26 PROCEDURE — 85025 COMPLETE CBC W/AUTO DIFF WBC: CPT | Performed by: ORTHOPAEDIC SURGERY

## 2020-12-26 PROCEDURE — 63710000001 SODIUM CHLORIDE 1 G TABLET: Performed by: ORTHOPAEDIC SURGERY

## 2020-12-26 PROCEDURE — 63710000001 SENNOSIDES-DOCUSATE 8.6-50 MG TABLET: Performed by: ORTHOPAEDIC SURGERY

## 2020-12-26 PROCEDURE — 97535 SELF CARE MNGMENT TRAINING: CPT

## 2020-12-26 PROCEDURE — 97116 GAIT TRAINING THERAPY: CPT

## 2020-12-26 PROCEDURE — 63710000001 MIRTAZAPINE 15 MG TABLET: Performed by: ORTHOPAEDIC SURGERY

## 2020-12-26 PROCEDURE — 63710000001 LEVOTHYROXINE 88 MCG TABLET: Performed by: ORTHOPAEDIC SURGERY

## 2020-12-26 RX ADMIN — CEPHALEXIN 500 MG: 500 CAPSULE ORAL at 21:52

## 2020-12-26 RX ADMIN — SODIUM CHLORIDE TAB 1 GM 1 G: 1 TAB at 17:45

## 2020-12-26 RX ADMIN — CEPHALEXIN 500 MG: 500 CAPSULE ORAL at 13:28

## 2020-12-26 RX ADMIN — MIRTAZAPINE 15 MG: 15 TABLET, FILM COATED ORAL at 21:51

## 2020-12-26 RX ADMIN — ENOXAPARIN SODIUM 40 MG: 40 INJECTION SUBCUTANEOUS at 08:47

## 2020-12-26 RX ADMIN — MELATONIN TAB 5 MG 5 MG: 5 TAB at 21:51

## 2020-12-26 RX ADMIN — SODIUM CHLORIDE TAB 1 GM 1 G: 1 TAB at 08:47

## 2020-12-26 RX ADMIN — SODIUM CHLORIDE TAB 1 GM 1 G: 1 TAB at 12:42

## 2020-12-26 RX ADMIN — LEVOTHYROXINE SODIUM 88 MCG: 88 TABLET ORAL at 07:00

## 2020-12-26 RX ADMIN — SODIUM CHLORIDE, PRESERVATIVE FREE 10 ML: 5 INJECTION INTRAVENOUS at 21:52

## 2020-12-26 RX ADMIN — SODIUM CHLORIDE, PRESERVATIVE FREE 10 ML: 5 INJECTION INTRAVENOUS at 08:52

## 2020-12-26 RX ADMIN — DOCUSATE SODIUM 50 MG AND SENNOSIDES 8.6 MG 2 TABLET: 8.6; 5 TABLET, FILM COATED ORAL at 21:51

## 2020-12-26 RX ADMIN — DULOXETINE HYDROCHLORIDE 60 MG: 60 CAPSULE, DELAYED RELEASE ORAL at 08:48

## 2020-12-26 RX ADMIN — CEPHALEXIN 500 MG: 500 CAPSULE ORAL at 07:00

## 2020-12-26 RX ADMIN — DOCUSATE SODIUM 50 MG AND SENNOSIDES 8.6 MG 2 TABLET: 8.6; 5 TABLET, FILM COATED ORAL at 08:47

## 2020-12-27 LAB
ANION GAP SERPL CALCULATED.3IONS-SCNC: 9.4 MMOL/L (ref 5–15)
BASOPHILS # BLD AUTO: 0.02 10*3/MM3 (ref 0–0.2)
BASOPHILS NFR BLD AUTO: 0.2 % (ref 0–1.5)
BUN SERPL-MCNC: 27 MG/DL (ref 8–23)
BUN/CREAT SERPL: 26 (ref 7–25)
CALCIUM SPEC-SCNC: 8.1 MG/DL (ref 8.2–9.6)
CHLORIDE SERPL-SCNC: 102 MMOL/L (ref 98–107)
CO2 SERPL-SCNC: 26.6 MMOL/L (ref 22–29)
CREAT SERPL-MCNC: 1.04 MG/DL (ref 0.57–1)
DEPRECATED RDW RBC AUTO: 47.7 FL (ref 37–54)
EOSINOPHIL # BLD AUTO: 0.62 10*3/MM3 (ref 0–0.4)
EOSINOPHIL NFR BLD AUTO: 6.9 % (ref 0.3–6.2)
ERYTHROCYTE [DISTWIDTH] IN BLOOD BY AUTOMATED COUNT: 14 % (ref 12.3–15.4)
GFR SERPL CREATININE-BSD FRML MDRD: 50 ML/MIN/1.73
GLUCOSE SERPL-MCNC: 99 MG/DL (ref 65–99)
HCT VFR BLD AUTO: 28.4 % (ref 34–46.6)
HGB BLD-MCNC: 8.9 G/DL (ref 12–15.9)
IMM GRANULOCYTES # BLD AUTO: 0.03 10*3/MM3 (ref 0–0.05)
IMM GRANULOCYTES NFR BLD AUTO: 0.3 % (ref 0–0.5)
LYMPHOCYTES # BLD AUTO: 1.39 10*3/MM3 (ref 0.7–3.1)
LYMPHOCYTES NFR BLD AUTO: 15.5 % (ref 19.6–45.3)
MCH RBC QN AUTO: 29.5 PG (ref 26.6–33)
MCHC RBC AUTO-ENTMCNC: 31.3 G/DL (ref 31.5–35.7)
MCV RBC AUTO: 94 FL (ref 79–97)
MONOCYTES # BLD AUTO: 0.9 10*3/MM3 (ref 0.1–0.9)
MONOCYTES NFR BLD AUTO: 10.1 % (ref 5–12)
NEUTROPHILS NFR BLD AUTO: 5.98 10*3/MM3 (ref 1.7–7)
NEUTROPHILS NFR BLD AUTO: 67 % (ref 42.7–76)
NRBC BLD AUTO-RTO: 0 /100 WBC (ref 0–0.2)
PLATELET # BLD AUTO: 279 10*3/MM3 (ref 140–450)
PMV BLD AUTO: 9.8 FL (ref 6–12)
POTASSIUM SERPL-SCNC: 4.2 MMOL/L (ref 3.5–5.2)
RBC # BLD AUTO: 3.02 10*6/MM3 (ref 3.77–5.28)
SODIUM SERPL-SCNC: 138 MMOL/L (ref 136–145)
WBC # BLD AUTO: 8.94 10*3/MM3 (ref 3.4–10.8)

## 2020-12-27 PROCEDURE — 63710000001 MIRTAZAPINE 15 MG TABLET: Performed by: ORTHOPAEDIC SURGERY

## 2020-12-27 PROCEDURE — 63710000001 SODIUM CHLORIDE 1 G TABLET: Performed by: ORTHOPAEDIC SURGERY

## 2020-12-27 PROCEDURE — G0378 HOSPITAL OBSERVATION PER HR: HCPCS

## 2020-12-27 PROCEDURE — 85025 COMPLETE CBC W/AUTO DIFF WBC: CPT | Performed by: ORTHOPAEDIC SURGERY

## 2020-12-27 PROCEDURE — 63710000001 LEVOTHYROXINE 88 MCG TABLET: Performed by: ORTHOPAEDIC SURGERY

## 2020-12-27 PROCEDURE — 25010000002 ENOXAPARIN PER 10 MG: Performed by: ORTHOPAEDIC SURGERY

## 2020-12-27 PROCEDURE — 63710000001 SENNOSIDES-DOCUSATE 8.6-50 MG TABLET: Performed by: ORTHOPAEDIC SURGERY

## 2020-12-27 PROCEDURE — 99024 POSTOP FOLLOW-UP VISIT: CPT | Performed by: ORTHOPAEDIC SURGERY

## 2020-12-27 PROCEDURE — A9270 NON-COVERED ITEM OR SERVICE: HCPCS | Performed by: ORTHOPAEDIC SURGERY

## 2020-12-27 PROCEDURE — 63710000001 DULOXETINE 60 MG CAPSULE DELAYED-RELEASE PARTICLES: Performed by: ORTHOPAEDIC SURGERY

## 2020-12-27 PROCEDURE — 99224 PR SBSQ OBSERVATION CARE/DAY 15 MINUTES: CPT | Performed by: HOSPITALIST

## 2020-12-27 PROCEDURE — 80048 BASIC METABOLIC PNL TOTAL CA: CPT | Performed by: ORTHOPAEDIC SURGERY

## 2020-12-27 PROCEDURE — A9270 NON-COVERED ITEM OR SERVICE: HCPCS | Performed by: HOSPITALIST

## 2020-12-27 PROCEDURE — 97110 THERAPEUTIC EXERCISES: CPT

## 2020-12-27 PROCEDURE — 63710000001 CEPHALEXIN 500 MG CAPSULE: Performed by: HOSPITALIST

## 2020-12-27 PROCEDURE — 63710000001 MELATONIN 5 MG TABLET: Performed by: ORTHOPAEDIC SURGERY

## 2020-12-27 RX ADMIN — SODIUM CHLORIDE TAB 1 GM 1 G: 1 TAB at 10:30

## 2020-12-27 RX ADMIN — LEVOTHYROXINE SODIUM 88 MCG: 88 TABLET ORAL at 06:11

## 2020-12-27 RX ADMIN — SODIUM CHLORIDE, PRESERVATIVE FREE 10 ML: 5 INJECTION INTRAVENOUS at 10:32

## 2020-12-27 RX ADMIN — SODIUM CHLORIDE TAB 1 GM 1 G: 1 TAB at 18:27

## 2020-12-27 RX ADMIN — DOCUSATE SODIUM 50 MG AND SENNOSIDES 8.6 MG 2 TABLET: 8.6; 5 TABLET, FILM COATED ORAL at 20:23

## 2020-12-27 RX ADMIN — CEPHALEXIN 500 MG: 500 CAPSULE ORAL at 22:51

## 2020-12-27 RX ADMIN — CEPHALEXIN 500 MG: 500 CAPSULE ORAL at 13:30

## 2020-12-27 RX ADMIN — MELATONIN TAB 5 MG 5 MG: 5 TAB at 20:23

## 2020-12-27 RX ADMIN — DULOXETINE HYDROCHLORIDE 60 MG: 60 CAPSULE, DELAYED RELEASE ORAL at 10:31

## 2020-12-27 RX ADMIN — SODIUM CHLORIDE TAB 1 GM 1 G: 1 TAB at 13:30

## 2020-12-27 RX ADMIN — DOCUSATE SODIUM 50 MG AND SENNOSIDES 8.6 MG 2 TABLET: 8.6; 5 TABLET, FILM COATED ORAL at 10:31

## 2020-12-27 RX ADMIN — SODIUM CHLORIDE, PRESERVATIVE FREE 10 ML: 5 INJECTION INTRAVENOUS at 20:24

## 2020-12-27 RX ADMIN — CEPHALEXIN 500 MG: 500 CAPSULE ORAL at 06:11

## 2020-12-27 RX ADMIN — MIRTAZAPINE 15 MG: 15 TABLET, FILM COATED ORAL at 20:24

## 2020-12-27 RX ADMIN — ENOXAPARIN SODIUM 40 MG: 40 INJECTION SUBCUTANEOUS at 10:31

## 2020-12-28 VITALS
DIASTOLIC BLOOD PRESSURE: 75 MMHG | WEIGHT: 121 LBS | RESPIRATION RATE: 17 BRPM | OXYGEN SATURATION: 97 % | TEMPERATURE: 98 F | SYSTOLIC BLOOD PRESSURE: 164 MMHG | HEIGHT: 61 IN | BODY MASS INDEX: 22.84 KG/M2 | HEART RATE: 83 BPM

## 2020-12-28 LAB
ANION GAP SERPL CALCULATED.3IONS-SCNC: 8 MMOL/L (ref 5–15)
BASOPHILS # BLD AUTO: 0.04 10*3/MM3 (ref 0–0.2)
BASOPHILS NFR BLD AUTO: 0.5 % (ref 0–1.5)
BUN SERPL-MCNC: 26 MG/DL (ref 8–23)
BUN/CREAT SERPL: 24.3 (ref 7–25)
CALCIUM SPEC-SCNC: 8.5 MG/DL (ref 8.2–9.6)
CHLORIDE SERPL-SCNC: 100 MMOL/L (ref 98–107)
CO2 SERPL-SCNC: 28 MMOL/L (ref 22–29)
CREAT SERPL-MCNC: 1.07 MG/DL (ref 0.57–1)
DEPRECATED RDW RBC AUTO: 47.6 FL (ref 37–54)
EOSINOPHIL # BLD AUTO: 0.69 10*3/MM3 (ref 0–0.4)
EOSINOPHIL NFR BLD AUTO: 8.3 % (ref 0.3–6.2)
ERYTHROCYTE [DISTWIDTH] IN BLOOD BY AUTOMATED COUNT: 13.9 % (ref 12.3–15.4)
GFR SERPL CREATININE-BSD FRML MDRD: 48 ML/MIN/1.73
GLUCOSE SERPL-MCNC: 100 MG/DL (ref 65–99)
HCT VFR BLD AUTO: 30.8 % (ref 34–46.6)
HGB BLD-MCNC: 9.7 G/DL (ref 12–15.9)
IMM GRANULOCYTES # BLD AUTO: 0.04 10*3/MM3 (ref 0–0.05)
IMM GRANULOCYTES NFR BLD AUTO: 0.5 % (ref 0–0.5)
LYMPHOCYTES # BLD AUTO: 1.48 10*3/MM3 (ref 0.7–3.1)
LYMPHOCYTES NFR BLD AUTO: 17.9 % (ref 19.6–45.3)
MCH RBC QN AUTO: 29.3 PG (ref 26.6–33)
MCHC RBC AUTO-ENTMCNC: 31.5 G/DL (ref 31.5–35.7)
MCV RBC AUTO: 93.1 FL (ref 79–97)
MONOCYTES # BLD AUTO: 0.83 10*3/MM3 (ref 0.1–0.9)
MONOCYTES NFR BLD AUTO: 10 % (ref 5–12)
NEUTROPHILS NFR BLD AUTO: 5.2 10*3/MM3 (ref 1.7–7)
NEUTROPHILS NFR BLD AUTO: 62.8 % (ref 42.7–76)
NRBC BLD AUTO-RTO: 0 /100 WBC (ref 0–0.2)
PLATELET # BLD AUTO: 323 10*3/MM3 (ref 140–450)
PMV BLD AUTO: 9.7 FL (ref 6–12)
POTASSIUM SERPL-SCNC: 4.3 MMOL/L (ref 3.5–5.2)
RBC # BLD AUTO: 3.31 10*6/MM3 (ref 3.77–5.28)
SODIUM SERPL-SCNC: 136 MMOL/L (ref 136–145)
WBC # BLD AUTO: 8.28 10*3/MM3 (ref 3.4–10.8)

## 2020-12-28 PROCEDURE — 97116 GAIT TRAINING THERAPY: CPT

## 2020-12-28 PROCEDURE — 97535 SELF CARE MNGMENT TRAINING: CPT

## 2020-12-28 PROCEDURE — A9270 NON-COVERED ITEM OR SERVICE: HCPCS | Performed by: ORTHOPAEDIC SURGERY

## 2020-12-28 PROCEDURE — 90686 IIV4 VACC NO PRSV 0.5 ML IM: CPT | Performed by: ORTHOPAEDIC SURGERY

## 2020-12-28 PROCEDURE — 63710000001 CEPHALEXIN 500 MG CAPSULE: Performed by: HOSPITALIST

## 2020-12-28 PROCEDURE — 63710000001 DULOXETINE 60 MG CAPSULE DELAYED-RELEASE PARTICLES: Performed by: ORTHOPAEDIC SURGERY

## 2020-12-28 PROCEDURE — 99217 PR OBSERVATION CARE DISCHARGE MANAGEMENT: CPT | Performed by: HOSPITALIST

## 2020-12-28 PROCEDURE — 97110 THERAPEUTIC EXERCISES: CPT

## 2020-12-28 PROCEDURE — A9270 NON-COVERED ITEM OR SERVICE: HCPCS | Performed by: HOSPITALIST

## 2020-12-28 PROCEDURE — 63710000001 SODIUM CHLORIDE 1 G TABLET: Performed by: ORTHOPAEDIC SURGERY

## 2020-12-28 PROCEDURE — 80048 BASIC METABOLIC PNL TOTAL CA: CPT | Performed by: ORTHOPAEDIC SURGERY

## 2020-12-28 PROCEDURE — G0008 ADMIN INFLUENZA VIRUS VAC: HCPCS | Performed by: ORTHOPAEDIC SURGERY

## 2020-12-28 PROCEDURE — G0378 HOSPITAL OBSERVATION PER HR: HCPCS

## 2020-12-28 PROCEDURE — 25010000002 INFLUENZA VAC SPLIT QUAD 0.5 ML SUSPENSION PREFILLED SYRINGE: Performed by: ORTHOPAEDIC SURGERY

## 2020-12-28 PROCEDURE — 63710000001 LEVOTHYROXINE 88 MCG TABLET: Performed by: ORTHOPAEDIC SURGERY

## 2020-12-28 PROCEDURE — 85025 COMPLETE CBC W/AUTO DIFF WBC: CPT | Performed by: ORTHOPAEDIC SURGERY

## 2020-12-28 PROCEDURE — 25010000002 ENOXAPARIN PER 10 MG: Performed by: ORTHOPAEDIC SURGERY

## 2020-12-28 RX ORDER — HYDROCODONE BITARTRATE AND ACETAMINOPHEN 7.5; 325 MG/1; MG/1
1 TABLET ORAL EVERY 4 HOURS PRN
Qty: 10 TABLET | Refills: 0 | Status: SHIPPED | OUTPATIENT
Start: 2020-12-28 | End: 2021-01-03

## 2020-12-28 RX ADMIN — SODIUM CHLORIDE, PRESERVATIVE FREE 10 ML: 5 INJECTION INTRAVENOUS at 08:39

## 2020-12-28 RX ADMIN — CEPHALEXIN 500 MG: 500 CAPSULE ORAL at 06:43

## 2020-12-28 RX ADMIN — LEVOTHYROXINE SODIUM 88 MCG: 88 TABLET ORAL at 06:43

## 2020-12-28 RX ADMIN — SODIUM CHLORIDE TAB 1 GM 1 G: 1 TAB at 08:39

## 2020-12-28 RX ADMIN — INFLUENZA VIRUS VACCINE 0.5 ML: 15; 15; 15; 15 SUSPENSION INTRAMUSCULAR at 13:23

## 2020-12-28 RX ADMIN — DULOXETINE HYDROCHLORIDE 60 MG: 60 CAPSULE, DELAYED RELEASE ORAL at 08:39

## 2020-12-28 RX ADMIN — SODIUM CHLORIDE TAB 1 GM 1 G: 1 TAB at 13:22

## 2020-12-28 RX ADMIN — ENOXAPARIN SODIUM 40 MG: 40 INJECTION SUBCUTANEOUS at 08:39

## 2020-12-28 RX ADMIN — CEPHALEXIN 500 MG: 500 CAPSULE ORAL at 13:22

## 2020-12-29 ENCOUNTER — READMISSION MANAGEMENT (OUTPATIENT)
Dept: CALL CENTER | Facility: HOSPITAL | Age: 85
End: 2020-12-29

## 2020-12-29 NOTE — OUTREACH NOTE
Prep Survey      Responses   [facility] facility patient discharged from?  [place]   Is LACE score < 7 ?  No   Emergency Room discharge w/ pulse ox?  No   Eligibility  Readm Mgmt   Discharge diagnosis   Rt Hip Pain total hip   Does the patient have one of the following disease processes/diagnoses(primary or secondary)?  Total Joint Replacement   Does the patient have Home health ordered?  Yes   What is the Home health agency?   [facility]    Is there a DME ordered?  No   Prep survey completed?  Yes          Maria Del Carmen Joya RN

## 2020-12-30 ENCOUNTER — READMISSION MANAGEMENT (OUTPATIENT)
Dept: CALL CENTER | Facility: HOSPITAL | Age: 85
End: 2020-12-30

## 2020-12-30 NOTE — OUTREACH NOTE
Total Joint Week 1 Survey      Responses   [facility] facility patient discharged from?  [place]   Does the patient have one of the following disease processes/diagnoses(primary or secondary)?  Total Joint Replacement   Joint surgery performed?  Hip   Week 1 attempt successful?  No   Unsuccessful attempts  Attempt 1          Antonella Reyna RN

## 2020-12-31 ENCOUNTER — OFFICE VISIT (OUTPATIENT)
Dept: ORTHOPEDIC SURGERY | Facility: CLINIC | Age: 85
End: 2020-12-31

## 2020-12-31 VITALS — WEIGHT: 121 LBS | HEIGHT: 61 IN | BODY MASS INDEX: 22.84 KG/M2

## 2020-12-31 DIAGNOSIS — Z96.649 STATUS POST HIP HEMIARTHROPLASTY: Primary | ICD-10-CM

## 2020-12-31 PROCEDURE — 99024 POSTOP FOLLOW-UP VISIT: CPT | Performed by: NURSE PRACTITIONER

## 2020-12-31 RX ORDER — OXYCODONE HYDROCHLORIDE AND ACETAMINOPHEN 5; 325 MG/1; MG/1
TABLET ORAL
Qty: 36 TABLET | Refills: 0 | Status: SHIPPED | OUTPATIENT
Start: 2020-12-31 | End: 2021-07-08

## 2020-12-31 RX ORDER — ERGOCALCIFEROL 1.25 MG/1
50000 CAPSULE ORAL WEEKLY
COMMUNITY
Start: 2020-12-08

## 2020-12-31 NOTE — PROGRESS NOTES
CC: F/u s/p right hip hemiarthroplasty, DOS 2020    SUBJECTIVE:Patient returns today for 1 week post-op follow up of right hip. Patient reports doing well with home health physical therapy and progressing. Continues to ambulate with use of walker. Denies any concerns with incision. Denies fevers, chills, or sweats.  Denies calf pain bilateral lower extremities, denies presence numbness or tingling.     EXAM:  Right hip- incision clean, dry, and intact   No erythema, or subcutaneous fluid collection mild swelling   Mild tenderness on passive logroll   Flex/extend all digits right foot with 4/5 strength   2+ dorsalis pedis pulse   No calf pain, negative Homans sign bilateral lower extremities   Positive sensation right foot    ASSESSMENT: status post right hip hemiarthroplasty.    PLAN:   1.  Pernio Dermabond dressing remains intact   2.  We will continue with home health physical therapy, continue posterior hip precautions x 6 weeks.   3.  We will restart DVT prophylaxis with Lovenox once daily for the next 3 weeks.  4.  Follow-up in 5 weeks with x-rays to be repeated of the right hip.  Patient verbalized understanding of all information agrees plan of care.  Denies other concerns present at this time.    New Medications Ordered This Visit   Medications   • oxyCODONE-acetaminophen (PERCOCET) 5-325 MG per tablet     Si-2 tablets every 4-6 hours, prn moderate-severe pain     Dispense:  36 tablet     Refill:  0   • enoxaparin (Lovenox) 40 MG/0.4ML solution syringe     Sig: Inject 0.4 mL under the skin into the appropriate area as directed Daily for 21 days.     Dispense:  8.4 mL     Refill:  0

## 2021-01-05 ENCOUNTER — READMISSION MANAGEMENT (OUTPATIENT)
Dept: CALL CENTER | Facility: HOSPITAL | Age: 86
End: 2021-01-05

## 2021-01-05 NOTE — OUTREACH NOTE
Total Joint Week 1 Survey      Responses   [facility] patient discharged from?  [place]   Does the patient have one of the following disease processes/diagnoses(primary or secondary)?  Total Joint Replacement   Joint surgery performed?  Hip   Week 1 attempt successful?  Yes   Call start time  1259   Call end time  1306   Has the patient been back in either the hospital or Emergency Department since discharge?  No   Is patient permission given to speak with other caregiver?  Yes   List who call center can speak with  anyone   Does the patient have all medications related to this admission filled (includes all antibiotics, pain medications, etc.)  Yes   Is the patient taking all medications as directed (includes completed medication regime)?  Yes   Is the patient able to teach back alternate methods of pain control?  Ice, Correct alignment, Reposition   Comments regarding appointments  has surgeon appt. set up   Does the patient have a follow up appointment with their surgeon?  Yes   Has the patient kept scheduled appointments due by today?  N/A   What is the [facility] health agency?   [facility]   Has home health visited the patient within 72 hours of discharge?  Yes   Psychosocial issues?  No   When is the first therapy visit scheduled (PO Day) including how many days per week   12/25   Has the patient began therapy sessions (either in the home or as an out patient)?  Yes   If the patient has started attending therapy, what post op day did they begin to attend (either in home or as an out patient)?    [facility]   Does the patient have a wound vac in place?  No   Has the patient fallen since discharge?  No   Comments  PT coming 2 times week   Did the patient receive a copy of their discharge instructions?  Yes   Nursing interventions  Reviewed instructions with patient   What is the patient's perception of their functional status since discharge?  Improving   Is the patient able to teach  back signs and symptoms of infection?  Temp >100.4 for 24h or longer, Incisional drainage, Blisters around incision, Increased swelling or redness around incision (not associated with surgical edema), Severe discomfort or pain, Changes in mobility, Shortness of breath or chest pain   Is the patient able to teach back how to prevent infection?  Check incision daily, Wash hands before and after touching incision, Keep incision covered if drainage, Keep incision covered if pets in house, Shower only as directed by surgeon, Monitor blood sugar if diabetic, Eat well-balanced diet, No tub baths, hot tub or swimming, No lotion or creams   Is the patient able to teach back signs and symptoms of DVT?  Redness in calf, Area hot to touch, Shortness of breath or chest pain, Swelling in calf, Severe pain in calf   Is the patient able to teach back home safety measures?  Ability to shower, Accessibility to necessary areas in home, Modifications to reach items, Modifications with ADLs such as dressing, cooking, toileting   Did the patient implement home safety suggestions from pre-surgery classes if attended?  Yes   If the patient is a current smoker, are they able to teach back resources for cessation?  Not a smoker   Is the patient/caregiver able to teach back the hierarchy of who to call/visit for symptoms/problems? PCP, Specialist, Home health nurse, Urgent Care, ED, 911  Yes   Week 1 call completed?  Yes   Wrap up additional comments  She says she is doing well, no new issues, no pain, no questions today, PT comes 2 times week          Valerie Jacob RN

## 2021-01-14 ENCOUNTER — READMISSION MANAGEMENT (OUTPATIENT)
Dept: CALL CENTER | Facility: HOSPITAL | Age: 86
End: 2021-01-14

## 2021-01-14 NOTE — OUTREACH NOTE
Total Joint Week 2 Survey      Responses   [facility] patient discharged from?  [place]   Does the patient have one of the following disease processes/diagnoses(primary or secondary)?  Total Joint Replacement   Joint surgery performed?  Hip   Week 2 attempt successful?  Yes   Call start time  1128   Call end time  1130   Has the patient been back in either the hospital or Emergency Department since discharge?  No   Discharge diagnosis   Rt Hip Pain total hip   Is patient permission given to speak with other caregiver?  Yes   List who call center can speak with  anyone   Does the patient have all medications related to this admission filled (includes all antibiotics, pain medications, etc.)  Yes   Is the patient taking all medications as directed (includes completed medication regime)?  Yes   Is the patient able to teach back alternate methods of pain control?  Ice, Reposition, Correct alignment, Short, frequent activity   Does the patient have a follow up appointment with their surgeon?  Yes   Has the patient kept scheduled appointments due by today?  Yes   Psychosocial issues?  No   Has the patient began therapy sessions (either in the home or as an out patient)?  Yes   Does the patient have a wound vac in place?  N/A   Has the patient fallen since discharge?  No   Did the patient receive a copy of their discharge instructions?  Yes   Nursing interventions  Reviewed instructions with patient, Educated on MyChart   What is the patient's perception of their functional status since discharge?  Improving   Is the patient able to teach back signs and symptoms of infection?  Temp >100.4 for 24h or longer, Incisional drainage, Blisters around incision, Increased swelling or redness around incision (not associated with surgical edema), Severe discomfort or pain, Changes in mobility, Shortness of breath or chest pain   Is the patient able to teach back how to prevent infection?  Check incision daily, Wash hands  before and after touching incision   Is the patient able to teach back signs and symptoms of DVT?  Redness in calf, Swelling in calf, Area hot to touch, Severe pain in calf, Shortness of breath or chest pain   Is the patient able to teach back home safety measures?  Ability to shower, Accessibility to necessary areas in home   If the patient is a current smoker, are they able to teach back resources for cessation?  Not a smoker   Is the patient/caregiver able to teach back the hierarchy of who to call/visit for symptoms/problems? PCP, Specialist, Home health nurse, Urgent Care, ED, 911  Yes   Week 2 call completed?  Yes          Grisel Mulligan RN

## 2021-01-29 ENCOUNTER — READMISSION MANAGEMENT (OUTPATIENT)
Dept: CALL CENTER | Facility: HOSPITAL | Age: 86
End: 2021-01-29

## 2021-01-29 NOTE — OUTREACH NOTE
Total Joint Month 1 Survey      Responses   [facility] patient discharged from?  [place]   Does the patient have one of the following disease processes/diagnoses(primary or secondary)?  Total Joint Replacement   Joint surgery performed?  Hip   Month 1 attempt successful?  Yes   Call start time  1607   Call end time  1610   Has the patient been back in either the hospital or Emergency Department since discharge?  No   Discharge diagnosis   Rt Hip Pain total hip   Is the patient taking all medications as directed (includes completed medication regime)?  Yes   Is the patient able to teach back alternate methods of pain control?  Correct alignment, Short, frequent activity   Has the patient kept scheduled appointments due by today?  N/A   Is the patient still receiving Home Health Services?  N/A   Is the patient still attending therapy sessions(either in the home or as an outpatient)?  Yes   Has the patient fallen since discharge?  No   Comments  She states she is still at home with therapy   What is the patient's perception of their functional status since discharge?  Improving   If the patient is a current smoker, are they able to teach back resources for cessation?  Not a smoker   Is the patient/caregiver able to teach back the hierarchy of who to call/visit for symptoms/problems? PCP, Specialist, Home health nurse, Urgent Care, ED, 911  Yes   Additional teach back comments  She was quick with answers and short on the call. Says she is doing very well.   Month 1 call completed?  Yes   Wrap up additional comments  Denies pain medication being needed.          Yanira Herrmann RN

## 2021-03-02 ENCOUNTER — READMISSION MANAGEMENT (OUTPATIENT)
Dept: CALL CENTER | Facility: HOSPITAL | Age: 86
End: 2021-03-02

## 2021-03-02 NOTE — OUTREACH NOTE
Total Joint Month 2 Survey      Responses   [facility] patient discharged from?  [place]   Does the patient have one of the following disease processes/diagnoses(primary or secondary)?  Total Joint Replacement   Joint surgery performed?  Hip   Month 2 attempt successful?  Yes   Call start time  0957   Call end time  0959   Has the patient been back in either the hospital or Emergency Department since discharge?  No   Discharge diagnosis   Rt Hip Pain total hip   Has the patient kept scheduled appointments due by today?  Yes   Is the patient still receiving Home Health Services?  Yes   Is the patient still attending therapy sessions(either in the home or as an outpatient)?  Yes   Has the patient fallen since discharge?  No   Comments  Incision has healed well. She is walking with a rollator and working with PT. Appetite is WNL and has no issues.    What is the patient's perception of their functional status since discharge?  Improving   Is the patient/caregiver able to teach back the hierarchy of who to call/visit for symptoms/problems? PCP, Specialist, Home health nurse, Urgent Care, ED, 911  Yes   Month 2 Call Completed?  Yes          Taylor Pitts RN

## 2021-03-08 ENCOUNTER — OFFICE VISIT (OUTPATIENT)
Dept: ORTHOPEDIC SURGERY | Facility: CLINIC | Age: 86
End: 2021-03-08

## 2021-03-08 VITALS — WEIGHT: 121 LBS | HEIGHT: 61 IN | BODY MASS INDEX: 22.84 KG/M2

## 2021-03-08 DIAGNOSIS — Z96.649 STATUS POST HIP HEMIARTHROPLASTY: Primary | ICD-10-CM

## 2021-03-08 PROCEDURE — 73502 X-RAY EXAM HIP UNI 2-3 VIEWS: CPT | Performed by: NURSE PRACTITIONER

## 2021-03-08 PROCEDURE — 99024 POSTOP FOLLOW-UP VISIT: CPT | Performed by: NURSE PRACTITIONER

## 2021-03-08 RX ORDER — HYDROCODONE BITARTRATE AND ACETAMINOPHEN 7.5; 325 MG/1; MG/1
TABLET ORAL
COMMUNITY
End: 2021-07-08

## 2021-03-08 NOTE — PROGRESS NOTES
CC: F/u s/p right hip hemiarthroplasty, DOS 12/24/2020    Interval history: Nicolasa Woodall returns to clinic for follow-up of her right hip.  Daughter has accompanied her to visit.  She is continued working with physical therapy for strengthening bilateral lower extremities and is doing very well.  She does continue to ambulate long distances with a walker denies any residual numbness or tingling radiating down the right lower extremity.  Incision well-healed, continues to improve.  Denies other concerns present this time.    Exam:  Right hip exam:  Incision well-healed  Negative logroll exam  Negative Stinchfield exam  Quad strength 4 out of 5  Positive sensation light touch all distributions right lower extremity  2+ dorsalis pedis pulse  Mild to moderate swelling bilateral ankles  Negative calf tenderness, negative Homans' sign bilaterally  Positive sensation on dorsum and plantar surface of the foot and all digits    Imaging:  Right Hip X-Ray  Indication: Status post right hip hemiarthroplasty  AP and Frog Leg views    Findings:  Implant with stable alignment, no evidence of any loosening negative for any fractures other acute osseous abnormality  prior studies were available for comparison.    Assessment: Status post right hip hemiarthroplasty    Plan:  1.  Discussed plan of care with patient and daughter.  We will continue with home health physical therapy for bilateral lower extremity strengthening.  Plan to have her follow-up in 3 months. All questions answered.       No orders of the defined types were placed in this encounter.

## 2021-04-06 ENCOUNTER — READMISSION MANAGEMENT (OUTPATIENT)
Dept: CALL CENTER | Facility: HOSPITAL | Age: 86
End: 2021-04-06

## 2021-04-06 NOTE — OUTREACH NOTE
Total Joint Month 3 Survey      Responses   [facility] patient discharged from?  [place]   Does the patient have one of the following disease processes/diagnoses(primary or secondary)?  Total Joint Replacement   Joint surgery performed?  Hip   Month 3 attempt successful?  Yes   Call start time  0949   Call end time  0951   Has the patient been back in either the hospital or Emergency Department since discharge?  No   Discharge diagnosis   Rt Hip Pain total hip   Is the patient taking all medications as directed (includes completed medication regime)?  Yes   Has the patient kept scheduled appointments due by today?  Yes   Is the patient still receiving Home Health Services?  Yes   Is the patient still attending therapy sessions(either in the home or as an outpatient)?  Yes   Has the patient fallen since discharge?  No   Comments  Pt reports no pain at all, no issues at all with ambulation. She is using a rollator for walking.    What is the patient's perception of their functional status since discharge?  Improving   Is the patient able to teach back signs and symptoms of infection?  Blisters around incision, Incisional drainage, Changes in mobility   Is the patient/caregiver able to teach back the hierarchy of who to call/visit for symptoms/problems? PCP, Specialist, Home health nurse, Urgent Care, ED, 911  Yes   Graduated  Yes   Is the patient interested in additional calls from an ambulatory ?  NOTE:  applies to high risk patients requiring additional follow-up.  No   Did the patient feel the follow up calls were helpful during their recovery period?  Yes   Was the number of calls appropriate?  Yes          Taylor Pitts RN

## 2021-07-08 ENCOUNTER — OFFICE VISIT (OUTPATIENT)
Dept: ORTHOPEDIC SURGERY | Facility: CLINIC | Age: 86
End: 2021-07-08

## 2021-07-08 VITALS — WEIGHT: 121 LBS | HEIGHT: 61 IN | BODY MASS INDEX: 22.84 KG/M2

## 2021-07-08 DIAGNOSIS — Z96.649 STATUS POST HIP HEMIARTHROPLASTY: Primary | ICD-10-CM

## 2021-07-08 PROCEDURE — 73502 X-RAY EXAM HIP UNI 2-3 VIEWS: CPT | Performed by: NURSE PRACTITIONER

## 2021-07-08 PROCEDURE — 99213 OFFICE O/P EST LOW 20 MIN: CPT | Performed by: NURSE PRACTITIONER

## 2021-07-08 RX ORDER — DRONABINOL 2.5 MG/1
2.5 CAPSULE ORAL 2 TIMES DAILY
COMMUNITY
Start: 2021-05-10 | End: 2023-04-01

## 2021-07-08 NOTE — PROGRESS NOTES
Subjective:     Patient ID: Nicolasa Woodall is a 89 y.o. female.    Chief Complaint:   F/u s/p right hip hemiarthroplasty, DOS 2020  History of Present Illness  Nicolasa Woodall returns to clinic with daughter for follow-up of her right lower extremity.  She is continued with weightbearing as tolerated does note significant improvement of endurance does continue to experience some fatigue and weakness with bilateral lower extremities but otherwise doing very well.  Denies that she is experiencing any pain presence of numbness or tingling at the right lower extremity.  Has continued to do extremely well recovering as expected.  Denies other complaints present time.     Social History     Occupational History   • Not on file   Tobacco Use   • Smoking status: Former Smoker     Packs/day: 0.50     Years: 25.00     Pack years: 12.50     Quit date:      Years since quittin.5   • Smokeless tobacco: Never Used   Vaping Use   • Vaping Use: Never used   Substance and Sexual Activity   • Alcohol use: Yes     Alcohol/week: 1.0 standard drinks     Types: 1 Glasses of wine per week     Comment: RARELY/Caffeine use   • Drug use: No   • Sexual activity: Defer      Past Medical History:   Diagnosis Date   • Anxiety    • Chronic kidney disease    • Depression     resolved after daughter completed chemotherapy    • Disease of thyroid gland    • Hypertension    • Metabolic encephalopathy    • Pulmonary embolism (CMS/HCC)     SPONTANEOUS HEMMORHAGE   • Subdural hematoma (CMS/HCC)      Past Surgical History:   Procedure Laterality Date   • BREAST BIOPSY      BENIGN   • HARDWARE REMOVAL Left 2019    Procedure: FEMUR HARDWARE REMOVAL;  Surgeon: Dagoberto Owens MD;  Location: [facility] OR;  Service: Orthopedics   • HIP HEMIARTHROPLASTY Right 2020    Procedure: HIP HEMIARTHROPLASTY and all associated procedures;  Surgeon: Dagoberto Owens MD;  Location: [facility] OR;  Service: Orthopedics;  Laterality: Right;   •  "HYSTERECTOMY     • JOINT REPLACEMENT     • THYROID SURGERY      PARATHYROID REMOVAL   • TOTAL HIP ARTHROPLASTY Left 11/11/2019    Procedure: TOTAL HIP ARTHROPLASTY;  Surgeon: Dagoberto Owens MD;  Location: [facility];  Service: Orthopedics   • VENA CAVA FILTER INSERTION         Family History   Problem Relation Age of Onset   • Breast cancer Daughter          Objective:  Physical Exam    Vital signs reviewed.   General: No acute distress.  Eyes: conjunctiva clear; pupils equally round and reactive  ENT: external ears and nose atraumatic; oropharynx clear  CV: no peripheral edema  Resp: normal respiratory effort  Skin: no rashes or wounds; normal turgor  Psych: mood and affect appropriate; recent and remote memory intact    Vitals:    07/08/21 1438   Weight: 54.9 kg (121 lb)   Height: 154.9 cm (61\")         07/08/21  1438   Weight: 54.9 kg (121 lb)     Body mass index is 22.86 kg/m².      Ortho Exam     Right hip examined  Negative logroll exam  Negative Stinchfield exam  Positive sensation light touch all distributions of the right lower extremity  2+ dorsalis pedis pulse  Negative calf tenderness, negative Homans' sign    Imaging:  Right Hip X-Ray  Indication: Status post right hemiarthroplasty  AP and Frog Leg views    Findings:  Implant remains intact no evidence of any periprosthetic fracture, no evidence of loosening  No bony lesion  Normal soft tissues  Normal joint spaces  Prior studies were available for comparison.    Assessment:        1. Status post hip hemiarthroplasty           Plan:  1.  Discussed plan of care with patient.  Continue with strengthening exercises of the right lower extremity continue weightbearing as tolerated with walker.  Again she continues to heal extremely well from the surgery.  We will plan to follow-up with her at the 1 year postop [place].  She verbalized her standing of information agrees with plan of care.  Repeat x-ray images at that time.  All questions " answered.  Orders:  Orders Placed This Encounter   Procedures   • XR Hip With or Without Pelvis 2 - 3 View Right       Medications:  No orders of the defined types were placed in this encounter.      Followup:  No follow-ups on file.    Diagnoses and all orders for this visit:    1. Status post hip hemiarthroplasty (Primary)  -     Cancel: XR Hip With or Without Pelvis 2 - 3 View Right; Future  -     XR Hip With or Without Pelvis 2 - 3 View Right        Dictated utilizing Dragon dictation

## 2021-12-19 ENCOUNTER — APPOINTMENT (OUTPATIENT)
Dept: CT IMAGING | Facility: HOSPITAL | Age: 86
End: 2021-12-19

## 2021-12-19 ENCOUNTER — HOSPITAL ENCOUNTER (OUTPATIENT)
Facility: HOSPITAL | Age: 86
Setting detail: OBSERVATION
LOS: 1 days | Discharge: HOME | End: 2021-12-20
Attending: EMERGENCY MEDICINE | Admitting: INTERNAL MEDICINE

## 2021-12-19 DIAGNOSIS — D64.9 SYMPTOMATIC ANEMIA: Primary | ICD-10-CM

## 2021-12-19 DIAGNOSIS — R19.5 OCCULT GI BLEEDING: ICD-10-CM

## 2021-12-19 PROBLEM — F32.A MILD DEPRESSION: Status: ACTIVE | Noted: 2021-12-19

## 2021-12-19 LAB
ABO GROUP BLD: NORMAL
ALBUMIN SERPL-MCNC: 3.3 G/DL (ref 3.5–5.2)
ALBUMIN/GLOB SERPL: 1.4 G/DL
ALP SERPL-CCNC: 65 U/L (ref 39–117)
ALT SERPL W P-5'-P-CCNC: 10 U/L (ref 1–33)
AMPHET+METHAMPHET UR QL: NEGATIVE
AMPHETAMINES UR QL: NEGATIVE
ANION GAP SERPL CALCULATED.3IONS-SCNC: 13.1 MMOL/L (ref 5–15)
AST SERPL-CCNC: 29 U/L (ref 1–32)
BACTERIA UR QL AUTO: ABNORMAL /HPF
BARBITURATES UR QL SCN: NEGATIVE
BASOPHILS # BLD AUTO: 0.05 10*3/MM3 (ref 0–0.2)
BASOPHILS NFR BLD AUTO: 0.7 % (ref 0–1.5)
BENZODIAZ UR QL SCN: NEGATIVE
BILIRUB SERPL-MCNC: 0.2 MG/DL (ref 0–1.2)
BILIRUB UR QL STRIP: NEGATIVE
BLD GP AB SCN SERPL QL: NEGATIVE
BUN SERPL-MCNC: 33 MG/DL (ref 8–23)
BUN/CREAT SERPL: 24.3 (ref 7–25)
BUPRENORPHINE SERPL-MCNC: NEGATIVE NG/ML
CALCIUM SPEC-SCNC: 8.7 MG/DL (ref 8.2–9.6)
CANNABINOIDS SERPL QL: POSITIVE
CHLORIDE SERPL-SCNC: 102 MMOL/L (ref 98–107)
CLARITY UR: ABNORMAL
CO2 SERPL-SCNC: 21.9 MMOL/L (ref 22–29)
COCAINE UR QL: NEGATIVE
COLOR UR: YELLOW
CREAT SERPL-MCNC: 1.36 MG/DL (ref 0.57–1)
DEPRECATED RDW RBC AUTO: 48.2 FL (ref 37–54)
EOSINOPHIL # BLD AUTO: 0.07 10*3/MM3 (ref 0–0.4)
EOSINOPHIL NFR BLD AUTO: 0.9 % (ref 0.3–6.2)
ERYTHROCYTE [DISTWIDTH] IN BLOOD BY AUTOMATED COUNT: 20.9 % (ref 12.3–15.4)
ETHANOL BLD-MCNC: <10 MG/DL (ref 0–10)
ETHANOL UR QL: <0.01 %
FERRITIN SERPL-MCNC: 11 NG/ML (ref 13–150)
GFR SERPL CREATININE-BSD FRML MDRD: 36 ML/MIN/1.73
GLOBULIN UR ELPH-MCNC: 2.4 GM/DL
GLUCOSE SERPL-MCNC: 150 MG/DL (ref 65–99)
GLUCOSE UR STRIP-MCNC: NEGATIVE MG/DL
HCT VFR BLD AUTO: 20.5 % (ref 34–46.6)
HGB BLD-MCNC: 5.4 G/DL (ref 12–15.9)
HGB UR QL STRIP.AUTO: NEGATIVE
HYALINE CASTS UR QL AUTO: ABNORMAL /LPF
IMM GRANULOCYTES # BLD AUTO: 0.04 10*3/MM3 (ref 0–0.05)
IMM GRANULOCYTES NFR BLD AUTO: 0.5 % (ref 0–0.5)
IRON 24H UR-MRATE: 11 MCG/DL (ref 37–145)
IRON SATN MFR SERPL: 3 % (ref 20–50)
KETONES UR QL STRIP: NEGATIVE
LEUKOCYTE ESTERASE UR QL STRIP.AUTO: ABNORMAL
LYMPHOCYTES # BLD AUTO: 0.8 10*3/MM3 (ref 0.7–3.1)
LYMPHOCYTES NFR BLD AUTO: 10.7 % (ref 19.6–45.3)
MCH RBC QN AUTO: 16.9 PG (ref 26.6–33)
MCHC RBC AUTO-ENTMCNC: 26.3 G/DL (ref 31.5–35.7)
MCV RBC AUTO: 64.1 FL (ref 79–97)
METHADONE UR QL SCN: NEGATIVE
MICROCYTES BLD QL: NORMAL
MONOCYTES # BLD AUTO: 0.65 10*3/MM3 (ref 0.1–0.9)
MONOCYTES NFR BLD AUTO: 8.7 % (ref 5–12)
NEUTROPHILS NFR BLD AUTO: 5.86 10*3/MM3 (ref 1.7–7)
NEUTROPHILS NFR BLD AUTO: 78.5 % (ref 42.7–76)
NITRITE UR QL STRIP: NEGATIVE
OPIATES UR QL: NEGATIVE
OXYCODONE UR QL SCN: NEGATIVE
PCP UR QL SCN: NEGATIVE
PH UR STRIP.AUTO: 6 [PH] (ref 4.5–8)
PLATELET # BLD AUTO: 365 10*3/MM3 (ref 140–450)
PMV BLD AUTO: 8.8 FL (ref 6–12)
POTASSIUM SERPL-SCNC: 4.8 MMOL/L (ref 3.5–5.2)
PROPOXYPH UR QL: NEGATIVE
PROT SERPL-MCNC: 5.7 G/DL (ref 6–8.5)
PROT UR QL STRIP: ABNORMAL
QT INTERVAL: 384 MS
RBC # BLD AUTO: 3.2 10*6/MM3 (ref 3.77–5.28)
RBC # UR STRIP: ABNORMAL /HPF
REF LAB TEST METHOD: ABNORMAL
RETICS # AUTO: 0.04 10*6/MM3 (ref 0.02–0.13)
RETICS/RBC NFR AUTO: 1.31 % (ref 0.7–1.9)
RH BLD: POSITIVE
SARS-COV-2 RNA PNL SPEC NAA+PROBE: NOT DETECTED
SMALL PLATELETS BLD QL SMEAR: ADEQUATE
SODIUM SERPL-SCNC: 137 MMOL/L (ref 136–145)
SP GR UR STRIP: 1.02 (ref 1–1.03)
SQUAMOUS #/AREA URNS HPF: ABNORMAL /HPF
T&S EXPIRATION DATE: NORMAL
TIBC SERPL-MCNC: 343 MCG/DL (ref 298–536)
TRICYCLICS UR QL SCN: NEGATIVE
TROPONIN T SERPL-MCNC: 0.07 NG/ML (ref 0–0.03)
TSH SERPL DL<=0.05 MIU/L-ACNC: 0.67 UIU/ML (ref 0.27–4.2)
UIBC SERPL-MCNC: 332 MCG/DL (ref 112–346)
UROBILINOGEN UR QL STRIP: ABNORMAL
WBC # UR STRIP: ABNORMAL /HPF
WBC MORPH BLD: NORMAL
WBC NRBC COR # BLD: 7.47 10*3/MM3 (ref 3.4–10.8)

## 2021-12-19 PROCEDURE — 86922 COMPATIBILITY TEST ANTIGLOB: CPT

## 2021-12-19 PROCEDURE — 84484 ASSAY OF TROPONIN QUANT: CPT | Performed by: EMERGENCY MEDICINE

## 2021-12-19 PROCEDURE — 80306 DRUG TEST PRSMV INSTRMNT: CPT | Performed by: EMERGENCY MEDICINE

## 2021-12-19 PROCEDURE — 87635 SARS-COV-2 COVID-19 AMP PRB: CPT | Performed by: EMERGENCY MEDICINE

## 2021-12-19 PROCEDURE — 86850 RBC ANTIBODY SCREEN: CPT | Performed by: EMERGENCY MEDICINE

## 2021-12-19 PROCEDURE — 82746 ASSAY OF FOLIC ACID SERUM: CPT | Performed by: HOSPITALIST

## 2021-12-19 PROCEDURE — 63710000001 DRONABINOL PER 2.5 MG: Performed by: INTERNAL MEDICINE

## 2021-12-19 PROCEDURE — 70450 CT HEAD/BRAIN W/O DYE: CPT

## 2021-12-19 PROCEDURE — 93005 ELECTROCARDIOGRAM TRACING: CPT | Performed by: EMERGENCY MEDICINE

## 2021-12-19 PROCEDURE — 83540 ASSAY OF IRON: CPT | Performed by: HOSPITALIST

## 2021-12-19 PROCEDURE — 82607 VITAMIN B-12: CPT | Performed by: HOSPITALIST

## 2021-12-19 PROCEDURE — 86901 BLOOD TYPING SEROLOGIC RH(D): CPT | Performed by: EMERGENCY MEDICINE

## 2021-12-19 PROCEDURE — 81001 URINALYSIS AUTO W/SCOPE: CPT | Performed by: EMERGENCY MEDICINE

## 2021-12-19 PROCEDURE — 83550 IRON BINDING TEST: CPT | Performed by: HOSPITALIST

## 2021-12-19 PROCEDURE — 85025 COMPLETE CBC W/AUTO DIFF WBC: CPT | Performed by: EMERGENCY MEDICINE

## 2021-12-19 PROCEDURE — 86900 BLOOD TYPING SEROLOGIC ABO: CPT | Performed by: EMERGENCY MEDICINE

## 2021-12-19 PROCEDURE — P9612 CATHETERIZE FOR URINE SPEC: HCPCS

## 2021-12-19 PROCEDURE — 82077 ASSAY SPEC XCP UR&BREATH IA: CPT | Performed by: EMERGENCY MEDICINE

## 2021-12-19 PROCEDURE — 93010 ELECTROCARDIOGRAM REPORT: CPT | Performed by: INTERNAL MEDICINE

## 2021-12-19 PROCEDURE — 99284 EMERGENCY DEPT VISIT MOD MDM: CPT

## 2021-12-19 PROCEDURE — 99219 PR INITIAL OBSERVATION CARE/DAY 50 MINUTES: CPT | Performed by: INTERNAL MEDICINE

## 2021-12-19 PROCEDURE — G0378 HOSPITAL OBSERVATION PER HR: HCPCS

## 2021-12-19 PROCEDURE — 86920 COMPATIBILITY TEST SPIN: CPT

## 2021-12-19 PROCEDURE — 82728 ASSAY OF FERRITIN: CPT | Performed by: INTERNAL MEDICINE

## 2021-12-19 PROCEDURE — 99285 EMERGENCY DEPT VISIT HI MDM: CPT | Performed by: EMERGENCY MEDICINE

## 2021-12-19 PROCEDURE — 80053 COMPREHEN METABOLIC PANEL: CPT | Performed by: EMERGENCY MEDICINE

## 2021-12-19 PROCEDURE — 85045 AUTOMATED RETICULOCYTE COUNT: CPT | Performed by: INTERNAL MEDICINE

## 2021-12-19 PROCEDURE — 85007 BL SMEAR W/DIFF WBC COUNT: CPT | Performed by: EMERGENCY MEDICINE

## 2021-12-19 PROCEDURE — 84443 ASSAY THYROID STIM HORMONE: CPT | Performed by: EMERGENCY MEDICINE

## 2021-12-19 RX ORDER — LEVOTHYROXINE SODIUM 88 UG/1
88 TABLET ORAL DAILY
Status: DISCONTINUED | OUTPATIENT
Start: 2021-12-20 | End: 2021-12-20 | Stop reason: HOSPADM

## 2021-12-19 RX ORDER — CHOLECALCIFEROL (VITAMIN D3) 125 MCG
5 CAPSULE ORAL NIGHTLY PRN
Status: DISCONTINUED | OUTPATIENT
Start: 2021-12-19 | End: 2021-12-20 | Stop reason: HOSPADM

## 2021-12-19 RX ORDER — ONDANSETRON 4 MG/1
4 TABLET, FILM COATED ORAL EVERY 6 HOURS PRN
Status: DISCONTINUED | OUTPATIENT
Start: 2021-12-19 | End: 2021-12-20 | Stop reason: HOSPADM

## 2021-12-19 RX ORDER — ACETAMINOPHEN 650 MG/1
650 SUPPOSITORY RECTAL EVERY 4 HOURS PRN
Status: DISCONTINUED | OUTPATIENT
Start: 2021-12-19 | End: 2021-12-20 | Stop reason: HOSPADM

## 2021-12-19 RX ORDER — SODIUM CHLORIDE 9 MG/ML
40 INJECTION, SOLUTION INTRAVENOUS AS NEEDED
Status: DISCONTINUED | OUTPATIENT
Start: 2021-12-19 | End: 2021-12-20 | Stop reason: HOSPADM

## 2021-12-19 RX ORDER — ONDANSETRON 2 MG/ML
4 INJECTION INTRAMUSCULAR; INTRAVENOUS EVERY 6 HOURS PRN
Status: DISCONTINUED | OUTPATIENT
Start: 2021-12-19 | End: 2021-12-20 | Stop reason: HOSPADM

## 2021-12-19 RX ORDER — SODIUM CHLORIDE 1000 MG
1 TABLET, SOLUBLE MISCELLANEOUS
Status: DISCONTINUED | OUTPATIENT
Start: 2021-12-20 | End: 2021-12-20 | Stop reason: HOSPADM

## 2021-12-19 RX ORDER — DRONABINOL 2.5 MG/1
2.5 CAPSULE ORAL 2 TIMES DAILY
Status: DISCONTINUED | OUTPATIENT
Start: 2021-12-19 | End: 2021-12-20 | Stop reason: HOSPADM

## 2021-12-19 RX ORDER — SODIUM CHLORIDE 0.9 % (FLUSH) 0.9 %
10 SYRINGE (ML) INJECTION EVERY 12 HOURS SCHEDULED
Status: DISCONTINUED | OUTPATIENT
Start: 2021-12-19 | End: 2021-12-20 | Stop reason: HOSPADM

## 2021-12-19 RX ORDER — SODIUM CHLORIDE 0.9 % (FLUSH) 0.9 %
10 SYRINGE (ML) INJECTION AS NEEDED
Status: DISCONTINUED | OUTPATIENT
Start: 2021-12-19 | End: 2021-12-20 | Stop reason: HOSPADM

## 2021-12-19 RX ORDER — ACETAMINOPHEN 160 MG/5ML
650 SOLUTION ORAL EVERY 4 HOURS PRN
Status: DISCONTINUED | OUTPATIENT
Start: 2021-12-19 | End: 2021-12-20 | Stop reason: HOSPADM

## 2021-12-19 RX ORDER — DULOXETIN HYDROCHLORIDE 60 MG/1
60 CAPSULE, DELAYED RELEASE ORAL DAILY
Status: DISCONTINUED | OUTPATIENT
Start: 2021-12-20 | End: 2021-12-20 | Stop reason: HOSPADM

## 2021-12-19 RX ORDER — MIRTAZAPINE 15 MG/1
15 TABLET, FILM COATED ORAL NIGHTLY
Status: DISCONTINUED | OUTPATIENT
Start: 2021-12-19 | End: 2021-12-20 | Stop reason: HOSPADM

## 2021-12-19 RX ORDER — ACETAMINOPHEN 325 MG/1
650 TABLET ORAL EVERY 4 HOURS PRN
Status: DISCONTINUED | OUTPATIENT
Start: 2021-12-19 | End: 2021-12-20 | Stop reason: HOSPADM

## 2021-12-19 RX ADMIN — SODIUM CHLORIDE, PRESERVATIVE FREE 10 ML: 5 INJECTION INTRAVENOUS at 22:09

## 2021-12-19 RX ADMIN — MIRTAZAPINE 15 MG: 15 TABLET, FILM COATED ORAL at 22:09

## 2021-12-19 RX ADMIN — DRONABINOL 2.5 MG: 2.5 CAPSULE ORAL at 22:08

## 2021-12-19 NOTE — ED PROVIDER NOTES
Subjective   History of Present Illness  History of Present Illness    Chief complaint: Weight loss,  weakness, change in vision    Location: Generalized    Quality/Severity: Difficulty walking without assistance    Timing/Onset/Duration: Gradual onset over the last 6 weeks, change in vision at 1230 today  Modifying Factors: Vision has gotten better    Associated Symptoms: No headache.  No fever chills or cough.  No sore throat earache or nasal congestion.  No chest pain.  Patient has had shortness of breath.  No abdominal pain.  No diarrhea or burning when she urinates.  No nausea or vomiting.  No black or bloody stools    Narrative: This 89-year-old white female presents with vision changes noted around 1230.  The last 6 weeks the patient has had progressive weakness and decreased appetite.  The patient has developed problems with memory.  She has a history of pulmonary embolus, she has a Kittery filter.  The patient has history of thyroid disorder.  The patient had a colonoscopy done at 82 years of age, Dr. Butcher indicated that the patient would not need any more colonoscopies.  Patient takes Marinol for appetite.  The patient has had some depression.    PCP: Dion Ann          Review of Systems   Constitutional: Negative for chills and fever.   HENT: Negative for congestion, ear pain, rhinorrhea and sore throat.    Eyes: Positive for visual disturbance.   Respiratory: Negative for cough and shortness of breath.    Cardiovascular: Negative for chest pain.   Gastrointestinal: Negative for abdominal pain, diarrhea, nausea and vomiting.   Genitourinary: Negative for dysuria.   Musculoskeletal: Negative for back pain and neck pain.   Skin: Negative for rash.   Neurological: Positive for weakness (Generalized). Negative for numbness and headaches.   Psychiatric/Behavioral:        Decreased memory and confusion        Medication List      ASK your doctor about these medications    dronabinol 2.5 MG  capsule  Commonly known as: MARINOL     DULoxetine 60 MG capsule  Commonly known as: CYMBALTA     levothyroxine 88 MCG tablet  Commonly known as: SYNTHROID, LEVOTHROID     mirtazapine 15 MG tablet  Commonly known as: REMERON     sodium chloride 1 g tablet  Take 1 tablet by mouth 3 (Three) Times a Day With Meals.     vitamin D 1.25 MG (71417 UT) capsule capsule  Commonly known as: ERGOCALCIFEROL            Past Medical History:   Diagnosis Date   • Anxiety    • Chronic kidney disease    • Depression     resolved after daughter completed chemotherapy    • Disease of thyroid gland    • Hypertension    • Metabolic encephalopathy    • Pulmonary embolism (HCC)     SPONTANEOUS HEMMORHAGE   • Subdural hematoma (HCC)        No Known Allergies    Past Surgical History:   Procedure Laterality Date   • BREAST BIOPSY      BENIGN   • HARDWARE REMOVAL Left 2019    Procedure: FEMUR HARDWARE REMOVAL;  Surgeon: Dagoberto Owens MD;  Location: [facility] OR;  Service: Orthopedics   • HIP HEMIARTHROPLASTY Right 2020    Procedure: HIP HEMIARTHROPLASTY and all associated procedures;  Surgeon: Dagoberto Owens MD;  Location: [facility] OR;  Service: Orthopedics;  Laterality: Right;   • HYSTERECTOMY     • JOINT REPLACEMENT     • THYROID SURGERY      PARATHYROID REMOVAL   • TOTAL HIP ARTHROPLASTY Left 2019    Procedure: TOTAL HIP ARTHROPLASTY;  Surgeon: Dagoberto Owens MD;  Location: [facility] OR;  Service: Orthopedics   • VENA CAVA FILTER INSERTION         Family History   Problem Relation Age of Onset   • Breast cancer Daughter        Social History     Socioeconomic History   • Marital status:    Tobacco Use   • Smoking status: Former Smoker     Packs/day: 0.50     Years: 25.00     Pack years: 12.50     Quit date:      Years since quittin.9   • Smokeless tobacco: Never Used   Vaping Use   • Vaping Use: Never used   Substance and Sexual Activity   • Alcohol use: Yes     Alcohol/week: 1.0 standard drink      Types: 1 Glasses of wine per week     Comment: RARELY/Caffeine use   • Drug use: No   • Sexual activity: Defer           Objective   Physical Exam  Vitals (The temperature is 97.6, pulse 85, respirations 18, /55..  The room air pulse ox is 98%.) and nursing note reviewed.   Constitutional:       Appearance: She is well-developed.      Comments: Cachectic   HENT:      Head: Normocephalic and atraumatic.   Eyes:      Extraocular Movements: Extraocular movements intact.      Pupils: Pupils are equal, round, and reactive to light.   Cardiovascular:      Rate and Rhythm: Normal rate and regular rhythm.      Heart sounds: Murmur heard.   No friction rub. No gallop.    Pulmonary:      Effort: Pulmonary effort is normal.      Breath sounds: Normal breath sounds.   Abdominal:      General: Bowel sounds are normal. There is no distension.      Palpations: Abdomen is soft. There is no mass.      Tenderness: There is no abdominal tenderness. There is no guarding.   Musculoskeletal:         General: No swelling or tenderness. Normal range of motion.      Cervical back: Normal range of motion and neck supple. No rigidity.   Skin:     General: Skin is warm and dry.   Neurological:      Mental Status: She is alert.      Cranial Nerves: No cranial nerve deficit, dysarthria or facial asymmetry.      Sensory: No sensory deficit.      Motor: Weakness (Generalized) present.      Comments: The patient does not know the year.  She knows where she is, she recognizes her daughter-in-law, and she knows her name.   Psychiatric:         Mood and Affect: Mood normal.         Procedures           ED Course  ED Course as of 12/19/21 2316   Sun Dec 19, 2021   1525 Patient care transferred to me from Dr. Mc Smith. [SS]   1526 Glucose(!): 150 [SS]   1526 BUN(!): 33 [SS]   1526 Creatinine(!): 1.36 [SS]   1526 Troponin T(!!): 0.066 [SS]   1526 CMP shows renal dysfunction with BUN 33 creatinine 1.36.  This is above patient's baseline  slightly.  Glucose 150.  Troponin elevated at 0.066.  EKG does not show any ST elevation or depression.  Patient does not have ACS symptoms. [SS]   1526  above baseline.  Glucose [SS]   1529 Hemoglobin(!!): 5.4 [SS]   1529 Hematocrit(!!): 20.5 [SS]   1529 MCV(!): 64.1 [SS]   1529 Patient is quite anemic with hemoglobin 5.4 and hematocrit 20.5.  Baseline hemoglobin approximately 9.5 and hematocrit approximately 30 patient on last 3 sets of lab work.  Will order 2 units packed RBCs. [SS]   1559 Appearance, UA(!): Slightly Cloudy [SS]   1559 Protein, UA(!): 100 mg/dL (2+) [SS]   1600 Leukocytes, UA(!): Small (1+) [SS]   1600 Urine slightly cloudy with 100 protein and small leukocyte.  No bacteria, 3-5 whites and 0-2 reds.  Not overly convincing evidence of UTI.  Drug screen positive for THC.  Awaiting CT head. [SS]   1634 Type & Screen  Blood bank called.  Patient has an antibody which will make cross making more difficult.  They anticipate the crossmatch will take a total of 4 hours before blood will be available. [SS]   1636 WBC, UA(!): 3-5 [SS]   1709 CT head shows only age-appropriate changes.  Unremarkable. [SS]   1722 On rectal exam brown stool present in the rectal vault.  Hemoccult positive.  Discussed with patient and her daughter all results, diagnoses, indications for admission and blood transfusion.  They knowledge understanding.  Patient has undergone 1 prior transfusion.  Patient had bilateral PEs.  She was anticoagulated and going through physical therapy.  During physical therapy she tore a muscle which resulted in retroperitoneal hematoma.  Patient was transfused 2 to 3 units.  Daughter believes this was in 2005.  No other transfusions reported.    Calling hospitalist for admission. [SS]   9496 Discussed with Dr. RT Bower.  He will admit for further care.  He request to additional units of packed RBCs be ordered.  I have already discussed this with blood bank.  4 units ordered. [SS]      ED Course User  Index  [SS] Adonis Curiel MD      23:16 EST, 12/19/21:  The EKG was obtained at 1445 and read by me at 1447.  The EKG shows normal sinus rhythm with rate of 81.  There is a normal axis with no hypertrophy.  The [place], QRS, and QT intervals are unremarkable.  There is no ectopy.  There is nonspecific T wave abnormalities diffusely.  There is no acute ST elevation or depression.  The EKG today compared to EKG dated 12/23/2020 is unremarkable     23:16 EST, 12/19/21:  The case was discussed with Dr. Curiel, will assume care of the patient.                                      MDM    Final diagnoses:   Symptomatic anemia   Occult GI bleeding       ED Disposition  ED Disposition     ED Disposition Condition Comment    Decision to Admit  Level of Care: Med/Surg [1]   Admitting Physician: KYLEIGH GRAY [[id number]]   Attending Physician: KYLEIGH GRAY [[id number]]   Patient Class: Inpatient [101]            No follow-up provider specified.       Medication List      No changes were made to your prescriptions during this visit.          Boni Smith MD  12/19/21 1523       Boni Smith MD  12/19/21 1540       Adonis Curiel MD  12/19/21 8882

## 2021-12-19 NOTE — ED PROVIDER NOTES
Subjective   History of Present Illness    Review of Systems    Past Medical History:   Diagnosis Date   • Anxiety    • Chronic kidney disease    • Depression     resolved after daughter completed chemotherapy    • Disease of thyroid gland    • Hypertension    • Metabolic encephalopathy    • Pulmonary embolism (HCC)     SPONTANEOUS HEMMORHAGE   • Subdural hematoma (HCC)        No Known Allergies    Past Surgical History:   Procedure Laterality Date   • BREAST BIOPSY      BENIGN   • HARDWARE REMOVAL Left 2019    Procedure: FEMUR HARDWARE REMOVAL;  Surgeon: Dagoberto Owens MD;  Location: [facility] OR;  Service: Orthopedics   • HIP HEMIARTHROPLASTY Right 2020    Procedure: HIP HEMIARTHROPLASTY and all associated procedures;  Surgeon: Dagoberto Owens MD;  Location: [facility] OR;  Service: Orthopedics;  Laterality: Right;   • HYSTERECTOMY     • JOINT REPLACEMENT     • THYROID SURGERY      PARATHYROID REMOVAL   • TOTAL HIP ARTHROPLASTY Left 2019    Procedure: TOTAL HIP ARTHROPLASTY;  Surgeon: Dagoberto Owens MD;  Location: [facility] OR;  Service: Orthopedics   • VENA CAVA FILTER INSERTION         Family History   Problem Relation Age of Onset   • Breast cancer Daughter        Social History     Socioeconomic History   • Marital status:    Tobacco Use   • Smoking status: Former Smoker     Packs/day: 0.50     Years: 25.00     Pack years: 12.50     Quit date:      Years since quittin.9   • Smokeless tobacco: Never Used   Vaping Use   • Vaping Use: Never used   Substance and Sexual Activity   • Alcohol use: Yes     Alcohol/week: 1.0 standard drink     Types: 1 Glasses of wine per week     Comment: RARELY/Caffeine use   • Drug use: No   • Sexual activity: Defer           Objective   Physical Exam  Genitourinary:     Rectum: Guaiac result positive.      Comments: Brown stool in rectal vault.  Hemoccult positive.  Skin:     Coloration: Skin is pale.         Procedures           ED Course  ED  Course as of 12/19/21 1752   Sun Dec 19, 2021   1525 Patient care transferred to me from Dr. Mc Smith. [SS]   1526 Glucose(!): 150 [SS]   1526 BUN(!): 33 [SS]   1526 Creatinine(!): 1.36 [SS]   1526 Troponin T(!!): 0.066 [SS]   1526 CMP shows renal dysfunction with BUN 33 creatinine 1.36.  This is above patient's baseline slightly.  Glucose 150.  Troponin elevated at 0.066.  EKG does not show any ST elevation or depression.  Patient does not have ACS symptoms. [SS]   1526  above baseline.  Glucose [SS]   1529 Hemoglobin(!!): 5.4 [SS]   1529 Hematocrit(!!): 20.5 [SS]   1529 MCV(!): 64.1 [SS]   1529 Patient is quite anemic with hemoglobin 5.4 and hematocrit 20.5.  Baseline hemoglobin approximately 9.5 and hematocrit approximately 30 patient on last 3 sets of lab work.  Will order 2 units packed RBCs. [SS]   1559 Appearance, UA(!): Slightly Cloudy [SS]   1559 Protein, UA(!): 100 mg/dL (2+) [SS]   1600 Leukocytes, UA(!): Small (1+) [SS]   1600 Urine slightly cloudy with 100 protein and small leukocyte.  No bacteria, 3-5 whites and 0-2 reds.  Not overly convincing evidence of UTI.  Drug screen positive for THC.  Awaiting CT head. [SS]   1634 Type & Screen  Blood bank called.  Patient has an antibody which will make cross making more difficult.  They anticipate the crossmatch will take a total of 4 hours before blood will be available. [SS]   1636 WBC, UA(!): 3-5 [SS]   1709 CT head shows only age-appropriate changes.  Unremarkable. [SS]   1722 On rectal exam brown stool present in the rectal vault.  Hemoccult positive.  Discussed with patient and her daughter all results, diagnoses, indications for admission and blood transfusion.  They knowledge understanding.  Patient has undergone 1 prior transfusion.  Patient had bilateral PEs.  She was anticoagulated and going through physical therapy.  During physical therapy she tore a muscle which resulted in retroperitoneal hematoma.  Patient was transfused 2 to 3 units.   Daughter believes this was in 2005.  No other transfusions reported.    Calling hospitalist for admission. [SS]   2022 Discussed with Dr. RT Bower.  He will admit for further care.  He request to additional units of packed RBCs be ordered.  I have already discussed this with blood bank.  4 units ordered. [SS]      ED Course User Index  [SS] Adonis Curiel MD      Labs Reviewed   COMPREHENSIVE METABOLIC PANEL - Abnormal; Notable for the following components:       Result Value    Glucose 150 (*)     BUN 33 (*)     Creatinine 1.36 (*)     CO2 21.9 (*)     Total Protein 5.7 (*)     Albumin 3.30 (*)     eGFR Non  Amer 36 (*)     All other components within normal limits    Narrative:     GFR Normal >60  Chronic Kidney Disease <60  Kidney Failure <15     URINALYSIS W/ MICROSCOPIC IF INDICATED (NO CULTURE) - Abnormal; Notable for the following components:    Appearance, UA Slightly Cloudy (*)     Protein,  mg/dL (2+) (*)     Leuk Esterase, UA Small (1+) (*)     All other components within normal limits   TROPONIN (IN-HOUSE) - Abnormal; Notable for the following components:    Troponin T 0.066 (*)     All other components within normal limits    Narrative:     Troponin T Reference Range:  <= 0.03 ng/mL-   Negative for AMI  >0.03 ng/mL-     Abnormal for myocardial necrosis.  Clinicians would have to utilize clinical acumen, EKG, Troponin and serial changes to determine if it is an Acute Myocardial Infarction or myocardial injury due to an underlying chronic condition.       Results may be falsely decreased if patient taking Biotin.     CBC WITH AUTO DIFFERENTIAL - Abnormal; Notable for the following components:    RBC 3.20 (*)     Hemoglobin 5.4 (*)     Hematocrit 20.5 (*)     MCV 64.1 (*)     MCH 16.9 (*)     MCHC 26.3 (*)     RDW 20.9 (*)     Neutrophil % 78.5 (*)     Lymphocyte % 10.7 (*)     All other components within normal limits   URINE DRUG SCREEN - Abnormal; Notable for the following components:     THC, Screen, Urine Positive (*)     All other components within normal limits    Narrative:     Urine drug screen results are to be used for medical purposes only.  They are not to be used for legal purposes such as employment testing.  Negative results do not necessarily mean the complete absence of a subtance, but rather that the result is less than the cutoff for that substance.  Positive results are unconfirmed and considered Preliminary Positive.  [facility] does not automatically confirm Postitive Unconfirmed results.  The physician may request (order) an Unconfirmed Positive result to be sent out for confirmation.      Negative Thresholds for Drugs Screened:    THC screen, urine                          50 ng/ml  Phenycyclidine (PCP), urine                25 ng/ml  Cocaine screen, urine                     150 ng/ml  Methamphetamine, urine                    500 ng/ml  Opiate screen, urine                      100 ng/ml  Amphetamine screen, urine                 500 ng/ml  Benzodiazepine screen, urine              150 ng/ml  Tricyclic Antidepressants screen, urine   300 ng/ml  Methadone screen, urine                   200 ng/ml  Barbiturates screen, urine                200 ng/ml  Oxycodone screen, urine                   100 ng/ml  Propoxyphene screen, urine                300 ng/ml  Buprenorphine screen, urine                10 ng/ml   URINALYSIS, MICROSCOPIC ONLY - Abnormal; Notable for the following components:    RBC, UA 0-2 (*)     WBC, UA 3-5 (*)     All other components within normal limits   COVID-19,[facility] IN-HOUSE,NAS[facility] SWAB NO TRANSPORT MEDIA 2 HR TAT - Normal    Narrative:     Fact sheet for providers: https://www.fda.gov/media/629461/download     Fact sheet for patients: https://www.fda.gov/media/049779/download    Test performed by PCR.    Consider negative results in combination with clinical observations, patient history, and epidemiological information.   TSH - Normal    ETHANOL   SCAN SLIDE   VITAMIN B12 AND FOLATE   IRON PROFILE   TYPE AND SCREEN   PREPARE RBC   PREPARE RBC   CBC AND DIFFERENTIAL    Narrative:     The following orders were created for panel order CBC & Differential.  Procedure                               Abnormality         Status                     ---------                               -----------         ------                     CBC Auto Differential[[id number]]        Abnormal            Final result               Scan Slide[[id number]]                                       Final result                 Please view results for these tests on the individual orders.     CT Head Without Contrast    Result Date: 12/19/2021  Narrative: CT Head WO HISTORY: Altered mental status with worsening confusion over the past 3 weeks. Low hemoglobin. TECHNIQUE: Axial unenhanced head CT. Radiation dose reduction techniques included automated exposure control or exposure modulation based on body size. Count of known CT and cardiac nuc med studies performed in previous 12 months: 0. Time of scan: 1620 hours COMPARISON: Noncontrast head CT 9/4/2018 FINDINGS: No intracranial hemorrhage, mass, or infarct. No hydrocephalus or extra-axial fluid collection. There are senescent changes, including volume loss and nonspecific white matter change, but no acute abnormality is seen. The skull base, calvarium, and extracranial soft tissues are normal.     Impression: Senescent changes without acute abnormality. No significant change from September 2018. Signer Name: DENNIS Bradshaw MD  Signed: 12/19/2021 4:54 PM  Workstation Name: [email or web address]  [facility]         My differential diagnosis for altered mental status includes but is not limited to:  Hypoglycemia, hyperglycemia, DKA, overdose, ethanol intoxication, thiamine deficiency, niacin deficiency, hypothymia, hyperviscosity, Dirk’s disease, hyponatremia, hypernatremia, liver failure, kidney failure, hyper  or hypothyroid, no insufficiency, hypoxia, hypercarbia, carbon monoxide poisoning, postanoxic encephalopathy, ischemic stroke, intracranial bleed, subarachnoid hemorrhage, brain tumor, closed head injury, epidural hematoma, epidural hematoma, seizure activity, postictal state, syncopal episode, disseminated encephalomyelitis, central pontine myelinolysis, post cardiac arrest, bacterial meningitis, viral meningitis, fungal meningitis, encephalitis, brain abscess, subdural empyema, hysteria, catatonic state, malingering, hypertensive encephalopathy, vasculitis, TTP, DIC                                        MDM    Final diagnoses:   Symptomatic anemia   Occult GI bleeding       ED Disposition  ED Disposition     ED Disposition Condition Comment    Decision to Admit  Level of Care: Med/Surg [1]   Admitting Physician: KYLEIGH GRAY [[id number]]   Attending Physician: KYLEIGH GRAY [[id number]]   Patient Class: Inpatient [101]            No follow-up provider specified.       Medication List      No changes were made to your prescriptions during this visit.          Adonis Curile MD  12/19/21 5617

## 2021-12-20 VITALS
HEART RATE: 80 BPM | TEMPERATURE: 97.4 F | SYSTOLIC BLOOD PRESSURE: 154 MMHG | BODY MASS INDEX: 19.31 KG/M2 | OXYGEN SATURATION: 96 % | DIASTOLIC BLOOD PRESSURE: 71 MMHG | WEIGHT: 92 LBS | RESPIRATION RATE: 16 BRPM | HEIGHT: 58 IN

## 2021-12-20 PROBLEM — D64.9 SYMPTOMATIC ANEMIA: Status: RESOLVED | Noted: 2021-12-20 | Resolved: 2021-12-20

## 2021-12-20 PROBLEM — E87.1 HYPONATREMIA: Status: RESOLVED | Noted: 2018-04-18 | Resolved: 2021-12-20

## 2021-12-20 PROBLEM — D64.9 SYMPTOMATIC ANEMIA: Status: ACTIVE | Noted: 2021-12-20

## 2021-12-20 PROBLEM — D64.9 SYMPTOMATIC ANEMIA: Status: RESOLVED | Noted: 2021-12-19 | Resolved: 2021-12-20

## 2021-12-20 LAB
BASOPHILS # BLD AUTO: 0.04 10*3/MM3 (ref 0–0.2)
BASOPHILS NFR BLD AUTO: 0.6 % (ref 0–1.5)
DEPRECATED RDW RBC AUTO: 75.5 FL (ref 37–54)
EOSINOPHIL # BLD AUTO: 0.13 10*3/MM3 (ref 0–0.4)
EOSINOPHIL NFR BLD AUTO: 2 % (ref 0.3–6.2)
ERYTHROCYTE [DISTWIDTH] IN BLOOD BY AUTOMATED COUNT: 26.9 % (ref 12.3–15.4)
FOLATE SERPL-MCNC: 17.2 NG/ML (ref 4.78–24.2)
HCT VFR BLD AUTO: 37.4 % (ref 34–46.6)
HGB BLD-MCNC: 10.8 G/DL (ref 12–15.9)
IMM GRANULOCYTES # BLD AUTO: 0.02 10*3/MM3 (ref 0–0.05)
IMM GRANULOCYTES NFR BLD AUTO: 0.3 % (ref 0–0.5)
LYMPHOCYTES # BLD AUTO: 0.95 10*3/MM3 (ref 0.7–3.1)
LYMPHOCYTES NFR BLD AUTO: 14.6 % (ref 19.6–45.3)
MCH RBC QN AUTO: 22.6 PG (ref 26.6–33)
MCHC RBC AUTO-ENTMCNC: 28.9 G/DL (ref 31.5–35.7)
MCV RBC AUTO: 78.2 FL (ref 79–97)
MONOCYTES # BLD AUTO: 0.83 10*3/MM3 (ref 0.1–0.9)
MONOCYTES NFR BLD AUTO: 12.7 % (ref 5–12)
NEUTROPHILS NFR BLD AUTO: 4.55 10*3/MM3 (ref 1.7–7)
NEUTROPHILS NFR BLD AUTO: 69.8 % (ref 42.7–76)
NRBC BLD AUTO-RTO: 0 /100 WBC (ref 0–0.2)
PLATELET # BLD AUTO: 265 10*3/MM3 (ref 140–450)
PMV BLD AUTO: 9.2 FL (ref 6–12)
RBC # BLD AUTO: 4.78 10*6/MM3 (ref 3.77–5.28)
VIT B12 BLD-MCNC: 393 PG/ML (ref 211–946)
WBC NRBC COR # BLD: 6.52 10*3/MM3 (ref 3.4–10.8)

## 2021-12-20 PROCEDURE — 25010000002 IRON SUCROSE PER 1 MG: Performed by: INTERNAL MEDICINE

## 2021-12-20 PROCEDURE — P9016 RBC LEUKOCYTES REDUCED: HCPCS

## 2021-12-20 PROCEDURE — 86900 BLOOD TYPING SEROLOGIC ABO: CPT

## 2021-12-20 PROCEDURE — G0378 HOSPITAL OBSERVATION PER HR: HCPCS

## 2021-12-20 PROCEDURE — 86902 BLOOD TYPE ANTIGEN DONOR EA: CPT

## 2021-12-20 PROCEDURE — 36430 TRANSFUSION BLD/BLD COMPNT: CPT

## 2021-12-20 PROCEDURE — 99217 PR OBSERVATION CARE DISCHARGE MANAGEMENT: CPT | Performed by: INTERNAL MEDICINE

## 2021-12-20 PROCEDURE — 63710000001 DRONABINOL PER 2.5 MG: Performed by: INTERNAL MEDICINE

## 2021-12-20 PROCEDURE — 85025 COMPLETE CBC W/AUTO DIFF WBC: CPT | Performed by: INTERNAL MEDICINE

## 2021-12-20 RX ORDER — DOXYCYCLINE HYCLATE 50 MG/1
324 CAPSULE, GELATIN COATED ORAL
Qty: 30 TABLET | Refills: 0 | Status: SHIPPED | OUTPATIENT
Start: 2021-12-20

## 2021-12-20 RX ADMIN — DRONABINOL 2.5 MG: 2.5 CAPSULE ORAL at 08:23

## 2021-12-20 RX ADMIN — SODIUM CHLORIDE 40 ML: 9 INJECTION, SOLUTION INTRAVENOUS at 12:06

## 2021-12-20 RX ADMIN — SODIUM CHLORIDE TAB 1 GM 1 G: 1 TAB at 08:23

## 2021-12-20 RX ADMIN — SODIUM CHLORIDE 40 ML: 9 INJECTION, SOLUTION INTRAVENOUS at 04:05

## 2021-12-20 RX ADMIN — SODIUM CHLORIDE 40 ML: 9 INJECTION, SOLUTION INTRAVENOUS at 08:22

## 2021-12-20 RX ADMIN — SODIUM CHLORIDE TAB 1 GM 1 G: 1 TAB at 11:44

## 2021-12-20 RX ADMIN — IRON SUCROSE 200 MG: 20 INJECTION, SOLUTION INTRAVENOUS at 14:53

## 2021-12-20 RX ADMIN — SODIUM CHLORIDE, PRESERVATIVE FREE 10 ML: 5 INJECTION INTRAVENOUS at 08:23

## 2021-12-20 RX ADMIN — LEVOTHYROXINE SODIUM 88 MCG: 88 TABLET ORAL at 08:23

## 2021-12-20 RX ADMIN — DULOXETINE HYDROCHLORIDE 60 MG: 60 CAPSULE, DELAYED RELEASE ORAL at 08:23

## 2021-12-20 NOTE — SIGNIFICANT NOTE
12/20/21 2181   OTHER   Discipline occupational therapist   Rehab Time/Intention   Session Not Performed patient unavailable for evaluation  (pt receiving blood transfusion (4 units today). Will follow up on 12-21-21)

## 2021-12-20 NOTE — H&P
[facility] HOSPITALIST     Dion Ann MD    CHIEF COMPLAINT:     Weakness, confusion    HISTORY OF PRESENT ILLNESS:    Patient is a 89 y/o female with a PMH of Hypothyroidism, Anxiety, Depression, and hx of PE. She presented today after her family went to visit her this afternoon and she was having blurry vision and difficulty seeing.Patient's family at bedside reports over the past 6 weeks or so she has been more lethargic, sleeping later, increased confusion, more short of breath and tires easily, which is a change. They had called her PCP to move up her appointment, but with the vision changes today they decided to come to the ER. Patient was found to be anemic with a hgb of 5.4  Patient reports daily bowel movements and denies any recent changes. She denies any melena or hematochezia. She does not take any blood thinners and denies using NSAIDS.  Patient reports her vision is now back to normal.      Past Medical History:   Diagnosis Date   • Anxiety    • Chronic kidney disease    • Depression     resolved after daughter completed chemotherapy    • Disease of thyroid gland    • Hypertension    • Metabolic encephalopathy    • Pulmonary embolism (HCC)     SPONTANEOUS HEMMORHAGE   • Subdural hematoma (HCC)      Past Surgical History:   Procedure Laterality Date   • BREAST BIOPSY      BENIGN   • HARDWARE REMOVAL Left 11/11/2019    Procedure: FEMUR HARDWARE REMOVAL;  Surgeon: Dagoberto Owens MD;  Location: [facility] OR;  Service: Orthopedics   • HIP HEMIARTHROPLASTY Right 12/24/2020    Procedure: HIP HEMIARTHROPLASTY and all associated procedures;  Surgeon: Dagoberto Owens MD;  Location: [facility] OR;  Service: Orthopedics;  Laterality: Right;   • HYSTERECTOMY     • JOINT REPLACEMENT     • THYROID SURGERY      PARATHYROID REMOVAL   • TOTAL HIP ARTHROPLASTY Left 11/11/2019    Procedure: TOTAL HIP ARTHROPLASTY;  Surgeon: Dagoberto Owens MD;  Location: [facility] OR;  Service: Orthopedics   •  VENA CAVA FILTER INSERTION       Family History   Problem Relation Age of Onset   • Breast cancer Daughter      Social History     Tobacco Use   • Smoking status: Former Smoker     Packs/day: 0.50     Years: 25.00     Pack years: 12.50     Quit date:      Years since quittin.9   • Smokeless tobacco: Never Used   Vaping Use   • Vaping Use: Never used   Substance Use Topics   • Alcohol use: Yes     Alcohol/week: 1.0 standard drink     Types: 1 Glasses of wine per week     Comment: RARELY/Caffeine use   • Drug use: No     Medications Prior to Admission   Medication Sig Dispense Refill Last Dose   • dronabinol (MARINOL) 2.5 MG capsule Take 2.5 mg by mouth 2 (Two) Times a Day.   2021 at Unknown time   • DULoxetine (CYMBALTA) 60 MG capsule Take 60 mg by mouth Daily.   2021 at Unknown time   • levothyroxine (SYNTHROID, LEVOTHROID) 88 MCG tablet Take 88 mcg by mouth Daily.   2021 at Unknown time   • mirtazapine (REMERON) 15 MG tablet Take 15 mg by mouth Every Night.   2021 at Unknown time   • sodium chloride 1 g tablet Take 1 tablet by mouth 3 (Three) Times a Day With Meals. 90 tablet 1 2021 at Unknown time   • vitamin D (ERGOCALCIFEROL) 1.25 MG (87016 UT) capsule capsule Take 50,000 Units by mouth 1 (One) Time Per Week.   2021 at Unknown time     Allergies:  Patient has no known allergies.    Immunization History   Administered Date(s) Administered   • COVID-19 (MODERNA) 1st, 2nd, 3rd Dose Only 10/07/2021, 2021   • Flu Vaccine Quad PF >18YRS 2020   • FluLaval/Fluarix/Fluzone >6 2020   • Influenza, Unspecified 2019   • PPD Test 2019   • Pneumococcal Polysaccharide (PPSV23) 2019   • flucelvax quad pfs =>4 YRS 2019           REVIEW OF SYSTEMS:    Please see the above history of present illness for pertinent positives and negatives.  The remainder of the patient's systems have been reviewed and are negative.     Vital Signs  Temp:  [97.3 °F  "(36.3 °C)-97.6 °F (36.4 °C)] 97.3 °F (36.3 °C)  Heart Rate:  [79-89] 86  Resp:  [18] 18  BP: (113-152)/(48-69) 140/69    Flowsheet Rows      First Filed Value   Admission Height 147.3 cm (58\") Documented at 12/19/2021 1412   Admission Weight 39 kg (86 lb) Documented at 12/19/2021 1412             Physical Exam:    Physical Exam  Vitals reviewed.   Constitutional:       General: She is not in acute distress.     Appearance: She is normal weight.   HENT:      Head: Normocephalic and atraumatic.      Mouth/Throat:      Mouth: Mucous membranes are moist.   Eyes:      General: No scleral icterus.     Pupils: Pupils are equal, round, and reactive to light.   Cardiovascular:      Rate and Rhythm: Normal rate and regular rhythm.      Heart sounds: No murmur heard.      Pulmonary:      Effort: Pulmonary effort is normal. No respiratory distress.      Breath sounds: No wheezing or rales.   Abdominal:      General: Bowel sounds are normal. There is no distension.      Palpations: Abdomen is soft.      Tenderness: There is no abdominal tenderness.   Musculoskeletal:      Right lower leg: No edema.      Left lower leg: No edema.   Skin:     General: Skin is warm and dry.      Findings: No rash.   Neurological:      General: No focal deficit present.      Mental Status: She is alert and oriented to person, place, and time. Mental status is at baseline.   Psychiatric:         Mood and Affect: Mood normal.         Behavior: Behavior normal.               Results Review:    I reviewed the patient's new clinical results.  Lab Results (most recent)     Procedure Component Value Units Date/Time    Ferritin [[id number]] Collected: 12/19/21 1445    Specimen: Blood Updated: 12/19/21 2000    Reticulocytes [[id number]] Collected: 12/19/21 1516    Specimen: Blood Updated: 12/19/21 2000    Vitamin B12 & Folate [[id number]] Collected: 12/19/21 1445    Specimen: Blood Updated: 12/19/21 1830    Iron Profile [[id number]]  (Abnormal) Collected: " 12/19/21 1445    Specimen: Blood Updated: 12/19/21 1825     Iron 11 mcg/dL      Iron Saturation 3 %      UIBC 332 mcg/dL      TIBC 343 mcg/dL     COVID-19,[facility] IN-HOUSE,Nasal Swab No Transport Media 3-4 HR TAT - Swab, Nasal Cavity [[id number]]  (Normal) Collected: 12/19/21 1503    Specimen: Swab from Nasal Cavity Updated: 12/19/21 1551     COVID19 Not Detected    Narrative:      Fact sheet for providers: https://www.fda.gov/media/711937/download     Fact sheet for patients: https://www.fda.gov/media/819288/download    Test performed by PCR.    Consider negative results in combination with clinical observations, patient history, and epidemiological information.    CBC & Differential [[id number]]  (Abnormal) Collected: 12/19/21 1516    Specimen: Blood Updated: 12/19/21 1538    Narrative:      The following orders were created for panel order CBC & Differential.  Procedure                               Abnormality         Status                     ---------                               -----------         ------                     CBC Auto Differential[[id number]]        Abnormal            Final result               Scan Slide[[id number]]                                       Final result                 Please view results for these tests on the individual orders.    Scan Slide [[id number]] Collected: 12/19/21 1516    Specimen: Blood Updated: 12/19/21 1538     Microcytes Mod/2+     WBC Morphology Normal     Platelet Estimate Adequate    Urinalysis, Microscopic Only - Urine, Catheter [[id number]]  (Abnormal) Collected: 12/19/21 1502    Specimen: Urine, Catheter Updated: 12/19/21 1537     RBC, UA 0-2 /HPF      WBC, UA 3-5 /HPF      Bacteria, UA None Seen /HPF      Squamous Epithelial Cells, UA None Seen /HPF      Hyaline Casts, UA None Seen /LPF      Methodology Manual Light Microscopy    Urine Drug Screen - [[id number]]  (Abnormal) Collected: 12/19/21 1502    Specimen: Urine Updated: 12/19/21 1536     THC, Screen,  Urine Positive     Phencyclidine (PCP), Urine Negative     Cocaine Screen, Urine Negative     Methamphetamine, Ur Negative     Opiate Screen Negative     Amphetamine Screen, Urine Negative     Benzodiazepine Screen, Urine Negative     Tricyclic Antidepressants Screen Negative     Methadone Screen, Urine Negative     Barbiturates Screen, Urine Negative     Oxycodone Screen, Urine Negative     Propoxyphene Screen Negative     Buprenorphine, Screen, Urine Negative    Narrative:      Urine drug screen results are to be used for medical purposes only.  They are not to be used for legal purposes such as employment testing.  Negative results do not necessarily mean the complete absence of a subtance, but rather that the result is less than the cutoff for that substance.  Positive results are unconfirmed and considered Preliminary Positive.  [facility] does not automatically confirm Postitive Unconfirmed results.  The physician may request (order) an Unconfirmed Positive result to be sent out for confirmation.      Negative Thresholds for Drugs Screened:    THC screen, urine                          50 ng/ml  Phenycyclidine (PCP), urine                25 ng/ml  Cocaine screen, urine                     150 ng/ml  Methamphetamine, urine                    500 ng/ml  Opiate screen, urine                      100 ng/ml  Amphetamine screen, urine                 500 ng/ml  Benzodiazepine screen, urine              150 ng/ml  Tricyclic Antidepressants screen, urine   300 ng/ml  Methadone screen, urine                   200 ng/ml  Barbiturates screen, urine                200 ng/ml  Oxycodone screen, urine                   100 ng/ml  Propoxyphene screen, urine                300 ng/ml  Buprenorphine screen, urine                10 ng/ml    Urinalysis With Microscopic If Indicated (No Culture) - Urine, Catheter [[id number]]  (Abnormal) Collected: 12/19/21 1502    Specimen: Urine, Catheter Updated: 12/19/21 6354      Color, UA Yellow     Appearance, UA Slightly Cloudy     pH, UA 6.0     Specific Gravity, UA 1.020     Glucose, UA Negative     Ketones, UA Negative     Bilirubin, UA Negative     Blood, UA Negative     Protein,  mg/dL (2+)     Leuk Esterase, UA Small (1+)     Nitrite, UA Negative     Urobilinogen, UA 0.2 E.U./dL    CBC Auto Differential [[id number]]  (Abnormal) Collected: 12/19/21 1516    Specimen: Blood Updated: 12/19/21 1528     WBC 7.47 10*3/mm3      RBC 3.20 10*6/mm3      Hemoglobin 5.4 g/dL      Hematocrit 20.5 %      MCV 64.1 fL      MCH 16.9 pg      MCHC 26.3 g/dL      RDW 20.9 %      RDW-SD 48.2 fl      MPV 8.8 fL      Platelets 365 10*3/mm3      Neutrophil % 78.5 %      Lymphocyte % 10.7 %      Monocyte % 8.7 %      Eosinophil % 0.9 %      Basophil % 0.7 %      Immature Grans % 0.5 %      Neutrophils, Absolute 5.86 10*3/mm3      Lymphocytes, Absolute 0.80 10*3/mm3      Monocytes, Absolute 0.65 10*3/mm3      Eosinophils, Absolute 0.07 10*3/mm3      Basophils, Absolute 0.05 10*3/mm3      Immature Grans, Absolute 0.04 10*3/mm3     Troponin [[id number]]  (Abnormal) Collected: 12/19/21 1445    Specimen: Blood Updated: 12/19/21 1523     Troponin T 0.066 ng/mL     Narrative:      Troponin T Reference Range:  <= 0.03 ng/mL-   Negative for AMI  >0.03 ng/mL-     Abnormal for myocardial necrosis.  Clinicians would have to utilize clinical acumen, EKG, Troponin and serial changes to determine if it is an Acute Myocardial Infarction or myocardial injury due to an underlying chronic condition.       Results may be falsely decreased if patient taking Biotin.      TSH [[id number]]  (Normal) Collected: 12/19/21 1445    Specimen: Blood Updated: 12/19/21 1519     TSH 0.671 uIU/mL     Comprehensive Metabolic Panel [[id number]]  (Abnormal) Collected: 12/19/21 1445    Specimen: Blood Updated: 12/19/21 1509     Glucose 150 mg/dL      BUN 33 mg/dL      Creatinine 1.36 mg/dL      Sodium 137 mmol/L      Potassium 4.8 mmol/L       Comment: Slight hemolysis detected by analyzer. Results may be affected.        Chloride 102 mmol/L      CO2 21.9 mmol/L      Calcium 8.7 mg/dL      Total Protein 5.7 g/dL      Albumin 3.30 g/dL      ALT (SGPT) 10 U/L      AST (SGOT) 29 U/L      Comment: Slight hemolysis detected by analyzer. Results may be affected.        Alkaline Phosphatase 65 U/L      Total Bilirubin 0.2 mg/dL      eGFR Non African Amer 36 mL/min/1.73      Globulin 2.4 gm/dL      A/G Ratio 1.4 g/dL      BUN/Creatinine Ratio 24.3     Anion Gap 13.1 mmol/L     Narrative:      GFR Normal >60  Chronic Kidney Disease <60  Kidney Failure <15      Ethanol [[id number]] Collected: 12/19/21 1445    Specimen: Blood Updated: 12/19/21 1508     Ethanol <10 mg/dL      Ethanol % <0.010 %           Imaging Results (Most Recent)     Procedure Component Value Units Date/Time    CT Head Without Contrast [[id number]] Collected: 12/19/21 1654     Updated: 12/19/21 1656    Narrative:      CT Head WO    HISTORY:   Altered mental status with worsening confusion over the past 3 weeks. Low hemoglobin.    TECHNIQUE:   Axial unenhanced head CT. Radiation dose reduction techniques included automated exposure control or exposure modulation based on body size. Count of known CT and cardiac nuc med studies performed in previous 12 months: 0.     Time of scan: 1620 hours    COMPARISON:   Noncontrast head CT 9/4/2018    FINDINGS:   No intracranial hemorrhage, mass, or infarct. No hydrocephalus or extra-axial fluid collection. There are senescent changes, including volume loss and nonspecific white matter change, but no acute abnormality is seen. The skull base, calvarium, and  extracranial soft tissues are normal.      Impression:      Senescent changes without acute abnormality. No significant change from September 2018.          Signer Name: DENNIS Bradshaw MD   Signed: 12/19/2021 4:54 PM   Workstation Name: [facility]    [facility]         reviewed    ECG/EMG Results (most recent)     Procedure Component Value Units Date/Time    ECG 12 Lead [[id number]] Collected: 12/19/21 1445     Updated: 12/19/21 1823     QT Interval 384 ms     Narrative:      HEART RATE= 81  bpm  RR Interval= 732  ms  LA Interval=   ms  P Horizontal Axis=   deg  P Front Axis=   deg  QRSD Interval= 86  ms  QT Interval= 384  ms  QRS Axis= 10  deg  T Wave Axis= -31  deg  - ABNORMAL ECG -  Sinus rhythm  Nonspecific T abnormalities, diffuse leads  NO SIGNIFICANT CHANGE FROM PREVIOUS ECG  Electronically Signed By: Emir Grant ([facility]) 19-Dec-2021 18:23:06  Date and Time of Study: 2021-12-19 14:45:03        reviewed    Assessment/Plan   Active Hospital Problems    Diagnosis  POA   • **Symptomatic anemia [D64.9]  Yes   • Mild depression (HCC) [F32.0]  Yes   • Hyponatremia [E87.1]  Yes      Resolved Hospital Problems   No resolved problems to display.       Symptomatic Microcytic Anemia   - check anemia panel  - given patient's antibodies, 4 units of blood have been ordered and placed on hold     Visual disturbance- resolved  - CT head negative for acute  changes    Hypothyroidism  - TSH wnl  - continue levothyroxine    Anxiety/Depression/Anorexia  - continue duloxetine, dronabinol, mirtazapine    Chronic Hyponatremia  - continue sodium tablets  - sodium wnl     DVT Prophylaxis  - SCDs        I discussed the patient's findings and my recommendations with patient and her family at bedside.       This patient has been examined wearing appropriate Personal Protective Equipment  12/19/21      Holli Ayala DO  12/19/21  20:26 EST

## 2021-12-20 NOTE — DISCHARGE SUMMARY
Date of Admission: 12/19/2021    Date of Discharge:  12/20/2021    Length of stay:  LOS: 1 day     Presenting Problem:   Occult GI bleeding [R19.5]  Symptomatic anemia [D64.9]      Active Diagnosis During Hospital Stay/Discharge Diagnoses/Course by Diagnoses:    Symptomatic Microcytic Anemia   -Likely related to iron deficiency iron deficiency noted on labs  -Given Venofer while here  -Status post 3 units PRBCs  -Hemoglobin improved 10.4  -Recommend CBC 1 week on primary follow-up, discussed with family about considering colonoscopy and they plan to defer at this time and will consider as an outpatient if counts drop again     Visual disturbance- resolved  - CT head negative for acute  changes     Hypothyroidism  - TSH wnl  - continue levothyroxine     Anxiety/Depression/Anorexia  - continue duloxetine, dronabinol, mirtazapine     Chronic Hyponatremia  - continue sodium tablets  - sodium normal       Active Hospital Problems    Diagnosis  POA   • Mild depression (HCC) [F32.0]  Yes      Resolved Hospital Problems    Diagnosis Date Resolved POA   • **Symptomatic anemia [D64.9] 12/20/2021 Yes   • Hyponatremia [E87.1] 12/20/2021 Yes         Hospital Course  Patient is a 89 y.o. female presented with generalized weakness and was found to be severely anemic.  She was admitted for blood transfusion after blood transfusion the patient felt improved and her hemoglobin improved.  He was also given iron infusion.  Since blood counts improved and she felt more back to her baseline she was stable to discharge home.      Procedures Performed: As noted         Consults:   Consults     No orders found for last 30 day(s).          Pertinent Test Results:     Results from last 7 days   Lab Units 12/20/21  1605 12/19/21  1516   WBC 10*3/mm3 6.52 7.47   HEMOGLOBIN g/dL 10.8* 5.4*   HEMATOCRIT % 37.4 20.5*   PLATELETS 10*3/mm3 265 365     Results from last 7 days   Lab Units 12/19/21  1445   SODIUM mmol/L 137   POTASSIUM mmol/L 4.8    CHLORIDE mmol/L 102   CO2 mmol/L 21.9*   BUN mg/dL 33*   CREATININE mg/dL 1.36*   CALCIUM mg/dL 8.7   BILIRUBIN mg/dL 0.2   ALK PHOS U/L 65   ALT (SGPT) U/L 10   AST (SGOT) U/L 29   GLUCOSE mg/dL 150*       Microbiology Results (last 10 days)     Procedure Component Value - Date/Time    COVID-19,[facility] IN-HOUSE,Nasal Swab No Transport Media 3-4 HR TAT - Swab, Nasal Cavity [[id number]]  (Normal) Collected: 12/19/21 1503    Lab Status: Final result Specimen: Swab from Nasal Cavity Updated: 12/19/21 1551     COVID19 Not Detected    Narrative:      Fact sheet for providers: https://www.fda.gov/media/294653/download     Fact sheet for patients: https://www.fda.gov/media/859162/download    Test performed by PCR.    Consider negative results in combination with clinical observations, patient history, and epidemiological information.          Results for orders placed during the hospital encounter of 04/18/18    Adult Transthoracic Echo Complete W/ Cont if Necessary Per Protocol    Interpretation Summary  · Left ventricular systolic function is hyperdynamic (EF > 70).  · There is mild calcification of the aortic valve.      Imaging Results (All)     Procedure Component Value Units Date/Time    CT Head Without Contrast [[id number]] Collected: 12/19/21 1654     Updated: 12/19/21 1656    Narrative:      CT Head WO    HISTORY:   Altered mental status with worsening confusion over the past 3 weeks. Low hemoglobin.    TECHNIQUE:   Axial unenhanced head CT. Radiation dose reduction techniques included automated exposure control or exposure modulation based on body size. Count of known CT and cardiac nuc med studies performed in previous 12 months: 0.     Time of scan: 1620 hours    COMPARISON:   Noncontrast head CT 9/4/2018    FINDINGS:   No intracranial hemorrhage, mass, or infarct. No hydrocephalus or extra-axial fluid collection. There are senescent changes, including volume loss and nonspecific white matter change, but no  acute abnormality is seen. The skull base, calvarium, and  extracranial soft tissues are normal.      Impression:      Senescent changes without acute abnormality. No significant change from September 2018.          Signer Name: DENNIS Bradshaw MD   Signed: 12/19/2021 4:54 PM   Workstation Name: [email or web address]    [facility]            Condition on Discharge: Stable    Vital Signs  Temp:  [97 °F (36.1 °C)-98 °F (36.7 °C)] 97.4 °F (36.3 °C)  Heart Rate:  [79-89] 80  Resp:  [8-18] 16  BP: (121-160)/(56-77) 154/71    Physical Exam:  Physical Exam  Vitals reviewed.   HENT:      Head: Normocephalic.   Cardiovascular:      Rate and Rhythm: Normal rate.   Neurological:      Mental Status: She is alert and oriented to person, place, and time.   Psychiatric:         Mood and Affect: Mood normal.         Discharge Disposition  Home or Self Care    Discharge Medications     Discharge Medications      New Medications      Instructions Start Date   ferrous gluconate 324 MG tablet  Commonly known as: FERGON   324 mg, Oral, Daily With Breakfast         Continue These Medications      Instructions Start Date   dronabinol 2.5 MG capsule  Commonly known as: MARINOL   2.5 mg, Oral, 2 Times Daily      DULoxetine 60 MG capsule  Commonly known as: CYMBALTA   60 mg, Oral, Daily      levothyroxine 88 MCG tablet  Commonly known as: SYNTHROID, LEVOTHROID   88 mcg, Oral, Daily      mirtazapine 15 MG tablet  Commonly known as: REMERON   15 mg, Oral, Nightly      sodium chloride 1 g tablet   1 g, Oral, 3 Times Daily With Meals      vitamin D 1.25 MG (24788 UT) capsule capsule  Commonly known as: ERGOCALCIFEROL   50,000 Units, Oral, Weekly             Discharge Diet:   Diet Instructions     Diet: Regular      Discharge Diet: Regular          Activity at Discharge:   Activity Instructions     Gradually Increase Activity Until at Pre-Hospitalization Level            Follow-up Appointments  Future Appointments   Date Time  Provider Department Center   1/6/2022  1:30 PM Aysha Schreiber, APRN [facility]     Additional Instructions for the Follow-ups that You Need to Schedule     Discharge Follow-up with PCP   As directed       Currently Documented PCP:    Dion Ann MD    PCP Phone Number:    [phone]     Follow Up Details: one week and will need CBC               Test Results Pending at Discharge       Risk for Readmission (LACE) Score: 6 (12/20/2021  6:01 AM)      This patient has been examined wearing appropriate Personal Protective Equipment . 12/20/21        Electronically signed by Hubert Wong DO, 12/20/21, 16:42 EST.

## 2021-12-20 NOTE — CASE MANAGEMENT/SOCIAL WORK
Discharge Planning Assessment   Stormy Rivera     Patient Name: Nicolasa Woodall  MRN: [id number]  Today's Date: 12/20/2021    Admit Date: 12/19/2021     Discharge Needs Assessment     Row Name 12/20/21 150       Living Environment    Primary Care Provided by self    Provides Primary Care For no one    Family Caregiver if Needed child(hebert), adult    Quality of Family Relationships involved; supportive    Able to Return to Prior Arrangements yes       Resource/Environmental Concerns    Resource/Environmental Concerns none    Transportation Concerns car, none       Transition Planning    Patient/Family Anticipates Transition to home    Patient/Family Anticipated Services at Transition none    Transportation Anticipated family or friend will provide       Discharge Needs Assessment    Readmission Within the Last 30 Days no previous admission in last 30 days    Equipment Currently Used at Home rollator    Concerns to be Addressed no discharge needs identified    Equipment Needed After Discharge none    Provided Post Acute Provider List? N/A    Provided Post Acute Provider Quality & Resource List? N/A               Discharge Plan     Row Name 12/20/21 1503       Plan    Plan plan home- lives alone    Provided Post Acute Provider List? N/A    Provided Post Acute Provider Quality & Resource List? N/A    Patient/Family in Agreement with Plan yes    Plan Comments Spoke with patient at bedside, face sheet verified - street address is [address] [place] [address] [place], [place]. She lives alone in a home and is independent of ADLs. She does not drive, her son and daughter live [place] and provide transportation/assistance as needed. She uses a rollator and denies additional DME. She has used HH in the past, but cannot recall the agency. She has been to [facility] for STR in the past. She has a living will on file. She uses [facility] pharmacy and denies issues obtaining medication. CM # placed on white board , azul faustin for PR  needs.              Continued Care and Services - Admitted Since 12/19/2021    Coordination has not been started for this encounter.          Demographic Summary     Row Name 12/20/21 9370       General Information    Admission Type inpatient    Arrived From home    Referral Source admission list    Reason for Consult discharge planning    Preferred Language English     Used During This Interaction no       Contact Information    Permission Granted to Share Info With                Functional Status    No documentation.                Psychosocial    No documentation.                Abuse/Neglect    No documentation.                Legal    No documentation.                Substance Abuse    No documentation.                Patient Forms    No documentation.                   Con Godinez RN

## 2021-12-20 NOTE — SIGNIFICANT NOTE
12/20/21 1350   OTHER   Discipline physical therapist   Rehab Time/Intention   Session Not Performed   (pt continuing to receive blood transfusion, will follow up in AM)

## 2021-12-20 NOTE — NURSING NOTE
Stat H&H ordered per . Lab contacted this nurse and stated CBC had been put on hold this am until after blood products were completed.  contacted and per Dr. Wong okay to draw CBC now and d/c H&H order. Lab made aware.

## 2021-12-20 NOTE — DISCHARGE INSTR - APPOINTMENTS
Patient needs to call and make a hospital follow up with Seth Hyman within 1-2 weeks of discharge. [phone]

## 2021-12-20 NOTE — PLAN OF CARE
Goal Outcome Evaluation:  Plan of Care Reviewed With: patient        Progress: no change  Outcome Summary: A/O and forgetful. She is able to make her needs known. denies pain. Lab notified of blood being ready and first unit of blood initiated at approx. 0400 this am. Up to restroom with walker and one person. Voiding. Pt reports bm 12/18.

## 2021-12-21 ENCOUNTER — READMISSION MANAGEMENT (OUTPATIENT)
Dept: CALL CENTER | Facility: HOSPITAL | Age: 86
End: 2021-12-21

## 2021-12-21 LAB
BH BB BLOOD EXPIRATION DATE: NORMAL
BH BB BLOOD EXPIRATION DATE: NORMAL
BH BB BLOOD TYPE BARCODE: 5100
BH BB BLOOD TYPE BARCODE: NORMAL
BH BB DISPENSE STATUS: NORMAL
BH BB DISPENSE STATUS: NORMAL
BH BB PRODUCT CODE: NORMAL
BH BB PRODUCT CODE: NORMAL
BH BB UNIT NUMBER: NORMAL
BH BB UNIT NUMBER: NORMAL
CROSSMATCH INTERPRETATION: NORMAL
CROSSMATCH INTERPRETATION: NORMAL
UNIT  ABO: NORMAL
UNIT  ABO: NORMAL
UNIT  RH: NORMAL
UNIT  RH: NORMAL

## 2021-12-21 NOTE — CASE MANAGEMENT/SOCIAL WORK
Case Management Discharge Note      Final Note: dc home    Provided Post Acute Provider List?: N/A  Provided Post Acute Provider Quality & Resource List?: N/A    Selected Continued Care - Discharged on 12/20/2021 Admission date: 12/19/2021 - Discharge disposition: Home or Self Care    Destination    No services have been selected for the patient.              Durable Medical Equipment    No services have been selected for the patient.              Dialysis/Infusion    No services have been selected for the patient.              Home Medical Care    No services have been selected for the patient.              Therapy    No services have been selected for the patient.              Community Resources    No services have been selected for the patient.              Community & DME    No services have been selected for the patient.                       Final Discharge Disposition Code: 01 - home or self-care

## 2021-12-21 NOTE — OUTREACH NOTE
Prep Survey      Responses   [facility] facility patient discharged from? [facility]   Is LACE score < 7 ? Yes   Emergency Room discharge w/ pulse ox? No   Eligibility Readm Mgmt   Discharge diagnosis Symptomatic Microcytic Anemia Visual disturbance   Does the patient have one of the following disease processes/diagnoses(primary or secondary)? Other   Does the patient have Home health ordered? No   Is there a DME ordered? No   Prep survey completed? Yes          Maria Del Carmen Joya RN

## 2021-12-23 ENCOUNTER — READMISSION MANAGEMENT (OUTPATIENT)
Dept: CALL CENTER | Facility: HOSPITAL | Age: 86
End: 2021-12-23

## 2021-12-23 NOTE — OUTREACH NOTE
LAG < 7 Survey      Responses   [facility] patient discharged from? [facility]   Does the patient have one of the following disease processes/diagnoses(primary or secondary)? Other   BHLAG <7 Attempt successful? Yes   Call start time 1427   Call end time 1429   Discharge diagnosis Symptomatic Microcytic Anemia Visual disturbance   Person spoke with today (if not patient) and relationship Fadia   Meds reviewed with patient/caregiver? Yes   Is the patient having any side effects they believe may be caused by any medication additions or changes? No   Does the patient have all medications ordered at discharge? Yes   Is the patient taking all medications as directed (includes completed medication regime)? Yes   Does the patient have a primary care provider?  Yes   Does the patient have an appointment with their PCP within 7 days of discharge? No   What is preventing the patient from scheduling follow up appointments within 7 days of discharge? --  [Will see PCP in 6 weeks per request]   Nursing Interventions Advised patient to make appointment   Has the patient kept scheduled appointments due by today? N/A   Has home health visited the patient within 72 hours of discharge? N/A   Psychosocial issues? No   Did the patient receive a copy of their discharge instructions? Yes   Nursing interventions Reviewed instructions with patient   What is the patient's perception of their health status since discharge? Improving   Is the patient/caregiver able to teach back signs and symptoms related to disease process for when to call PCP? Yes   Is the patient/caregiver able to teach back signs and symptoms related to disease process for when to call 911? Yes   If the patient is a current smoker, are they able to teach back resources for cessation? Not a smoker          Claudia Rocha RN

## 2021-12-28 ENCOUNTER — APPOINTMENT (OUTPATIENT)
Dept: GENERAL RADIOLOGY | Facility: HOSPITAL | Age: 86
End: 2021-12-28

## 2021-12-28 ENCOUNTER — HOSPITAL ENCOUNTER (EMERGENCY)
Facility: HOSPITAL | Age: 86
Discharge: HOME | End: 2021-12-28
Attending: EMERGENCY MEDICINE | Admitting: EMERGENCY MEDICINE

## 2021-12-28 VITALS
DIASTOLIC BLOOD PRESSURE: 82 MMHG | BODY MASS INDEX: 19.85 KG/M2 | TEMPERATURE: 98.5 F | SYSTOLIC BLOOD PRESSURE: 120 MMHG | RESPIRATION RATE: 16 BRPM | HEIGHT: 57 IN | OXYGEN SATURATION: 98 % | HEART RATE: 86 BPM | WEIGHT: 92 LBS

## 2021-12-28 DIAGNOSIS — S70.01XA HEMATOMA OF HIP, RIGHT, INITIAL ENCOUNTER: ICD-10-CM

## 2021-12-28 DIAGNOSIS — D64.9 ANEMIA, UNSPECIFIED TYPE: Primary | ICD-10-CM

## 2021-12-28 LAB
ABO GROUP BLD: NORMAL
ALBUMIN SERPL-MCNC: 3.3 G/DL (ref 3.5–5.2)
ALBUMIN/GLOB SERPL: 1.4 G/DL
ALP SERPL-CCNC: 71 U/L (ref 39–117)
ALT SERPL W P-5'-P-CCNC: 13 U/L (ref 1–33)
ANION GAP SERPL CALCULATED.3IONS-SCNC: 7.7 MMOL/L (ref 5–15)
APTT PPP: 28.8 SECONDS (ref 24.3–38.1)
AST SERPL-CCNC: 23 U/L (ref 1–32)
BASOPHILS # BLD AUTO: 0.04 10*3/MM3 (ref 0–0.2)
BASOPHILS NFR BLD AUTO: 0.4 % (ref 0–1.5)
BILIRUB SERPL-MCNC: 0.9 MG/DL (ref 0–1.2)
BLD GP AB SCN SERPL QL: NEGATIVE
BUN SERPL-MCNC: 41 MG/DL (ref 8–23)
BUN/CREAT SERPL: 35.3 (ref 7–25)
CALCIUM SPEC-SCNC: 8.6 MG/DL (ref 8.2–9.6)
CHLORIDE SERPL-SCNC: 101 MMOL/L (ref 98–107)
CO2 SERPL-SCNC: 26.3 MMOL/L (ref 22–29)
CREAT SERPL-MCNC: 1.16 MG/DL (ref 0.57–1)
DEPRECATED RDW RBC AUTO: 85.7 FL (ref 37–54)
EOSINOPHIL # BLD AUTO: 0.02 10*3/MM3 (ref 0–0.4)
EOSINOPHIL NFR BLD AUTO: 0.2 % (ref 0.3–6.2)
ERYTHROCYTE [DISTWIDTH] IN BLOOD BY AUTOMATED COUNT: 30.9 % (ref 12.3–15.4)
GFR SERPL CREATININE-BSD FRML MDRD: 44 ML/MIN/1.73
GLOBULIN UR ELPH-MCNC: 2.4 GM/DL
GLUCOSE SERPL-MCNC: 110 MG/DL (ref 65–99)
HCT VFR BLD AUTO: 26.6 % (ref 34–46.6)
HGB BLD-MCNC: 7.9 G/DL (ref 12–15.9)
IMM GRANULOCYTES # BLD AUTO: 0.03 10*3/MM3 (ref 0–0.05)
IMM GRANULOCYTES NFR BLD AUTO: 0.3 % (ref 0–0.5)
INR PPP: 1 (ref 0.9–1.1)
LYMPHOCYTES # BLD AUTO: 0.9 10*3/MM3 (ref 0.7–3.1)
LYMPHOCYTES NFR BLD AUTO: 8.4 % (ref 19.6–45.3)
MCH RBC QN AUTO: 23.6 PG (ref 26.6–33)
MCHC RBC AUTO-ENTMCNC: 29.7 G/DL (ref 31.5–35.7)
MCV RBC AUTO: 79.4 FL (ref 79–97)
MONOCYTES # BLD AUTO: 0.75 10*3/MM3 (ref 0.1–0.9)
MONOCYTES NFR BLD AUTO: 7 % (ref 5–12)
NEUTROPHILS NFR BLD AUTO: 8.94 10*3/MM3 (ref 1.7–7)
NEUTROPHILS NFR BLD AUTO: 83.7 % (ref 42.7–76)
PLATELET # BLD AUTO: 329 10*3/MM3 (ref 140–450)
PMV BLD AUTO: 8.9 FL (ref 6–12)
POTASSIUM SERPL-SCNC: 4.9 MMOL/L (ref 3.5–5.2)
PROT SERPL-MCNC: 5.7 G/DL (ref 6–8.5)
PROTHROMBIN TIME: 13.4 SECONDS (ref 12.1–15)
RBC # BLD AUTO: 3.35 10*6/MM3 (ref 3.77–5.28)
RH BLD: POSITIVE
SARS-COV-2 RNA PNL SPEC NAA+PROBE: NOT DETECTED
SODIUM SERPL-SCNC: 135 MMOL/L (ref 136–145)
T&S EXPIRATION DATE: NORMAL
WBC NRBC COR # BLD: 10.68 10*3/MM3 (ref 3.4–10.8)

## 2021-12-28 PROCEDURE — 96374 THER/PROPH/DIAG INJ IV PUSH: CPT

## 2021-12-28 PROCEDURE — 73502 X-RAY EXAM HIP UNI 2-3 VIEWS: CPT

## 2021-12-28 PROCEDURE — 80053 COMPREHEN METABOLIC PANEL: CPT | Performed by: EMERGENCY MEDICINE

## 2021-12-28 PROCEDURE — 85610 PROTHROMBIN TIME: CPT | Performed by: EMERGENCY MEDICINE

## 2021-12-28 PROCEDURE — 85025 COMPLETE CBC W/AUTO DIFF WBC: CPT | Performed by: EMERGENCY MEDICINE

## 2021-12-28 PROCEDURE — 86901 BLOOD TYPING SEROLOGIC RH(D): CPT | Performed by: EMERGENCY MEDICINE

## 2021-12-28 PROCEDURE — 99283 EMERGENCY DEPT VISIT LOW MDM: CPT | Performed by: EMERGENCY MEDICINE

## 2021-12-28 PROCEDURE — 25010000002 FENTANYL CITRATE (PF) 50 MCG/ML SOLUTION: Performed by: EMERGENCY MEDICINE

## 2021-12-28 PROCEDURE — 86850 RBC ANTIBODY SCREEN: CPT | Performed by: EMERGENCY MEDICINE

## 2021-12-28 PROCEDURE — 99284 EMERGENCY DEPT VISIT MOD MDM: CPT

## 2021-12-28 PROCEDURE — 86902 BLOOD TYPE ANTIGEN DONOR EA: CPT

## 2021-12-28 PROCEDURE — 86900 BLOOD TYPING SEROLOGIC ABO: CPT | Performed by: EMERGENCY MEDICINE

## 2021-12-28 PROCEDURE — 87635 SARS-COV-2 COVID-19 AMP PRB: CPT | Performed by: EMERGENCY MEDICINE

## 2021-12-28 PROCEDURE — 85730 THROMBOPLASTIN TIME PARTIAL: CPT | Performed by: EMERGENCY MEDICINE

## 2021-12-28 RX ORDER — SODIUM CHLORIDE 0.9 % (FLUSH) 0.9 %
10 SYRINGE (ML) INJECTION AS NEEDED
Status: DISCONTINUED | OUTPATIENT
Start: 2021-12-28 | End: 2021-12-28 | Stop reason: HOSPADM

## 2021-12-28 RX ORDER — FENTANYL CITRATE 50 UG/ML
25 INJECTION, SOLUTION INTRAMUSCULAR; INTRAVENOUS ONCE
Status: COMPLETED | OUTPATIENT
Start: 2021-12-28 | End: 2021-12-28

## 2021-12-28 RX ORDER — ACETAMINOPHEN 500 MG
1000 TABLET ORAL ONCE
Status: COMPLETED | OUTPATIENT
Start: 2021-12-28 | End: 2021-12-28

## 2021-12-28 RX ADMIN — ACETAMINOPHEN 1000 MG: 500 TABLET ORAL at 10:13

## 2021-12-28 RX ADMIN — FENTANYL CITRATE 25 MCG: 50 INJECTION INTRAMUSCULAR; INTRAVENOUS at 13:18

## 2022-01-04 ENCOUNTER — TELEPHONE (OUTPATIENT)
Dept: ORTHOPEDIC SURGERY | Facility: CLINIC | Age: 87
End: 2022-01-04

## 2022-01-04 NOTE — TELEPHONE ENCOUNTER
Provider: DR. DAY    Caller:  DK DA SILVA    Relationship to Patient: DAUGHTER     Phone Number: [phone]    Reason for Call:  [place] ADVISED  PATIENT FELL AND WENT TO ED ON 12/28/21. [place] WANTED TO KNOW IF DR. DAY OR BHARGAV LEMUS COULD REVIEW PATIENT'S XRAY FROM 12/28/21 TO USE FOR PATIENT'S APPOINTMENT ON 1/6/21. IF SO, [place]  WOULD LIKE TO CHANGE THE APPOINTMENT TYPE ON 1/6/21 TO A TELEHEALTH VISIT DUE TO PATIENT'S ANEMIA.     IF CHANGES ARE MADE TO THE APPOINTMENT PLEASE CONTACT PATIENT'S DAUGHTER [place] TO INFORM. PLEASE LEAVE A MESSAGE IF UNABLE TO SPEAK WITH DAUGHTER.     Called patient's daughter reviewed x-ray images previously completed BH lag which are available for viewing in chart no evidence of any fracture dislocation or other acute osseous abnormality.  Patient has resumed weightbearing activities around her home with rolling walker denies that she is experiencing any pain.  Due to anemia patient is extremely weak therefore would not be coming into clinic.  Patient's daughter verbalized worsening wound formation agrees with plan of care.  We will gladly see her back in clinic if any other concerns.

## 2023-01-01 ENCOUNTER — HOSPITAL ENCOUNTER (EMERGENCY)
Facility: HOSPITAL | Age: 88
Discharge: ANOTHER HOSPITAL | DRG: 871 | End: 2023-06-27
Attending: EMERGENCY MEDICINE | Admitting: EMERGENCY MEDICINE
Payer: MEDICARE

## 2023-01-01 ENCOUNTER — APPOINTMENT (OUTPATIENT)
Dept: CARDIOLOGY | Facility: HOSPITAL | Age: 88
DRG: 871 | End: 2023-01-01
Payer: MEDICARE

## 2023-01-01 ENCOUNTER — APPOINTMENT (OUTPATIENT)
Dept: GENERAL RADIOLOGY | Facility: HOSPITAL | Age: 88
DRG: 871 | End: 2023-01-01
Payer: MEDICARE

## 2023-01-01 ENCOUNTER — APPOINTMENT (OUTPATIENT)
Dept: CT IMAGING | Facility: HOSPITAL | Age: 88
DRG: 871 | End: 2023-01-01
Payer: MEDICARE

## 2023-01-01 ENCOUNTER — HOSPITAL ENCOUNTER (INPATIENT)
Facility: HOSPITAL | Age: 88
LOS: 6 days | DRG: 871 | End: 2023-07-03
Attending: INTERNAL MEDICINE
Payer: MEDICARE

## 2023-01-01 ENCOUNTER — APPOINTMENT (OUTPATIENT)
Dept: ULTRASOUND IMAGING | Facility: HOSPITAL | Age: 88
DRG: 871 | End: 2023-01-01
Payer: MEDICARE

## 2023-01-01 VITALS
OXYGEN SATURATION: 93 % | HEART RATE: 100 BPM | WEIGHT: 93 LBS | SYSTOLIC BLOOD PRESSURE: 111 MMHG | TEMPERATURE: 99.2 F | DIASTOLIC BLOOD PRESSURE: 64 MMHG | HEIGHT: 67 IN | RESPIRATION RATE: 30 BRPM | BODY MASS INDEX: 14.6 KG/M2

## 2023-01-01 VITALS
TEMPERATURE: 94.3 F | HEIGHT: 61 IN | OXYGEN SATURATION: 89 % | SYSTOLIC BLOOD PRESSURE: 105 MMHG | DIASTOLIC BLOOD PRESSURE: 55 MMHG | WEIGHT: 105.82 LBS | BODY MASS INDEX: 19.98 KG/M2

## 2023-01-01 DIAGNOSIS — J18.9 PNEUMONIA OF RIGHT LUNG DUE TO INFECTIOUS ORGANISM, UNSPECIFIED PART OF LUNG: Primary | ICD-10-CM

## 2023-01-01 DIAGNOSIS — J96.01 SEPSIS WITH ACUTE HYPOXIC RESPIRATORY FAILURE WITHOUT SEPTIC SHOCK, DUE TO UNSPECIFIED ORGANISM: ICD-10-CM

## 2023-01-01 DIAGNOSIS — A41.9 SEPSIS WITH ACUTE HYPOXIC RESPIRATORY FAILURE WITHOUT SEPTIC SHOCK, DUE TO UNSPECIFIED ORGANISM: ICD-10-CM

## 2023-01-01 DIAGNOSIS — R65.20 SEPSIS WITH ACUTE HYPOXIC RESPIRATORY FAILURE WITHOUT SEPTIC SHOCK, DUE TO UNSPECIFIED ORGANISM: ICD-10-CM

## 2023-01-01 LAB
ACANTHOCYTES BLD QL SMEAR: ABNORMAL
ALBUMIN SERPL-MCNC: 2.4 G/DL (ref 3.5–5.2)
ALBUMIN/GLOB SERPL: 0.7 G/DL
ALP SERPL-CCNC: 74 U/L (ref 39–117)
ALT SERPL W P-5'-P-CCNC: 16 U/L (ref 1–33)
ANION GAP SERPL CALCULATED.3IONS-SCNC: 11 MMOL/L (ref 5–15)
ANION GAP SERPL CALCULATED.3IONS-SCNC: 11.6 MMOL/L (ref 5–15)
ANION GAP SERPL CALCULATED.3IONS-SCNC: 12 MMOL/L (ref 5–15)
ANION GAP SERPL CALCULATED.3IONS-SCNC: 12.8 MMOL/L (ref 5–15)
ANION GAP SERPL CALCULATED.3IONS-SCNC: 14 MMOL/L (ref 5–15)
ANION GAP SERPL CALCULATED.3IONS-SCNC: 17 MMOL/L (ref 5–15)
ANISOCYTOSIS BLD QL: ABNORMAL
AORTIC DIMENSIONLESS INDEX: 0.6 (DI)
ARTERIAL PATENCY WRIST A: ABNORMAL
ARTERIAL PATENCY WRIST A: POSITIVE
AST SERPL-CCNC: 17 U/L (ref 1–32)
ATMOSPHERIC PRESS: 735 MMHG
ATMOSPHERIC PRESS: 743.9 MMHG
ATMOSPHERIC PRESS: 744.8 MMHG
ATMOSPHERIC PRESS: 746.7 MMHG
ATMOSPHERIC PRESS: 747.2 MMHG
ATMOSPHERIC PRESS: 747.7 MMHG
ATMOSPHERIC PRESS: 748.8 MMHG
BACTERIA BLD CULT: ABNORMAL
BACTERIA SPEC AEROBE CULT: ABNORMAL
BACTERIA SPEC AEROBE CULT: NO GROWTH
BACTERIA SPEC AEROBE CULT: NORMAL
BACTERIA SPEC RESP CULT: NO GROWTH
BACTERIA UR QL AUTO: ABNORMAL /HPF
BASE EXCESS BLDA CALC-SCNC: -10 MMOL/L (ref 0–2)
BASE EXCESS BLDA CALC-SCNC: -10.7 MMOL/L (ref 0–2)
BASE EXCESS BLDA CALC-SCNC: -6.9 MMOL/L (ref 0–2)
BASE EXCESS BLDA CALC-SCNC: -7.4 MMOL/L (ref 0–2)
BASE EXCESS BLDA CALC-SCNC: -7.5 MMOL/L (ref 0–2)
BASE EXCESS BLDA CALC-SCNC: -8 MMOL/L (ref 0–2)
BASE EXCESS BLDA CALC-SCNC: 1.1 MMOL/L (ref 0–2)
BASOPHILS # BLD AUTO: 0 10*3/MM3 (ref 0–0.2)
BASOPHILS # BLD AUTO: 0.01 10*3/MM3 (ref 0–0.2)
BASOPHILS # BLD AUTO: 0.03 10*3/MM3 (ref 0–0.2)
BASOPHILS # BLD AUTO: 0.03 10*3/MM3 (ref 0–0.2)
BASOPHILS NFR BLD AUTO: 0 % (ref 0–1.5)
BASOPHILS NFR BLD AUTO: 0.1 % (ref 0–1.5)
BDY SITE: ABNORMAL
BH CV ECHO MEAS - ACS: 1.67 CM
BH CV ECHO MEAS - AO MAX PG: 5.9 MMHG
BH CV ECHO MEAS - AO MEAN PG: 3.3 MMHG
BH CV ECHO MEAS - AO V2 MAX: 122 CM/SEC
BH CV ECHO MEAS - AO V2 VTI: 24 CM
BH CV ECHO MEAS - AVA(I,D): 1.61 CM2
BH CV ECHO MEAS - EDV(MOD-SP2): 55 ML
BH CV ECHO MEAS - EDV(MOD-SP4): 54 ML
BH CV ECHO MEAS - EF(MOD-BP): 62.8 %
BH CV ECHO MEAS - EF(MOD-SP2): 67.3 %
BH CV ECHO MEAS - EF(MOD-SP4): 57.4 %
BH CV ECHO MEAS - ESV(MOD-SP2): 18 ML
BH CV ECHO MEAS - ESV(MOD-SP4): 23 ML
BH CV ECHO MEAS - LAT PEAK E' VEL: 11.9 CM/SEC
BH CV ECHO MEAS - LV DIASTOLIC VOL/BSA (35-75): 37 CM2
BH CV ECHO MEAS - LV MAX PG: 3.3 MMHG
BH CV ECHO MEAS - LV MEAN PG: 1.12 MMHG
BH CV ECHO MEAS - LV SYSTOLIC VOL/BSA (12-30): 15.7 CM2
BH CV ECHO MEAS - LV V1 MAX: 90.8 CM/SEC
BH CV ECHO MEAS - LV V1 VTI: 14.8 CM
BH CV ECHO MEAS - LVOT AREA: 2.6 CM2
BH CV ECHO MEAS - LVOT DIAM: 1.82 CM
BH CV ECHO MEAS - MED PEAK E' VEL: 7.6 CM/SEC
BH CV ECHO MEAS - MR MAX PG: 127.2 MMHG
BH CV ECHO MEAS - MR MAX VEL: 563.9 CM/SEC
BH CV ECHO MEAS - MV A DUR: 0.12 SEC
BH CV ECHO MEAS - MV A MAX VEL: 57 CM/SEC
BH CV ECHO MEAS - MV DEC SLOPE: 775.4 CM/SEC2
BH CV ECHO MEAS - MV DEC TIME: 220 MSEC
BH CV ECHO MEAS - MV E MAX VEL: 80.6 CM/SEC
BH CV ECHO MEAS - MV E/A: 1.41
BH CV ECHO MEAS - MV MAX PG: 9.4 MMHG
BH CV ECHO MEAS - MV MEAN PG: 2.8 MMHG
BH CV ECHO MEAS - MV P1/2T: 55 MSEC
BH CV ECHO MEAS - MV V2 VTI: 27.3 CM
BH CV ECHO MEAS - MVA(P1/2T): 4 CM2
BH CV ECHO MEAS - MVA(VTI): 1.41 CM2
BH CV ECHO MEAS - PA ACC TIME: 0.05 SEC
BH CV ECHO MEAS - PA V2 MAX: 65.2 CM/SEC
BH CV ECHO MEAS - PULM A REVS DUR: 0.16 SEC
BH CV ECHO MEAS - PULM A REVS VEL: 52.3 CM/SEC
BH CV ECHO MEAS - PULM DIAS VEL: 55.3 CM/SEC
BH CV ECHO MEAS - PULM S/D: 0.85
BH CV ECHO MEAS - PULM SYS VEL: 47.1 CM/SEC
BH CV ECHO MEAS - RAP SYSTOLE: 15 MMHG
BH CV ECHO MEAS - RV MAX PG: 1.01 MMHG
BH CV ECHO MEAS - RV V1 MAX: 50.1 CM/SEC
BH CV ECHO MEAS - RV V1 VTI: 9.4 CM
BH CV ECHO MEAS - RVSP: 58.4 MMHG
BH CV ECHO MEAS - SI(MOD-SP2): 25.3 ML/M2
BH CV ECHO MEAS - SI(MOD-SP4): 21.2 ML/M2
BH CV ECHO MEAS - SV(LVOT): 38.6 ML
BH CV ECHO MEAS - SV(MOD-SP2): 37 ML
BH CV ECHO MEAS - SV(MOD-SP4): 31 ML
BH CV ECHO MEAS - TAPSE (>1.6): 1.68 CM
BH CV ECHO MEAS - TR MAX PG: 43.4 MMHG
BH CV ECHO MEAS - TR MAX VEL: 329.6 CM/SEC
BH CV ECHO MEASUREMENTS AVERAGE E/E' RATIO: 8.27
BH CV XLRA - RV BASE: 2.5 CM
BH CV XLRA - RV LENGTH: 5.5 CM
BH CV XLRA - RV MID: 2.26 CM
BH CV XLRA - TDI S': 12.2 CM/SEC
BILIRUB SERPL-MCNC: 0.4 MG/DL (ref 0–1.2)
BILIRUB UR QL STRIP: NEGATIVE
BODY TEMPERATURE: 37 C
BUN SERPL-MCNC: 47 MG/DL (ref 8–23)
BUN SERPL-MCNC: 50 MG/DL (ref 8–23)
BUN SERPL-MCNC: 56 MG/DL (ref 8–23)
BUN SERPL-MCNC: 60 MG/DL (ref 8–23)
BUN SERPL-MCNC: 64 MG/DL (ref 8–23)
BUN SERPL-MCNC: 64 MG/DL (ref 8–23)
BUN/CREAT SERPL: 33.8 (ref 7–25)
BUN/CREAT SERPL: 36.8 (ref 7–25)
BUN/CREAT SERPL: 38.3 (ref 7–25)
BUN/CREAT SERPL: 40.9 (ref 7–25)
BUN/CREAT SERPL: 41.8 (ref 7–25)
BUN/CREAT SERPL: 43.5 (ref 7–25)
BURR CELLS BLD QL SMEAR: ABNORMAL
BURR CELLS BLD QL SMEAR: ABNORMAL
CALCIUM SPEC-SCNC: 6.8 MG/DL (ref 8.2–9.6)
CALCIUM SPEC-SCNC: 7 MG/DL (ref 8.2–9.6)
CALCIUM SPEC-SCNC: 7.1 MG/DL (ref 8.2–9.6)
CALCIUM SPEC-SCNC: 7.3 MG/DL (ref 8.2–9.6)
CALCIUM SPEC-SCNC: 7.9 MG/DL (ref 8.2–9.6)
CALCIUM SPEC-SCNC: 8.6 MG/DL (ref 8.2–9.6)
CHLORIDE SERPL-SCNC: 103 MMOL/L (ref 98–107)
CHLORIDE SERPL-SCNC: 111 MMOL/L (ref 98–107)
CHLORIDE SERPL-SCNC: 119 MMOL/L (ref 98–107)
CHLORIDE SERPL-SCNC: 119 MMOL/L (ref 98–107)
CHLORIDE SERPL-SCNC: 120 MMOL/L (ref 98–107)
CHLORIDE SERPL-SCNC: 124 MMOL/L (ref 98–107)
CLARITY UR: ABNORMAL
CO2 SERPL-SCNC: 13 MMOL/L (ref 22–29)
CO2 SERPL-SCNC: 13.2 MMOL/L (ref 22–29)
CO2 SERPL-SCNC: 14 MMOL/L (ref 22–29)
CO2 SERPL-SCNC: 16 MMOL/L (ref 22–29)
CO2 SERPL-SCNC: 16 MMOL/L (ref 22–29)
CO2 SERPL-SCNC: 23.4 MMOL/L (ref 22–29)
COLOR UR: YELLOW
CREAT SERPL-MCNC: 1.36 MG/DL (ref 0.57–1)
CREAT SERPL-MCNC: 1.37 MG/DL (ref 0.57–1)
CREAT SERPL-MCNC: 1.38 MG/DL (ref 0.57–1)
CREAT SERPL-MCNC: 1.39 MG/DL (ref 0.57–1)
CREAT SERPL-MCNC: 1.53 MG/DL (ref 0.57–1)
CREAT SERPL-MCNC: 1.67 MG/DL (ref 0.57–1)
D-LACTATE SERPL-SCNC: 1.6 MMOL/L (ref 0.5–2)
D-LACTATE SERPL-SCNC: 2.6 MMOL/L (ref 0.5–2)
DEPRECATED RDW RBC AUTO: 48.9 FL (ref 37–54)
DEPRECATED RDW RBC AUTO: 49.6 FL (ref 37–54)
DEPRECATED RDW RBC AUTO: 50.1 FL (ref 37–54)
DEPRECATED RDW RBC AUTO: 50.9 FL (ref 37–54)
DEPRECATED RDW RBC AUTO: 61.6 FL (ref 37–54)
EGFRCR SERPLBLD CKD-EPI 2021: 28.6 ML/MIN/1.73
EGFRCR SERPLBLD CKD-EPI 2021: 31.8 ML/MIN/1.73
EGFRCR SERPLBLD CKD-EPI 2021: 35.7 ML/MIN/1.73
EGFRCR SERPLBLD CKD-EPI 2021: 36 ML/MIN/1.73
EGFRCR SERPLBLD CKD-EPI 2021: 36.3 ML/MIN/1.73
EGFRCR SERPLBLD CKD-EPI 2021: 36.6 ML/MIN/1.73
EOSINOPHIL # BLD AUTO: 0 10*3/MM3 (ref 0–0.4)
EOSINOPHIL # BLD AUTO: 0.17 10*3/MM3 (ref 0–0.4)
EOSINOPHIL # BLD AUTO: 0.19 10*3/MM3 (ref 0–0.4)
EOSINOPHIL # BLD AUTO: 0.29 10*3/MM3 (ref 0–0.4)
EOSINOPHIL NFR BLD AUTO: 0 % (ref 0.3–6.2)
EOSINOPHIL NFR BLD AUTO: 0.9 % (ref 0.3–6.2)
EOSINOPHIL NFR BLD AUTO: 1 % (ref 0.3–6.2)
EOSINOPHIL NFR BLD AUTO: 1.3 % (ref 0.3–6.2)
ERYTHROCYTE [DISTWIDTH] IN BLOOD BY AUTOMATED COUNT: 14.9 % (ref 12.3–15.4)
ERYTHROCYTE [DISTWIDTH] IN BLOOD BY AUTOMATED COUNT: 15.2 % (ref 12.3–15.4)
ERYTHROCYTE [DISTWIDTH] IN BLOOD BY AUTOMATED COUNT: 15.3 % (ref 12.3–15.4)
ERYTHROCYTE [DISTWIDTH] IN BLOOD BY AUTOMATED COUNT: 15.4 % (ref 12.3–15.4)
ERYTHROCYTE [DISTWIDTH] IN BLOOD BY AUTOMATED COUNT: 15.4 % (ref 12.3–15.4)
ERYTHROCYTE [DISTWIDTH] IN BLOOD BY AUTOMATED COUNT: 15.6 % (ref 12.3–15.4)
ERYTHROCYTE [DISTWIDTH] IN BLOOD BY AUTOMATED COUNT: 17.6 % (ref 12.3–15.4)
FLUAV RNA RESP QL NAA+PROBE: NOT DETECTED
FLUBV RNA RESP QL NAA+PROBE: NOT DETECTED
GAS FLOW AIRWAY: 6 LPM
GEN 5 2HR TROPONIN T REFLEX: 214 NG/L
GLOBULIN UR ELPH-MCNC: 3.3 GM/DL
GLUCOSE BLDC GLUCOMTR-MCNC: 158 MG/DL (ref 70–130)
GLUCOSE BLDC GLUCOMTR-MCNC: 160 MG/DL (ref 70–130)
GLUCOSE BLDC GLUCOMTR-MCNC: 177 MG/DL (ref 70–130)
GLUCOSE BLDC GLUCOMTR-MCNC: 179 MG/DL (ref 70–130)
GLUCOSE BLDC GLUCOMTR-MCNC: 185 MG/DL (ref 70–130)
GLUCOSE BLDC GLUCOMTR-MCNC: 194 MG/DL (ref 70–130)
GLUCOSE BLDC GLUCOMTR-MCNC: 199 MG/DL (ref 70–130)
GLUCOSE BLDC GLUCOMTR-MCNC: 94 MG/DL (ref 70–130)
GLUCOSE SERPL-MCNC: 126 MG/DL (ref 65–99)
GLUCOSE SERPL-MCNC: 157 MG/DL (ref 65–99)
GLUCOSE SERPL-MCNC: 173 MG/DL (ref 65–99)
GLUCOSE SERPL-MCNC: 174 MG/DL (ref 65–99)
GLUCOSE SERPL-MCNC: 87 MG/DL (ref 65–99)
GLUCOSE SERPL-MCNC: 96 MG/DL (ref 65–99)
GLUCOSE UR STRIP-MCNC: NEGATIVE MG/DL
GRAM STN SPEC: ABNORMAL
GRAM STN SPEC: ABNORMAL
GRAM STN SPEC: NORMAL
HCO3 BLDA-SCNC: 14.2 MMOL/L (ref 22–28)
HCO3 BLDA-SCNC: 14.4 MMOL/L (ref 22–28)
HCO3 BLDA-SCNC: 19.4 MMOL/L (ref 22–28)
HCO3 BLDA-SCNC: 19.8 MMOL/L (ref 22–28)
HCO3 BLDA-SCNC: 20.2 MMOL/L (ref 22–28)
HCO3 BLDA-SCNC: 20.8 MMOL/L (ref 22–28)
HCO3 BLDA-SCNC: 24.2 MMOL/L (ref 20–26)
HCT VFR BLD AUTO: 22.1 % (ref 34–46.6)
HCT VFR BLD AUTO: 22.4 % (ref 34–46.6)
HCT VFR BLD AUTO: 22.6 % (ref 34–46.6)
HCT VFR BLD AUTO: 25.1 % (ref 34–46.6)
HCT VFR BLD AUTO: 26 % (ref 34–46.6)
HCT VFR BLD AUTO: 26.9 % (ref 34–46.6)
HCT VFR BLD AUTO: 27.7 % (ref 34–46.6)
HGB BLD-MCNC: 6.8 G/DL (ref 12–15.9)
HGB BLD-MCNC: 7.1 G/DL (ref 12–15.9)
HGB BLD-MCNC: 7.2 G/DL (ref 12–15.9)
HGB BLD-MCNC: 8 G/DL (ref 12–15.9)
HGB BLD-MCNC: 8.3 G/DL (ref 12–15.9)
HGB BLD-MCNC: 8.4 G/DL (ref 12–15.9)
HGB BLD-MCNC: 8.4 G/DL (ref 12–15.9)
HGB BLDA-MCNC: 8.5 G/DL (ref 13.5–17.5)
HGB UR QL STRIP.AUTO: ABNORMAL
HYALINE CASTS UR QL AUTO: ABNORMAL /LPF
HYPOCHROMIA BLD QL: ABNORMAL
HYPOCHROMIA BLD QL: ABNORMAL
IMM GRANULOCYTES # BLD AUTO: 0.01 10*3/MM3 (ref 0–0.05)
IMM GRANULOCYTES # BLD AUTO: 0.61 10*3/MM3 (ref 0–0.05)
IMM GRANULOCYTES # BLD AUTO: 0.87 10*3/MM3 (ref 0–0.05)
IMM GRANULOCYTES NFR BLD AUTO: 0.1 % (ref 0–0.5)
IMM GRANULOCYTES NFR BLD AUTO: 3.5 % (ref 0–0.5)
IMM GRANULOCYTES NFR BLD AUTO: 4.2 % (ref 0–0.5)
INHALED O2 CONCENTRATION: 100 %
INHALED O2 CONCENTRATION: 21 %
INHALED O2 CONCENTRATION: 30 %
INHALED O2 CONCENTRATION: 40 %
ISOLATED FROM: ABNORMAL
KETONES UR QL STRIP: ABNORMAL
L PNEUMO1 AG UR QL IA: NEGATIVE
LEFT ATRIUM VOLUME INDEX: 31.4 ML/M2
LEUKOCYTE ESTERASE UR QL STRIP.AUTO: ABNORMAL
LYMPHOCYTES # BLD AUTO: 0.77 10*3/MM3 (ref 0.7–3.1)
LYMPHOCYTES # BLD AUTO: 0.78 10*3/MM3 (ref 0.7–3.1)
LYMPHOCYTES # BLD AUTO: 1.14 10*3/MM3 (ref 0.7–3.1)
LYMPHOCYTES # BLD AUTO: 1.23 10*3/MM3 (ref 0.7–3.1)
LYMPHOCYTES # BLD MANUAL: 0.92 10*3/MM3 (ref 0.7–3.1)
LYMPHOCYTES # BLD MANUAL: 0.92 10*3/MM3 (ref 0.7–3.1)
LYMPHOCYTES NFR BLD AUTO: 4.4 % (ref 19.6–45.3)
LYMPHOCYTES NFR BLD AUTO: 5.3 % (ref 19.6–45.3)
LYMPHOCYTES NFR BLD AUTO: 5.4 % (ref 19.6–45.3)
LYMPHOCYTES NFR BLD AUTO: 5.4 % (ref 19.6–45.3)
LYMPHOCYTES NFR BLD MANUAL: 2 % (ref 5–12)
LYMPHOCYTES NFR BLD MANUAL: 2 % (ref 5–12)
Lab: ABNORMAL
Lab: ABNORMAL
MAXIMAL PREDICTED HEART RATE: 128 BPM
MCH RBC QN AUTO: 27.4 PG (ref 26.6–33)
MCH RBC QN AUTO: 28 PG (ref 26.6–33)
MCH RBC QN AUTO: 28.2 PG (ref 26.6–33)
MCH RBC QN AUTO: 28.5 PG (ref 26.6–33)
MCH RBC QN AUTO: 28.5 PG (ref 26.6–33)
MCH RBC QN AUTO: 28.7 PG (ref 26.6–33)
MCH RBC QN AUTO: 29 PG (ref 26.6–33)
MCHC RBC AUTO-ENTMCNC: 30.3 G/DL (ref 31.5–35.7)
MCHC RBC AUTO-ENTMCNC: 30.4 G/DL (ref 31.5–35.7)
MCHC RBC AUTO-ENTMCNC: 31.2 G/DL (ref 31.5–35.7)
MCHC RBC AUTO-ENTMCNC: 31.4 G/DL (ref 31.5–35.7)
MCHC RBC AUTO-ENTMCNC: 31.9 G/DL (ref 31.5–35.7)
MCHC RBC AUTO-ENTMCNC: 31.9 G/DL (ref 31.5–35.7)
MCHC RBC AUTO-ENTMCNC: 32.6 G/DL (ref 31.5–35.7)
MCV RBC AUTO: 87.8 FL (ref 79–97)
MCV RBC AUTO: 89.1 FL (ref 79–97)
MCV RBC AUTO: 89.3 FL (ref 79–97)
MCV RBC AUTO: 89.7 FL (ref 79–97)
MCV RBC AUTO: 90.3 FL (ref 79–97)
MCV RBC AUTO: 91.8 FL (ref 79–97)
MCV RBC AUTO: 93.9 FL (ref 79–97)
MODALITY: ABNORMAL
MONOCYTES # BLD AUTO: 0.54 10*3/MM3 (ref 0.1–0.9)
MONOCYTES # BLD AUTO: 0.6 10*3/MM3 (ref 0.1–0.9)
MONOCYTES # BLD AUTO: 0.69 10*3/MM3 (ref 0.1–0.9)
MONOCYTES # BLD AUTO: 0.74 10*3/MM3 (ref 0.1–0.9)
MONOCYTES # BLD: 0.46 10*3/MM3 (ref 0.1–0.9)
MONOCYTES # BLD: 0.61 10*3/MM3 (ref 0.1–0.9)
MONOCYTES NFR BLD AUTO: 3 % (ref 5–12)
MONOCYTES NFR BLD AUTO: 3.1 % (ref 5–12)
MONOCYTES NFR BLD AUTO: 3.5 % (ref 5–12)
MONOCYTES NFR BLD AUTO: 4.1 % (ref 5–12)
MRSA DNA SPEC QL NAA+PROBE: NORMAL
NEUTROPHILS # BLD AUTO: 21.52 10*3/MM3 (ref 1.7–7)
NEUTROPHILS # BLD AUTO: 29.05 10*3/MM3 (ref 1.7–7)
NEUTROPHILS NFR BLD AUTO: 13.23 10*3/MM3 (ref 1.7–7)
NEUTROPHILS NFR BLD AUTO: 15.43 10*3/MM3 (ref 1.7–7)
NEUTROPHILS NFR BLD AUTO: 17.99 10*3/MM3 (ref 1.7–7)
NEUTROPHILS NFR BLD AUTO: 20.05 10*3/MM3 (ref 1.7–7)
NEUTROPHILS NFR BLD AUTO: 85.9 % (ref 42.7–76)
NEUTROPHILS NFR BLD AUTO: 87.9 % (ref 42.7–76)
NEUTROPHILS NFR BLD AUTO: 88 % (ref 42.7–76)
NEUTROPHILS NFR BLD AUTO: 90.5 % (ref 42.7–76)
NEUTROPHILS NFR BLD MANUAL: 94 % (ref 42.7–76)
NEUTROPHILS NFR BLD MANUAL: 95 % (ref 42.7–76)
NITRITE UR QL STRIP: NEGATIVE
NOTIFIED BY: ABNORMAL
NOTIFIED WHO: ABNORMAL
NRBC BLD AUTO-RTO: 0.2 /100 WBC (ref 0–0.2)
NRBC BLD AUTO-RTO: 0.2 /100 WBC (ref 0–0.2)
NT-PROBNP SERPL-MCNC: ABNORMAL PG/ML (ref 0–1800)
O2 A-A PPRESDIFF RESPIRATORY: 0.3 MMHG
O2 A-A PPRESDIFF RESPIRATORY: 0.3 MMHG
O2 A-A PPRESDIFF RESPIRATORY: 0.4 MMHG
O2 A-A PPRESDIFF RESPIRATORY: 0.5 MMHG
PCO2 BLDA: 26.3 MM HG (ref 35–45)
PCO2 BLDA: 26.9 MM HG (ref 35–45)
PCO2 BLDA: 31.8 MM HG (ref 35–45)
PCO2 BLDA: 41.1 MM HG (ref 35–45)
PCO2 BLDA: 46.8 MM HG (ref 35–45)
PCO2 BLDA: 51 MM HG (ref 35–45)
PCO2 BLDA: 57.2 MM HG (ref 35–45)
PCO2 TEMP ADJ BLD: 31.8 MM HG (ref 35–45)
PEEP RESPIRATORY: 5 CM[H2O]
PH BLDA: 7.17 PH UNITS (ref 7.35–7.45)
PH BLDA: 7.21 PH UNITS (ref 7.35–7.45)
PH BLDA: 7.23 PH UNITS (ref 7.35–7.45)
PH BLDA: 7.28 PH UNITS (ref 7.35–7.45)
PH BLDA: 7.33 PH UNITS (ref 7.35–7.45)
PH BLDA: 7.35 PH UNITS (ref 7.35–7.45)
PH BLDA: 7.49 PH UNITS (ref 7.35–7.45)
PH UR STRIP.AUTO: 5.5 [PH] (ref 5–8)
PH, TEMP CORRECTED: 7.49 PH UNITS (ref 7.35–7.45)
PHOSPHATE SERPL-MCNC: 3.2 MG/DL (ref 2.5–4.5)
PHOSPHATE SERPL-MCNC: 3.8 MG/DL (ref 2.5–4.5)
PLAT MORPH BLD: NORMAL
PLAT MORPH BLD: NORMAL
PLATELET # BLD AUTO: 243 10*3/MM3 (ref 140–450)
PLATELET # BLD AUTO: 278 10*3/MM3 (ref 140–450)
PLATELET # BLD AUTO: 285 10*3/MM3 (ref 140–450)
PLATELET # BLD AUTO: 300 10*3/MM3 (ref 140–450)
PLATELET # BLD AUTO: 307 10*3/MM3 (ref 140–450)
PLATELET # BLD AUTO: 311 10*3/MM3 (ref 140–450)
PLATELET # BLD AUTO: 320 10*3/MM3 (ref 140–450)
PMV BLD AUTO: 10.2 FL (ref 6–12)
PMV BLD AUTO: 10.5 FL (ref 6–12)
PMV BLD AUTO: 10.5 FL (ref 6–12)
PMV BLD AUTO: 10.8 FL (ref 6–12)
PMV BLD AUTO: 10.9 FL (ref 6–12)
PMV BLD AUTO: 11.1 FL (ref 6–12)
PMV BLD AUTO: 11.1 FL (ref 6–12)
PO2 BLDA: 175.6 MM HG (ref 80–100)
PO2 BLDA: 50.2 MM HG (ref 83–108)
PO2 BLDA: 59 MM HG (ref 80–100)
PO2 BLDA: 65.9 MM HG (ref 80–100)
PO2 BLDA: 75.5 MM HG (ref 80–100)
PO2 BLDA: 87.9 MM HG (ref 80–100)
PO2 BLDA: 92 MM HG (ref 80–100)
PO2 TEMP ADJ BLD: 50.2 MM HG (ref 83–108)
POIKILOCYTOSIS BLD QL SMEAR: ABNORMAL
POIKILOCYTOSIS BLD QL SMEAR: ABNORMAL
POLYCHROMASIA BLD QL SMEAR: ABNORMAL
POTASSIUM SERPL-SCNC: 3.6 MMOL/L (ref 3.5–5.2)
POTASSIUM SERPL-SCNC: 3.8 MMOL/L (ref 3.5–5.2)
POTASSIUM SERPL-SCNC: 4.4 MMOL/L (ref 3.5–5.2)
POTASSIUM SERPL-SCNC: 4.4 MMOL/L (ref 3.5–5.2)
POTASSIUM SERPL-SCNC: 4.5 MMOL/L (ref 3.5–5.2)
POTASSIUM SERPL-SCNC: 4.7 MMOL/L (ref 3.5–5.2)
PROCALCITONIN SERPL-MCNC: 20.74 NG/ML (ref 0–0.25)
PROT SERPL-MCNC: 5.7 G/DL (ref 6–8.5)
PROT UR QL STRIP: ABNORMAL
PSV: 4 CMH2O
PSV: 7 CMH2O
PSV: 8 CMH2O
QT INTERVAL: 318 MS
RBC # BLD AUTO: 2.48 10*6/MM3 (ref 3.77–5.28)
RBC # BLD AUTO: 2.48 10*6/MM3 (ref 3.77–5.28)
RBC # BLD AUTO: 2.52 10*6/MM3 (ref 3.77–5.28)
RBC # BLD AUTO: 2.81 10*6/MM3 (ref 3.77–5.28)
RBC # BLD AUTO: 2.93 10*6/MM3 (ref 3.77–5.28)
RBC # BLD AUTO: 2.95 10*6/MM3 (ref 3.77–5.28)
RBC # BLD AUTO: 2.96 10*6/MM3 (ref 3.77–5.28)
RBC # UR STRIP: ABNORMAL /HPF
RBC MORPH BLD: NORMAL
REF LAB TEST METHOD: ABNORMAL
S PNEUM AG SPEC QL LA: POSITIVE
SAO2 % BLDCOA: 87.6 % (ref 94–99)
SAO2 % BLDCOA: 89.5 % (ref 92–99)
SAO2 % BLDCOA: 90.1 % (ref 92–99)
SAO2 % BLDCOA: 94.3 % (ref 92–99)
SAO2 % BLDCOA: 94.3 % (ref 92–99)
SAO2 % BLDCOA: 94.7 % (ref 92–99)
SAO2 % BLDCOA: 99.2 % (ref 92–99)
SARS-COV-2 RNA RESP QL NAA+PROBE: NOT DETECTED
SCHISTOCYTES BLD QL SMEAR: ABNORMAL
SET MECH RESP RATE: 18
SET MECH RESP RATE: 20
SET MECH RESP RATE: 21
SET MECH RESP RATE: 22
SET MECH RESP RATE: 22
SINUS: 3.1 CM
SMALL PLATELETS BLD QL SMEAR: ADEQUATE
SODIUM SERPL-SCNC: 138 MMOL/L (ref 136–145)
SODIUM SERPL-SCNC: 142 MMOL/L (ref 136–145)
SODIUM SERPL-SCNC: 145 MMOL/L (ref 136–145)
SODIUM SERPL-SCNC: 146 MMOL/L (ref 136–145)
SODIUM SERPL-SCNC: 148 MMOL/L (ref 136–145)
SODIUM SERPL-SCNC: 151 MMOL/L (ref 136–145)
SP GR UR STRIP: 1.02 (ref 1–1.03)
SQUAMOUS #/AREA URNS HPF: ABNORMAL /HPF
STRESS TARGET HR: 109 BPM
TOTAL RATE: 20 BREATHS/MINUTE
TOTAL RATE: 21 BREATHS/MINUTE
TOTAL RATE: 22 BREATHS/MINUTE
TOTAL RATE: 23 BREATHS/MINUTE
TOTAL RATE: 25 BREATHS/MINUTE
TOTAL RATE: 30 BREATHS/MINUTE
TROPONIN T DELTA: -2 NG/L
TROPONIN T SERPL HS-MCNC: 216 NG/L
UROBILINOGEN UR QL STRIP: ABNORMAL
VARIANT LYMPHS NFR BLD MANUAL: 3 % (ref 19.6–45.3)
VARIANT LYMPHS NFR BLD MANUAL: 4 % (ref 19.6–45.3)
VENTILATOR MODE: ABNORMAL
VT ON VENT VENT: 401 ML
VT ON VENT VENT: 436 ML
VT ON VENT VENT: 544 ML
VT ON VENT VENT: 564 ML
WBC # UR STRIP: ABNORMAL /HPF
WBC MORPH BLD: NORMAL
WBC NRBC COR # BLD: 14.61 10*3/MM3 (ref 3.4–10.8)
WBC NRBC COR # BLD: 17.54 10*3/MM3 (ref 3.4–10.8)
WBC NRBC COR # BLD: 20.96 10*3/MM3 (ref 3.4–10.8)
WBC NRBC COR # BLD: 22.17 10*3/MM3 (ref 3.4–10.8)
WBC NRBC COR # BLD: 22.78 10*3/MM3 (ref 3.4–10.8)
WBC NRBC COR # BLD: 22.89 10*3/MM3 (ref 3.4–10.8)
WBC NRBC COR # BLD: 30.58 10*3/MM3 (ref 3.4–10.8)

## 2023-01-01 PROCEDURE — 94799 UNLISTED PULMONARY SVC/PX: CPT

## 2023-01-01 PROCEDURE — 94761 N-INVAS EAR/PLS OXIMETRY MLT: CPT

## 2023-01-01 PROCEDURE — 82803 BLOOD GASES ANY COMBINATION: CPT

## 2023-01-01 PROCEDURE — 25010000002 MORPHINE PER 10 MG: Performed by: INTERNAL MEDICINE

## 2023-01-01 PROCEDURE — 36600 WITHDRAWAL OF ARTERIAL BLOOD: CPT

## 2023-01-01 PROCEDURE — 25010000002 VANCOMYCIN 750 MG RECONSTITUTED SOLUTION 1 EACH VIAL: Performed by: INTERNAL MEDICINE

## 2023-01-01 PROCEDURE — 25010000002 CEFTRIAXONE PER 250 MG: Performed by: INTERNAL MEDICINE

## 2023-01-01 PROCEDURE — 71250 CT THORAX DX C-: CPT

## 2023-01-01 PROCEDURE — 82948 REAGENT STRIP/BLOOD GLUCOSE: CPT

## 2023-01-01 PROCEDURE — 80048 BASIC METABOLIC PNL TOTAL CA: CPT | Performed by: INTERNAL MEDICINE

## 2023-01-01 PROCEDURE — 85025 COMPLETE CBC W/AUTO DIFF WBC: CPT | Performed by: INTERNAL MEDICINE

## 2023-01-01 PROCEDURE — 25010000002 ENOXAPARIN PER 10 MG: Performed by: INTERNAL MEDICINE

## 2023-01-01 PROCEDURE — 94760 N-INVAS EAR/PLS OXIMETRY 1: CPT

## 2023-01-01 PROCEDURE — 25010000002 CEFTRIAXONE SODIUM-DEXTROSE 2000-2.22 MG-%(50ML) RECONSTITUTED SOLUTION: Performed by: EMERGENCY MEDICINE

## 2023-01-01 PROCEDURE — 93005 ELECTROCARDIOGRAM TRACING: CPT | Performed by: EMERGENCY MEDICINE

## 2023-01-01 PROCEDURE — 87899 AGENT NOS ASSAY W/OPTIC: CPT | Performed by: INTERNAL MEDICINE

## 2023-01-01 PROCEDURE — 84484 ASSAY OF TROPONIN QUANT: CPT | Performed by: EMERGENCY MEDICINE

## 2023-01-01 PROCEDURE — 93306 TTE W/DOPPLER COMPLETE: CPT | Performed by: STUDENT IN AN ORGANIZED HEALTH CARE EDUCATION/TRAINING PROGRAM

## 2023-01-01 PROCEDURE — 0BH17EZ INSERTION OF ENDOTRACHEAL AIRWAY INTO TRACHEA, VIA NATURAL OR ARTIFICIAL OPENING: ICD-10-PCS | Performed by: INTERNAL MEDICINE

## 2023-01-01 PROCEDURE — 25010000002 PROPOFOL 10 MG/ML EMULSION: Performed by: INTERNAL MEDICINE

## 2023-01-01 PROCEDURE — 87636 SARSCOV2 & INF A&B AMP PRB: CPT | Performed by: EMERGENCY MEDICINE

## 2023-01-01 PROCEDURE — 87147 CULTURE TYPE IMMUNOLOGIC: CPT | Performed by: EMERGENCY MEDICINE

## 2023-01-01 PROCEDURE — 99232 SBSQ HOSP IP/OBS MODERATE 35: CPT | Performed by: INTERNAL MEDICINE

## 2023-01-01 PROCEDURE — 81001 URINALYSIS AUTO W/SCOPE: CPT | Performed by: INTERNAL MEDICINE

## 2023-01-01 PROCEDURE — 87449 NOS EACH ORGANISM AG IA: CPT | Performed by: INTERNAL MEDICINE

## 2023-01-01 PROCEDURE — 84100 ASSAY OF PHOSPHORUS: CPT | Performed by: INTERNAL MEDICINE

## 2023-01-01 PROCEDURE — 85007 BL SMEAR W/DIFF WBC COUNT: CPT | Performed by: EMERGENCY MEDICINE

## 2023-01-01 PROCEDURE — 25010000002 FUROSEMIDE PER 20 MG: Performed by: INTERNAL MEDICINE

## 2023-01-01 PROCEDURE — 25010000002 HEPARIN (PORCINE) PER 1000 UNITS: Performed by: INTERNAL MEDICINE

## 2023-01-01 PROCEDURE — 94003 VENT MGMT INPAT SUBQ DAY: CPT

## 2023-01-01 PROCEDURE — 93010 ELECTROCARDIOGRAM REPORT: CPT | Performed by: INTERNAL MEDICINE

## 2023-01-01 PROCEDURE — 25010000002 LORAZEPAM PER 2 MG: Performed by: INTERNAL MEDICINE

## 2023-01-01 PROCEDURE — 84145 PROCALCITONIN (PCT): CPT | Performed by: EMERGENCY MEDICINE

## 2023-01-01 PROCEDURE — 85007 BL SMEAR W/DIFF WBC COUNT: CPT | Performed by: INTERNAL MEDICINE

## 2023-01-01 PROCEDURE — 80053 COMPREHEN METABOLIC PANEL: CPT | Performed by: EMERGENCY MEDICINE

## 2023-01-01 PROCEDURE — 96368 THER/DIAG CONCURRENT INF: CPT

## 2023-01-01 PROCEDURE — 76775 US EXAM ABDO BACK WALL LIM: CPT

## 2023-01-01 PROCEDURE — 31500 INSERT EMERGENCY AIRWAY: CPT

## 2023-01-01 PROCEDURE — 87040 BLOOD CULTURE FOR BACTERIA: CPT | Performed by: EMERGENCY MEDICINE

## 2023-01-01 PROCEDURE — 71045 X-RAY EXAM CHEST 1 VIEW: CPT

## 2023-01-01 PROCEDURE — 25010000002 HYDRALAZINE PER 20 MG: Performed by: INTERNAL MEDICINE

## 2023-01-01 PROCEDURE — 25010000002 CEFTRIAXONE SODIUM-DEXTROSE 2-2.22 GM-%(50ML) RECONSTITUTED SOLUTION: Performed by: EMERGENCY MEDICINE

## 2023-01-01 PROCEDURE — 87150 DNA/RNA AMPLIFIED PROBE: CPT | Performed by: EMERGENCY MEDICINE

## 2023-01-01 PROCEDURE — 93306 TTE W/DOPPLER COMPLETE: CPT

## 2023-01-01 PROCEDURE — 87086 URINE CULTURE/COLONY COUNT: CPT | Performed by: INTERNAL MEDICINE

## 2023-01-01 PROCEDURE — 83605 ASSAY OF LACTIC ACID: CPT | Performed by: INTERNAL MEDICINE

## 2023-01-01 PROCEDURE — 87205 SMEAR GRAM STAIN: CPT | Performed by: INTERNAL MEDICINE

## 2023-01-01 PROCEDURE — 83880 ASSAY OF NATRIURETIC PEPTIDE: CPT | Performed by: EMERGENCY MEDICINE

## 2023-01-01 PROCEDURE — 87070 CULTURE OTHR SPECIMN AEROBIC: CPT | Performed by: INTERNAL MEDICINE

## 2023-01-01 PROCEDURE — 94002 VENT MGMT INPAT INIT DAY: CPT

## 2023-01-01 PROCEDURE — 99285 EMERGENCY DEPT VISIT HI MDM: CPT

## 2023-01-01 PROCEDURE — 5A1945Z RESPIRATORY VENTILATION, 24-96 CONSECUTIVE HOURS: ICD-10-PCS | Performed by: INTERNAL MEDICINE

## 2023-01-01 PROCEDURE — 87641 MR-STAPH DNA AMP PROBE: CPT | Performed by: INTERNAL MEDICINE

## 2023-01-01 PROCEDURE — 36415 COLL VENOUS BLD VENIPUNCTURE: CPT

## 2023-01-01 PROCEDURE — 85025 COMPLETE CBC W/AUTO DIFF WBC: CPT

## 2023-01-01 PROCEDURE — 83605 ASSAY OF LACTIC ACID: CPT | Performed by: EMERGENCY MEDICINE

## 2023-01-01 PROCEDURE — 25010000002 PIPERACILLIN SOD-TAZOBACTAM PER 1 G: Performed by: INTERNAL MEDICINE

## 2023-01-01 PROCEDURE — 85025 COMPLETE CBC W/AUTO DIFF WBC: CPT | Performed by: EMERGENCY MEDICINE

## 2023-01-01 PROCEDURE — 85027 COMPLETE CBC AUTOMATED: CPT | Performed by: INTERNAL MEDICINE

## 2023-01-01 PROCEDURE — 96366 THER/PROPH/DIAG IV INF ADDON: CPT

## 2023-01-01 PROCEDURE — 99222 1ST HOSP IP/OBS MODERATE 55: CPT | Performed by: STUDENT IN AN ORGANIZED HEALTH CARE EDUCATION/TRAINING PROGRAM

## 2023-01-01 RX ORDER — FUROSEMIDE 10 MG/ML
40 INJECTION INTRAMUSCULAR; INTRAVENOUS ONCE
Status: COMPLETED | OUTPATIENT
Start: 2023-01-01 | End: 2023-01-01

## 2023-01-01 RX ORDER — HYDROMORPHONE HYDROCHLORIDE 2 MG/ML
1.5 INJECTION, SOLUTION INTRAMUSCULAR; INTRAVENOUS; SUBCUTANEOUS
Status: DISCONTINUED | OUTPATIENT
Start: 2023-01-01 | End: 2023-01-01 | Stop reason: HOSPADM

## 2023-01-01 RX ORDER — HYDROCODONE BITARTRATE AND ACETAMINOPHEN 5; 325 MG/1; MG/1
1 TABLET ORAL EVERY 6 HOURS PRN
Status: DISCONTINUED | OUTPATIENT
Start: 2023-01-01 | End: 2023-01-01

## 2023-01-01 RX ORDER — NOREPINEPHRINE BITARTRATE 0.03 MG/ML
INJECTION, SOLUTION INTRAVENOUS
Status: ACTIVE
Start: 2023-01-01 | End: 2023-01-01

## 2023-01-01 RX ORDER — DIPHENOXYLATE HYDROCHLORIDE AND ATROPINE SULFATE 2.5; .025 MG/1; MG/1
1 TABLET ORAL
Status: DISCONTINUED | OUTPATIENT
Start: 2023-01-01 | End: 2023-01-01 | Stop reason: HOSPADM

## 2023-01-01 RX ORDER — PROPOFOL 10 MG/ML
100 VIAL (ML) INTRAVENOUS ONCE
Status: COMPLETED | OUTPATIENT
Start: 2023-01-01 | End: 2023-01-01

## 2023-01-01 RX ORDER — BISACODYL 5 MG/1
5 TABLET, DELAYED RELEASE ORAL DAILY PRN
Status: DISCONTINUED | OUTPATIENT
Start: 2023-01-01 | End: 2023-01-01 | Stop reason: HOSPADM

## 2023-01-01 RX ORDER — POLYETHYLENE GLYCOL 3350 17 G/17G
17 POWDER, FOR SOLUTION ORAL DAILY PRN
Status: DISCONTINUED | OUTPATIENT
Start: 2023-01-01 | End: 2023-01-01

## 2023-01-01 RX ORDER — HALOPERIDOL 5 MG/ML
1 INJECTION INTRAMUSCULAR EVERY 4 HOURS PRN
Status: DISCONTINUED | OUTPATIENT
Start: 2023-01-01 | End: 2023-01-01 | Stop reason: HOSPADM

## 2023-01-01 RX ORDER — GLYCOPYRROLATE 0.2 MG/ML
0.4 INJECTION INTRAMUSCULAR; INTRAVENOUS
Status: DISCONTINUED | OUTPATIENT
Start: 2023-01-01 | End: 2023-01-01 | Stop reason: HOSPADM

## 2023-01-01 RX ORDER — DIPHENHYDRAMINE HYDROCHLORIDE 50 MG/ML
25 INJECTION INTRAMUSCULAR; INTRAVENOUS EVERY 6 HOURS PRN
Status: DISCONTINUED | OUTPATIENT
Start: 2023-01-01 | End: 2023-01-01 | Stop reason: HOSPADM

## 2023-01-01 RX ORDER — LORAZEPAM 2 MG/ML
0.5 CONCENTRATE ORAL
Status: DISCONTINUED | OUTPATIENT
Start: 2023-01-01 | End: 2023-01-01 | Stop reason: HOSPADM

## 2023-01-01 RX ORDER — LEVOTHYROXINE SODIUM 88 UG/1
88 TABLET ORAL DAILY
Status: DISCONTINUED | OUTPATIENT
Start: 2023-01-01 | End: 2023-01-01

## 2023-01-01 RX ORDER — SODIUM CHLORIDE 0.9 % (FLUSH) 0.9 %
10 SYRINGE (ML) INJECTION AS NEEDED
Status: DISCONTINUED | OUTPATIENT
Start: 2023-01-01 | End: 2023-01-01 | Stop reason: HOSPADM

## 2023-01-01 RX ORDER — AMOXICILLIN 250 MG
2 CAPSULE ORAL 2 TIMES DAILY
Status: DISCONTINUED | OUTPATIENT
Start: 2023-01-01 | End: 2023-01-01

## 2023-01-01 RX ORDER — POTASSIUM CHLORIDE 1.5 G/1.58G
40 POWDER, FOR SOLUTION ORAL ONCE
Status: COMPLETED | OUTPATIENT
Start: 2023-01-01 | End: 2023-01-01

## 2023-01-01 RX ORDER — LORAZEPAM 2 MG/ML
2 INJECTION INTRAMUSCULAR
Status: DISCONTINUED | OUTPATIENT
Start: 2023-01-01 | End: 2023-01-01 | Stop reason: HOSPADM

## 2023-01-01 RX ORDER — MORPHINE SULFATE 20 MG/ML
10 SOLUTION ORAL
Status: DISCONTINUED | OUTPATIENT
Start: 2023-01-01 | End: 2023-01-01 | Stop reason: HOSPADM

## 2023-01-01 RX ORDER — HYDRALAZINE HYDROCHLORIDE 20 MG/ML
20 INJECTION INTRAMUSCULAR; INTRAVENOUS EVERY 6 HOURS PRN
Status: DISCONTINUED | OUTPATIENT
Start: 2023-01-01 | End: 2023-01-01

## 2023-01-01 RX ORDER — ACETAMINOPHEN 325 MG/1
650 TABLET ORAL EVERY 6 HOURS PRN
Status: DISCONTINUED | OUTPATIENT
Start: 2023-01-01 | End: 2023-01-01 | Stop reason: HOSPADM

## 2023-01-01 RX ORDER — HEPARIN SODIUM 5000 [USP'U]/ML
5000 INJECTION, SOLUTION INTRAVENOUS; SUBCUTANEOUS EVERY 8 HOURS SCHEDULED
Status: DISCONTINUED | OUTPATIENT
Start: 2023-01-01 | End: 2023-01-01

## 2023-01-01 RX ORDER — CEFTRIAXONE 2 G/50ML
2 INJECTION, SOLUTION INTRAVENOUS ONCE
Status: COMPLETED | OUTPATIENT
Start: 2023-01-01 | End: 2023-01-01

## 2023-01-01 RX ORDER — ACETAMINOPHEN 160 MG/5ML
650 SOLUTION ORAL EVERY 4 HOURS PRN
Status: DISCONTINUED | OUTPATIENT
Start: 2023-01-01 | End: 2023-01-01 | Stop reason: HOSPADM

## 2023-01-01 RX ORDER — ACETAMINOPHEN 325 MG/1
650 TABLET ORAL EVERY 4 HOURS PRN
Status: DISCONTINUED | OUTPATIENT
Start: 2023-01-01 | End: 2023-01-01 | Stop reason: HOSPADM

## 2023-01-01 RX ORDER — ENOXAPARIN SODIUM 100 MG/ML
30 INJECTION SUBCUTANEOUS EVERY 24 HOURS
Status: DISCONTINUED | OUTPATIENT
Start: 2023-01-01 | End: 2023-01-01

## 2023-01-01 RX ORDER — GLYCOPYRROLATE 0.2 MG/ML
0.2 INJECTION INTRAMUSCULAR; INTRAVENOUS
Status: DISCONTINUED | OUTPATIENT
Start: 2023-01-01 | End: 2023-01-01 | Stop reason: HOSPADM

## 2023-01-01 RX ORDER — LORAZEPAM 2 MG/ML
1 INJECTION INTRAMUSCULAR
Status: DISCONTINUED | OUTPATIENT
Start: 2023-01-01 | End: 2023-01-01 | Stop reason: HOSPADM

## 2023-01-01 RX ORDER — NITROGLYCERIN 0.4 MG/1
0.4 TABLET SUBLINGUAL
Status: DISCONTINUED | OUTPATIENT
Start: 2023-01-01 | End: 2023-01-01

## 2023-01-01 RX ORDER — MORPHINE SULFATE 20 MG/ML
20 SOLUTION ORAL
Status: DISCONTINUED | OUTPATIENT
Start: 2023-01-01 | End: 2023-01-01 | Stop reason: HOSPADM

## 2023-01-01 RX ORDER — ACETAMINOPHEN 325 MG/1
650 TABLET ORAL EVERY 6 HOURS PRN
Status: DISCONTINUED | OUTPATIENT
Start: 2023-01-01 | End: 2023-01-01

## 2023-01-01 RX ORDER — LORAZEPAM 2 MG/ML
0.5 INJECTION INTRAMUSCULAR
Status: DISCONTINUED | OUTPATIENT
Start: 2023-01-01 | End: 2023-01-01 | Stop reason: HOSPADM

## 2023-01-01 RX ORDER — LORAZEPAM 2 MG/ML
1 CONCENTRATE ORAL
Status: DISCONTINUED | OUTPATIENT
Start: 2023-01-01 | End: 2023-01-01 | Stop reason: HOSPADM

## 2023-01-01 RX ORDER — FAMOTIDINE 10 MG/ML
20 INJECTION, SOLUTION INTRAVENOUS DAILY
Status: DISCONTINUED | OUTPATIENT
Start: 2023-01-01 | End: 2023-01-01

## 2023-01-01 RX ORDER — QUINIDINE GLUCONATE 324 MG
240 TABLET, EXTENDED RELEASE ORAL
COMMUNITY

## 2023-01-01 RX ORDER — HALOPERIDOL 1 MG/1
1 TABLET ORAL EVERY 4 HOURS PRN
Status: DISCONTINUED | OUTPATIENT
Start: 2023-01-01 | End: 2023-01-01 | Stop reason: HOSPADM

## 2023-01-01 RX ORDER — FENTANYL CITRATE 50 UG/ML
25 INJECTION, SOLUTION INTRAMUSCULAR; INTRAVENOUS
Status: DISCONTINUED | OUTPATIENT
Start: 2023-01-01 | End: 2023-01-01 | Stop reason: HOSPADM

## 2023-01-01 RX ORDER — BISACODYL 10 MG
10 SUPPOSITORY, RECTAL RECTAL DAILY PRN
Status: DISCONTINUED | OUTPATIENT
Start: 2023-01-01 | End: 2023-01-01 | Stop reason: HOSPADM

## 2023-01-01 RX ORDER — NOREPINEPHRINE BITARTRATE 0.03 MG/ML
.02-.3 INJECTION, SOLUTION INTRAVENOUS
Status: DISCONTINUED | OUTPATIENT
Start: 2023-01-01 | End: 2023-01-01

## 2023-01-01 RX ORDER — SODIUM CHLORIDE 9 MG/ML
INJECTION, SOLUTION INTRAVENOUS
Status: COMPLETED
Start: 2023-01-01 | End: 2023-01-01

## 2023-01-01 RX ORDER — HALOPERIDOL 2 MG/ML
1 SOLUTION ORAL EVERY 4 HOURS PRN
Status: DISCONTINUED | OUTPATIENT
Start: 2023-01-01 | End: 2023-01-01 | Stop reason: HOSPADM

## 2023-01-01 RX ORDER — LORAZEPAM 2 MG/ML
2 CONCENTRATE ORAL
Status: DISCONTINUED | OUTPATIENT
Start: 2023-01-01 | End: 2023-01-01 | Stop reason: HOSPADM

## 2023-01-01 RX ORDER — DRONABINOL 5 MG/1
5 CAPSULE ORAL
COMMUNITY

## 2023-01-01 RX ORDER — SODIUM CHLORIDE 9 MG/ML
40 INJECTION, SOLUTION INTRAVENOUS AS NEEDED
Status: DISCONTINUED | OUTPATIENT
Start: 2023-01-01 | End: 2023-01-01 | Stop reason: HOSPADM

## 2023-01-01 RX ORDER — SODIUM CHLORIDE 9 MG/ML
125 INJECTION, SOLUTION INTRAVENOUS CONTINUOUS
Status: DISCONTINUED | OUTPATIENT
Start: 2023-01-01 | End: 2023-01-01

## 2023-01-01 RX ORDER — MORPHINE SULFATE 10 MG/ML
6 INJECTION INTRAMUSCULAR; INTRAVENOUS; SUBCUTANEOUS
Status: DISCONTINUED | OUTPATIENT
Start: 2023-01-01 | End: 2023-01-01 | Stop reason: HOSPADM

## 2023-01-01 RX ORDER — DIPHENHYDRAMINE HCL 25 MG
25 CAPSULE ORAL EVERY 6 HOURS PRN
Status: DISCONTINUED | OUTPATIENT
Start: 2023-01-01 | End: 2023-01-01 | Stop reason: HOSPADM

## 2023-01-01 RX ORDER — AMOXICILLIN 250 MG
2 CAPSULE ORAL 2 TIMES DAILY
Status: DISCONTINUED | OUTPATIENT
Start: 2023-01-01 | End: 2023-01-01 | Stop reason: HOSPADM

## 2023-01-01 RX ORDER — BISACODYL 10 MG
10 SUPPOSITORY, RECTAL RECTAL DAILY PRN
Status: DISCONTINUED | OUTPATIENT
Start: 2023-01-01 | End: 2023-01-01

## 2023-01-01 RX ORDER — ACETAMINOPHEN 650 MG/1
650 SUPPOSITORY RECTAL EVERY 4 HOURS PRN
Status: DISCONTINUED | OUTPATIENT
Start: 2023-01-01 | End: 2023-01-01 | Stop reason: HOSPADM

## 2023-01-01 RX ORDER — DONEPEZIL HYDROCHLORIDE 10 MG/1
10 TABLET, FILM COATED ORAL NIGHTLY
COMMUNITY

## 2023-01-01 RX ORDER — CHLORHEXIDINE GLUCONATE 0.12 MG/ML
15 RINSE ORAL EVERY 12 HOURS SCHEDULED
Status: DISCONTINUED | OUTPATIENT
Start: 2023-01-01 | End: 2023-01-01 | Stop reason: HOSPADM

## 2023-01-01 RX ORDER — BISACODYL 5 MG/1
5 TABLET, DELAYED RELEASE ORAL DAILY PRN
Status: DISCONTINUED | OUTPATIENT
Start: 2023-01-01 | End: 2023-01-01

## 2023-01-01 RX ORDER — ACETAMINOPHEN 650 MG/1
650 SUPPOSITORY RECTAL ONCE
Status: COMPLETED | OUTPATIENT
Start: 2023-01-01 | End: 2023-01-01

## 2023-01-01 RX ORDER — POLYETHYLENE GLYCOL 3350 17 G/17G
17 POWDER, FOR SOLUTION ORAL DAILY PRN
Status: DISCONTINUED | OUTPATIENT
Start: 2023-01-01 | End: 2023-01-01 | Stop reason: HOSPADM

## 2023-01-01 RX ORDER — FAMOTIDINE 20 MG/1
20 TABLET, FILM COATED ORAL DAILY
Status: DISCONTINUED | OUTPATIENT
Start: 2023-01-01 | End: 2023-01-01

## 2023-01-01 RX ORDER — HYDROCODONE BITARTRATE AND ACETAMINOPHEN 5; 325 MG/1; MG/1
1 TABLET ORAL EVERY 6 HOURS PRN
Status: DISCONTINUED | OUTPATIENT
Start: 2023-01-01 | End: 2023-01-01 | Stop reason: HOSPADM

## 2023-01-01 RX ORDER — MEMANTINE HYDROCHLORIDE 7 MG/1
7 CAPSULE, EXTENDED RELEASE ORAL DAILY
COMMUNITY
Start: 2023-01-01

## 2023-01-01 RX ORDER — SODIUM CHLORIDE 0.9 % (FLUSH) 0.9 %
10 SYRINGE (ML) INJECTION EVERY 12 HOURS SCHEDULED
Status: DISCONTINUED | OUTPATIENT
Start: 2023-01-01 | End: 2023-01-01 | Stop reason: HOSPADM

## 2023-01-01 RX ORDER — MORPHINE SULFATE 2 MG/ML
4 INJECTION, SOLUTION INTRAMUSCULAR; INTRAVENOUS
Status: DISCONTINUED | OUTPATIENT
Start: 2023-01-01 | End: 2023-01-01 | Stop reason: HOSPADM

## 2023-01-01 RX ADMIN — PROPOFOL INJECTABLE EMULSION 5 MCG/KG/MIN: 10 INJECTION, EMULSION INTRAVENOUS at 16:10

## 2023-01-01 RX ADMIN — HEPARIN SODIUM 5000 UNITS: 5000 INJECTION INTRAVENOUS; SUBCUTANEOUS at 21:19

## 2023-01-01 RX ADMIN — MORPHINE SULFATE 4 MG: 2 INJECTION, SOLUTION INTRAMUSCULAR; INTRAVENOUS at 13:46

## 2023-01-01 RX ADMIN — Medication 0.02 MCG/KG/MIN: at 18:23

## 2023-01-01 RX ADMIN — CHLORHEXIDINE GLUCONATE 15 ML: 1.2 RINSE ORAL at 08:40

## 2023-01-01 RX ADMIN — CHLORHEXIDINE GLUCONATE 15 ML: 1.2 RINSE ORAL at 20:32

## 2023-01-01 RX ADMIN — DOXYCYCLINE 100 MG: 100 INJECTION, POWDER, LYOPHILIZED, FOR SOLUTION INTRAVENOUS at 16:21

## 2023-01-01 RX ADMIN — DOXYCYCLINE 100 MG: 100 INJECTION, POWDER, LYOPHILIZED, FOR SOLUTION INTRAVENOUS at 05:50

## 2023-01-01 RX ADMIN — FAMOTIDINE 20 MG: 20 TABLET, FILM COATED ORAL at 09:25

## 2023-01-01 RX ADMIN — CHLORHEXIDINE GLUCONATE 15 ML: 1.2 RINSE ORAL at 08:37

## 2023-01-01 RX ADMIN — CEFTRIAXONE 2 G: 2 INJECTION, POWDER, FOR SOLUTION INTRAMUSCULAR; INTRAVENOUS at 09:32

## 2023-01-01 RX ADMIN — SENNOSIDES AND DOCUSATE SODIUM 2 TABLET: 50; 8.6 TABLET ORAL at 20:19

## 2023-01-01 RX ADMIN — Medication 10 ML: at 08:42

## 2023-01-01 RX ADMIN — DOXYCYCLINE 100 MG: 100 INJECTION, POWDER, LYOPHILIZED, FOR SOLUTION INTRAVENOUS at 15:47

## 2023-01-01 RX ADMIN — CHLORHEXIDINE GLUCONATE 15 ML: 1.2 RINSE ORAL at 09:25

## 2023-01-01 RX ADMIN — SODIUM CHLORIDE 125 ML/HR: 9 INJECTION, SOLUTION INTRAVENOUS at 16:11

## 2023-01-01 RX ADMIN — HYDROCODONE BITARTRATE AND ACETAMINOPHEN 1 TABLET: 5; 325 TABLET ORAL at 08:41

## 2023-01-01 RX ADMIN — PIPERACILLIN SODIUM AND TAZOBACTAM SODIUM 3.38 G: 3; .375 INJECTION, SOLUTION INTRAVENOUS at 17:43

## 2023-01-01 RX ADMIN — HYDROCODONE BITARTRATE AND ACETAMINOPHEN 1 TABLET: 5; 325 TABLET ORAL at 13:46

## 2023-01-01 RX ADMIN — PROPOFOL INJECTABLE EMULSION 100 MG: 10 INJECTION, EMULSION INTRAVENOUS at 16:12

## 2023-01-01 RX ADMIN — MORPHINE SULFATE 4 MG: 2 INJECTION, SOLUTION INTRAMUSCULAR; INTRAVENOUS at 22:48

## 2023-01-01 RX ADMIN — SODIUM CHLORIDE 500 ML: 9 INJECTION, SOLUTION INTRAVENOUS at 10:56

## 2023-01-01 RX ADMIN — Medication 10 ML: at 20:19

## 2023-01-01 RX ADMIN — HYDRALAZINE HYDROCHLORIDE 20 MG: 20 INJECTION INTRAMUSCULAR; INTRAVENOUS at 09:24

## 2023-01-01 RX ADMIN — MORPHINE SULFATE 4 MG: 2 INJECTION, SOLUTION INTRAMUSCULAR; INTRAVENOUS at 04:45

## 2023-01-01 RX ADMIN — LORAZEPAM 2 MG: 2 INJECTION INTRAMUSCULAR; INTRAVENOUS at 17:20

## 2023-01-01 RX ADMIN — CHLORHEXIDINE GLUCONATE 15 ML: 1.2 RINSE ORAL at 20:19

## 2023-01-01 RX ADMIN — PROPOFOL INJECTABLE EMULSION 20 MCG/KG/MIN: 10 INJECTION, EMULSION INTRAVENOUS at 17:34

## 2023-01-01 RX ADMIN — LORAZEPAM 2 MG: 2 INJECTION INTRAMUSCULAR; INTRAVENOUS at 13:46

## 2023-01-01 RX ADMIN — POLYETHYLENE GLYCOL 3350 17 G: 17 POWDER, FOR SOLUTION ORAL at 08:42

## 2023-01-01 RX ADMIN — SODIUM CHLORIDE 1000 ML: 9 INJECTION, SOLUTION INTRAVENOUS at 16:12

## 2023-01-01 RX ADMIN — Medication 10 ML: at 08:37

## 2023-01-01 RX ADMIN — GLYCOPYRROLATE 0.4 MG: 0.2 INJECTION INTRAMUSCULAR; INTRAVENOUS at 13:46

## 2023-01-01 RX ADMIN — VANCOMYCIN HYDROCHLORIDE 750 MG: 750 INJECTION, POWDER, LYOPHILIZED, FOR SOLUTION INTRAVENOUS at 13:04

## 2023-01-01 RX ADMIN — MORPHINE SULFATE 4 MG: 2 INJECTION, SOLUTION INTRAMUSCULAR; INTRAVENOUS at 19:47

## 2023-01-01 RX ADMIN — HEPARIN SODIUM 5000 UNITS: 5000 INJECTION INTRAVENOUS; SUBCUTANEOUS at 15:46

## 2023-01-01 RX ADMIN — FUROSEMIDE 40 MG: 40 INJECTION, SOLUTION INTRAMUSCULAR; INTRAVENOUS at 08:53

## 2023-01-01 RX ADMIN — PIPERACILLIN SODIUM AND TAZOBACTAM SODIUM 4.5 G: 4; .5 INJECTION, SOLUTION INTRAVENOUS at 22:49

## 2023-01-01 RX ADMIN — PIPERACILLIN SODIUM AND TAZOBACTAM SODIUM 4.5 G: 4; .5 INJECTION, SOLUTION INTRAVENOUS at 11:05

## 2023-01-01 RX ADMIN — LEVOTHYROXINE SODIUM 88 MCG: 0.09 TABLET ORAL at 08:33

## 2023-01-01 RX ADMIN — PIPERACILLIN SODIUM AND TAZOBACTAM SODIUM 3.38 G: 3; .375 INJECTION, SOLUTION INTRAVENOUS at 22:11

## 2023-01-01 RX ADMIN — HEPARIN SODIUM 5000 UNITS: 5000 INJECTION INTRAVENOUS; SUBCUTANEOUS at 21:22

## 2023-01-01 RX ADMIN — CHLORHEXIDINE GLUCONATE 15 ML: 1.2 RINSE ORAL at 08:34

## 2023-01-01 RX ADMIN — SODIUM CHLORIDE 125 ML/HR: 9 INJECTION, SOLUTION INTRAVENOUS at 23:50

## 2023-01-01 RX ADMIN — Medication 10 ML: at 20:32

## 2023-01-01 RX ADMIN — Medication 10 ML: at 20:40

## 2023-01-01 RX ADMIN — ENOXAPARIN SODIUM 30 MG: 30 INJECTION SUBCUTANEOUS at 15:47

## 2023-01-01 RX ADMIN — FAMOTIDINE 20 MG: 20 TABLET, FILM COATED ORAL at 08:53

## 2023-01-01 RX ADMIN — PROPOFOL INJECTABLE EMULSION 20 MCG/KG/MIN: 10 INJECTION, EMULSION INTRAVENOUS at 01:34

## 2023-01-01 RX ADMIN — HEPARIN SODIUM 5000 UNITS: 5000 INJECTION INTRAVENOUS; SUBCUTANEOUS at 13:46

## 2023-01-01 RX ADMIN — DOXYCYCLINE 100 MG: 100 INJECTION, POWDER, LYOPHILIZED, FOR SOLUTION INTRAVENOUS at 11:37

## 2023-01-01 RX ADMIN — HYDROCODONE BITARTRATE AND ACETAMINOPHEN 1 TABLET: 5; 325 TABLET ORAL at 15:46

## 2023-01-01 RX ADMIN — CEFTRIAXONE 2 G: 2 INJECTION, SOLUTION INTRAVENOUS at 11:35

## 2023-01-01 RX ADMIN — SENNOSIDES AND DOCUSATE SODIUM 2 TABLET: 50; 8.6 TABLET ORAL at 09:25

## 2023-01-01 RX ADMIN — DOXYCYCLINE 100 MG: 100 INJECTION, POWDER, LYOPHILIZED, FOR SOLUTION INTRAVENOUS at 04:22

## 2023-01-01 RX ADMIN — Medication 10 ML: at 09:51

## 2023-01-01 RX ADMIN — ACETAMINOPHEN 650 MG: 650 SUPPOSITORY RECTAL at 10:35

## 2023-01-01 RX ADMIN — MORPHINE SULFATE 4 MG: 2 INJECTION, SOLUTION INTRAMUSCULAR; INTRAVENOUS at 17:20

## 2023-01-01 RX ADMIN — FAMOTIDINE 20 MG: 20 TABLET, FILM COATED ORAL at 08:41

## 2023-01-01 RX ADMIN — Medication 10 ML: at 08:34

## 2023-01-01 RX ADMIN — Medication 10 ML: at 09:26

## 2023-01-01 RX ADMIN — HEPARIN SODIUM 5000 UNITS: 5000 INJECTION INTRAVENOUS; SUBCUTANEOUS at 05:36

## 2023-01-01 RX ADMIN — HEPARIN SODIUM 5000 UNITS: 5000 INJECTION INTRAVENOUS; SUBCUTANEOUS at 05:11

## 2023-01-01 RX ADMIN — SENNOSIDES AND DOCUSATE SODIUM 2 TABLET: 50; 8.6 TABLET ORAL at 20:32

## 2023-01-01 RX ADMIN — CEFTRIAXONE 2 G: 2 INJECTION, POWDER, FOR SOLUTION INTRAMUSCULAR; INTRAVENOUS at 11:28

## 2023-01-01 RX ADMIN — CHLORHEXIDINE GLUCONATE 15 ML: 1.2 RINSE ORAL at 20:40

## 2023-01-01 RX ADMIN — FAMOTIDINE 20 MG: 10 INJECTION INTRAVENOUS at 11:03

## 2023-01-01 RX ADMIN — Medication 10 ML: at 20:04

## 2023-01-01 RX ADMIN — SENNOSIDES AND DOCUSATE SODIUM 2 TABLET: 50; 8.6 TABLET ORAL at 08:41

## 2023-01-01 RX ADMIN — PROPOFOL INJECTABLE EMULSION 20 MCG/KG/MIN: 10 INJECTION, EMULSION INTRAVENOUS at 23:49

## 2023-01-01 RX ADMIN — POTASSIUM CHLORIDE 40 MEQ: 1.5 POWDER, FOR SOLUTION ORAL at 11:28

## 2023-01-01 RX ADMIN — CHLORHEXIDINE GLUCONATE 15 ML: 1.2 RINSE ORAL at 20:04

## 2023-01-01 RX ADMIN — LEVOTHYROXINE SODIUM 88 MCG: 0.09 TABLET ORAL at 08:42

## 2023-01-01 RX ADMIN — CEFTRIAXONE 2 G: 2 INJECTION, POWDER, FOR SOLUTION INTRAMUSCULAR; INTRAVENOUS at 10:15

## 2023-01-01 RX ADMIN — SODIUM CHLORIDE 1000 ML: 9 INJECTION, SOLUTION INTRAVENOUS at 11:34

## 2023-01-01 RX ADMIN — SENNOSIDES AND DOCUSATE SODIUM 2 TABLET: 50; 8.6 TABLET ORAL at 08:33

## 2023-04-01 ENCOUNTER — APPOINTMENT (OUTPATIENT)
Dept: GENERAL RADIOLOGY | Facility: HOSPITAL | Age: 88
End: 2023-04-01
Payer: MEDICARE

## 2023-04-01 ENCOUNTER — APPOINTMENT (OUTPATIENT)
Dept: CT IMAGING | Facility: HOSPITAL | Age: 88
End: 2023-04-01
Payer: MEDICARE

## 2023-04-01 ENCOUNTER — HOSPITAL ENCOUNTER (EMERGENCY)
Facility: HOSPITAL | Age: 88
Discharge: HOME | End: 2023-04-01
Attending: EMERGENCY MEDICINE | Admitting: EMERGENCY MEDICINE
Payer: MEDICARE

## 2023-04-01 VITALS
WEIGHT: 104.7 LBS | SYSTOLIC BLOOD PRESSURE: 116 MMHG | RESPIRATION RATE: 16 BRPM | HEART RATE: 82 BPM | OXYGEN SATURATION: 95 % | TEMPERATURE: 100.5 F | BODY MASS INDEX: 22.66 KG/M2 | DIASTOLIC BLOOD PRESSURE: 56 MMHG

## 2023-04-01 DIAGNOSIS — R33.9 URINARY RETENTION: ICD-10-CM

## 2023-04-01 DIAGNOSIS — R50.9 FEVER, UNSPECIFIED FEVER CAUSE: ICD-10-CM

## 2023-04-01 DIAGNOSIS — R31.9 URINARY TRACT INFECTION WITH HEMATURIA, SITE UNSPECIFIED: Primary | ICD-10-CM

## 2023-04-01 DIAGNOSIS — R11.0 NAUSEA: ICD-10-CM

## 2023-04-01 DIAGNOSIS — N39.0 URINARY TRACT INFECTION WITH HEMATURIA, SITE UNSPECIFIED: Primary | ICD-10-CM

## 2023-04-01 LAB
ALBUMIN SERPL-MCNC: 3.5 G/DL (ref 3.5–5.2)
ALBUMIN/GLOB SERPL: 1.3 G/DL
ALP SERPL-CCNC: 65 U/L (ref 39–117)
ALT SERPL W P-5'-P-CCNC: 8 U/L (ref 1–33)
ANION GAP SERPL CALCULATED.3IONS-SCNC: 12.5 MMOL/L (ref 5–15)
AST SERPL-CCNC: 16 U/L (ref 1–32)
BACTERIA UR QL AUTO: ABNORMAL /HPF
BASOPHILS # BLD AUTO: 0.03 10*3/MM3 (ref 0–0.2)
BASOPHILS NFR BLD AUTO: 0.2 % (ref 0–1.5)
BILIRUB SERPL-MCNC: 0.3 MG/DL (ref 0–1.2)
BILIRUB UR QL STRIP: NEGATIVE
BUN SERPL-MCNC: 30 MG/DL (ref 8–23)
BUN/CREAT SERPL: 26.1 (ref 7–25)
CALCIUM SPEC-SCNC: 8.6 MG/DL (ref 8.2–9.6)
CHLORIDE SERPL-SCNC: 102 MMOL/L (ref 98–107)
CLARITY UR: ABNORMAL
CO2 SERPL-SCNC: 24.5 MMOL/L (ref 22–29)
COLOR UR: YELLOW
CREAT SERPL-MCNC: 1.15 MG/DL (ref 0.57–1)
DEPRECATED RDW RBC AUTO: 53.7 FL (ref 37–54)
EGFRCR SERPLBLD CKD-EPI 2021: 44.8 ML/MIN/1.73
EOSINOPHIL # BLD AUTO: 0 10*3/MM3 (ref 0–0.4)
EOSINOPHIL NFR BLD AUTO: 0 % (ref 0.3–6.2)
ERYTHROCYTE [DISTWIDTH] IN BLOOD BY AUTOMATED COUNT: 14.5 % (ref 12.3–15.4)
FLUAV RNA RESP QL NAA+PROBE: NOT DETECTED
FLUBV RNA RESP QL NAA+PROBE: NOT DETECTED
GLOBULIN UR ELPH-MCNC: 2.7 GM/DL
GLUCOSE SERPL-MCNC: 121 MG/DL (ref 65–99)
GLUCOSE UR STRIP-MCNC: NEGATIVE MG/DL
HCT VFR BLD AUTO: 30 % (ref 34–46.6)
HGB BLD-MCNC: 9.5 G/DL (ref 12–15.9)
HGB UR QL STRIP.AUTO: ABNORMAL
HYALINE CASTS UR QL AUTO: ABNORMAL /LPF
IMM GRANULOCYTES # BLD AUTO: 0.07 10*3/MM3 (ref 0–0.05)
IMM GRANULOCYTES NFR BLD AUTO: 0.4 % (ref 0–0.5)
KETONES UR QL STRIP: NEGATIVE
LEUKOCYTE ESTERASE UR QL STRIP.AUTO: ABNORMAL
LYMPHOCYTES # BLD AUTO: 0.39 10*3/MM3 (ref 0.7–3.1)
LYMPHOCYTES NFR BLD AUTO: 2.3 % (ref 19.6–45.3)
MCH RBC QN AUTO: 32.1 PG (ref 26.6–33)
MCHC RBC AUTO-ENTMCNC: 31.7 G/DL (ref 31.5–35.7)
MCV RBC AUTO: 101.4 FL (ref 79–97)
MONOCYTES # BLD AUTO: 0.84 10*3/MM3 (ref 0.1–0.9)
MONOCYTES NFR BLD AUTO: 4.9 % (ref 5–12)
NEUTROPHILS NFR BLD AUTO: 15.87 10*3/MM3 (ref 1.7–7)
NEUTROPHILS NFR BLD AUTO: 92.2 % (ref 42.7–76)
NITRITE UR QL STRIP: NEGATIVE
NRBC BLD AUTO-RTO: 0 /100 WBC (ref 0–0.2)
PH UR STRIP.AUTO: 7 [PH] (ref 4.5–8)
PLATELET # BLD AUTO: 270 10*3/MM3 (ref 140–450)
PMV BLD AUTO: 9.7 FL (ref 6–12)
POTASSIUM SERPL-SCNC: 4.5 MMOL/L (ref 3.5–5.2)
PROT SERPL-MCNC: 6.2 G/DL (ref 6–8.5)
PROT UR QL STRIP: ABNORMAL
RBC # BLD AUTO: 2.96 10*6/MM3 (ref 3.77–5.28)
RBC # UR STRIP: ABNORMAL /HPF
REF LAB TEST METHOD: ABNORMAL
SARS-COV-2 RNA RESP QL NAA+PROBE: NOT DETECTED
SODIUM SERPL-SCNC: 139 MMOL/L (ref 136–145)
SP GR UR STRIP: 1.02 (ref 1–1.03)
SQUAMOUS #/AREA URNS HPF: ABNORMAL /HPF
UROBILINOGEN UR QL STRIP: ABNORMAL
WBC # UR STRIP: ABNORMAL /HPF
WBC NRBC COR # BLD: 17.2 10*3/MM3 (ref 3.4–10.8)

## 2023-04-01 PROCEDURE — 25010000002 CEFTRIAXONE SODIUM-DEXTROSE 1-3.74 GM-%(50ML) RECONSTITUTED SOLUTION: Performed by: EMERGENCY MEDICINE

## 2023-04-01 PROCEDURE — 25510000001 IOPAMIDOL PER 1 ML: Performed by: EMERGENCY MEDICINE

## 2023-04-01 PROCEDURE — 87636 SARSCOV2 & INF A&B AMP PRB: CPT | Performed by: EMERGENCY MEDICINE

## 2023-04-01 PROCEDURE — 80053 COMPREHEN METABOLIC PANEL: CPT | Performed by: EMERGENCY MEDICINE

## 2023-04-01 PROCEDURE — 74018 RADEX ABDOMEN 1 VIEW: CPT

## 2023-04-01 PROCEDURE — 96365 THER/PROPH/DIAG IV INF INIT: CPT

## 2023-04-01 PROCEDURE — 25010000002 ONDANSETRON PER 1 MG: Performed by: EMERGENCY MEDICINE

## 2023-04-01 PROCEDURE — 36415 COLL VENOUS BLD VENIPUNCTURE: CPT

## 2023-04-01 PROCEDURE — 99284 EMERGENCY DEPT VISIT MOD MDM: CPT

## 2023-04-01 PROCEDURE — 74177 CT ABD & PELVIS W/CONTRAST: CPT

## 2023-04-01 PROCEDURE — 81001 URINALYSIS AUTO W/SCOPE: CPT | Performed by: EMERGENCY MEDICINE

## 2023-04-01 PROCEDURE — 71045 X-RAY EXAM CHEST 1 VIEW: CPT

## 2023-04-01 PROCEDURE — 85025 COMPLETE CBC W/AUTO DIFF WBC: CPT | Performed by: EMERGENCY MEDICINE

## 2023-04-01 PROCEDURE — 87086 URINE CULTURE/COLONY COUNT: CPT | Performed by: EMERGENCY MEDICINE

## 2023-04-01 PROCEDURE — 96375 TX/PRO/DX INJ NEW DRUG ADDON: CPT

## 2023-04-01 RX ORDER — CEFPODOXIME PROXETIL 100 MG/1
100 TABLET, FILM COATED ORAL EVERY 12 HOURS
Qty: 14 TABLET | Refills: 0 | Status: SHIPPED | OUTPATIENT
Start: 2023-04-01

## 2023-04-01 RX ORDER — ONDANSETRON 2 MG/ML
4 INJECTION INTRAMUSCULAR; INTRAVENOUS ONCE
Status: COMPLETED | OUTPATIENT
Start: 2023-04-01 | End: 2023-04-01

## 2023-04-01 RX ORDER — ONDANSETRON 4 MG/1
4 TABLET, ORALLY DISINTEGRATING ORAL EVERY 6 HOURS PRN
Qty: 20 TABLET | Refills: 0 | Status: SHIPPED | OUTPATIENT
Start: 2023-04-01

## 2023-04-01 RX ORDER — ACETAMINOPHEN 500 MG
1000 TABLET ORAL ONCE
Status: COMPLETED | OUTPATIENT
Start: 2023-04-01 | End: 2023-04-01

## 2023-04-01 RX ORDER — CEFTRIAXONE 1 G/50ML
1 INJECTION, SOLUTION INTRAVENOUS ONCE
Status: COMPLETED | OUTPATIENT
Start: 2023-04-01 | End: 2023-04-01

## 2023-04-01 RX ADMIN — ACETAMINOPHEN 1000 MG: 500 TABLET ORAL at 16:06

## 2023-04-01 RX ADMIN — IOPAMIDOL 100 ML: 755 INJECTION, SOLUTION INTRAVENOUS at 17:33

## 2023-04-01 RX ADMIN — SODIUM CHLORIDE, POTASSIUM CHLORIDE, SODIUM LACTATE AND CALCIUM CHLORIDE 500 ML: 600; 310; 30; 20 INJECTION, SOLUTION INTRAVENOUS at 18:28

## 2023-04-01 RX ADMIN — CEFTRIAXONE 1 G: 1 INJECTION, SOLUTION INTRAVENOUS at 18:29

## 2023-04-01 RX ADMIN — ONDANSETRON 4 MG: 2 INJECTION INTRAMUSCULAR; INTRAVENOUS at 16:06

## 2023-04-01 NOTE — ED PROVIDER NOTES
Subjective   History of Present Illness  Patient presents with the vague complaints of some dry heaves, shaking and generalized weakness.  Family member was with her earlier and noticed that she seemed to be shivering and had some dry heaves.  Patient now seems to be back to her baseline per the family.  Patient says she still feels not her best but she feels much better compared to earlier.  Patient denies any recent travel, sick contacts or bad food exposure, or trauma.  No recent therapy prior to arrival.  Nothing seems to make it better or worse.  Patient is able tolerate p.o. fluids.        Review of Systems   All other systems reviewed and are negative.      Past Medical History:   Diagnosis Date   • Anxiety    • Chronic kidney disease    • Depression     resolved after daughter completed chemotherapy    • Disease of thyroid gland    • Hypertension    • Metabolic encephalopathy    • Pulmonary embolism     SPONTANEOUS HEMMORHAGE   • Subdural hematoma        No Known Allergies    Past Surgical History:   Procedure Laterality Date   • BREAST BIOPSY      BENIGN   • HARDWARE REMOVAL Left 11/11/2019    Procedure: FEMUR HARDWARE REMOVAL;  Surgeon: Dagoberto Owens MD;  Location: [facility] OR;  Service: Orthopedics   • HIP HEMIARTHROPLASTY Right 12/24/2020    Procedure: HIP HEMIARTHROPLASTY and all associated procedures;  Surgeon: Dagoberto Owens MD;  Location: [facility] OR;  Service: Orthopedics;  Laterality: Right;   • HYSTERECTOMY     • JOINT REPLACEMENT     • THYROID SURGERY      PARATHYROID REMOVAL   • TOTAL HIP ARTHROPLASTY Left 11/11/2019    Procedure: TOTAL HIP ARTHROPLASTY;  Surgeon: Dagoberto Owens MD;  Location: [facility] OR;  Service: Orthopedics   • VENA CAVA FILTER INSERTION         Family History   Problem Relation Age of Onset   • Breast cancer Daughter        Social History     Socioeconomic History   • Marital status:    Tobacco Use   • Smoking status: Former     Packs/day: 0.50     Years:  25.00     Pack years: 12.50     Types: Cigarettes     Quit date:      Years since quittin.2   • Smokeless tobacco: Never   Vaping Use   • Vaping Use: Never used   Substance and Sexual Activity   • Alcohol use: Yes     Alcohol/week: 1.0 standard drink     Types: 1 Glasses of wine per week     Comment: RARELY/Caffeine use   • Drug use: No   • Sexual activity: Defer           Objective   Physical Exam  Vitals and nursing note reviewed.   HENT:      Head: Normocephalic.      Mouth/Throat:      Mouth: Mucous membranes are moist.      Pharynx: Oropharynx is clear.   Eyes:      Conjunctiva/sclera: Conjunctivae normal.   Cardiovascular:      Rate and Rhythm: Normal rate and regular rhythm.      Heart sounds: Normal heart sounds.   Pulmonary:      Effort: Pulmonary effort is normal.   Abdominal:      General: There is no distension.      Palpations: Abdomen is soft.      Tenderness: There is no abdominal tenderness. There is no right CVA tenderness, left CVA tenderness or guarding.      Comments: Active bowel sounds heard in all 4 quadrants.   Musculoskeletal:         General: No tenderness.      Cervical back: Neck supple.   Skin:     General: Skin is warm and dry.      Capillary Refill: Capillary refill takes 2 to 3 seconds.   Neurological:      Mental Status: She is alert. Mental status is at baseline.   Psychiatric:         Mood and Affect: Mood normal.         Behavior: Behavior normal.         Procedures           ED Course                                           Medical Decision Making  ddx viral syndrome, UTI, gastritis, gastroenteritis, pyelonephritis, colitis    Labs Reviewed  URINALYSIS W/ CULTURE IF INDICATED - Abnormal; Notable for the following components:     Appearance, UA                  (*)                  Blood, UA                     Large (3+) (*)               Protein, UA                   Trace (*)               Leuk Esterase, UA             Small (1+) (*)            All other components  within normal limits         Narrative: In absence of clinical symptoms, the presence of pyuria, bacteria, and/or nitrites on the urinalysis result does not correlate with infection.  COMPREHENSIVE METABOLIC PANEL - Abnormal; Notable for the following components:     Glucose                       121 (*)                BUN                           30 (*)                 Creatinine                    1.15 (*)               BUN/Creatinine Ratio          26.1 (*)               eGFR                          44.8 (*)            All other components within normal limits         Narrative: GFR Normal >60                  Chronic Kidney Disease <60                  Kidney Failure <15                                    The GFR formula is only valid for adults with stable renal function between ages 18 and 70.  CBC WITH AUTO DIFFERENTIAL - Abnormal; Notable for the following components:     WBC                           17.20 (*)               RBC                           2.96 (*)               Hemoglobin                    9.5 (*)                Hematocrit                    30.0 (*)               MCV                           101.4 (*)               Neutrophil %                  92.2 (*)               Lymphocyte %                  2.3 (*)                Monocyte %                    4.9 (*)                Eosinophil %                  0.0 (*)                Neutrophils, Absolute         15.87 (*)               Lymphocytes, Absolute         0.39 (*)               Immature Grans, Absolute      0.07 (*)            All other components within normal limits  URINALYSIS, MICROSCOPIC ONLY - Abnormal; Notable for the following components:     RBC, UA                       13-20 (*)               WBC, UA                       6-12 (*)               Bacteria, UA                  3+ (*)                 Squamous Epithelial Cells, UA   7-12 (*)            All other components within normal limits  COVID-19 AND FLU A/B, NP SWAB IN  TRANSPORT MEDIA 8-12 HR TAT - Normal         Narrative: Fact sheet for providers: https://www.fda.gov/media/633274/download                                    Fact sheet for patients: https://www.fda.gov/media/461442/download                                    Test performed by PCR.  COVID PRE-OP / PRE-PROCEDURE SCREENING ORDER (NO ISOLATION)         Narrative: The following orders were created for panel order COVID PRE-OP / PRE-PROCEDURE SCREENING ORDER (NO ISOLATION) - Swab, Nasopharynx.                  Procedure                               Abnormality         Status                                     ---------                               -----------         ------                                     COVID-19 and FLU A/B PCR...([id number])  Normal              Final result                                                 Please view results for these tests on the individual orders.  URINE CULTURE  CBC AND DIFFERENTIAL    XR Abdomen 1 View    Result Date: 4/1/2023  Nonspecific nonobstructive bowel gas pattern. Signer Name: Timothy Marin MD  Signed: 4/1/2023 5:20 PM  Workstation Name: [id number]  Radiology [facility]    CT Abdomen Pelvis With Contrast    Result Date: 4/1/2023  1.  Right colonic and cecal wall thickening which may reflect colitis. Underlying mass is not excluded. Recommend colonoscopy evaluation upon resolution of symptoms. 2.  Large rectal stool burden. Sigmoid diverticulosis without evidence of acute diverticulitis. 3.  Cardiomegaly with trace bilateral pleural effusions. Signer Name: Timothy Marin MD  Signed: 4/1/2023 5:58 PM  Workstation Name: [id number]  Radiology [facility]    XR Chest 1 View    Result Date: 4/1/2023  Right upper lobe pneumonia. Signer Name: Timothy Marin MD  Signed: 4/1/2023 5:21 PM  Workstation Name: [id number]  Radiology [facility]    1825 Pt seen again prior to d/c.  Labs/Imaging reviewed and are remarkable for uti and possibly colitis;  she will be going home with zofran and antibiotics.  Symptoms improved and pt feels better compared to this morning; vitals stable and pt. in NAD. Non-toxic. Comfortable. Tolerating po.  Relaxed breathing.  Patient's daughter-in-law was present for the evaluation and discharge instructions.  She was also made aware of the abnormality on CT scan where they mentioned the possible colonic mass.  She was very thankful and said they will follow-up as needed.  All questions personally answered at the bedside and all d/c instructions personally reviewed with pt. And family  Discussed the importance of close outpt. f/u and pt. And family understands this and agrees to do so.  Pt and family agrees to return to ED immediately for any new, persistent, or worsening symptoms.    EMR Dragon/Transcription disclaimer:  Much of this encounter note is an electronic transcription/translation of spoken language to printed text, aka voice recognition.  The electronic translation of spoken language may permit erroneous or at times nonsensical words or phrases to be inadvertently transcribed; although I have reviewed the note for such errors, some may still exist so please interpret based on surrounding text content.      Fever, unspecified fever cause: complicated acute illness or injury  Nausea: complicated acute illness or injury  Urinary tract infection with hematuria, site unspecified: complicated acute illness or injury  Amount and/or Complexity of Data Reviewed  Labs: ordered.  Radiology: ordered.      Risk  OTC drugs.  Prescription drug management.          Final diagnoses:   Urinary tract infection with hematuria, site unspecified   Nausea   Fever, unspecified fever cause   Urinary retention       ED Disposition  ED Disposition     ED Disposition   Discharge    Condition   Stable    Comment   --             Dion Ann MD  [address] [facility] [address] [phone]    In 3 days           Medication List       New Prescriptions    cefpodoxime 100 MG tablet  Commonly known as: VANTIN  Take 1 tablet by mouth Every 12 (Twelve) Hours.     ondansetron ODT 4 MG disintegrating tablet  Commonly known as: ZOFRAN-ODT  Place 1 tablet on the tongue Every 6 (Six) Hours As Needed for Nausea or Vomiting.           Where to Get Your Medications      These medications were sent to [facility]/pharmacy #[id number] - [place], [place] - [address] [facility] [phone]  - [phone]   [address] [facility] [address]    Phone: [phone]   · cefpodoxime 100 MG tablet  · ondansetron ODT 4 MG disintegrating tablet          Celestine David MD  04/01/23 1452

## 2023-04-02 LAB — BACTERIA SPEC AEROBE CULT: NORMAL

## 2023-04-06 ENCOUNTER — HOSPITAL ENCOUNTER (OUTPATIENT)
Dept: GENERAL RADIOLOGY | Facility: HOSPITAL | Age: 88
Discharge: HOME | End: 2023-04-06
Admitting: INTERNAL MEDICINE
Payer: MEDICARE

## 2023-04-06 ENCOUNTER — TRANSCRIBE ORDERS (OUTPATIENT)
Dept: ADMINISTRATIVE | Facility: HOSPITAL | Age: 88
End: 2023-04-06
Payer: MEDICARE

## 2023-04-06 DIAGNOSIS — J18.1 UNRESOLVED LOBAR PNEUMONIA: Primary | ICD-10-CM

## 2023-04-06 DIAGNOSIS — J18.1 UNRESOLVED LOBAR PNEUMONIA: ICD-10-CM

## 2023-04-06 PROCEDURE — 71046 X-RAY EXAM CHEST 2 VIEWS: CPT

## 2023-06-27 PROBLEM — J18.9 PNEUMONIA: Status: ACTIVE | Noted: 2023-01-01

## 2023-06-27 NOTE — ED NOTES
ATTEMPTED STRAIGHT CATH URINE, NO URINE OUTPUT AT THIS TIME. WILL ATTEMPT AFTER FLUID COMPLETION.

## 2023-06-27 NOTE — ED NOTES
Pt POA/daughter Kathya Woodall ([phone]) called by this RN for information on patient. [place] states pt has a Living Will on file. [place] updated on patient's current status. She will not be able to come to the ER due to being out of town in [place] but can take calls at anytime.

## 2023-06-27 NOTE — PROGRESS NOTES
Called by nurse Rodriguez that the patient's family including H POA has requested that we continue all current measures however DNR in the event of arrest.    Orders entered at family request    Oscar Cruz MD  [facility]  06/27/23  17:52 EDT

## 2023-06-27 NOTE — ED PROVIDER NOTES
"Subjective     History provided by:  Patient and EMS personnel  History of Present Illness    Chief complaint: Low oxygen saturations and strong smelling urine.    Location: At home    Quality/Severity: Patient reportedly had low oxygen saturations and difficulty breathing.  She is strong smelling urine.    Timing/Onset: Over the weekend 3 days ago.    Modifying Factors: Patient has a history of pneumonia and UTIs.    Associated symptoms: No reported fever.  Patient does have a cough.    Narrative: The patient is a 89-year-old white female with complaint of shortness of breath and strong smelling urine.  She has a cough.  She also has felt sick and weak.  EMS notes a room air oxygen saturation of 78% that improved in the 90s on nonrebreather mask.  The patient has a history of pneumonia and UTIs.    Past Medical History:   Diagnosis Date    Anxiety     Chronic kidney disease     Depression     resolved after daughter completed chemotherapy     Disease of thyroid gland     Hypertension     Metabolic encephalopathy     Pulmonary embolism     SPONTANEOUS HEMMORHAGE    Subdural hematoma      /64 (BP Location: Right arm, Patient Position: Lying)   Pulse 100   Temp 99.2 °F (37.3 °C) (Rectal)   Resp (!) 30   Ht 170.2 cm (67\")   Wt 42.2 kg (93 lb)   SpO2 93%   BMI 14.57 kg/m²   Review of Systems    Past Medical History:   Diagnosis Date    Anxiety     Chronic kidney disease     Depression     resolved after daughter completed chemotherapy     Disease of thyroid gland     Hypertension     Metabolic encephalopathy     Pulmonary embolism     SPONTANEOUS HEMMORHAGE    Subdural hematoma        No Known Allergies    Past Surgical History:   Procedure Laterality Date    BREAST BIOPSY      BENIGN    HARDWARE REMOVAL Left 11/11/2019    Procedure: FEMUR HARDWARE REMOVAL;  Surgeon: Dagoberto Owens MD;  Location: [facility];  Service: Orthopedics    HIP HEMIARTHROPLASTY Right 12/24/2020    Procedure: HIP " HEMIARTHROPLASTY and all associated procedures;  Surgeon: Dagoberto Owens MD;  Location: [facility] OR;  Service: Orthopedics;  Laterality: Right;    HYSTERECTOMY      JOINT REPLACEMENT      THYROID SURGERY      PARATHYROID REMOVAL    TOTAL HIP ARTHROPLASTY Left 2019    Procedure: TOTAL HIP ARTHROPLASTY;  Surgeon: Dagoberto Owens MD;  Location: [facility] OR;  Service: Orthopedics    VENA CAVA FILTER INSERTION         Family History   Problem Relation Age of Onset    Breast cancer Daughter        Social History     Socioeconomic History    Marital status:    Tobacco Use    Smoking status: Former     Packs/day: 0.50     Years: 25.00     Pack years: 12.50     Types: Cigarettes     Quit date:      Years since quittin.5    Smokeless tobacco: Never   Vaping Use    Vaping Use: Never used   Substance and Sexual Activity    Alcohol use: Yes     Alcohol/week: 1.0 standard drink     Types: 1 Glasses of wine per week     Comment: RARELY/Caffeine use    Drug use: No    Sexual activity: Defer           Objective   Physical Exam  Vitals and nursing note reviewed.   Constitutional:       Comments: Patient appears weak and acutely ill.  Review of the patient's vital signs: The patient is febrile with a temperature 101.7, tachypnea with a respiratory rate of 38 with a oxygen saturation of 90% on oxygen, tachycardic with a heart rate of 125, blood pressure normal 129/86.   HENT:      Head: Normocephalic and atraumatic.      Comments: No facial asymmetry.     Nose: Nose normal.      Mouth/Throat:      Mouth: Mucous membranes are moist.      Pharynx: No posterior oropharyngeal erythema.   Eyes:      General: No scleral icterus.     Conjunctiva/sclera: Conjunctivae normal.      Pupils: Pupils are equal, round, and reactive to light.   Cardiovascular:      Rate and Rhythm: Regular rhythm. Tachycardia present.      Heart sounds: No murmur heard.  Pulmonary:      Effort: Tachypnea present.      Breath sounds:  Examination of the right-upper field reveals decreased breath sounds and rhonchi. Examination of the right-middle field reveals decreased breath sounds and rhonchi. Examination of the right-lower field reveals decreased breath sounds and rhonchi. Decreased breath sounds and rhonchi present. No wheezing or rales.      Comments: Tachypnea  Abdominal:      General: Bowel sounds are normal. There is no distension.      Palpations: Abdomen is soft.      Tenderness: There is no abdominal tenderness.   Musculoskeletal:         General: No deformity or signs of injury.      Cervical back: Normal range of motion and neck supple. No tenderness.      Right lower leg: No edema.      Left lower leg: No edema.   Lymphadenopathy:      Cervical: No cervical adenopathy.   Skin:     General: Skin is warm and dry.      Capillary Refill: Capillary refill takes less than 2 seconds.   Neurological:      General: No focal deficit present.      Mental Status: She is alert and oriented to person, place, and time.      Cranial Nerves: No cranial nerve deficit.      Sensory: No sensory deficit.      Motor: No weakness.   Psychiatric:      Comments: Depressed mood.       Procedures           ED Course  ED Course as of 06/27/23 1347   e Jun 27, 2023   1035 Interpretation of the patient's EKG tracing performed 10: 32 is sinus tachycardia with PACs, normal axis, normal conduction, no acute ST segment elevation or depression consistent with ischemia, normal R wave transition, normal CO and QT intervals.  With the exception of the PAC there is no significant change from 12/19/2021. [TP]   1133 Patient's chest x-ray shows a infiltrate encompassing the entire right lung. [TP]   1133 Patient was treated for sepsis with IV normal saline bolus.  She was administered Tylenol for her fever.  Antibiotic therapy for her right upper lobe pneumonia will be initiated with Rocephin 2 g and doxycycline 100 mg IV. [TP]   1133 I discussed the patient with her  POA in [place] who confirmed the patient is a full code. [TP]   1133 11: 30 patient discussed with Dr. West, hospitalist, who requested the patient wait till he 2-hour troponin is for admission here. [TP]   1134 Review of the patient's laboratory studies: The patient CBC has an elevated white count of 14.6 with a left shift.  The patient is anemic with a hemoglobin 8.4 hematocrit 27.7.  Procalcitonin is elevated 20.74 and lactic acid elevated at 2.6 consistent with sepsis.  An arterial blood gas on 6 L of oxygen via oral cavity had a pH of 7.49, PCO2 of 31.8, PO2 of 50.2 and an O2 saturation of 87.6%.  The patient was placed on high flow O2 which improved her pulse oximetry to 100%. [TP]   1135 The nurses cath the patient for a UA, but obtained no urine. [TP]   1148 11:47 patient discussed with Dr. Sylvester, cardiology on-call, who stated the her elevated troponin is consistent with her underlying pneumonia and disease.  He did not foresee any cardiac procedures being performed of the patient to require transfer to [facility].  He stated they would probably get a echocardiogram on the patient. [TP]   1149 11:50 patient again discussed with Dr. West, hospitalist, who agreed to admit the patient to the ICU. [TP]   1154 11:53 patient again discussed with Dr. West, we discussed the patient with Dr. Oscar Connolly, pulmonologist, who wanted the patient transferred to [facility]. [TP]   1235 12: 33 patient discussed with Dr. Sylvester, neurologist who evaluated the patient in the ER, stated that her echocardiogram performed in the ER was fine.  He stated the patient was fully fluid resuscitated and we can stop administering IV fluids. [TP]   1242 12:40 patient discussed with Dr. Andrade, intensivist, who accepted the patient in transfer. [TP]      ED Course User Index  [TP] Vinicius Leblanc MD                                           Medical Decision Making  My differential diagnosis for dyspnea  includes but is not limited to:  Asthma, COPD, pneumonia, pulmonary embolus, acute respiratory distress syndrome, pneumothorax, pleural effusion, pulmonary fibrosis, congestive heart failure, myocardial infarction, DKA, uremia, acidosis, sepsis, anemia, drug related, hyperventilation, CNS disease     Problems Addressed:  Pneumonia of right lung due to infectious organism, unspecified part of lung: complicated acute illness or injury  Sepsis with acute hypoxic respiratory failure without septic shock, due to unspecified organism: complicated acute illness or injury    Amount and/or Complexity of Data Reviewed  Independent Historian: guardian and friend  Labs: ordered. Decision-making details documented in ED Course.  Radiology: ordered. Decision-making details documented in ED Course.  ECG/medicine tests: ordered. Decision-making details documented in ED Course.  Discussion of management or test interpretation with external provider(s): Details documented in the ED course.    Risk  OTC drugs.  Prescription drug management.  Decision regarding hospitalization.        Final diagnoses:   Pneumonia of right lung due to infectious organism, unspecified part of lung   Sepsis with acute hypoxic respiratory failure without septic shock, due to unspecified organism       ED Disposition  ED Disposition       ED Disposition   Transfer to Another Facility     Condition   --    Comment   --               No follow-up provider specified.       Medication List      No changes were made to your prescriptions during this visit.           Labs Reviewed   COMPREHENSIVE METABOLIC PANEL - Abnormal; Notable for the following components:       Result Value    BUN 47 (*)     Creatinine 1.39 (*)     Total Protein 5.7 (*)     Albumin 2.4 (*)     BUN/Creatinine Ratio 33.8 (*)     eGFR 35.7 (*)     All other components within normal limits    Narrative:     GFR Normal >60  Chronic Kidney Disease <60  Kidney Failure <15    The GFR formula is  "only valid for adults with stable renal function between ages 18 and 70.   LACTIC ACID, PLASMA - Abnormal; Notable for the following components:    Lactate 2.6 (*)     All other components within normal limits   PROCALCITONIN - Abnormal; Notable for the following components:    Procalcitonin 20.74 (*)     All other components within normal limits    Narrative:     As a Marker for Sepsis (Non-Neonates):    1. <0.5 ng/mL represents a low risk of severe sepsis and/or septic shock.  2. >2 ng/mL represents a high risk of severe sepsis and/or septic shock.    As a Marker for Lower Respiratory Tract Infections that require antibiotic therapy:    PCT on Admission    Antibiotic Therapy       6-12 Hrs later    >0.5                Strongly Recommended  >0.25 - <0.5        Recommended   0.1 - 0.25          Discouraged              Remeasure/reassess PCT  <0.1                Strongly Discouraged     Remeasure/reassess PCT    As 28 day mortality risk marker: \"Change in Procalcitonin Result\" (>80% or <=80%) if Day 0 (or Day 1) and Day 4 values are available. Refer to http://www.[employer]-pct-calculator.com    Change in PCT <=80%  A decrease of PCT levels below or equal to 80% defines a positive change in PCT test result representing a higher risk for 28-day all-cause mortality of patients diagnosed with severe sepsis for septic shock.    Change in PCT >80%  A decrease of PCT levels of more than 80% defines a negative change in PCT result representing a lower risk for 28-day all-cause mortality of patients diagnosed with severe sepsis or septic shock.      CBC WITH AUTO DIFFERENTIAL - Abnormal; Notable for the following components:    WBC 14.61 (*)     RBC 2.95 (*)     Hemoglobin 8.4 (*)     Hematocrit 27.7 (*)     MCHC 30.3 (*)     RDW 17.6 (*)     RDW-SD 61.6 (*)     Neutrophil % 90.5 (*)     Lymphocyte % 5.3 (*)     Monocyte % 4.1 (*)     Eosinophil % 0.0 (*)     Neutrophils, Absolute 13.23 (*)     All other components within " normal limits   TROPONIN - Abnormal; Notable for the following components:    HS Troponin T 216 (*)     All other components within normal limits    Narrative:     High Sensitive Troponin T Reference Range:  <10.0 ng/L- Negative Female for AMI  <15.0 ng/L- Negative Male for AMI  >=10 - Abnormal Female indicating possible myocardial injury.  >=15 - Abnormal Male indicating possible myocardial injury.   Clinicians would have to utilize clinical acumen, EKG, Troponin, and serial changes to determine if it is an Acute Myocardial Infarction or myocardial injury due to an underlying chronic condition.        BNP (IN-HOUSE) - Abnormal; Notable for the following components:    proBNP 17,721.0 (*)     All other components within normal limits    Narrative:     Among patients with dyspnea, NT-proBNP is highly sensitive for the detection of acute congestive heart failure. In addition NT-proBNP of <300 pg/ml effectively rules out acute congestive heart failure with 99% negative predictive value.     BLOOD GAS, ARTERIAL - Abnormal; Notable for the following components:    pH, Arterial 7.490 (*)     pCO2, Arterial 31.8 (*)     pO2, Arterial 50.2 (*)     O2 Saturation, Arterial 87.6 (*)     Hemoglobin, Blood Gas 8.5 (*)     pCO2, Temperature Corrected 31.8 (*)     pH, Temp Corrected 7.490 (*)     pO2, Temperature Corrected 50.2 (*)     All other components within normal limits   HIGH SENSITIVITIY TROPONIN T 2HR - Abnormal; Notable for the following components:    HS Troponin T 214 (*)     All other components within normal limits    Narrative:     High Sensitive Troponin T Reference Range:  <10.0 ng/L- Negative Female for AMI  <15.0 ng/L- Negative Male for AMI  >=10 - Abnormal Female indicating possible myocardial injury.  >=15 - Abnormal Male indicating possible myocardial injury.   Clinicians would have to utilize clinical acumen, EKG, Troponin, and serial changes to determine if it is an Acute Myocardial Infarction or  myocardial injury due to an underlying chronic condition.        COVID-19 AND FLU A/B, NP SWAB IN TRANSPORT MEDIA 8-12 HR TAT - Normal    Narrative:     Fact sheet for providers: https://www.fda.gov/media/238334/download    Fact sheet for patients: https://www.fda.gov/media/325418/download    Test performed by PCR.   BLOOD CULTURE   BLOOD CULTURE   BLOOD GAS, ARTERIAL   SCAN SLIDE   URINALYSIS W/ CULTURE IF INDICATED   LACTIC ACID, REFLEX   CBC AND DIFFERENTIAL    Narrative:     The following orders were created for panel order CBC & Differential.  Procedure                               Abnormality         Status                     ---------                               -----------         ------                     CBC Auto Differential[[id number]]        Abnormal            Final result               Scan Slide[[id number]]                                       Final result                 Please view results for these tests on the individual orders.     XR Chest 1 View   Final Result   Worsening infiltrates throughout the right lung with moderate right   pleural effusion. Stable mild cardiomegaly.       This report was finalized on 6/27/2023 11:23 AM by Dr. Selvin Quinones MD.                 Medication List      No changes were made to your prescriptions during this visit.              Vinicius Leblanc MD  06/27/23 [id number]

## 2023-06-27 NOTE — PLAN OF CARE
Goal Outcome Evaluation:    Patient arrived to ICU this afternoon.  She was very difficult to rouse and was on 8L hi flow.  She had to be intubated at the bedside.  Abx were started as well as propofol and levo.  Family has made her DNR but still wants treatment.  Will continue to monitor.

## 2023-06-27 NOTE — CONSULTS
[facility]        Patient Name: Nicolasa Woodall  Age/Sex: 89 y.o. female  : 1930  MRN: [id number]    Date of Admission: 2023  Date of Encounter Visit: 23  Encounter Provider: Abdullahi Sylvester MD  Referring Provider: Vinicius Leblanc MD  Place of Service: [facility] CARDIOLOGY  Patient Care Team:  Dion Ann MD as PCP - General (Family Medicine)    Subjective:     Chief Complaint: Respiratory failure    Reason for consult: Elevated troponin in the setting of respiratory failure    History of Present Illness:  Nicolasa Woodall is a 89 y.o. female who does not routinely see a cardiologist.  She has a history of dementia, CKD, she was brought to the emergency department for subacute malaise, fatigue, and dyspnea with encephalopathy.  She had a cough.  When she was evaluate by EMS, she was noted to have a room air O2 sat of 78% that improved to 90% of nonrebreather.  In talking to the patient's son who is present at bedside, she has had worsening decreased appetite over the last several months, and accompanied with her dementia.  Notably, she is full code.      Upon evaluation in the emergency department, she was noted to have escalating O2 requirements.  Her BNP was markedly elevated, creatinine elevated, and I sent a troponin was checked and was elevated.  Patient gives no clinical history of chest pain.  An EKG was performed which was notable for Multifocal atrial tachycardia.  Due to the elevated troponin, respiratory failure with elevated BNP, cardiologist was consulted for further recommendations.    Currently, the patient, evaluation in the [place] ER, is demented unable to give history.  She does have some labored respirations.            Past Medical History:  Past Medical History:   Diagnosis Date    Anxiety     Chronic kidney disease     Depression     resolved after daughter completed chemotherapy     Disease of thyroid gland      Hypertension     Metabolic encephalopathy     Pulmonary embolism     SPONTANEOUS HEMMORHAGE    Subdural hematoma        Past Surgical History:   Procedure Laterality Date    BREAST BIOPSY      BENIGN    HARDWARE REMOVAL Left 11/11/2019    Procedure: FEMUR HARDWARE REMOVAL;  Surgeon: Dagoberto Owens MD;  Location: [facility] OR;  Service: Orthopedics    HIP HEMIARTHROPLASTY Right 12/24/2020    Procedure: HIP HEMIARTHROPLASTY and all associated procedures;  Surgeon: Dagoberto Owens MD;  Location: [facility] OR;  Service: Orthopedics;  Laterality: Right;    HYSTERECTOMY      JOINT REPLACEMENT      THYROID SURGERY      PARATHYROID REMOVAL    TOTAL HIP ARTHROPLASTY Left 11/11/2019    Procedure: TOTAL HIP ARTHROPLASTY;  Surgeon: Dagoberto Owens MD;  Location: [facility] OR;  Service: Orthopedics    VENA CAVA FILTER INSERTION         Home Medications:   Medications Prior to Admission   Medication Sig Dispense Refill Last Dose    dronabinol (MARINOL) 5 MG capsule Take 1 capsule by mouth 2 (Two) Times a Day Before Meals.       DULoxetine (CYMBALTA) 60 MG capsule Take 60 mg by mouth Daily.       ferrous gluconate (FERGON) 324 MG tablet Take 1 tablet by mouth Daily With Breakfast. 30 tablet 0     levothyroxine (SYNTHROID, LEVOTHROID) 88 MCG tablet Take 88 mcg by mouth Daily.       memantine (NAMENDA XR) 7 MG capsule sustained-release 24 hr extended release capsule        mirtazapine (REMERON) 15 MG tablet Take 15 mg by mouth Every Night.       ondansetron ODT (ZOFRAN-ODT) 4 MG disintegrating tablet Place 1 tablet on the tongue Every 6 (Six) Hours As Needed for Nausea or Vomiting. 20 tablet 0     sodium chloride 1 g tablet Take 1 tablet by mouth 3 (Three) Times a Day With Meals. 90 tablet 1     vitamin D (ERGOCALCIFEROL) 1.25 MG (00533 UT) capsule capsule Take 50,000 Units by mouth 1 (One) Time Per Week.          Allergies:  No Known Allergies    Past Social History:  Social History     Socioeconomic History    Marital status:     Tobacco Use    Smoking status: Former     Packs/day: 0.50     Years: 25.00     Pack years: 12.50     Types: Cigarettes     Quit date:      Years since quittin.5    Smokeless tobacco: Never   Vaping Use    Vaping Use: Never used   Substance and Sexual Activity    Alcohol use: Yes     Alcohol/week: 1.0 standard drink     Types: 1 Glasses of wine per week     Comment: RARELY/Caffeine use    Drug use: No    Sexual activity: Defer       Past Family History:  Family History   Problem Relation Age of Onset    Breast cancer Daughter        REVIEW OF SYSTEMS:   14 point ROS was performed and is negative except as outlined in HPI          Objective:   Temp:  [99.2 °F (37.3 °C)-101.7 °F (38.7 °C)] 99.2 °F (37.3 °C)  Heart Rate:  [100-125] 101  Resp:  [22-38] 22  BP: (111-166)/(64-98) 123/69  FiO2 (%):  [100 %] 100 %   No intake or output data in the 24 hours ending 23 1606  There is no height or weight on file to calculate BMI.  There were no vitals filed for this visit.  Weight change:     General Appearance:    appears acutely ill   Head:    Normocephalic, without obvious abnormality, atraumatic   Eyes:            Lids and lashes normal, conjunctivae and sclerae normal, no   icterus, no pallor, corneas clear, PERRLA   Ears:    Ears appear intact with no abnormalities noted   Throat:   No oral lesions, no thrush, oral mucosa moist   Neck:   No adenopathy, supple, trachea midline, no thyromegaly, no   carotid bruit, distended EJ neck veins but no overt JVD   Back:     No kyphosis present, no scoliosis present, no skin lesions, erythema or scars, no tenderness to percussion or palpation, range of motion normal   Lungs:   Scattered rhonchorous airway sounds on right lung, with inspiratory crackles.  Slightly increased work of breathing on high flow nasal cannula.      Heart:  Irregularly irregular, normal S1 and S2, no murmur, no gallop, no rub, no click, I appreciate no murmurs   Chest Wall:    No  abnormalities observed   Abdomen:     Normal bowel sounds, no masses, no organomegaly, soft, nontender, nondistended, no guarding, no rebound  tenderness   Extremities:   Moves all extremities well, but does not follow commands., no edema, no cyanosis, no redness   Pulses:   Pulses palpable and equal bilaterally. Normal radial, carotid, femoral, dorsalis pedis and posterior tibial pulses bilaterally. Normal abdominal aorta   Skin:  Psychiatric:   No bleeding, bruising or rash    Alert, not oriented, does not respond to questioning, normal mood and affect     Lab Review:   Results from last 7 days   Lab Units 06/27/23  1033   SODIUM mmol/L 138   POTASSIUM mmol/L 4.7   CHLORIDE mmol/L 103   CO2 mmol/L 23.4   BUN mg/dL 47*   CREATININE mg/dL 1.39*   GLUCOSE mg/dL 87   CALCIUM mg/dL 8.6   AST (SGOT) U/L 17   ALT (SGPT) U/L 16     Results from last 7 days   Lab Units 06/27/23  1235 06/27/23  1033   HSTROP T ng/L 214* 216*     Results from last 7 days   Lab Units 06/27/23  1033   WBC 10*3/mm3 14.61*   HEMOGLOBIN g/dL 8.4*   HEMATOCRIT % 27.7*   PLATELETS 10*3/mm3 307                   Invalid input(s): LDLCALC  Results from last 7 days   Lab Units 06/27/23  1033   PROBNP pg/mL 17,721.0*           Echo EF Estimated  Lab Results   Component Value Date    ECHOEFEST 80 04/18/2018       EKG:   I personally viewed and interpreted the patient's EKG  Her EKG shows an irregular, narrow complex tachycardia with varying morphology of P waves that appear suggestive of multifocal atrial tachycardia.    Imaging:  Imaging Results (Most Recent)       Procedure Component Value Units Date/Time    XR Chest 1 View [[id number]] Collected: 06/27/23 1552     Updated: 06/27/23 1557    Narrative:      XR CHEST 1 VW-     HISTORY: Female who is 89 years-old,  endotracheal intubation     TECHNIQUE: Frontal view of the chest     COMPARISON: 06/27/2023 at 1109 hours     FINDINGS: The patient is rotated towards the right. Endotracheal tube  tip is 1.2  cm above the daniel. The heart is enlarged. Aorta is  tortuous, calcified. Pulmonary vasculature is unremarkable. Persistent  opacity is seen at the right apex, persistent right basilar  atelectasis/infiltrate/effusion. No pneumothorax. No acute osseous  process.       Impression:      Endotracheal intubation. Otherwise, no significant change.     This report was finalized on 6/27/2023 3:54 PM by Dr. Rodrigo Newman M.D.                   Assessment:     Active Hospital Problems    Diagnosis  POA    **Pneumonia [J18.9]  Yes      Resolved Hospital Problems   No resolved problems to display.          Plan:     Troponin elevation: Consistent with acute myocardial injury secondary to her hypoxia.  She gives no clinical history of chest pain.  Her echo shows normal wall motion and normal LVEF.  This is not ACS.  Treatment of pneumonia begun by primary team, recommend treatment of underlying causes which is likely driving her troponin elevation.  Pneumonia, suspected bacterial.  Elevated procalcitonin.  The unilateral nature of the infiltrates, seems to be a right-sided whiteout on her chest x-ray which makes pulmonary edema less likely.  Elevated BNP  Her BNP is elevated, but the unilateral nature of the infiltrates on her chest x-ray make pulmonary edema seem unlikely to be the main  of this hypoxia.  Noted CT chest planning by primary team.  Her BNP is elevated, and on her echo her IVC is dilated.  RVSP is elevated likely related to her pneumonia.  I would hold off on further IV fluid resuscitation, if she has worsening hypotension this may be more related to distributive shock rather than hypovolemic.  She may ultimately need gentle diuresis, her neck veins do seem a little distended on my exam after she received IV fluids in the ED.  Multifocal atrial tachycardia: Likely due to #2  Moderate mitral regurgitation.    Thank you for the consult.  Cardiology will follow.  Her echo does show a rather eccentric  mitral regurgitation jet that is difficult to assess in the setting of tachycardia and her echo was obtained in the time of respiratory distress.  She may need a repeat echocardiogram as her clinical course progresses and her heart rate becomes better controlled as her pneumonia and sepsis are treated.  Certainly, given her clinical status, dementia, and age would prohibit her from any interventions for her valve even if the regurgitation were severe at this time.    Abdullahi Sylvester MD  [facility]  06/27/23  16:06 EDT      Part of this note may be an electronic transcription/translation of spoken language to printed text using the Dragon Dictation System.

## 2023-06-27 NOTE — H&P
H&P NOTE    Patient Identification:  Nicolasa Woodall  89 y.o.  female  9/19/1930  [id number]              CC: Transfer from [place] with sepsis pneumonia altered mental status    History of Present Illness:  89-year-old female presented to [place] ER with low oxygen saturation and strong smelling urine.  She had also reported to be increasingly short of breath with ongoing cough.  She has history of pneumonia and UTI in the past.  Work-up in [place] was done and was noted with severe sepsis and right-sided severe pneumonia.  Patient was transferred to [facility] for further care.  At the time of my evaluation patient is pretty much obtunded with low blood pressure.  She is not arousable and unable to protect her airway.  I spoke with patient's daughter on the phone in [place] and she wanted her to be full code and proceed with intubation and mechanical ventilation.      Review of Systems  As above and limited with patient's current condition  Past Medical History:  Past Medical History:   Diagnosis Date   • Anxiety    • Chronic kidney disease    • Depression     resolved after daughter completed chemotherapy    • Disease of thyroid gland    • Hypertension    • Metabolic encephalopathy    • Pulmonary embolism     SPONTANEOUS HEMMORHAGE   • Subdural hematoma        Past Surgical History:  Past Surgical History:   Procedure Laterality Date   • BREAST BIOPSY      BENIGN   • HARDWARE REMOVAL Left 11/11/2019    Procedure: FEMUR HARDWARE REMOVAL;  Surgeon: Dagoberto Owens MD;  Location: [facility];  Service: Orthopedics   • HIP HEMIARTHROPLASTY Right 12/24/2020    Procedure: HIP HEMIARTHROPLASTY and all associated procedures;  Surgeon: Dagoberto Owens MD;  Location: [facility];  Service: Orthopedics;  Laterality: Right;   • HYSTERECTOMY     • JOINT REPLACEMENT     • THYROID SURGERY      PARATHYROID REMOVAL   • TOTAL HIP ARTHROPLASTY Left 11/11/2019    Procedure: TOTAL HIP ARTHROPLASTY;  Surgeon: Dagoberto Owens  MD;  Location: [facility];  Service: Orthopedics   • VENA CAVA FILTER INSERTION          Home Meds:  Medications Prior to Admission   Medication Sig Dispense Refill Last Dose   • dronabinol (MARINOL) 5 MG capsule Take 1 capsule by mouth 2 (Two) Times a Day Before Meals.      • DULoxetine (CYMBALTA) 60 MG capsule Take 60 mg by mouth Daily.      • ferrous gluconate (FERGON) 324 MG tablet Take 1 tablet by mouth Daily With Breakfast. 30 tablet 0    • levothyroxine (SYNTHROID, LEVOTHROID) 88 MCG tablet Take 88 mcg by mouth Daily.      • memantine (NAMENDA XR) 7 MG capsule sustained-release 24 hr extended release capsule       • mirtazapine (REMERON) 15 MG tablet Take 15 mg by mouth Every Night.      • ondansetron ODT (ZOFRAN-ODT) 4 MG disintegrating tablet Place 1 tablet on the tongue Every 6 (Six) Hours As Needed for Nausea or Vomiting. 20 tablet 0    • sodium chloride 1 g tablet Take 1 tablet by mouth 3 (Three) Times a Day With Meals. 90 tablet 1    • vitamin D (ERGOCALCIFEROL) 1.25 MG (76542 UT) capsule capsule Take 50,000 Units by mouth 1 (One) Time Per Week.          Allergies:  No Known Allergies    Social History:   Social History     Socioeconomic History   • Marital status:    Tobacco Use   • Smoking status: Former     Packs/day: 0.50     Years: 25.00     Pack years: 12.50     Types: Cigarettes     Quit date:      Years since quittin.5   • Smokeless tobacco: Never   Vaping Use   • Vaping Use: Never used   Substance and Sexual Activity   • Alcohol use: Yes     Alcohol/week: 1.0 standard drink     Types: 1 Glasses of wine per week     Comment: RARELY/Caffeine use   • Drug use: No   • Sexual activity: Defer       Family History:  Family History   Problem Relation Age of Onset   • Breast cancer Daughter        Physical Exam:  There were no vitals taken for this visit. There is no height or weight on file to calculate BMI.      Physical Exam  Patient is examined using the personal protective equipment  as per guidelines from infection control for this particular patient as enacted.  Hand hygiene was performed before and after patient interaction.  Elderly female pretty much obtunded on deep sternal rub opens eyes mumbles a few words and then goes back to sleep  Eyes normal conjunctivae and pupils reactive to light  ENT dry oral mucous membrane  Neck midline trachea no thyromegaly  Chest diminished breath sounds crackles on the right base  CVS regular rate and rhythm no lower extremity edema  Abdomen soft nontender no hepatosplenomegaly  CNS Sleepy obtunded  Skin no rashes no nodules  Psych Sleepy obtunded confused  Musculoskeletal no cyanosis no clubbing normal range of motion        LABS:  Lab Results   Component Value Date    CALCIUM 8.6 06/27/2023    PHOS 2.7 04/20/2018     Results from last 7 days   Lab Units 06/27/23  1033   SODIUM mmol/L 138   POTASSIUM mmol/L 4.7   CHLORIDE mmol/L 103   CO2 mmol/L 23.4   BUN mg/dL 47*   CREATININE mg/dL 1.39*   GLUCOSE mg/dL 87   CALCIUM mg/dL 8.6   WBC 10*3/mm3 14.61*   HEMOGLOBIN g/dL 8.4*   PLATELETS 10*3/mm3 307   ALT (SGPT) U/L 16   AST (SGOT) U/L 17   PROBNP pg/mL 17,721.0*   PROCALCITONIN ng/mL 20.74*     Lab Results   Component Value Date    CKTOTAL 148 04/19/2018    TROPONINT 214 (C) 06/27/2023     Results from last 7 days   Lab Units 06/27/23  1235 06/27/23  1033   HSTROP T ng/L 214* 216*         Results from last 7 days   Lab Units 06/27/23  1040 06/27/23  1033   PROCALCITONIN ng/mL  --  20.74*   LACTATE mmol/L 2.6*  --      Results from last 7 days   Lab Units 06/27/23  1035   PH, ARTERIAL pH units 7.490*   PCO2, ARTERIAL mm Hg 31.8*   PO2 ART mm Hg 50.2*   O2 SATURATION ART % 87.6*   FLOW RATE lpm 6.0   MODALITY  Nasal Cannula     Results from last 7 days   Lab Units 06/27/23  1143   INFLUENZA B PCR  Not Detected             Lab Results   Component Value Date    TSH 0.671 12/19/2021     Estimated Creatinine Clearance: 17.2 mL/min (A) (by C-G formula based on  SCr of 1.39 mg/dL (H)).         Imaging: I personally visualized the images of scans/x-rays performed within last 3 days.      Assessment:  Acute hypoxemic respiratory failure  Severe sepsis  Right-sided pneumonia  Altered mental status  Right pleural effusion loculated  Lactic acidosis  Hypothyroidism  Elevated troponin likely from sepsis  Acute renal insufficiency    Recommendations:  At this time we have elderly female with severe sepsis likely source pneumonia but also reported her urine was foul-smelling.  Initiate broad-spectrum antibiotic Zosyn with doxycycline.  We will send for urine culture also.  I suspect right-sided effusion likely from pneumonia.  Will get CT chest to evaluate further.  She will require intubation mechanical ventilation due to her current mental status and respiratory status.  I had a detailed discussion with patient's daughter who is agreeable to proceed with full code  Fluids will be given currently blood pressure improved with fluid bolus.  Continue to monitor.  May need pressors to maintain MAP greater than 65  CT chest once post intubation  Trend lactate  Elevated troponin likely from sepsis I was told by ER physician that cardiology evaluated patient and reported elevated troponin likely from sepsis.  Monitor creatinine closely with IV fluids  ICU core measures  Her prognosis is guarded and I had had a detailed discussion with the patient's daughter who understands.  She wants her to be a full code at this time.    Care time 40 minutes excluding procedures          Arelis Summers MD  6/27/2023  15:19 EDT      Much of this encounter note is an electronic transcription/translation of spoken language to printed text using Dragon Software.

## 2023-06-28 PROBLEM — E43 SEVERE MALNUTRITION: Status: ACTIVE | Noted: 2023-01-01

## 2023-06-28 NOTE — CONSULTS
Nutrition Services    Patient Name:  Nicolasa Woodall  YOB: 1930  MRN: [id number]  Admit Date:  6/27/2023      Assessment Date:  06/28/23    Comment: Nutrition consult for TF's.  Pt admitted with severe sepsis, pulm infiltrates, resp failure- vent, lactic acidosis, NSTEMI, AMI, Encephalopathy, anemia, hypothyroidism. Hx Dementia, poor po intake with ~13lb weight loss. Nutrition focused physical exam performed. See MSA below    Patient meets ASPEN/AND criteria for a nutrition diagnosis of severe malnutrition of chronic illness based on: significant edema, muscle wasting, fat losses, report of inadequate po intake.    NG Cortrak placed. Would begin TF's with Fibersource HN.  Her goal is 45mL/hr at this time, min free water.  Will adv slowly as pt is at risk for refeeding syndrome.    Will follow clinical course, nutrition needs until family finalizes GOC.     CLINICAL NUTRITION ASSESSMENT      Reason for Assessment Cortrak Placement, Physician Consult, Tube Feeding Assessment    Diagnosis/Problem evere sepsis, pulm infiltrates, resp failure- vent, lactic acidosis, NSTEMI, AMI, Encephalopathy, anemia, hypothyroidism. Hx Dementia   Medical & Surgical Hx Past Medical History:   Diagnosis Date    Anxiety     Chronic kidney disease     Depression     resolved after daughter completed chemotherapy     Disease of thyroid gland     Hypertension     Metabolic encephalopathy     Pulmonary embolism     SPONTANEOUS HEMMORHAGE    Subdural hematoma        Past Surgical History:   Procedure Laterality Date    BREAST BIOPSY      BENIGN    HARDWARE REMOVAL Left 11/11/2019    Procedure: FEMUR HARDWARE REMOVAL;  Surgeon: Dagoberto Owens MD;  Location:  [facility];  Service: Orthopedics    HIP HEMIARTHROPLASTY Right 12/24/2020    Procedure: HIP HEMIARTHROPLASTY and all associated procedures;  Surgeon: Dagoberto Owens MD;  Location:  [facility];  Service: Orthopedics;  Laterality: Right;    HYSTERECTOMY      JOINT  REPLACEMENT      THYROID SURGERY      PARATHYROID REMOVAL    TOTAL HIP ARTHROPLASTY Left 11/11/2019    Procedure: TOTAL HIP ARTHROPLASTY;  Surgeon: Dagoberto Owens MD;  Location: [facility];  Service: Orthopedics    VENA CAVA FILTER INSERTION          Current Problems Unable to take po, on vent     Encounter Information        Nutrition History Not eating very much at home   Food Preferences    Supplements    Factors Affecting Intake decreased appetite   Tests/Procedures X-Ray     Anthropometrics        Current Height   Current Weight  BMI kg/m2  61in (RD measured)  Weight: 42.2 kg (93 lb 0.6 oz) (06/28/23 0531)  Body mass index is 17.58 kg/m².     Adj BMI (if applicable)    BMI Category Underweight (18.4 or below)       Admission Weight 93lb   Ideal Body Weight (IBW) 105lb     Adj IBW (if applicable)    Usual Body Weight (UBW) unknown   Weight Change/Trend Loss per report 13 lbs (son unsure of timeframe)       Weight history: Wt Readings from Last 30 Encounters:   06/28/23 0531 42.2 kg (93 lb 0.6 oz)   06/27/23 1535 42.2 kg (93 lb 0.6 oz)   06/27/23 1233 42.2 kg (93 lb)   06/27/23 1007 42.5 kg (93 lb 11.2 oz)   04/01/23 1446 47.5 kg (104 lb 11.2 oz)   12/28/21 0932 41.7 kg (92 lb)   12/19/21 1830 41.7 kg (92 lb)   12/19/21 1412 39 kg (86 lb)   07/08/21 1438 54.9 kg (121 lb)   03/08/21 1509 54.9 kg (121 lb)   12/31/20 0933 54.9 kg (121 lb)   12/23/20 1519 54.9 kg (121 lb)   12/23/20 1006 54.9 kg (121 lb)   11/20/20 1143 41.7 kg (92 lb)   07/30/20 1432 42.1 kg (92 lb 12.8 oz)   05/21/20 1107 47.6 kg (105 lb)   12/19/19 1114 50.8 kg (112 lb)   11/27/19 1341 51.1 kg (112 lb 9.6 oz)   11/19/19 0800 53 kg (116 lb 12.8 oz)   11/13/19 1043 53.3 kg (117 lb 9.6 oz)   11/08/19 2042 51.9 kg (114 lb 6.4 oz)   11/08/19 1539 50.8 kg (112 lb)   09/04/18 0529 49.6 kg (109 lb 5 oz)   06/08/18 1310 52.1 kg (114 lb 12.8 oz)   05/30/18 1320 51.1 kg (112 lb 10.5 oz)   05/29/18 1123 51.1 kg (112 lb 10.5 oz)   05/29/18 1033 51.1 kg  (112 lb 11.2 oz)   05/29/18 0738 50.8 kg (112 lb)   06/01/18 1630 50.8 kg (112 lb)   04/25/18 1728 49.9 kg (110 lb)   04/25/18 1513 55.8 kg (123 lb)   04/20/18 0500 55.2 kg (121 lb 11.2 oz)   04/19/18 0500 52.5 kg (115 lb 11.2 oz)   04/18/18 0957 60.8 kg (134 lb)   04/18/18 0506 51.5 kg (113 lb 8 oz)   10/26/17 1133 55.8 kg (123 lb)   03/25/15 1622 53.5 kg (117 lb 15.8 oz)   01/30/15 1224 56.3 kg (124 lb 0.1 oz)   12/17/14 1436 58.5 kg (128 lb 15.9 oz)   12/17/14 1316 58.5 kg (128 lb 15.9 oz)        Estimated/Assessed Needs        Energy Requirements    Weight for Calculation 42.2kg   Method for Estimation  35 kcal/kg   EST Needs (kcal/day) 1477       Protein Requirements    Weight for Calculation 42.2kg   EST Protein Needs (g/kg) 1.2 - 1.5 gm/kg   EST Daily Needs (g/day) 50-63       Fluid Requirements     Method for Estimation 1 mL/kcal    Estimated Needs (mL/day) 1500       Fluid Deficit    Current Na Level (mEq/L)    Desired Na Level (mEq/L)    Estimated Fluid Deficit (L)       Labs        Pertinent Labs    Results from last 7 days   Lab Units 06/28/23  0512 06/27/23  1033   SODIUM mmol/L 142 138   POTASSIUM mmol/L 4.4 4.7   CHLORIDE mmol/L 111* 103   CO2 mmol/L 14.0* 23.4   BUN mg/dL 50* 47*   CREATININE mg/dL 1.36* 1.39*   CALCIUM mg/dL 7.3* 8.6   BILIRUBIN mg/dL  --  0.4   ALK PHOS U/L  --  74   ALT (SGPT) U/L  --  16   AST (SGOT) U/L  --  17   GLUCOSE mg/dL 96 87     Results from last 7 days   Lab Units 06/28/23  0512 06/27/23  1033   HEMOGLOBIN g/dL 8.0* 8.4*   HEMATOCRIT % 25.1* 27.7*   WBC 10*3/mm3 30.58* 14.61*   ALBUMIN g/dL  --  2.4*     Results from last 7 days   Lab Units 06/28/23  0512 06/27/23  1033   PLATELETS 10*3/mm3 300 307     COVID19   Date Value Ref Range Status   06/27/2023 Not Detected Not Detected - Ref. Range Final     Lab Results   Component Value Date    HGBA1C 5.60 12/23/2020          Medications            Scheduled Medications chlorhexidine, 15 mL, Mouth/Throat, Q12H  doxycycline,  100 mg, Intravenous, Q12H  enoxaparin, 30 mg, Subcutaneous, Q24H  famotidine, 20 mg, Intravenous, Daily  levothyroxine, 88 mcg, Oral, Daily  piperacillin-tazobactam, 4.5 g, Intravenous, Q12H  senna-docusate sodium, 2 tablet, Oral, BID  sodium chloride, 10 mL, Intravenous, Q12H        Infusions norepinephrine, 0.02-0.3 mcg/kg/min, Last Rate: 0.1 mcg/kg/min (06/28/23 0851)  propofol, 5-50 mcg/kg/min, Last Rate: 20 mcg/kg/min (06/28/23 0134)  sodium chloride, 125 mL/hr, Last Rate: 125 mL/hr (06/27/23 1611)        PRN Medications   acetaminophen    senna-docusate sodium **AND** polyethylene glycol **AND** bisacodyl **AND** bisacodyl    fentaNYL citrate (PF)    HYDROcodone-acetaminophen    nitroglycerin    sodium chloride    sodium chloride     Physical Findings          Physical Appearance frail, loss of muscle mass, loss of subcutaneous fat, sedate, ventilator support   Oral/Mouth Cavity tooth or teeth missing   Edema  3+ (moderate) (per RD assessment)   Gastrointestinal last bowel movement: 6/27   Skin  skin intact   Tubes/Drains/Lines Cortrak, NG tube, bridle in place   NFPE See Malnutrition Severity Assessment, Date Completed:   6/28     Malnutrition Severity Assessment      Patient meets criteria for : Severe Malnutrition  Malnutrition Type (last 8 hours)       Malnutrition Severity Assessment       Row Name 06/28/23 1025       Malnutrition Severity Assessment    Malnutrition Type Chronic Disease - Related Malnutrition      Row Name 06/28/23 1025       Insufficient Energy Intake     Insufficient Energy Intake Findings Severe    Insufficient Energy Intake  <75% of est. energy requirement for > or equal to 1 month      Row Name 06/28/23 1025       Muscle Loss    Loss of Muscle Mass Findings Moderate    [place] Moderate - slight depression    Clavicle Bone Region Moderate - some protrusion in females, visible in males    Acromion Bone Region Moderate - acromion may slightly protrude    Anterior Thigh Region  Moderate - mild depression on inner thigh      Row Name 06/28/23 1025       Fat Loss    Subcutaneous Fat Loss Findings Moderate    Orbital Region  Moderate -  somewhat hollowness, slightly dark circles    Upper Arm Region Moderate - some fat tissue, not ample      Row Name 06/28/23 1025       Fluid Accumulation (Edema)    Fluid Accumulation  Severe equals 3+ or 4+ pitting edema      Row Name 06/28/23 1025       Criteria Met (Must meet criteria for severity in at least 2 of these categories: M Wasting, Fat Loss, Fluid, Secondary Signs, Wt. Status, Intake)    Patient meets criteria for  Severe Malnutrition                     Current Nutrition Orders & Evaluation of Intake       Oral Nutrition     Food Allergies NKFA   Current PO Diet NPO Diet NPO Type: Strict NPO   Supplement    PO Evaluation     Trending % PO Intake      Nutrition Diagnosis        Nutrition Dx Problem 1 Problem: Malnutrition  Etiology: Medical Diagnosis dementia, severe sepsis, resp failure-vent   Signs/Symptoms: Report of Minimal PO Intake, NFPE Results, and Unintended Weight Change    Comment:      INTERVENTION / PLAN OF CARE  Intervention Goal        Intervention Goal(s) Maintain nutrition status, Nutrition support treatment, Meet estimated needs, Disease management/therapy, Initiate TF/PN, Maintain TF/PN, and No significant weight loss     Nutrition Intervention        RD Action Follow Tx Progress, Care plan reviewed, and Recommend/ordered:      Prescription         Diet     Supplement/Snack    EN/PN     Prescription Ordered       Enteral Prescription:     Enteral Route NG    TF Delivery Method Continuous    Enteral Product Fibersource HN    Modular None    Propofol Rate/Kcal 5.1 (134 kcals)    TF Start Rate 15    TF Goal Rate 45    Free Water Flush 30mL q 4 hrs    TF Provision at Goal: 1296kcal, 1430 kcals with propofol, 58gm protein, 875mL free water + 180mL water flushes         Calories 97 % needs met         Protein  100 % needs met          Fluid (mL) 1055mL    Prescription Ordered Yes     Monitor/Evaluation        Monitor Per protocol, I&O, Pertinent labs, EN delivery/tolerance, Weight, Skin status, GI status, Symptoms, POC/GOC, Swallow function, Hemodynamic stability   Discharge Plan Pending clinical course   Education Will instruct as appropriate     RD to follow per protocol.       Electronically signed by:  Jammie Patton RD  06/28/23 09:53 EDT

## 2023-06-28 NOTE — PROGRESS NOTES
LOS: 1 day   Patient Care Team:  Dion Ann MD as PCP - General (Family Medicine)    Chief Complaint: Follow-up elevated troponin, type II NSTEMI, multifocal atrial tachycardia, mitral regurgitation.    Interval History: She remains critically ill on Levophed.  She is intubated.  She is in sinus rhythm.    Vital Signs:  Temp:  [97.7 °F (36.5 °C)-99.2 °F (37.3 °C)] 98.2 °F (36.8 °C)  Heart Rate:  [] 85  Resp:  [22-25] 22  BP: ()/(45-82) 134/72  FiO2 (%):  [30 %-100 %] 40 %    Intake/Output Summary (Last 24 hours) at 6/28/2023 1529  Last data filed at 6/28/2023 1100  Gross per 24 hour   Intake 2315 ml   Output --   Net 2315 ml       Physical Exam:   General Appearance:    Intubated, sedated.   Lungs:     Rhonchi bilaterally anteriorly.    Heart:    Regular rhythm and normal rate.  No murmurs, gallops, or rubs.   Abdomen:     Soft, nontender, nondistended.    Extremities:   No clubbing, cyanosis, or edema.     Results Review:    Results from last 7 days   Lab Units 06/28/23  0512   SODIUM mmol/L 142   POTASSIUM mmol/L 4.4   CHLORIDE mmol/L 111*   CO2 mmol/L 14.0*   BUN mg/dL 50*   CREATININE mg/dL 1.36*   GLUCOSE mg/dL 96   CALCIUM mg/dL 7.3*     Results from last 7 days   Lab Units 06/27/23  1235 06/27/23  1033   HSTROP T ng/L 214* 216*     Results from last 7 days   Lab Units 06/28/23  0512   WBC 10*3/mm3 30.58*   HEMOGLOBIN g/dL 8.0*   HEMATOCRIT % 25.1*   PLATELETS 10*3/mm3 300                       I reviewed the patient's new clinical results.        Assessment:  1.  Right-sided pneumonia  2.  Acute hypoxic respiratory failure  3.  Sepsis with septic shock  4.  Type II NSTEMI  5.  Acute kidney injury  6.  Encephalopathy with underlying dementia  7.  Right-sided pleural effusion  8.  Moderate mitral regurgitation  9.  Multifocal atrial tachycardia  10.  Multifactorial anemia    Plan:  -Discussed with Dr. Cruz earlier today.  Her overall prognosis is very poor at this point.  Considerations  for palliative care are being made currently.    -This is a type II NSTEMI secondary to the pneumonia and sepsis.  Her troponin has been relatively flat, and her echocardiogram showed normal wall motion.    -There is really little to offer from a cardiac standpoint at this time.  She is critically ill, and she is not a proper candidate for any invasive cardiac procedures.  I spoke with her son at the bedside today as well.    -Cardiology will sign off for now.  Please call back if needed.    Maldonado Daley MD  06/28/23  15:29 EDT

## 2023-06-28 NOTE — CONSULTS
"Purpose of the visit was to evaluate for: goals of care/advanced care planning and support for patient/family. Spoke with MD, RN, and CCP as well as family and discussed palliative care, goals of care, care options, and resuscitation status.      Assessment:  Patient is palliative care appropriate for inpatient care given (list diagnosis/symptoms):  89 year old female seen in CCU today with severe sepsis, acute respiratory failure requiring mechanical ventilation, lactic acidosis, NSTEMI, moderate mitral regurg, AMI, dementia, pleural effusion. Patient sedated and intubated, unable to communicate. Feeding tube has been placed tube feeds to be initiated. No acute distress noted. PPS 30%    Recommendations/Plan: No changes in treatment plan at this time. Waiting for family to arrive likely on Friday with plans to transition to comfort measures only at that point. Palliative care team will continue to follow for support.     Other Comments: Spoke with patient's son Roger at bedside. His wife and sister are currently in [place] and working on finding a way back. They understand gravity of situation, son states they would not want to continue ventilator support unless quality of life were to improve and he is doubtful that will happen. We did discuss transition to comfort care including compassionate extubation and medications used to alleviate suffering. Son is tearful during conversation, states \"I'm trying not to be angry\". Normalized emotions and provided support. Son cited his malgorzata as important to him, and states his mother was Restorationist as well. Spiritual care consult placed for additional support. Advised of ongoing availability and encouraged him to reach out for any needs or questions.   "

## 2023-06-28 NOTE — PLAN OF CARE
Problem: Skin Injury Risk Increased  Goal: Skin Health and Integrity  Intervention: Optimize Skin Protection  Recent Flowsheet Documentation  Taken 6/28/2023 0600 by Norman Obando RN  Head of Bed ([facility]) Positioning: HOB at 30 degrees  Taken 6/28/2023 0400 by Norman Obando RN  Pressure Reduction Techniques: weight shift assistance provided  Head of Bed ([facility]) Positioning: HOB at 30 degrees  Pressure Reduction Devices: specialty bed utilized  Skin Protection:   adhesive use limited   incontinence pads utilized   skin-to-device areas padded   skin-to-skin areas padded  Taken 6/28/2023 0000 by Norman Obando RN  Head of Bed ([facility]) Positioning: HOB at 30 degrees  Taken 6/27/2023 2200 by Norman Obando RN  Head of Bed ([facility]) Positioning: HOB at 30 degrees  Taken 6/27/2023 2000 by Norman Obando RN  Pressure Reduction Techniques: weight shift assistance provided  Head of Bed ([facility]) Positioning: HOB at 30 degrees  Pressure Reduction Devices: specialty bed utilized  Skin Protection:   adhesive use limited   incontinence pads utilized   skin-to-skin areas padded   skin-to-device areas padded     Problem: Restraint, Nonviolent  Goal: Absence of Harm or Injury  Outcome: Ongoing, Progressing  Intervention: Implement Least Restrictive Safety Strategies  Recent Flowsheet Documentation  Taken 6/28/2023 0600 by Norman Obando RN  Medical Device Protection:   IV pole/bag removed from visual field   tubing secured  Less Restrictive Alternative:   bed alarm in use   environment adjusted   calming techniques promoted  De-Escalation Techniques: increased round frequency  Diversional Activities: music  Taken 6/28/2023 0400 by Norman Obando RN  Medical Device Protection:   tubing secured   IV pole/bag removed from visual field  Less Restrictive Alternative:   bed alarm in use   environment adjusted   calming techniques promoted  De-Escalation Techniques: increased round frequency  Diversional Activities: music  Taken 6/28/2023 0200  by Norman Obando RN  Medical Device Protection:   tubing secured   IV pole/bag removed from visual field  Less Restrictive Alternative:   bed alarm in use   environment adjusted   calming techniques promoted  De-Escalation Techniques: diversional activity encouraged  Diversional Activities: music  Taken 6/28/2023 0000 by Norman Obando RN  Medical Device Protection:   IV pole/bag removed from visual field   tubing secured  Less Restrictive Alternative:   bed alarm in use   environment adjusted   calming techniques promoted  De-Escalation Techniques: increased round frequency  Diversional Activities: music  Taken 6/27/2023 2200 by Norman Obando RN  Medical Device Protection:   IV pole/bag removed from visual field   tubing secured  Less Restrictive Alternative:   bed alarm in use   environment adjusted   calming techniques promoted  De-Escalation Techniques: diversional activity encouraged  Diversional Activities: music  Taken 6/27/2023 1957 by Norman Obando RN  Medical Device Protection:   IV pole/bag removed from visual field   tubing secured  Less Restrictive Alternative:   bed alarm in use   environment adjusted   calming techniques promoted  De-Escalation Techniques: diversional activity encouraged  Diversional Activities: music  Intervention: Protect Dignity, Rights, and Personal Wellbeing  Recent Flowsheet Documentation  Taken 6/28/2023 0400 by Norman Obando RN  Trust Relationship/Rapport:   care explained   reassurance provided   thoughts/feelings acknowledged  Taken 6/28/2023 0000 by Norman Obando RN  Trust Relationship/Rapport:   care explained   thoughts/feelings acknowledged   reassurance provided  Taken 6/27/2023 2000 by Norman Obando RN  Trust Relationship/Rapport:   care explained   reassurance provided   thoughts/feelings acknowledged  Intervention: Protect Skin and Joint Integrity  Recent Flowsheet Documentation  Taken 6/28/2023 0600 by Norman Obando RN  Body Position:   turned    right  Taken 6/28/2023 0400 by Norman Obando RN  Body Position:   turned   left  Range of Motion: ROM (range of motion) performed  Taken 6/28/2023 0000 by Norman Obando RN  Body Position:   neutral head position   neutral body alignment  Taken 6/27/2023 2200 by Norman Obando RN  Body Position:   turned   left  Taken 6/27/2023 2000 by Norman Obando RN  Body Position:   turned   right  Range of Motion: ROM (range of motion) performed     Problem: Restraint, Nonviolent  Goal: Absence of Harm or Injury  Intervention: Protect Skin and Joint Integrity  Recent Flowsheet Documentation  Taken 6/28/2023 0600 by Norman Obando RN  Body Position:   turned   right  Taken 6/28/2023 0400 by Norman Obando RN  Body Position:   turned   left  Range of Motion: ROM (range of motion) performed  Taken 6/28/2023 0000 by Norman Obando RN  Body Position:   neutral head position   neutral body alignment  Taken 6/27/2023 2200 by Norman Obando RN  Body Position:   turned   left  Taken 6/27/2023 2000 by Norman Obando RN  Body Position:   turned   right  Range of Motion: ROM (range of motion) performed   Goal Outcome Evaluation:

## 2023-06-28 NOTE — PLAN OF CARE
Goal Outcome Evaluation:      VSS, pt follows commands on all extremities.  Weaned off of levo, F/C placed for urinary retention.  250 UOP this shift.  Abx adjusted in response to cultures.  Lactic normalized.  Family seems on board with going palliative but wants to wait until POA is able to return from [place].  She is DNR.  No acute events this shift, will continue to monitor.

## 2023-06-28 NOTE — PROGRESS NOTES
"[facility] Clinical Pharmacy Services: Vancomycin Pharmacokinetic Initial Consult Note    Nicolasa Woodall is a 89 y.o. female who is on day 1 of pharmacy to dose vancomycin.    Indication: Sepsis, PNA  Consulting Provider: Dr. Cruz  Planned Duration of Therapy: 5 days  Loading Dose Ordered: 750 mg on 6/28 at 1200  MRSA PCR performed: yes; Result: pending  Culture/Source:   6/27 Bcx: 1/2 Aerobic Bottle Gram positive cocci in clusters  6/27 Bcx: 1/2 no growth at 24 hours  Target: Dose by Levels  Pertinent Vanc Dosing History: unknown  Other Antimicrobials: Zosyn and Doxycycline    Vitals/Labs  Ht: 154.9 cm (61\"); Wt: 42.2 kg (93 lb 0.6 oz)  Temp Readings from Last 1 Encounters:   06/28/23 98.6 °F (37 °C) (Oral)    Estimated Creatinine Clearance: 17.6 mL/min (A) (by C-G formula based on SCr of 1.36 mg/dL (H)).       Results from last 7 days   Lab Units 06/28/23  0512 06/27/23  1033   CREATININE mg/dL 1.36* 1.39*   WBC 10*3/mm3 30.58* 14.61*     Assessment/Plan:    Vancomycin Dose: will give 750 mg IV x 1 dose.   Will dose by levels with patients renal function.   Vanc Random has been ordered for 6/29 at 0600     Pharmacy will follow patient's kidney function and will adjust doses and obtain levels as necessary. Thank you for involving pharmacy in this patient's care. Please contact pharmacy with any questions or concerns.                           Kath Kennedy [facility]  Clinical Pharmacist   "

## 2023-06-28 NOTE — PLAN OF CARE
Problem: Aspiration (Enteral Nutrition)  Goal: Absence of Aspiration Signs and Symptoms  Outcome: Ongoing, Progressing     Problem: Device-Related Complication Risk (Enteral Nutrition)  Goal: Safe, Effective Therapy Delivery  Outcome: Ongoing, Progressing     Problem: Feeding Intolerance (Enteral Nutrition)  Goal: Feeding Tolerance  Outcome: Ongoing, Progressing   Goal Outcome Evaluation:      Nutrition assessment and MSA complete. Rukhsana trevizo. TF's to start today.

## 2023-06-28 NOTE — PROGRESS NOTES
[facility]     Mar/chart reviewed  Follow up encephalopathy, sepsis  Patient unable to provide useful subjective    Vital Sign Min/Max for last 24 hours  Temp  Min: 97.7 °F (36.5 °C)  Max: 101.7 °F (38.7 °C)   BP  Min: 71/47  Max: 166/98   Pulse  Min: 82  Max: 125   Resp  Min: 22  Max: 38   SpO2  Min: 75 %  Max: 100 %   Flow (L/min)  Min: 5.5  Max: 8   Weight  Min: 42.2 kg (93 lb)  Max: 42.5 kg (93 lb 11.2 oz)   2300/? (No output recorded)  Appears ill, sleepy  perrl, eomi, normal sclera  mmm, no jvd, trachea midline, neck supple,  chest cta bilaterally, no crackles, no wheezes,   Tachy, regular  soft, nt, nd +bs,  no c/c/ e  Skin warm, dry no rashes    Labs: 6/28: reviewed:  Glucose 96  Bun 50  Cr 1.36  Na 142  Bicarb 14  Gap 17  Wbc 30  Hgb 8  Plts 300  6/27:  7.28/41/65  CXR: 6/27: reviewed, extensive right sided infiltrate, rul and rll with some volume loss, left hilar prominence (compared with CXR from April, findings have progressed on the right)  Micro: neg; no sputum collect  Echo: 6/27: EF 62%; mod mitral valve regurg; mod/sev tricuspid regurd; RVSP 58  6/28: ct chest: official read pending -- et tube in Right main stem, extensive pna in the rul some right lower lboe, right effusion.   A/P:  Severe sepsis  -- s/p iv fluids, no output is recorded? No dobbs is in place, will discuss with RN --source urine and/or pna  Right sided pulmonary infiltrate -- she had similar findings present on CXR from April of this year, raises concern for lung mass -- ct chest ordered, pending at this time  Acute hypoxemic respiratory failure --repeat gas this morning, adjust vent as needed  Lactic acidosis -- gap still elevated -- repeat lactic acid  NSTEMI -- cards has seen  Moderate mitral regurg with pulmonary hypertension on echo  Trisha -- cr flat from admit, no urine output recorded, given possible uti as well, will check renal u/s to r/o obstructing stone, etc.   Encephalopathy with underlying dementia -- note  patient was on cymbalta, marinol, remeron, namenda at home.   Anemia  Hypothyroidism  Chronic hyponatremia? -- patient was on salt tablets at home, na is 142 here, observe  12. Right sided pleural effusion -- will get u/s thora  13. Urinary retention -- she did require straight cath overnight times one, given situation and likely going to be comfort measures only here, will go ahead and place dobbs    Discussed with son and sister in law over phone (who is a PA).  August is currently in [place] at doctor's appointment at [facility].  She is looking for flight to get back home,  discussed situation with underlying dementia and progressive deterioration here recently and not eating/drinking at home. Discussed very severe illness and likely poor outcome with family, they state she would likely want comfort measures only in this situation but would like to be present. Will not escalate aggressively at this point and wait arrival of family    Discussed with RN, RT and Dr. Delgado at bedside    CC 70 mins.

## 2023-06-29 NOTE — PROGRESS NOTES
"      [facility]         Dr Infante Sayied   LOS: 2 days   Patient Care Team:  Dion Ann MD as PCP - General (Family Medicine)    Chief Complaint: Acute hypoxemic respiratory failure with right-sided pneumococcal pneumonia pleural effusion loculated multiple medical issues as listed below    Interval History: Events noted chart reviewed.  Currently on the vent 21% FiO2 PEEP of 5.  I placed on SBT trial and she is currently tolerating well.  On propofol drip currently.  Not following commands for me at this time.    REVIEW OF SYSTEMS:   Sedated intubated    Ventilator/Non-Invasive Ventilation Settings (From admission, onward)     Start     Ordered    06/27/23 1712  Ventilator - Vent Mode: AC/PC; Rate: Other; Rate: 22; FiO2: Titrate Per SpO2; Titrate Oxygen for SpO2: 90% or Greater; PEEP: 5; Inspiratory Pressure: 22  Continuous        Question Answer Comment   Vent Mode AC/PC    Rate Other    Rate 22    FiO2 Titrate Per SpO2    Titrate Oxygen for SpO2 90% or Greater    PEEP 5    Inspiratory Pressure 22        06/27/23 1713                  Vital Signs  Temp:  [97.3 °F (36.3 °C)-98.2 °F (36.8 °C)] 97.5 °F (36.4 °C)  Heart Rate:  [] 107  Resp:  [18-22] 18  BP: ()/(55-84) 157/78  FiO2 (%):  [21 %-40 %] 25 %    Intake/Output Summary (Last 24 hours) at 6/29/2023 0939  Last data filed at 6/29/2023 0507  Gross per 24 hour   Intake 5461 ml   Output 550 ml   Net 4911 ml     Flowsheet Rows    Flowsheet Row First Filed Value   Admission Height 154.9 cm (61\") Documented at 06/28/2023 0959   Admission Weight 42.2 kg (93 lb 0.6 oz) Documented at 06/27/2023 1535          Physical Exam:  Patient is examined using the personal protective equipment as per guidelines from infection control for this particular patient as enacted.  Hand hygiene was performed before and after patient interaction.   General Appearance:   Sedated intubated.  Not following commands for me  ENT ET tube good position " orally  Neck midline trachea, no thyromegaly   Lungs:    Diminished breath sounds on the right side with crackles noted    Heart:    Regular rhythm and normal rate, normal S1 and S2, no            murmur, no gallop, no rub, no click   Chest Wall:    No abnormalities observed   Abdomen:     Normal bowel sounds, no masses, no organomegaly, soft        nontender, nondistended, no guarding, no rebound                tenderness   Extremities:   Moves all extremities well, no edema, no cyanosis, no             redness  CNS sedated intubated not following commands  Skin no rashes no nodules  Musculoskeletal no cyanosis no clubbing normal range of motion     Results Review:        Results from last 7 days   Lab Units 06/29/23  0350 06/28/23  0512 06/27/23  1033   SODIUM mmol/L 145 142 138   POTASSIUM mmol/L 3.6 4.4 4.7   CHLORIDE mmol/L 119* 111* 103   CO2 mmol/L 13.2* 14.0* 23.4   BUN mg/dL 56* 50* 47*   CREATININE mg/dL 1.37* 1.36* 1.39*   GLUCOSE mg/dL 126* 96 87   CALCIUM mg/dL 6.8* 7.3* 8.6     Results from last 7 days   Lab Units 06/27/23  1235 06/27/23  1033   HSTROP T ng/L 214* 216*     Results from last 7 days   Lab Units 06/29/23  0452 06/28/23  0512 06/27/23  1033   WBC 10*3/mm3 22.89* 30.58* 14.61*   HEMOGLOBIN g/dL 7.2* 8.0* 8.4*   HEMATOCRIT % 22.1* 25.1* 27.7*   PLATELETS 10*3/mm3 243 300 307                     Results from last 7 days   Lab Units 06/29/23  0906   PH, ARTERIAL pH units 7.348*   PO2 ART mm Hg 59.0*   PCO2, ARTERIAL mm Hg 26.3*   HCO3 ART mmol/L 14.4*       I reviewed the patient's new clinical results.  I personally viewed and interpreted the patient's chest x-ray.        Medication Review:   chlorhexidine, 15 mL, Mouth/Throat, Q12H  doxycycline, 100 mg, Intravenous, Q12H  enoxaparin, 30 mg, Subcutaneous, Q24H  famotidine, 20 mg, Nasogastric, Daily  levothyroxine, 88 mcg, Nasogastric, Daily  piperacillin-tazobactam, 4.5 g, Intravenous, Q12H  senna-docusate sodium, 2 tablet, Nasogastric,  BID  sodium chloride, 10 mL, Intravenous, Q12H        norepinephrine, 0.02-0.3 mcg/kg/min, Last Rate: 0.02 mcg/kg/min (06/28/23 1317)  propofol, 5-50 mcg/kg/min, Last Rate: 20 mcg/kg/min (06/28/23 2349)  sodium chloride, 125 mL/hr, Last Rate: 125 mL/hr (06/28/23 2350)        ASSESSMENT:   Acute hypoxemic respiratory failure  Severe sepsis  Right-sided pneumonia/pneumococcal pneumonia  Altered mental status  Right pleural effusion loculated  Lactic acidosis  Hypothyroidism  Elevated troponin likely from sepsis  Acute renal insufficiency    PLAN:  Continue current antibiotics.  Noted improvement in hemodynamics and leukocytosis.  Pneumococcal antigen positive likely pneumococcal pneumonia.  We will switch to Rocephin discontinue Zosyn and doxycycline  SBT trial tolerating well.  Mental status still remains poor.  Will wean from the vent as tolerated  Give 1 dose of diuretic today.  Appears to be volume overloaded clinically  CT chest with minimal effusion on the right side with dense consolidation.  Tube feeds to start  Discussed with son at bedside  We will await daughter to arrive tomorrow to discuss further goals of care    Critical care time 35 minutes      Arelis Summers MD  06/29/23  09:39 EDT

## 2023-06-29 NOTE — CONSULTS
"Nutrition Services    Patient Name:  Nicolasa Woodall  YOB: 1930  MRN: [id number]  Admit Date:  6/27/2023      Assessment Date:  06/29/23    Comment: Nutrition follow up. Tolerating feeds at low rate.  RN has increased to 30mL/hr. Labs reviewed. Phos 3.2, glu 126, K+ 3.6. + BM yesterday.  Cont same TF regimen for now, advancing to goal later today.    Will follow clinical course, nutrition needs until family finalizes GOC.     CLINICAL NUTRITION ASSESSMENT      Reason for Assessment Follow-up Protocol   Diagnosis/Problem evere sepsis, pulm infiltrates, resp failure- vent, lactic acidosis, NSTEMI, AMI, Encephalopathy, anemia, hypothyroidism. Hx Dementia   Current Problems Unable to take po, on vent     Encounter Information        Nutrition History Not eating very much at home   Food Preferences    Supplements    Factors Affecting Intake decreased appetite   Tests/Procedures X-Ray     Anthropometrics        Current Height   Current Weight  BMI kg/m2 Height: 154.9 cm (61\")61in (RD measured)  Weight: 42.3 kg (93 lb 4.1 oz) (06/29/23 0507)  Body mass index is 17.62 kg/m².     Adj BMI (if applicable)    BMI Category Underweight (18.4 or below)       Admission Weight 93lb   Ideal Body Weight (IBW) 105lb     Adj IBW (if applicable)    Usual Body Weight (UBW) unknown   Weight Change/Trend Loss per report 13 lbs (son unsure of timeframe)         Estimated/Assessed Needs        Energy Requirements    Weight for Calculation 42.2kg   Method for Estimation  35 kcal/kg   EST Needs (kcal/day) 1477       Protein Requirements    Weight for Calculation 42.2kg   EST Protein Needs (g/kg) 1.2 - 1.5 gm/kg   EST Daily Needs (g/day) 50-63       Fluid Requirements     Method for Estimation 1 mL/kcal    Estimated Needs (mL/day) 1500       Fluid Deficit    Current Na Level (mEq/L)    Desired Na Level (mEq/L)    Estimated Fluid Deficit (L)       Labs        Pertinent Labs    Results from last 7 days   Lab Units 06/29/23  0350 " 06/28/23  0512 06/27/23  1033   SODIUM mmol/L 145 142 138   POTASSIUM mmol/L 3.6 4.4 4.7   CHLORIDE mmol/L 119* 111* 103   CO2 mmol/L 13.2* 14.0* 23.4   BUN mg/dL 56* 50* 47*   CREATININE mg/dL 1.37* 1.36* 1.39*   CALCIUM mg/dL 6.8* 7.3* 8.6   BILIRUBIN mg/dL  --   --  0.4   ALK PHOS U/L  --   --  74   ALT (SGPT) U/L  --   --  16   AST (SGOT) U/L  --   --  17   GLUCOSE mg/dL 126* 96 87       Results from last 7 days   Lab Units 06/29/23  0452 06/29/23  0350 06/28/23 0512 06/27/23  1033   PHOSPHORUS mg/dL  --  3.2   < >  --    HEMOGLOBIN g/dL 7.2*  --    < > 8.4*   HEMATOCRIT % 22.1*  --    < > 27.7*   WBC 10*3/mm3 22.89*  --    < > 14.61*   ALBUMIN g/dL  --   --   --  2.4*    < > = values in this interval not displayed.       Results from last 7 days   Lab Units 06/29/23  0452 06/28/23 0512 06/27/23  1033   PLATELETS 10*3/mm3 243 300 307       COVID19   Date Value Ref Range Status   06/27/2023 Not Detected Not Detected - Ref. Range Final     Lab Results   Component Value Date    HGBA1C 5.60 12/23/2020          Medications            Scheduled Medications cefTRIAXone, 2 g, Intravenous, Q24H  chlorhexidine, 15 mL, Mouth/Throat, Q12H  famotidine, 20 mg, Nasogastric, Daily  heparin (porcine), 5,000 Units, Subcutaneous, Q8H  levothyroxine, 88 mcg, Nasogastric, Daily  senna-docusate sodium, 2 tablet, Nasogastric, BID  sodium chloride, 10 mL, Intravenous, Q12H        Infusions norepinephrine, 0.02-0.3 mcg/kg/min, Last Rate: 0.02 mcg/kg/min (06/28/23 1317)  propofol, 5-50 mcg/kg/min, Last Rate: 25 mcg/kg/min (06/29/23 1017)        PRN Medications   acetaminophen    senna-docusate sodium **AND** polyethylene glycol **AND** bisacodyl **AND** bisacodyl    fentaNYL citrate (PF)    HYDROcodone-acetaminophen    nitroglycerin    sodium chloride    sodium chloride     Physical Findings          Physical Appearance frail, loss of muscle mass, loss of subcutaneous fat, sedate, ventilator support   Oral/Mouth Cavity tooth or teeth  missing   Edema  3+ (moderate) (per RD assessment)   Gastrointestinal last bowel movement: 6/28   Skin  skin intact, bruising   Tubes/Drains/Lines Cortrak, NG tube, bridle in place   NFPE See Malnutrition Severity Assessment, Date Completed:   6/28     Malnutrition Severity Assessment      Patient meets criteria for : Severe Malnutrition         Current Nutrition Orders & Evaluation of Intake       Oral Nutrition     Food Allergies NKFA   Current PO Diet NPO Diet NPO Type: Strict NPO   Supplement    PO Evaluation     Trending % PO Intake       Enteral Nutrition     Enteral Route NG    TF Delivery Method Continuous    Enteral Product Fibersource HN    Modular None    Propofol Rate/Kcal 6.3 (166 kcals)    TF Current Rate (mL) 30    TF Goal Rate (mL) 45    Current Water Flush (mL) 30mL q 4 hrs    Current TF Provision  864kcal, 1030 kcals with propofol, 39gm protein, 583mL free water + 180mL water flushes         Calories 70 % needs met         Protein  78 % needs met         TF Fluid (mL) 763mL         IV Fluids     Avg Volume Delivered (mL) 184mL x 24 hrs    % Goal Volume %17    TF Residual  no or minimal residual    TF Changes rate increased    TF Tolerance tolerating     Nutrition Diagnosis        Nutrition Dx Problem 1 Problem: Malnutrition  Etiology: Medical Diagnosis dementia, severe sepsis, resp failure-vent   Signs/Symptoms: Report of Minimal PO Intake, NFPE Results, and Unintended Weight Change    Comment:      INTERVENTION / PLAN OF CARE  Intervention Goal        Intervention Goal(s) Maintain nutrition status, Nutrition support treatment, Meet estimated needs, Disease management/therapy, Initiate TF/PN, Maintain TF/PN, and No significant weight loss     Nutrition Intervention        RD Action Follow Tx Progress, Care plan reviewed, and Recommend/ordered:      Prescription         Diet     Supplement/Snack    EN/PN     Prescription Ordered       Enteral Prescription:     Enteral Route NG    TF Delivery Method  Continuous    Enteral Product Fibersource HN    Modular None    Propofol Rate/Kcal 6.3 (166 kcals)    TF Start Rate 15    TF Goal Rate 45    Free Water Flush 30mL q 4 hrs    TF Provision at Goal: 1296kcal, 1462 kcals with propofol, 58gm protein, 875mL free water + 180mL water flushes         Calories 100 % needs met         Protein  100 % needs met         Fluid (mL) 1055mL    Prescription Ordered Yes     Monitor/Evaluation        Monitor Per protocol, I&O, Pertinent labs, EN delivery/tolerance, Weight, Skin status, GI status, Symptoms, POC/GOC, Swallow function, Hemodynamic stability   Discharge Plan Pending clinical course   Education Will instruct as appropriate     RD to follow per protocol.       Electronically signed by:  Jammie Patton RD  06/29/23 11:10 EDT

## 2023-06-29 NOTE — PLAN OF CARE
Problem: Skin Injury Risk Increased  Goal: Skin Health and Integrity  Intervention: Optimize Skin Protection  Recent Flowsheet Documentation  Taken 6/29/2023 1800 by Rosalinda Acuña RN  Head of Bed (HOB) Positioning: HOB at 30 degrees  Taken 6/29/2023 1600 by Rosalinda Acuña RN  Pressure Reduction Techniques: weight shift assistance provided  Head of Bed (HOB) Positioning: HOB at 30 degrees  Pressure Reduction Devices: specialty bed utilized  Skin Protection: incontinence pads utilized  Taken 6/29/2023 1400 by Rosalinda Acuña RN  Head of Bed (HOB) Positioning: HOB at 30 degrees  Taken 6/29/2023 1200 by Rosalinda Acuña RN  Pressure Reduction Techniques: weight shift assistance provided  Head of Bed (HOB) Positioning: HOB at 30 degrees  Pressure Reduction Devices: specialty bed utilized  Skin Protection: incontinence pads utilized  Taken 6/29/2023 1000 by Rosalinda Acuña RN  Head of Bed (HOB) Positioning: HOB at 30 degrees  Taken 6/29/2023 0800 by Rosalinda Acuña RN  Pressure Reduction Techniques: weight shift assistance provided  Head of Bed (HOB) Positioning: HOB at 30 degrees  Pressure Reduction Devices: specialty bed utilized  Skin Protection: incontinence pads utilized   Goal Outcome Evaluation:

## 2023-06-29 NOTE — PROCEDURES
Endotracheal Intubation Procedure Note    Indication for endotracheal intubation: airway compromise and respiratory failure.  Sedation: Propofol 5 cc rapid bolus.  Equipment: A video GlideScope was used and Mi 3 laryngoscope blade.  Number of attempts: 1.  ETT location confirmed by by auscultation and ETCO2 monitor.  Tolerated procedure well  ET tube secured 24 cm at the lip  Sats above 90% postintubation    Arelis Summers MD  6/29/2023

## 2023-06-30 NOTE — PROGRESS NOTES
"      [facility]         Dr Infante Sayied   LOS: 3 days   Patient Care Team:  Dion Ann MD as PCP - General (Family Medicine)    Chief Complaint: Acute hypoxemic respiratory failure with right-sided pneumococcal pneumonia pleural effusion loculated multiple medical issues as listed below    Interval History: Minimally sedated with propofol.  Pressure support and tolerating well.  She appears to be weak and currently not waking up or following commands for me.  Adequate tidal volume on pressure support 10 noted.    REVIEW OF SYSTEMS:   Sedated intubated    Ventilator/Non-Invasive Ventilation Settings (From admission, onward)     Start     Ordered    06/27/23 1712  Ventilator - Vent Mode: AC/PC; Rate: Other; Rate: 22; FiO2: Titrate Per SpO2; Titrate Oxygen for SpO2: 90% or Greater; PEEP: 5; Inspiratory Pressure: 22  Continuous        Question Answer Comment   Vent Mode AC/PC    Rate Other    Rate 22    FiO2 Titrate Per SpO2    Titrate Oxygen for SpO2 90% or Greater    PEEP 5    Inspiratory Pressure 22        06/27/23 1713                  Vital Signs  Temp:  [97.7 °F (36.5 °C)-98.4 °F (36.9 °C)] 98.1 °F (36.7 °C)  Heart Rate:  [] 94  Resp:  [15-19] 19  BP: ()/(56-85) 145/85  FiO2 (%):  [25 %-30 %] 25 %    Intake/Output Summary (Last 24 hours) at 6/30/2023 0917  Last data filed at 6/30/2023 0800  Gross per 24 hour   Intake 2323 ml   Output 1300 ml   Net 1023 ml     Flowsheet Rows    Flowsheet Row First Filed Value   Admission Height 154.9 cm (61\") Documented at 06/28/2023 0959   Admission Weight 42.2 kg (93 lb 0.6 oz) Documented at 06/27/2023 1535          Physical Exam:  Patient is examined using the personal protective equipment as per guidelines from infection control for this particular patient as enacted.  Hand hygiene was performed before and after patient interaction.   General Appearance:   Sedated intubated.  Not following commands for me  ENT ET tube good position " orally  Neck midline trachea, no thyromegaly   Lungs:    Diminished breath sounds on the right side with crackles noted    Heart:    Regular rhythm and normal rate, normal S1 and S2, no            murmur, no gallop, no rub, no click   Chest Wall:    No abnormalities observed   Abdomen:     Normal bowel sounds, no masses, no organomegaly, soft        nontender, nondistended, no guarding, no rebound                tenderness   Extremities:   Moves all extremities well, no edema, no cyanosis, no             redness  CNS sedated intubated not following commands  Skin no rashes no nodules  Musculoskeletal no cyanosis no clubbing normal range of motion     Results Review:        Results from last 7 days   Lab Units 06/29/23  0350 06/28/23  0512 06/27/23  1033   SODIUM mmol/L 145 142 138   POTASSIUM mmol/L 3.6 4.4 4.7   CHLORIDE mmol/L 119* 111* 103   CO2 mmol/L 13.2* 14.0* 23.4   BUN mg/dL 56* 50* 47*   CREATININE mg/dL 1.37* 1.36* 1.39*   GLUCOSE mg/dL 126* 96 87   CALCIUM mg/dL 6.8* 7.3* 8.6     Results from last 7 days   Lab Units 06/27/23  1235 06/27/23  1033   HSTROP T ng/L 214* 216*     Results from last 7 days   Lab Units 06/29/23  0452 06/28/23  0512 06/27/23  1033   WBC 10*3/mm3 22.89* 30.58* 14.61*   HEMOGLOBIN g/dL 7.2* 8.0* 8.4*   HEMATOCRIT % 22.1* 25.1* 27.7*   PLATELETS 10*3/mm3 243 300 307                     Results from last 7 days   Lab Units 06/29/23  0906   PH, ARTERIAL pH units 7.348*   PO2 ART mm Hg 59.0*   PCO2, ARTERIAL mm Hg 26.3*   HCO3 ART mmol/L 14.4*       I reviewed the patient's new clinical results.  I personally viewed and interpreted the patient's chest x-ray.        Medication Review:   cefTRIAXone, 2 g, Intravenous, Q24H  chlorhexidine, 15 mL, Mouth/Throat, Q12H  famotidine, 20 mg, Nasogastric, Daily  heparin (porcine), 5,000 Units, Subcutaneous, Q8H  levothyroxine, 88 mcg, Nasogastric, Daily  senna-docusate sodium, 2 tablet, Nasogastric, BID  sodium chloride, 10 mL, Intravenous,  Q12H        norepinephrine, 0.02-0.3 mcg/kg/min, Last Rate: 0.02 mcg/kg/min (06/28/23 1317)  propofol, 5-50 mcg/kg/min, Last Rate: 5 mcg/kg/min (06/30/23 0905)        ASSESSMENT:   Acute hypoxemic respiratory failure  Severe sepsis  Right-sided pneumonia/pneumococcal pneumonia  Altered mental status  Right pleural effusion loculated  Lactic acidosis  Hypothyroidism  Elevated troponin likely from sepsis  Acute renal insufficiency    PLAN:  Continue current antibiotics.  Noted improvement in hemodynamics.  CBC pending this morning.  Pneumococcal antigen positive likely pneumococcal pneumonia.  Continue Rocephin 2 g daily.  Tolerating SBT trial well.  Biggest issue with family discussion regarding goals of care.  Family expected to arrive around noon and will have further discussions with family regarding need for reintubation if patient felt able to be extubated.  As needed diuretics keep volume status on the dry side.  CT chest with minimal effusion on the right side with dense consolidation likely all from pneumococcal pneumonia.  Tube feeds to continue  Discussed with son at bedside and daughter on the phone  ICU core measures  Overall prognosis is guarded    Critical care time 35 minutes      Arelis Summers MD  06/30/23  09:17 EDT

## 2023-06-30 NOTE — NURSING NOTE
Pt on the vent, spontaneous FIO2 21% since yesterday afternoon. Follows commands, moves all extremities very weakly. Propofol off, no sedation. Administered PRN Miralax. Moderate BM resulted. Replaced K. Waiting for pt's daughter/POA to arrive to discuss goals of care, as pt may be appropriate to assess for extubation.   Plan is to likely extubate in AM & then consider becoming DNR/DNI.   Nonblanchable areas noted on upper & lower back, buttock.

## 2023-06-30 NOTE — PLAN OF CARE
Problem: Skin Injury Risk Increased  Goal: Skin Health and Integrity  Intervention: Optimize Skin Protection  Recent Flowsheet Documentation  Taken 6/30/2023 0400 by Norman Obando RN  Pressure Reduction Techniques: weight shift assistance provided  Head of Bed (HOB) Positioning: HOB at 30 degrees  Pressure Reduction Devices: specialty bed utilized  Skin Protection:   adhesive use limited   incontinence pads utilized   skin-to-device areas padded   skin-to-skin areas padded  Taken 6/30/2023 0200 by Norman Obando RN  Head of Bed (HOB) Positioning: HOB at 30 degrees  Taken 6/30/2023 0000 by Norman Obando RN  Head of Bed (HOB) Positioning: HOB at 30 degrees  Taken 6/29/2023 2200 by Norman Obando RN  Head of Bed (HOB) Positioning: HOB at 30 degrees  Taken 6/29/2023 2000 by Norman Obando RN  Pressure Reduction Techniques: weight shift assistance provided  Head of Bed (HOB) Positioning: HOB at 30 degrees  Pressure Reduction Devices: specialty bed utilized  Skin Protection:   adhesive use limited   incontinence pads utilized   skin-to-device areas padded   skin-to-skin areas padded   Goal Outcome Evaluation:

## 2023-06-30 NOTE — PROGRESS NOTES
[facility] Clinical Pharmacy Services: Medication Reconciliation     Nicolasa Woodall is a 89 y.o. female presenting to [facility] for Pneumonia [J18.9]       She  has a past medical history of Anxiety, Chronic kidney disease, Depression, Disease of thyroid gland, Hypertension, Metabolic encephalopathy, Pulmonary embolism, and Subdural hematoma.       Allergies as of 06/27/2023    (No Known Allergies)          Medication information was obtained from: external fill history    Prior to Admission Medications       Prescriptions Last Dose Informant Patient Reported? Taking?    donepezil (ARICEPT) 10 MG tablet  Pharmacy No Unknown    Take 1 tablet by mouth Every Night.    dronabinol (MARINOL) 5 MG capsule  Pharmacy No Unknown    Take 1 capsule by mouth 2 (Two) Times a Day Before Meals.    DULoxetine (CYMBALTA) 60 MG capsule  Pharmacy No Unknown    Take 1 capsule by mouth Daily.    ferrous gluconate (FERGON) 240 (27 FE) MG tablet  Pharmacy No Unknown    Take 1 tablet by mouth Daily With Breakfast.    levothyroxine (SYNTHROID, LEVOTHROID) 88 MCG tablet  Pharmacy No Unknown    Take 1 tablet by mouth Daily.    memantine (NAMENDA XR) 7 MG capsule sustained-release 24 hr extended release capsule  Pharmacy No Unknown    Take 1 capsule by mouth Daily.    mirtazapine (REMERON) 15 MG tablet  Pharmacy No Unknown    Take 1 tablet by mouth Every Night.    sodium chloride 1 g tablet  Pharmacy No Unknown    Take 1 tablet by mouth 3 (Three) Times a Day With Meals.    vitamin D (ERGOCALCIFEROL) 1.25 MG (62434 UT) capsule capsule  Pharmacy No Unknown    Take 1 capsule by mouth 1 (One) Time Per Week.           Medication notes:   Unable to confirm adherence to above regimen due to current clinical status. Mematine appears to be a new medication with only one recent fill noted. Based on fill history, the following medications may not remain active in patient's home regimen:   Vitamin D  Mirtazipine         This  medication list is complete to the best of my knowledge as of 6/30/2023       Please call if questions.     Concetta Patton, PharmD  Clinical Pharmacist

## 2023-06-30 NOTE — PROGRESS NOTES
"Spoke to patient's son at bedside. He noted that his sister will be arriving this afternoon around 3pm. He expressed that the family has a \"glimmer of hope\" as they understand the patient has been doing a little better today. Son noted they will likely speak to the MD again once his sister arrives. Offered support to family. They will notify palliative team if they would like further follow up.   "

## 2023-07-01 NOTE — SIGNIFICANT NOTE
07/01/23 0709   Readiness to Wean Daily Screen   Daily Screen Complete Y   Daily Screen Outcome pass  (changed to psv of 8 as guy, pt maintaing vt's, but breathing a little fast, will discuss with pulm)   Spontaneous Breathing Trial   $ SBT Readiness to Wean yes   Vt Spontaneous (mL) 419 mL   RSBI 69   Negative Inspiratory Force (cm H2O) -25

## 2023-07-01 NOTE — NURSING NOTE
1121:  Nurse spoke with Gaby SUÁREZ at 1118 am. Per Gaby ruled out, contact KAMINI with time of death.       1354: Patient compassionately extubated by RT with Nursing staff at 1350. Patient tolerated well. Family at bedside.       1847:   Patients family would like the patient to be released to:   [facility]   [address] [place] [address] [phone], Once she expires.

## 2023-07-01 NOTE — NURSING NOTE
Spoke with pts nurse Nellie. Met with pts son, daughter and dil in the room. EOS given of scattered bed when pt transfers to [facility]. Family states they want to see how pt does in the next few hours and if pt is able to transfer to [facility], [facility] will see pt tomorrow and complete a SB. Left EOS folder with daughter and explained that they are eligible for grief counseling even if pt is not admitted to SB. Thank you for this referral.                   Thank you  Tana Hines RN, [facility]  [phone]

## 2023-07-01 NOTE — PROGRESS NOTES
Dr. NILSON Thompson    [facility] CORONARY CARE        Patient ID:  Name:  Nicolasa Woodall  MRN:  [id number]  9/19/1930  89 y.o.  female            CC/Reason for visit: Acute hypoxemic respiratory failure with right-sided pneumococcal pneumonia pleural effusion loculated multiple medical issues as listed below    Interval hx: Patient is on mechanical ventilation.  He is having some agonal breathing pattern despite being on propofol.  Does not open eyes, does not follow commands.  Failed spontaneous breathing trial due to agonal breathing pattern and being unresponsive.  Blood gas showed hypercapnia.  I reviewed notes by other medical practitioners.  I reviewed mechanical ventilator settings.  Patient has been in the hospital for the past 4 days due to pneumococcal pneumonia with loculated pleural effusion and acute respiratory failure.      ROS: Unobtainable, intubated    I reviewed old medical records.  Past medical history, social history and family history: Unchanged from admission H&P.      Vitals:  Vitals:    07/01/23 1130 07/01/23 1200 07/01/23 1230 07/01/23 1300   BP: 120/56 115/69 121/55 105/55   BP Location:       Patient Position:       Pulse: 111 115 112 110   Resp:       Temp:       TempSrc:       SpO2: 97% 97% 97% 98%   Weight:       Height:         FiO2 (%): 40 %     Body mass index is 19.99 kg/m².    Intake/Output Summary (Last 24 hours) at 7/1/2023 1334  Last data filed at 7/1/2023 0932  Gross per 24 hour   Intake 1689.56 ml   Output 1100 ml   Net 589.56 ml       Exam:  GEN:  Elderly who appears frail  Intubated, mechanically ventilated  Unarousable, unresponsive  Audible oral secretions, gurgling respirations  LUNGS: Scattered rhonchi bilat, using accessory muscles, agonal breathing pattern  CV:  Normal S1S2, without murmur, there is leg edema bilateral  ABD:  Non tender, no enlarged liver or masses      Scheduled meds:  chlorhexidine, 15 mL, Mouth/Throat, Q12H  senna-docusate sodium, 2  tablet, Nasogastric, BID  sodium chloride, 10 mL, Intravenous, Q12H      IV meds:                           Data Review:   I reviewed the patient's medications and new clinical results.            Results from last 7 days   Lab Units 07/01/23  0709 07/01/23  0637 06/30/23  1115 06/30/23  0952 06/30/23  0753 06/28/23  0512 06/27/23  1033   SODIUM mmol/L  --  151* 148*  --  146*   < > 138   POTASSIUM mmol/L  --  4.5 3.8  --  4.4   < > 4.7   CHLORIDE mmol/L  --  124* 120*  --  119*   < > 103   CO2 mmol/L  --  16.0* 16.0*  --  13.0*   < > 23.4   BUN mg/dL  --  60* 64*  --  64*   < > 47*   CREATININE mg/dL  --  1.38* 1.53*  --  1.67*   < > 1.39*   CALCIUM mg/dL  --  7.9* 7.1*  --  7.0*   < > 8.6   BILIRUBIN mg/dL  --   --   --   --   --   --  0.4   ALK PHOS U/L  --   --   --   --   --   --  74   ALT (SGPT) U/L  --   --   --   --   --   --  16   AST (SGOT) U/L  --   --   --   --   --   --  17   GLUCOSE mg/dL  --  174* 173*  --  157*   < > 87   WBC 10*3/mm3 20.96* 17.54*  --  22.17* 22.78*   < > 14.61*   HEMOGLOBIN g/dL 7.1* 6.8*  --  8.3* 8.4*   < > 8.4*   PLATELETS 10*3/mm3 320 278  --  285 311   < > 307   PROBNP pg/mL  --   --   --   --   --   --  17,721.0*   PROCALCITONIN ng/mL  --   --   --   --   --   --  20.74*    < > = values in this interval not displayed.     Results from last 7 days   Lab Units 06/28/23  1127 06/27/23  1828 06/27/23  1032   BLOODCX   --   --  No growth at 4 days  Staphylococcus, coagulase negative*   RESPCX  No growth  --   --    URINECX   --  No growth  --    BCIDPCR   --   --  Staph spp, not aureus or lugdunensis. Identification by BCID2 PCR.*         Results from last 7 days   Lab Units 06/27/23  1235 06/27/23  1033   HSTROP T ng/L 214* 216*     Results from last 7 days   Lab Units 07/01/23  1242 07/01/23  1106 06/29/23  0906 06/28/23  1017 06/27/23  2008 06/27/23  1651 06/27/23  1035   PH, ARTERIAL pH units 7.168* 7.234* 7.348* 7.329* 7.282* 7.206* 7.490*   PCO2, ARTERIAL mm Hg 57.2* 46.8*  26.3* 26.9* 41.1 51.0* 31.8*   PO2 ART mm Hg 92.0 87.9 59.0* 75.5* 65.9* 175.6* 50.2*   O2 SATURATION ART %  --   --   --   --   --   --  87.6*   FLOW RATE lpm  --   --   --   --   --   --  6.0   MODALITY  Adult Vent Adult Vent Adult Vent Adult Vent Adult Vent Adult Vent Nasal Cannula   O2 SATURATION CALC % 94.3 94.7 89.5* 94.3 90.1* 99.2*  --              ASSESSMENT:   Acute hypoxemic respiratory failure  Severe sepsis  Right-sided pneumococcal pneumonia  Altered mental status  Right pleural effusion loculated  Lactic acidosis  Hypothyroidism  Elevated troponin likely from sepsis  Acute kidney injury  Anemia      PLAN:  Patient and all problems new to me during this admission.  Patient is extremely ill.  She is frail, elderly and is currently on mechanical ventilator due to sepsis and pneumococcal pneumonia with parapneumonic loculated effusion.  Her urine Streptococcus antigen is positive.  She has been receiving treatment with IV antibiotics for pneumococcal pneumonia with right-sided loculated parapneumonic effusion.  Clinically her condition has been deteriorating.  She has not been responding to treatment and today spontaneous breathing trial showed that the patient cannot maintain her respiratory status without mechanical ventilator support.  She developed acidosis and is unresponsive with agonal breathing pattern.  She also has acute kidney injury.  All of this was communicated to the family.  Both her children at the bedside.  They have decided they would like to stop all medical treatment and provide comfort measures and palliative care and allow natural death.  They have requested we extubate her from mechanical ventilator now.    Total critical care time 32 minutes      Clay Thompson MD  7/1/2023

## 2023-07-01 NOTE — NURSING NOTE
Pt remains on the vent, O2 dropping throughout night not tolerating,  switched from spontaneous and to ACPC FiO2 up to 50% and tolerating well. Follow commands and moves extremities. Awaiting family decision about extubation.

## 2023-07-02 NOTE — PLAN OF CARE
Goal Outcome Evaluation:   Patient is unresponsive. Her bilateral feet are mottled and cool to the touch. O2 at 2 liters per nasal cannula. Medication given prior to turns. Family remains at the bedside. Will monitor.

## 2023-07-02 NOTE — PLAN OF CARE
Goal Outcome Evaluation: Patient unresponsive. No s/sx of discomfort or distress with rounding or repositioning per schedule. PRN medications available if needed.   Problem: Palliative Care  Goal: Enhanced Quality of Life  Outcome: Ongoing, Progressing  Intervention: Maximize Comfort  Description: Assess pain and acceptable level of comfort to individualize pain management plan.  Offer preferred nonpharmacologic and pharmacologic techniques to maximize comfort and pain relief.  Prevent or manage oral and gastrointestinal symptoms.  Offer food and fluids based on patient’s preference and tolerance.  Promote complementary therapy, such as music, pet therapy, massage, meditation or aromatherapy.  Evaluate for breathlessness; provide supportive relief (e.g., positioning, breathing exercises, secretion clearance, medication).  Promote strategies for sleep and rest.  Recent Flowsheet Documentation  Taken 7/2/2023 1000 by Jessica Jackson, RN  Oral Care:   lip/mouth moisturizer applied   swabbed with sterile water  Intervention: Optimize Function  Description: Assess and monitor for fatigue at regular intervals; promote energy-conservation strategies to maximize ability to participate in prioritized activities.  Provide calm, consistent environment and schedule to prevent or minimize confusion and delirium.  Identify functional deficit; provide therapeutic interventions and assistive technology to facilitate mobility and safety (e.g., adaptive activities of daily living equipment, ambulation device).  Recent Flowsheet Documentation  Taken 7/2/2023 0810 by Jessica Jackson, RN  Sleep/Rest Enhancement:   consistent schedule promoted   family presence promoted  Intervention: Optimize Psychosocial Wellbeing  Description: Encourage expression of emotions; respond with empathy, sensitivity and compassion.  Identify and assist patient to prioritize activities which bring meaning (e.g., participation in roles and relationships,  spiritual activities); encourage reminiscence and life review.  Identify and maximize patient and family/caregiver strengths and coping strategies.  Acknowledge and assist with life transitions, grief and loss (e.g., roles, cognitive changes, dependence).  Monitor for spiritual concerns and distress; provide support.  Identify perceptions regarding caregiving needs, abilities and stress; encourage use of social support.  Assess and monitor patient and caregiver for signs and symptoms of behavioral health concerns (e.g., depression, anxiety).  Connect with community resources for ongoing support.  Recent Flowsheet Documentation  Taken 7/2/2023 1200 by Jessica Jackson, RN  Family/Support System Care:   support provided   presence promoted   self-care encouraged   involvement promoted     Problem: End-of-Life Care  Goal: Comfort, Peace and Preserved Dignity  Outcome: Ongoing, Progressing  Intervention: Support the Grieving Process  Description: Identify experience of grief as a normal, healthy response to loss.  Encourage expression of emotions; respond with empathy, sensitivity and compassion.  Support family in honoring individual expressions of grief, as well as differences in coping styles.  Identify and maximize patient and family strengths and coping strategies.  Provide opportunities for family involvement (e.g., caregiving, decision-making).  Identify and integrate cultural practices, spiritual beliefs, rituals and remembrance activities.  Monitor family risk for complicated grieving; refer to or provide intensive psychosocial support and bereavement services.  Assist with arrangements following death (e.g., mortuary facilitation) and connection to community support.  Recent Flowsheet Documentation  Taken 7/2/2023 1200 by Jessica Jackson, RN  Family/Support System Care:   support provided   presence promoted   self-care encouraged   involvement promoted   Family at bedside, supportive, have said their goodbyes  and communicated that it is okay for her to go onto heaven. Monitoring and supporting.

## 2023-07-02 NOTE — PROGRESS NOTES
Dr. NILSON Thompson    [address]        Patient ID:  Name:  Nicolasa Woodall  MRN:  [id number]  9/19/1930  89 y.o.  female            CC/Reason for visit: Acute hypoxemic respiratory failure with right-sided pneumococcal pneumonia pleural effusion loculated multiple medical issues as listed below     Interval hx: Patient is unresponsive, receiving palliative care and comfort measures only     ROS: Unobtainable, unresponsive    Vitals:  Vitals:    07/01/23 1800 07/01/23 1830 07/01/23 2000 07/02/23 0439   BP:       BP Location:    Right arm   Patient Position:    Lying   Pulse: 102 96 100 89   Resp:    16   Temp:    94.3 °F (34.6 °C)   TempSrc:    Axillary   SpO2:    (!) 89%   Weight:       Height:         FiO2 (%): 40 %     Body mass index is 19.99 kg/m².    Intake/Output Summary (Last 24 hours) at 7/2/2023 1159  Last data filed at 7/1/2023 2025  Gross per 24 hour   Intake --   Output 225 ml   Net -225 ml       Exam:  GEN:  No distress  Unresponsive, does not open eyes, does not follow commands        ASSESSMENT:   Acute hypoxemic respiratory failure  Severe sepsis  Right-sided pneumococcal pneumonia  Altered mental status  Right pleural effusion loculated  Lactic acidosis  Hypothyroidism  Elevated troponin likely from sepsis  Acute kidney injury  Anemia      PLAN:  Family at bedside.  Questions answered to their satisfaction.  Continue comfort measures and palliative care.        Clay Thompson MD  7/2/2023

## 2023-07-02 NOTE — NURSING NOTE
Received patient as a transfer from CCU. She is unresponsive. Her feet and legs are mottled. 4 plus pitting edema to bilateral arms. Family remains at the bedside. Will continue to monitor.

## 2023-07-03 NOTE — CASE MANAGEMENT/SOCIAL WORK
Case Management Discharge Note      Final Note: pt   Yamila De La Paz RN         Selected Continued Care - Discharged on 7/3/2023 Admission date: 2023 - Discharge disposition:       Destination    No services have been selected for the patient.                Durable Medical Equipment    No services have been selected for the patient.                Dialysis/Infusion    No services have been selected for the patient.                Home Medical Care    No services have been selected for the patient.                Therapy    No services have been selected for the patient.                Community Resources    No services have been selected for the patient.                Community & DME    No services have been selected for the patient.                         Final Discharge Disposition Code: 41 -  in medical facility  
no abrasions, no jaundice, no lesions, no pruritis, and no rashes.

## 2023-07-18 NOTE — DISCHARGE SUMMARY
Patient Identification:  Name: Nicolasa Woodall  Age: 89 y.o.  Sex: female  :  1930  MRN: [id number]  Primary Care Physician: Dion Ann MD    Admit date: 2023  Discharge date and time: 7/3/2023  6:12 AM   Discharged Condition:      Discharge Diagnoses:   Acute hypoxemic respiratory failure  Severe sepsis  Right-sided pneumococcal pneumonia  Altered mental status  Right pleural effusion loculated  Lactic acidosis  Hypothyroidism  Elevated troponin likely from sepsis  Acute kidney injury  Anemia  Severe chronic disease severe malnutrition      History of Present Illness:  89-year-old female presented to [place] ER with low oxygen saturation and strong smelling urine.  She had also reported to be increasingly short of breath with ongoing cough.  She has history of pneumonia and UTI in the past.  Work-up in [place] was done and was noted with severe sepsis and right-sided severe pneumonia.  Patient was transferred to [facility] for further care.  At the time of my evaluation patient is pretty much obtunded with low blood pressure.  She is not arousable and unable to protect her airway.  I spoke with patient's daughter on the phone in [place] and she wanted her to be full code and proceed with intubation and mechanical ventilation.    Over the course of this patient's hospital stay the patient was in the intensive care, received mechanical ventilation and antibiotics for pneumococcal pneumonia and sepsis.  Unfortunately she failed to respond to treatment.  She was also quite elderly, frail and had severe chronic disease malnutrition  After discussion with family, they decided to stop all medical therapy and provide comfort measures only.  She  shortly thereafter.      Significant Diagnostic Studies:   Imaging Results (Most Recent)       Procedure Component Value Units Date/Time     Renal Bilateral [[id number]] Collected: 23 1228     Updated: 23 1234    Narrative:      US  RENAL BILATERAL-     Clinical: Sepsis, UTI, acute renal insufficiency     COMPARISON CT of the abdomen 04/01/2023     FINDINGS: The right kidney measures 8.4 cm in length and left kidney  measures 8.4 cm in length. No obstructive uropathy on the right or left.  There are 2 Right renal cysts with the largest of these measuring 17 mm  in maximum diameter. There is a tiny left renal cyst which is 6 mm in  maximum diameter.     The renal cortical echotexture appears slightly greater than  anticipated, question medical renal disease.     CONCLUSION: No obstructive uropathy, cortical echotexture suggests  underlying medical renal disease. The bladder was collapsed, cannot  evaluate for either ureteral jet.     The bladder was collapsed, cannot evaluate for either the right or left  ureteral jet.     This report was finalized on 6/28/2023 12:31 PM by Dr. Jules Hopkins M.D.       CT Chest Without Contrast Diagnostic [[id number]] Collected: 06/28/23 1200     Updated: 06/28/23 1212    Narrative:      CT CHEST WITHOUT CONTRAST     HISTORY: Pneumonia     TECHNIQUE: Radiation dose reduction techniques were utilized, including  automated exposure control and exposure modulation based on body size.  Axial images were obtained through the chest without the administration  of IV contrast. Coronal and sagittal reformatted images obtained.     COMPARISON: Chest x-ray 6/27/2023 and 4/6/2023, CT abdomen pelvis  04/01/2023     FINDINGS: There is extensive consolidation within the right upper and  right lower lobes consistent with pneumonia. There is a small  groundglass nodule in the right middle lobe measuring about 11 mm. There  is a small right-sided pleural effusion. There is some mild groundglass  infiltrate in the left lower lobe consistent with pneumonia. There is a  minimal left pleural effusion. There is an endotracheal tube in place.  The tip is located in the right mainstem bronchus. On yesterday's chest  x-ray the tip was in  good position. Mildly enlarged mediastinal lymph  nodes are most likely reactive. Limited imaging of the upper abdomen  demonstrates an IVC filter. Bone windows show stable compression  fractures of T10 and T11 when compared with the previous chest x-ray  04/06/2023.       Impression:      1. Extensive consolidation in the right lung consistent with pneumonia.  There is also some mild groundglass infiltrate in the left lower lobe  also consistent with pneumonia  2. Small right pleural effusion and minimal left pleural effusion  3. The tip of the endotracheal tube is located in the right mainstem  bronchus. There is good aeration of the left lung. On the chest x-ray  from yesterday, the tube was in good position. The difference in  location of the tip of the tube on today's CT versus yesterday's x-ray  may just be positional. Suggest correlation with an upright chest x-ray  to compare tube positioning to yesterday's xray     Radiation dose reduction techniques were utilized, including automated  exposure control and exposure modulation based on body size.     This report was finalized on 6/28/2023 12:09 PM by Dr. Herbert Bradshaw M.D.       XR Chest 1 View [[id number]] Collected: 06/27/23 1552     Updated: 06/27/23 1557    Narrative:      XR CHEST 1 VW-     HISTORY: Female who is 89 years-old,  endotracheal intubation     TECHNIQUE: Frontal view of the chest     COMPARISON: 06/27/2023 at 1109 hours     FINDINGS: The patient is rotated towards the right. Endotracheal tube  tip is 1.2 cm above the daniel. The heart is enlarged. Aorta is  tortuous, calcified. Pulmonary vasculature is unremarkable. Persistent  opacity is seen at the right apex, persistent right basilar  atelectasis/infiltrate/effusion. No pneumothorax. No acute osseous  process.       Impression:      Endotracheal intubation. Otherwise, no significant change.     This report was finalized on 6/27/2023 3:54 PM by Dr. Rodrigo Newman M.D.                      TEST  RESULTS PENDING AT DISCHARGE           [facility]        Total time spent discharging patient less than 30 minutes.    Signed:  Clay Thompson MD  7/17/2023  20:18 EDT

## 2025-04-11 NOTE — THERAPY EVALUATION
Acute Care - Occupational Therapy Initial Evaluation   Stormy Rivera     Patient Name: Nicolasa Woodall  : 1930  MRN: [id number]  Today's Date: 6/3/2018  Onset of Illness/Injury or Date of Surgery: 18  Date of Referral to OT: 18  Referring Physician: Dr Byers    Admit Date: 2018       ICD-10-CM ICD-9-CM   1. Hyponatremia E87.1 276.1   2. Confusion R41.0 298.9   3. Oral phase dysphagia R13.11 787.21     Patient Active Problem List   Diagnosis   • Traumatic intraventricular hemorrhage   • Hyponatremia   • Traumatic rhabdomyolysis   • Syncope   • Abnormal urinalysis   • Essential hypertension   • Chronic kidney disease, stage 3   • AMI (acute kidney injury)   • Dehydration   • Collapse of vertebra   • PAC (premature atrial contraction)     Past Medical History:   Diagnosis Date   • Depression    • Disease of thyroid gland    • Hypertension    • Pulmonary embolism     SPONTANEOUS HEMMORHAGE   • Subdural hematoma      Past Surgical History:   Procedure Laterality Date   • BREAST BIOPSY      BENIGN   • HYSTERECTOMY     • JOINT REPLACEMENT     • THYROID SURGERY      PARATHYROID REMOVAL   • VENA CAVA FILTER INSERTION            OT ASSESSMENT FLOWSHEET (last 72 hours)      Occupational Therapy Evaluation     Row Name 18 0916                   OT Evaluation Time/Intention    Subjective Information complains of;pain   right arm from IV  -EN        Document Type re-evaluation  -EN        Mode of Treatment occupational therapy  -EN        Patient Effort adequate  -EN        Symptoms Noted During/After Treatment shortness of breath   Patient SOA, O2 sat was 100%  -EN           General Information    Patient Profile Reviewed? yes  -EN        Onset of Illness/Injury or Date of Surgery 18  -EN        Referring Physician Dr Byers  -EN        Patient Observations alert;agree to therapy  -EN        Patient/Family Observations Patient supine in bed, agreed to re-evaluation.  -EN        Pertinent  History of Current Functional Problem Re-evaluation ordered due to family request.  -EN           Cognitive Assessment/Intervention- PT/OT    Affect/Mental Status (Cognitive) sad/depressed affect  -EN        Orientation Status (Cognition) oriented x 4  -EN        Follows Commands (Cognition) WFL  -EN        Personal Safety Interventions gait belt;nonskid shoes/slippers when out of bed  -EN           Safety Issues, Functional Mobility    Comment, Safety Issues/Impairments (Mobility) Patient somewhat anxious during functional mobility.  Patient SOA, however oxygens sats were 100% following mobility.    -EN           Bed Mobility Assessment/Treatment    Bed Mobility Assessment/Treatment supine-sit  -EN        Supine-Sit [place] (Bed Mobility) supervision   extended time to complete  -EN        Assistive Device (Bed Mobility) bed rails;head of bed elevated  -EN           Functional Mobility    Functional Mobility- Ind. Level contact guard assist;verbal cues required  -EN        Functional Mobility- Device rolling walker  -EN        Functional Mobility-Distance (Feet) 250  -EN        Functional Mobility- Comment Patient veered to right with walker during functional mobility    -EN           Transfer Assessment/Treatment    Sit-Stand [place] (Transfers) stand by assist  -EN        Stand-Sit [place] (Transfers) stand by assist;verbal cues  -EN           Sit-Stand Transfer    Assistive Device (Sit-Stand Transfers) walker, front-wheeled  -EN           Stand-Sit Transfer    Assistive Device (Stand-Sit Transfers) walker, front-wheeled  -EN           Lower Body Dressing Assessment/Training    Lower Body Dressing [place] Level contact guard assist   extended time to complete don/doffing socks  -EN           General ROM    GENERAL ROM COMMENTS B UE AROM WFL  -EN           General Assessment (Manual Muscle Testing)    Comment, General Manual Muscle Testing (MMT) Assessment B UE strength WFL  -EN            "Positioning and Restraints    Pre-Treatment Position in bed  -EN        Post Treatment Position chair  -EN        In Chair reclined;call light within reach;encouraged to call for assist;notified nsg   notified MD of performance  -EN           Pain Scale: Numbers Pre/Post-Treatment    Pain Scale: Numbers, Pretreatment --   did not rate, stated \"soreness\"  -EN           Plan of Care Review    Plan of Care Reviewed With patient  -EN           Clinical Impression (OT)    Date of Referral to OT 06/03/18  -EN        OT Diagnosis weakness  -EN        Functional Level at Time of Evaluation (OT Eval) Patient performed bed mobility with supervision and required increased time to complete.  Patient stood with SBA and performed functional mobility (250 ft) with CGA/RW.  Patient veered to right side when walking in hallway with rolling walker and became SOA (O2 was 100%).  Patient's with flat affect with c/o of arm pain from where an IV had previously been placed.  Patient required CGA for LB ADLs and required extended time to don/doff socks.   -EN        Patient/Family Goals Statement (OT Eval) Patient wants to go home but did agree STR may be beneficial.  -EN        Criteria for Skilled Therapeutic Interventions Met (OT Eval) yes;treatment indicated  -EN        Rehab Potential (OT Eval) good, to achieve stated therapy goals  -EN        Therapy Frequency (OT Eval) 5 times/wk  -EN        Predicted Duration of Therapy Intervention (OT Eval) will continue to assess  -EN        Care Plan Review (OT) evaluation/treatment results reviewed  -EN        Anticipated Equipment Needs at Discharge (OT) --   may benefit from  AE  -EN        Anticipated Discharge Disposition (OT) skilled nursing facility  -EN           Vital Signs    Post SpO2 (%) 100  -EN        O2 Delivery Post Treatment room air  -EN        Post Patient Position Sitting  -EN           Planned OT Interventions    Planned Therapy Interventions (OT Eval) adaptive equipment " training;BADL retraining;strengthening exercise;transfer/mobility retraining  -EN           OT Goals    Dressing Goal Selection (OT) dressing, OT goal 1  -EN           Transfer Goal 1 (OT)    Activity/Assistive Device (Transfer Goal 1, OT) sit-to-stand/stand-to-sit;toilet;walker, rolling  -EN        [place] Level/Cues Needed (Transfer Goal 1, OT) supervision required  -EN        Time Frame (Transfer Goal 1, OT) 3 days  -EN        Progress/Outcome (Transfer Goal 1, OT) other (see comments)   new goal  -EN           Dressing Goal 1 (OT)    Activity/Assistive Device (Dressing Goal 1, OT) lower body dressing;reacher;sock-aid;long handled shoe horn  -EN        [place]/Cues Needed (Dressing Goal 1, OT) supervision required  -EN        Time Frame (Dressing Goal 1, OT) 5 days  -EN        Progress/Outcome (Dressing Goal 1, OT) other (see comments)   new goal  -EN           Patient Education Goal (OT)    Activity (Patient Education Goal, OT) Patient to demo ind with UE HEP  -EN        [place]/Cues/Accuracy (Memory Goal 2, OT) demonstrates adequately  -EN        Time Frame (Patient Education Goal, OT) 4 days  -EN        Progress/Outcome (Patient Education Goal, OT) other (see comments)   new goal  -EN          User Key  (r) = Recorded By, (t) = Taken By, (c) = Cosigned By    Initials Name Effective Dates    EN Bree TITUS Neena, OTR 06/22/16 -            Occupational Therapy Education     Title: PT OT SLP Therapies (Active)     Topic: Occupational Therapy (Active)     Point: ADL training (Done)     Description: Instruct learner(s) on proper safety adaptation and remediation techniques during self care or transfers.   Instruct in proper use of assistive devices.   Learning Progress Summary     Learner Status Readiness Method Response Comment Documented by    Patient Done Acceptance E VU Patient educated on benefits of activity safety during functional transfers and mobility. EN 06/03/18 1153     Done Acceptance  E VU Patient educated on benefits of rolling walker for safe mobility. EN 05/30/18 1404    Family Done Acceptance E VU Patient educated on benefits of rolling walker for safe mobility. EN 05/30/18 1404                      User Key     Initials Effective Dates Name Provider Type Discipline    EN 06/22/16 -  Bree Chaudhary OTR Occupational Therapist OT                  OT Recommendation and Plan  Outcome Summary/Treatment Plan (OT)  Anticipated Equipment Needs at Discharge (OT):  (may benefit from [facility] AE)  Anticipated Discharge Disposition (OT): skilled nursing facility  Planned Therapy Interventions (OT Eval): adaptive equipment training, BADL retraining, strengthening exercise, transfer/mobility retraining  Therapy Frequency (OT Eval): 5 times/wk  Plan of Care Review  Plan of Care Reviewed With: patient  Plan of Care Reviewed With: patient  Outcome Summary: OT evaluation completed.  Patient performed supine to sit with supervision however required extended time to complete.  Patient complained of right arm pain where an IV was previously placed.  Patient with flat affect and was SOA with functional mobility (O2 sats on room air was at 100% following mobility).  Patient performed sit to stand transfers with SBA and performed functional mobility with rolling walker and CGA (veered to right side several times during mobility).  Patient required extended time and CGA for lower body dressing. Patient with noticeable change in afffect and functional status since evaluation on 5/30/18.  Patient would benefit from STR prior to discharge home.  OT to follow while in hospital.          Outcome Measures     Row Name 06/03/18 1200             How much help from another is currently needed...    Putting on and taking off regular lower body clothing? 3  -EN      Bathing (including washing, rinsing, and drying) 3  -EN      Toileting (which includes using toilet bed pan or urinal) 3  -EN      Putting on and taking off regular  upper body clothing 4  -EN      Taking care of personal grooming (such as brushing teeth) 4  -EN      Eating meals 4  -EN      Score 21  -EN        User Key  (r) = Recorded By, (t) = Taken By, (c) = Cosigned By    Initials Name Provider Type    ARNAV Olivares Occupational Therapist          Time Calculation:   OT Start Time: 0915      Therapy Charges for Today     Code Description Service Date Service Provider Modifiers Qty    [id number]  OT EVAL LOW COMPLEXITY 2 6/3/2018 ARNAV Cespedes GO 1            ARNAV Myers  6/3/2018   Yes

## (undated) DEVICE — SUT FW #2 W/TPR NDL 1/2 CIR 38IN 97CM 26.5MM BLU: Type: IMPLANTABLE DEVICE | Site: HIP | Status: NON-FUNCTIONAL

## (undated) DEVICE — SPNG GZ WOVN 4X4IN 12PLY 10/BX STRL

## (undated) DEVICE — PK KN TOTL 90

## (undated) DEVICE — APPL CHLORAPREP HI/LITE 26ML ORNG

## (undated) DEVICE — DRAPE,U/ SHT,SPLIT,PLAS,STERIL: Brand: MEDLINE

## (undated) DEVICE — INTENDED FOR TISSUE SEPARATION, AND OTHER PROCEDURES THAT REQUIRE A SHARP SURGICAL BLADE TO PUNCTURE OR CUT.: Brand: BARD-PARKER ® STAINLESS STEEL BLADES

## (undated) DEVICE — SPONGE,DRAIN,NONWVN,4"X4",6PLY,STRL,LF: Brand: MEDLINE

## (undated) DEVICE — GLV SURG NEOLON 2G PF LF 7.5 STRL

## (undated) DEVICE — FRAZIER SUCTION INSTRUMENT 10 FR W/CONTROL VENT & OBTURATOR: Brand: FRAZIER

## (undated) DEVICE — Device

## (undated) DEVICE — INTENT TO BE USED WITH SUTURE MATERIAL FOR TISSUE CLOSURE: Brand: RICHARD-ALLAN® NEEDLE 1/2 CIRCLE TAPER

## (undated) DEVICE — SUT VIC 0 CT1 CR8 18IN J840D

## (undated) DEVICE — DRSNG SURESITE WNDW 4X4.5

## (undated) DEVICE — BOWL AND CEMENT CARTRIDGE WITH BREAKAWAY FEMORAL NOZZLE: Brand: ACM

## (undated) DEVICE — SOL ISO/ALC RUB 70PCT 4OZ

## (undated) DEVICE — ELECTRD BLD EZ CLN STD 4IN

## (undated) DEVICE — IRRIGATOR BULB 60CC

## (undated) DEVICE — PILLW ABD MD

## (undated) DEVICE — 3M™ STERI-DRAPE™ U-DRAPE 1015: Brand: STERI-DRAPE™

## (undated) DEVICE — SUT FW 5 W .5 CIR CUT NDL 48M AR7211: Type: IMPLANTABLE DEVICE | Site: HIP | Status: NON-FUNCTIONAL

## (undated) DEVICE — DRSNG PAD ABD 8X10IN STRL

## (undated) DEVICE — SPNG LAP 18X18IN LF STRL PK/5

## (undated) DEVICE — GLV SURG SENSICARE W/ALOE PF LF 7.5 STRL

## (undated) DEVICE — SYS SKIN CLS DERMABOND PRINEO W/22CM MESH TP

## (undated) DEVICE — DRAPE,HIP,W/POUCHES,STERILE: Brand: MEDLINE

## (undated) DEVICE — SUT VIC 2/0 CT1 CR8 18IN J839D

## (undated) DEVICE — MEDI-VAC YANK SUCT HNDL W/TPRD BULBOUS TIP: Brand: CARDINAL HEALTH

## (undated) DEVICE — 3M™ STERI-DRAPE™ INSTRUMENT POUCH 1018: Brand: STERI-DRAPE™

## (undated) DEVICE — 3M™ MEDIPORE™ H SOFT CLOTH SURGICAL TAPE 2862, 2 INCH X 10 YARD (5CM X 9,1M), 12 ROLLS/CASE: Brand: 3M™ MEDIPORE™

## (undated) DEVICE — GLV SURG SENSICARE PI MIC PF SZ7.5 LF STRL

## (undated) DEVICE — DRSNG TELFA PAD NONADH STR 1S 3X8IN

## (undated) DEVICE — GLV SURG SENSICARE PI LF PF 7.5

## (undated) DEVICE — 3M™ IOBAN™ 2 ANTIMICROBIAL INCISE DRAPE 6651EZ: Brand: IOBAN™ 2

## (undated) DEVICE — PILLW ABD SM

## (undated) DEVICE — HOOD, PEEL-AWAY: Brand: FLYTE

## (undated) DEVICE — DECANT BG O JET

## (undated) DEVICE — COAXIAL FEMORAL CANAL TIP

## (undated) DEVICE — TRAP FLD MINIVAC MEGADYNE 100ML

## (undated) DEVICE — CONTAINER,SPECIMEN,OR STERILE,4OZ: Brand: MEDLINE

## (undated) DEVICE — DUAL CUT SAGITTAL BLADE

## (undated) DEVICE — 1010 S-DRAPE TOWEL DRAPE 10/BX: Brand: STERI-DRAPE™

## (undated) DEVICE — APPL CHLORAPREP W/TINT 26ML ORNG

## (undated) DEVICE — IMPLANTABLE DEVICE: Type: IMPLANTABLE DEVICE | Site: HIP | Status: NON-FUNCTIONAL

## (undated) DEVICE — GAUZE,SPONGE,4"X4",16PLY,XRAY,STRL,LF: Brand: MEDLINE

## (undated) DEVICE — PENCL E/S ULTRAVAC TELESCP NOSE HOLSTR 10FT

## (undated) DEVICE — DECANTER: Brand: UNBRANDED

## (undated) DEVICE — GLV SURG SENSICARE PI MIC PF SZ8 LF STRL

## (undated) DEVICE — INTENDED USE FOR SURGICAL MARKING ON INTACT SKIN, ALSO PROVIDES A PERMANENT METHOD OF IDENTIFYING OBJECTS IN THE OPERATING ROOM: Brand: WRITESITE® REGULAR TIP SKIN MARKER

## (undated) DEVICE — GLV SURG SENSICARE ORTHO PF LF 7 STRL